# Patient Record
Sex: MALE | Race: WHITE | NOT HISPANIC OR LATINO | Employment: OTHER | URBAN - METROPOLITAN AREA
[De-identification: names, ages, dates, MRNs, and addresses within clinical notes are randomized per-mention and may not be internally consistent; named-entity substitution may affect disease eponyms.]

---

## 2017-04-05 ENCOUNTER — OFFICE VISIT (OUTPATIENT)
Dept: CARDIOLOGY | Facility: MEDICAL CENTER | Age: 82
End: 2017-04-05
Payer: MEDICARE

## 2017-04-05 VITALS
HEIGHT: 70 IN | HEART RATE: 88 BPM | SYSTOLIC BLOOD PRESSURE: 102 MMHG | WEIGHT: 157 LBS | BODY MASS INDEX: 22.48 KG/M2 | OXYGEN SATURATION: 95 % | DIASTOLIC BLOOD PRESSURE: 60 MMHG

## 2017-04-05 DIAGNOSIS — J44.9 CHRONIC OBSTRUCTIVE PULMONARY DISEASE, UNSPECIFIED COPD TYPE (HCC): ICD-10-CM

## 2017-04-05 DIAGNOSIS — E78.00 HYPERCHOLESTEROLEMIA: ICD-10-CM

## 2017-04-05 DIAGNOSIS — I10 ESSENTIAL HYPERTENSION: ICD-10-CM

## 2017-04-05 DIAGNOSIS — I25.10 CORONARY ARTERY DISEASE INVOLVING NATIVE CORONARY ARTERY OF NATIVE HEART WITHOUT ANGINA PECTORIS: ICD-10-CM

## 2017-04-05 LAB — EKG IMPRESSION: NORMAL

## 2017-04-05 PROCEDURE — 1101F PT FALLS ASSESS-DOCD LE1/YR: CPT | Mod: 8P | Performed by: NURSE PRACTITIONER

## 2017-04-05 PROCEDURE — G8420 CALC BMI NORM PARAMETERS: HCPCS | Performed by: NURSE PRACTITIONER

## 2017-04-05 PROCEDURE — G8432 DEP SCR NOT DOC, RNG: HCPCS | Performed by: NURSE PRACTITIONER

## 2017-04-05 PROCEDURE — 1036F TOBACCO NON-USER: CPT | Performed by: NURSE PRACTITIONER

## 2017-04-05 PROCEDURE — G8598 ASA/ANTIPLAT THER USED: HCPCS | Performed by: NURSE PRACTITIONER

## 2017-04-05 PROCEDURE — 93000 ELECTROCARDIOGRAM COMPLETE: CPT | Performed by: NURSE PRACTITIONER

## 2017-04-05 PROCEDURE — 99214 OFFICE O/P EST MOD 30 MIN: CPT | Performed by: NURSE PRACTITIONER

## 2017-04-05 PROCEDURE — 4040F PNEUMOC VAC/ADMIN/RCVD: CPT | Mod: 8P | Performed by: NURSE PRACTITIONER

## 2017-04-05 RX ORDER — METHOCARBAMOL 750 MG/1
750 TABLET, FILM COATED ORAL 4 TIMES DAILY
COMMUNITY
End: 2018-11-19

## 2017-04-05 ASSESSMENT — ENCOUNTER SYMPTOMS
HEADACHES: 0
LOSS OF CONSCIOUSNESS: 0
SHORTNESS OF BREATH: 0
PALPITATIONS: 0
PND: 0
FEVER: 0
BRUISES/BLEEDS EASILY: 0
ORTHOPNEA: 0
MYALGIAS: 0
ABDOMINAL PAIN: 0
DIZZINESS: 0
CHILLS: 0

## 2017-04-05 NOTE — MR AVS SNAPSHOT
"iVpiniliarocio Donato   2017 1:40 PM   Office Visit   MRN: [id number]    Department:  Heart [facility]   Dept Phone:  [phone]    Description:  Male : 1932   Provider:  MICHAELA Orellana           Reason for Visit     Follow-Up           Allergies as of 2017     No Known Allergies      You were diagnosed with     Coronary artery disease involving native coronary artery of native heart without angina pectoris   [8956856]       Chronic obstructive pulmonary disease, unspecified COPD type (CMS-HCC)   [7978526]       Essential hypertension   [8716378]       Hypercholesterolemia   [022343]         Vital Signs     Blood Pressure Pulse Height Weight Body Mass Index Oxygen Saturation    102/60 mmHg 88 1.778 m (5' 10\") 71.215 kg (157 lb) 22.53 kg/m2 95%    Smoking Status                   Former Smoker           Basic Information     Date Of Birth Sex Race Ethnicity Preferred Language    1932 Male White Non- English      Your appointments     Apr 10, 2017  3:00 PM   NM CARDIAC STRESS TEST (30) with [facility] FORTE 1   [facility] ([facility])    [address] [place] [id number]   [phone]           NPO for 4 hours prior to scan.  No caffeine for 24 hours prior to test (decaf, coffee, cola, tea, chocolate, Excedrin, and Anacin).  No Viagra or other ED meds for 48 hours.  Warn patient of length of test and to bring list of meds.              Problem List              ICD-10-CM Priority Class Noted - Resolved    Right shoulder pain M25.511 Medium  2015 - Present    SVT (supraventricular tachycardia) (CMS-HCC) I47.1 High  2015 - Present    Coronary artery disease involving native coronary artery I25.10 High  2015 - Present    Hypercholesterolemia E78.00 High  2015 - Present    GERD (gastroesophageal reflux disease) K21.9 Medium  2015 - Present    Essential hypertension I10 High  2016 - Present    COPD (chronic " obstructive pulmonary disease) (CMS-HCC) J44.9 Medium  4/5/2017 - Present      Health Maintenance        Date Due Completion Dates    IMM DTaP/Tdap/Td Vaccine (1 - Tdap) 11/6/1951 ---    COLONOSCOPY 11/6/1982 ---    IMM ZOSTER VACCINE 11/6/1992 ---    IMM PNEUMOCOCCAL 65+ (ADULT) LOW/MEDIUM RISK SERIES (1 of 2 - PCV13) 11/6/1997 ---            Results       Current Immunizations     No immunizations on file.      Below and/or attached are the medications your provider expects you to take. Review all of your home medications and newly ordered medications with your provider and/or pharmacist. Follow medication instructions as directed by your provider and/or pharmacist. Please keep your medication list with you and share with your provider. Update the information when medications are discontinued, doses are changed, or new medications (including over-the-counter products) are added; and carry medication information at all times in the event of emergency situations     Allergies:  No Known Allergies          Medications  Valid as of: April 05, 2017 -  2:25 PM    Generic Name Brand Name Tablet Size Instructions for use    Aspirin (Tab) aspirin 81 MG Take 81 mg by mouth every day.        Cyanocobalamin (Tab) vitamin B-12 1000 MCG Take 1,000 mcg by mouth every day.        Methocarbamol (Tab) ROBAXIN 750 MG Take 750 mg by mouth 4 times a day.        Nitroglycerin (SL Tab) NITROSTAT 0.4 MG Place 0.4 mg under tongue every 5 minutes as needed for Chest Pain.        Omeprazole (CAPSULE DELAYED RELEASE) PRILOSEC 20 MG Take 20 mg by mouth 2 times a day.        Pravastatin Sodium (Tab) PRAVACHOL 40 MG Take 40 mg by mouth every evening.        Spironolactone (Tab) ALDACTONE 25 MG Take 25 mg by mouth every day.        TraMADol HCl (Tab) ULTRAM 50 MG Take  mg by mouth every four hours as needed.        Verapamil HCl (Tab CR) CALAN- MG Take 1 Tab by mouth every day.        .                 Medicines prescribed today were  sent to:     [facility] [address] [place] [address], [place] - [address] [facility]    [address] [facility] [address]    Phone: [phone] Fax: [phone]    Open 24 Hours?: No      Medication refill instructions:       If your prescription bottle indicates you have medication refills left, it is not necessary to call your provider’s office. Please contact your pharmacy and they will refill your medication.    If your prescription bottle indicates you do not have any refills left, you may request refills at any time through one of the following ways: The online [facility] system (except Urgent Care), by calling your provider’s office, or by asking your pharmacy to contact your provider’s office with a refill request. Medication refills are processed only during regular business hours and may not be available until the next business day. Your provider may request additional information or to have a follow-up visit with you prior to refilling your medication.   *Please Note: Medication refills are assigned a new Rx number when refilled electronically. Your pharmacy may indicate that no refills were authorized even though a new prescription for the same medication is available at the pharmacy. Please request the medicine by name with the pharmacy before contacting your provider for a refill.        Your To Do List     Future Labs/Procedures Complete By Expires    Echocardiogram Comp w/o Cont  As directed 4/5/2018    NM-CARDIAC STRESS TEST  As directed 10/5/2017         [facility] Access Code: Activation code not generated  Current [facility] Status: Active

## 2017-04-05 NOTE — Clinical Note
[facility]   [address]   [address]  [place] [address]  Phone: [phone]  Fax: [phone]              Kyler Donato  11/6/1932    Encounter Date: 4/5/2017    MICHAELA Orellana          PROGRESS NOTE:  Subjective:   Kyler Donato is a 84 y.o. male who presents today for cardiac pre-operative clearance for left shoulder surgery.    Kyler is an 84 year old male with history of CAD, status post remote PCI/stent to the LAD in 1995, hypertension, hyperlipidemia, PSVT and COPD, previously followed by Dr. Castillo, and more recently, Dr. Blake.    He is hoping to have left shoulder surgery, given torn rotator cuff, but needs cardiac clearance. Generally, he has been doing well. At the last visit, he was told to stop his Aldactone, but he developed some chest discomfort after stopping it, which was relieved with NTG. So, he restarted it, and symptoms are improved. No palpitations; some shortness of breath related to COPD; no orthopnea or PND; no dizziness or syncope; no edema. He does have chronic pain in his left shoulder.    Past Medical History   Diagnosis Date   • Acute anterolateral myocardial infarction (CMS-[facility]) 1995   • CAD (coronary artery disease) 1995     PCI/stent to the LAD   • Hypertension    • Hyperlipidemia    • COPD (chronic obstructive pulmonary disease) (CMS-HCC)    • Shoulder pain    • PSVT (paroxysmal supraventricular tachycardia) (CMS-HCC)      Past Surgical History   Procedure Laterality Date   • Shoulder orif     • Hand surgery     • Elbowplasty       ulnar nerve transposition     Family History   Problem Relation Age of Onset   • Heart Disease Father      History   Smoking status   • Former Smoker   • Quit date: 09/24/1995   Smokeless tobacco   • Never Used     No Known Allergies  Outpatient Encounter Prescriptions as of 4/5/2017   Medication Sig Dispense Refill   • methocarbamol (ROBAXIN) 750 MG Tab Take 750  "mg by mouth 4 times a day.     • Cyanocobalamin (VITAMIN B-12) 1000 MCG Tab Take 1,000 mcg by mouth every day.     • verapamil ER (CALAN-SR) 240 MG Tab CR Take 1 Tab by mouth every day. (Patient taking differently: Take 120 mg by mouth every day.) 30 Tab 11   • omeprazole (PRILOSEC) 20 MG delayed-release capsule Take 20 mg by mouth 2 times a day.     • spironolactone (ALDACTONE) 25 MG Tab Take 25 mg by mouth every day.     • tramadol (ULTRAM) 50 MG Tab Take  mg by mouth every four hours as needed.     • aspirin 81 MG tablet Take 81 mg by mouth every day.     • pravastatin (PRAVACHOL) 40 MG tablet Take 40 mg by mouth every evening.     • nitroglycerin (NITROSTAT) 0.4 MG SL Tab Place 0.4 mg under tongue every 5 minutes as needed for Chest Pain.       No facility-administered encounter medications on file as of 4/5/2017.     Review of Systems   Constitutional: Negative for fever and chills.   Respiratory: Negative for shortness of breath.    Cardiovascular: Negative for chest pain, palpitations, orthopnea, leg swelling and PND.   Gastrointestinal: Negative for abdominal pain.   Musculoskeletal: Positive for joint pain. Negative for myalgias.        Left shoulder   Neurological: Negative for dizziness, loss of consciousness and headaches.   Endo/Heme/Allergies: Does not bruise/bleed easily.        Objective:   /60 mmHg  Pulse 88  Ht 1.778 m (5' 10\")  Wt 71.215 kg (157 lb)  BMI 22.53 kg/m2  SpO2 95%    Physical Exam   Constitutional: He is oriented to person, place, and time. He appears well-developed and well-nourished.   HENT:   Head: Normocephalic and atraumatic.   Eyes: Conjunctivae and EOM are normal.   Neck: No JVD present. No thyroid mass present.   Cardiovascular: Normal rate, regular rhythm, S1 normal, S2 normal, intact distal pulses and normal pulses.   No extrasystoles are present. PMI is not displaced.  Exam reveals no gallop.    No murmur heard.  Pulses:       Carotid pulses are 2+ on the " right side, and 2+ on the left side.       Radial pulses are 2+ on the right side, and 2+ on the left side.        Femoral pulses are 2+ on the left side.       Dorsalis pedis pulses are 2+ on the right side, and 2+ on the left side.        Posterior tibial pulses are 2+ on the right side, and 2+ on the left side.   No significant murmurs.   Pulmonary/Chest: Effort normal and breath sounds normal.   Abdominal: Soft. Normal appearance. He exhibits no abdominal bruit. There is no hepatosplenomegaly.   Musculoskeletal: Normal range of motion. He exhibits no edema.        Thoracic back: He exhibits no tenderness and no spasm.   Neurological: He is alert and oriented to person, place, and time.   Skin: No cyanosis. Nails show no clubbing.   Psychiatric: He has a normal mood and affect.     EKG today reveals: sinus rhythm at 74 bpm, with anterior infarct (unchange from previous).    Assessment:     1. Coronary artery disease involving native coronary artery of native heart without angina pectoris  [facility] (Clinic Performed)    Echocardiogram Comp w/o Cont    NM-CARDIAC STRESS TEST   2. Chronic obstructive pulmonary disease, unspecified COPD type (CMS-HCC)     3. Essential hypertension     4. Hypercholesterolemia         Medical Decision Making:  Today's Assessment / Status / Plan:     1. CAD, status post remote PCI/stent to the LAD in 1995.  Last MPI and echo were in 2015. To repeat these, given he has had some symptoms of chest pain, relieved with NTG. He is agreeable.    2. COPD, related to smoking history, stable.    3. Hypertension, treated and stable.    4. Hyperlipidemia, treated with Pravachol.    Plan as above: proceed with echo and MPI. We will determine clearance based on these results. Same medications for now.    Collaborating MD: Hernan      No Recipients

## 2017-04-05 NOTE — PROGRESS NOTES
Subjective:   Kyler Donato is a 84 y.o. male who presents today for cardiac pre-operative clearance for left shoulder surgery.    Kyler is an 84 year old male with history of CAD, status post remote PCI/stent to the LAD in 1995, hypertension, hyperlipidemia, PSVT and COPD, previously followed by Dr. Castillo, and more recently, Dr. Blake.    He is hoping to have left shoulder surgery, given torn rotator cuff, but needs cardiac clearance. Generally, he has been doing well. At the last visit, he was told to stop his Aldactone, but he developed some chest discomfort after stopping it, which was relieved with NTG. So, he restarted it, and symptoms are improved. No palpitations; some shortness of breath related to COPD; no orthopnea or PND; no dizziness or syncope; no edema. He does have chronic pain in his left shoulder.    Past Medical History   Diagnosis Date   • Acute anterolateral myocardial infarction (CMS-HCC) 1995   • CAD (coronary artery disease) 1995     PCI/stent to the LAD   • Hypertension    • Hyperlipidemia    • COPD (chronic obstructive pulmonary disease) (CMS-HCC)    • Shoulder pain    • PSVT (paroxysmal supraventricular tachycardia) (CMS-HCC)      Past Surgical History   Procedure Laterality Date   • Shoulder orif     • Hand surgery     • Elbowplasty       ulnar nerve transposition     Family History   Problem Relation Age of Onset   • Heart Disease Father      History   Smoking status   • Former Smoker   • Quit date: 09/24/1995   Smokeless tobacco   • Never Used     No Known Allergies  Outpatient Encounter Prescriptions as of 4/5/2017   Medication Sig Dispense Refill   • methocarbamol (ROBAXIN) 750 MG Tab Take 750 mg by mouth 4 times a day.     • Cyanocobalamin (VITAMIN B-12) 1000 MCG Tab Take 1,000 mcg by mouth every day.     • verapamil ER (CALAN-SR) 240 MG Tab CR Take 1 Tab by mouth every day. (Patient taking differently: Take 120 mg by mouth every day.) 30 Tab 11   • omeprazole (PRILOSEC) 20  "MG delayed-release capsule Take 20 mg by mouth 2 times a day.     • spironolactone (ALDACTONE) 25 MG Tab Take 25 mg by mouth every day.     • tramadol (ULTRAM) 50 MG Tab Take  mg by mouth every four hours as needed.     • aspirin 81 MG tablet Take 81 mg by mouth every day.     • pravastatin (PRAVACHOL) 40 MG tablet Take 40 mg by mouth every evening.     • nitroglycerin (NITROSTAT) 0.4 MG SL Tab Place 0.4 mg under tongue every 5 minutes as needed for Chest Pain.       No facility-administered encounter medications on file as of 4/5/2017.     Review of Systems   Constitutional: Negative for fever and chills.   Respiratory: Negative for shortness of breath.    Cardiovascular: Negative for chest pain, palpitations, orthopnea, leg swelling and PND.   Gastrointestinal: Negative for abdominal pain.   Musculoskeletal: Positive for joint pain. Negative for myalgias.        Left shoulder   Neurological: Negative for dizziness, loss of consciousness and headaches.   Endo/Heme/Allergies: Does not bruise/bleed easily.        Objective:   /60 mmHg  Pulse 88  Ht 1.778 m (5' 10\")  Wt 71.215 kg (157 lb)  BMI 22.53 kg/m2  SpO2 95%    Physical Exam   Constitutional: He is oriented to person, place, and time. He appears well-developed and well-nourished.   HENT:   Head: Normocephalic and atraumatic.   Eyes: Conjunctivae and EOM are normal.   Neck: No JVD present. No thyroid mass present.   Cardiovascular: Normal rate, regular rhythm, S1 normal, S2 normal, intact distal pulses and normal pulses.   No extrasystoles are present. PMI is not displaced.  Exam reveals no gallop.    No murmur heard.  Pulses:       Carotid pulses are 2+ on the right side, and 2+ on the left side.       Radial pulses are 2+ on the right side, and 2+ on the left side.        Femoral pulses are 2+ on the left side.       Dorsalis pedis pulses are 2+ on the right side, and 2+ on the left side.        Posterior tibial pulses are 2+ on the right " side, and 2+ on the left side.   No significant murmurs.   Pulmonary/Chest: Effort normal and breath sounds normal.   Abdominal: Soft. Normal appearance. He exhibits no abdominal bruit. There is no hepatosplenomegaly.   Musculoskeletal: Normal range of motion. He exhibits no edema.        Thoracic back: He exhibits no tenderness and no spasm.   Neurological: He is alert and oriented to person, place, and time.   Skin: No cyanosis. Nails show no clubbing.   Psychiatric: He has a normal mood and affect.     EKG today reveals: sinus rhythm at 74 bpm, with anterior infarct (unchange from previous).    Assessment:     1. Coronary artery disease involving native coronary artery of native heart without angina pectoris  [facility] EKG (Clinic Performed)    Echocardiogram Comp w/o Cont    NM-CARDIAC STRESS TEST   2. Chronic obstructive pulmonary disease, unspecified COPD type (CMS-HCC)     3. Essential hypertension     4. Hypercholesterolemia         Medical Decision Making:  Today's Assessment / Status / Plan:     1. CAD, status post remote PCI/stent to the LAD in 1995.  Last MPI and echo were in 2015. To repeat these, given he has had some symptoms of chest pain, relieved with NTG. He is agreeable.    2. COPD, related to smoking history, stable.    3. Hypertension, treated and stable.    4. Hyperlipidemia, treated with Pravachol.    Plan as above: proceed with echo and MPI. We will determine clearance based on these results. Same medications for now.    Collaborating MD: Hernan

## 2017-04-07 ENCOUNTER — HOSPITAL ENCOUNTER (OUTPATIENT)
Dept: CARDIOLOGY | Facility: MEDICAL CENTER | Age: 82
End: 2017-04-07
Attending: NURSE PRACTITIONER
Payer: MEDICARE

## 2017-04-07 DIAGNOSIS — I25.10 CORONARY ARTERY DISEASE INVOLVING NATIVE CORONARY ARTERY OF NATIVE HEART WITHOUT ANGINA PECTORIS: ICD-10-CM

## 2017-04-07 LAB
LV EJECT FRACT  99904: 45
LV EJECT FRACT MOD 2C 99903: 40
LV EJECT FRACT MOD 4C 99902: 41.5
LV EJECT FRACT MOD BP 99901: 52.69

## 2017-04-07 PROCEDURE — 93306 TTE W/DOPPLER COMPLETE: CPT | Mod: 26 | Performed by: INTERNAL MEDICINE

## 2017-04-07 PROCEDURE — 93306 TTE W/DOPPLER COMPLETE: CPT

## 2017-04-10 ENCOUNTER — HOSPITAL ENCOUNTER (OUTPATIENT)
Dept: RADIOLOGY | Facility: MEDICAL CENTER | Age: 82
End: 2017-04-10
Attending: NURSE PRACTITIONER
Payer: MEDICARE

## 2017-04-10 DIAGNOSIS — I25.10 CORONARY ARTERY DISEASE INVOLVING NATIVE CORONARY ARTERY OF NATIVE HEART WITHOUT ANGINA PECTORIS: ICD-10-CM

## 2017-04-10 PROCEDURE — 700111 HCHG RX REV CODE 636 W/ 250 OVERRIDE (IP)

## 2017-04-10 PROCEDURE — A9502 TC99M TETROFOSMIN: HCPCS

## 2017-04-10 RX ORDER — REGADENOSON 0.08 MG/ML
INJECTION, SOLUTION INTRAVENOUS
Status: COMPLETED
Start: 2017-04-10 | End: 2017-04-10

## 2017-04-10 RX ADMIN — REGADENOSON 0.4 MG: 0.08 INJECTION, SOLUTION INTRAVENOUS at 16:09

## 2017-05-05 ENCOUNTER — APPOINTMENT (OUTPATIENT)
Dept: RADIOLOGY | Facility: MEDICAL CENTER | Age: 82
End: 2017-05-05
Attending: EMERGENCY MEDICINE
Payer: MEDICARE

## 2017-05-05 ENCOUNTER — HOSPITAL ENCOUNTER (EMERGENCY)
Facility: MEDICAL CENTER | Age: 82
End: 2017-05-05
Attending: EMERGENCY MEDICINE
Payer: MEDICARE

## 2017-05-05 VITALS
SYSTOLIC BLOOD PRESSURE: 132 MMHG | OXYGEN SATURATION: 97 % | HEART RATE: 89 BPM | HEIGHT: 71 IN | TEMPERATURE: 97.5 F | DIASTOLIC BLOOD PRESSURE: 78 MMHG | RESPIRATION RATE: 14 BRPM

## 2017-05-05 DIAGNOSIS — M25.531 ACUTE WRIST PAIN, RIGHT: ICD-10-CM

## 2017-05-05 DIAGNOSIS — M25.331 SCAPHOLUNATE DISSOCIATION, RIGHT: ICD-10-CM

## 2017-05-05 DIAGNOSIS — S69.91XA WRIST INJURY, RIGHT, INITIAL ENCOUNTER: ICD-10-CM

## 2017-05-05 PROCEDURE — 302874 HCHG BANDAGE ACE 2 OR 3""

## 2017-05-05 PROCEDURE — A9270 NON-COVERED ITEM OR SERVICE: HCPCS | Performed by: EMERGENCY MEDICINE

## 2017-05-05 PROCEDURE — 73110 X-RAY EXAM OF WRIST: CPT | Mod: RT

## 2017-05-05 PROCEDURE — 99284 EMERGENCY DEPT VISIT MOD MDM: CPT

## 2017-05-05 PROCEDURE — 29125 APPL SHORT ARM SPLINT STATIC: CPT

## 2017-05-05 PROCEDURE — 700111 HCHG RX REV CODE 636 W/ 250 OVERRIDE (IP): Performed by: EMERGENCY MEDICINE

## 2017-05-05 PROCEDURE — 700102 HCHG RX REV CODE 250 W/ 637 OVERRIDE(OP): Performed by: EMERGENCY MEDICINE

## 2017-05-05 RX ORDER — ONDANSETRON 4 MG/1
4 TABLET, ORALLY DISINTEGRATING ORAL ONCE
Status: COMPLETED | OUTPATIENT
Start: 2017-05-05 | End: 2017-05-05

## 2017-05-05 RX ORDER — OXYCODONE HYDROCHLORIDE AND ACETAMINOPHEN 5; 325 MG/1; MG/1
1 TABLET ORAL ONCE
Status: COMPLETED | OUTPATIENT
Start: 2017-05-05 | End: 2017-05-05

## 2017-05-05 RX ORDER — OXYCODONE HYDROCHLORIDE AND ACETAMINOPHEN 5; 325 MG/1; MG/1
1-2 TABLET ORAL EVERY 4 HOURS PRN
Qty: 20 TAB | Refills: 0 | Status: SHIPPED | OUTPATIENT
Start: 2017-05-05 | End: 2018-11-19

## 2017-05-05 RX ADMIN — OXYCODONE HYDROCHLORIDE AND ACETAMINOPHEN 1 TABLET: 5; 325 TABLET ORAL at 10:02

## 2017-05-05 RX ADMIN — OXYCODONE HYDROCHLORIDE AND ACETAMINOPHEN 1 TABLET: 5; 325 TABLET ORAL at 09:08

## 2017-05-05 RX ADMIN — ONDANSETRON 4 MG: 4 TABLET, ORALLY DISINTEGRATING ORAL at 09:08

## 2017-05-05 NOTE — ED NOTES
"Pt ambulatory to triage, pt c/o rt wrist pain s/p fall a week ago, everything else has \" healed\" , but he is worried about his wrist   "

## 2017-05-05 NOTE — ED AVS SNAPSHOT
Home Care Instructions                                                                                                                Kyler Donato   MRN: [id number]    Department:  [facility], Emergency Dept   Date of Visit:  5/5/2017            [facility], Emergency Dept    [address]    [place] [address]    Phone:  [phone]      You were seen by     Hill Wick M.D.      Your Diagnosis Was     Wrist injury, right, initial encounter     S69.91XA       These are the medications you received during your hospitalization from 05/05/2017 0828 to 05/05/2017 0958     Date/Time Order Dose Route Action    05/05/2017 0908 oxycodone-acetaminophen (PERCOCET) 5-325 MG per tablet 1 Tab 1 Tab Oral Given    05/05/2017 0908 ondansetron (ZOFRAN ODT) dispertab 4 mg 4 mg Oral Given      Medication Information     Review all of your home medications and newly ordered medications with your primary doctor and/or pharmacist as soon as possible. Follow medication instructions as directed by your doctor and/or pharmacist.     Please keep your complete medication list with you and share with your physician. Update the information when medications are discontinued, doses are changed, or new medications (including over-the-counter products) are added; and carry medication information at all times in the event of emergency situations.               Medication List      START taking these medications        Instructions    Morning Afternoon Evening Bedtime    oxycodone-acetaminophen 5-325 MG Tabs   Last time this was given:  1 Tab on 5/5/2017  9:08 AM   Commonly known as:  PERCOCET        Take 1-2 Tabs by mouth every four hours as needed.   Dose:  1-2 Tab                          ASK your doctor about these medications        Instructions    Morning Afternoon Evening Bedtime    aspirin 81 MG tablet        Take 81 mg by mouth every day.   Dose:  81 mg                        methocarbamol  750 MG Tabs   Commonly known as:  ROBAXIN        Take 750 mg by mouth 4 times a day.   Dose:  750 mg                        nitroglycerin 0.4 MG Subl   Commonly known as:  NITROSTAT        Place 0.4 mg under tongue every 5 minutes as needed for Chest Pain.   Dose:  0.4 mg                        omeprazole 20 MG delayed-release capsule   Commonly known as:  PRILOSEC        Take 20 mg by mouth 2 times a day.   Dose:  20 mg                        PRAVACHOL 40 MG tablet   Generic drug:  pravastatin        Take 40 mg by mouth every evening.   Dose:  40 mg                        spironolactone 25 MG Tabs   Commonly known as:  ALDACTONE        Take 25 mg by mouth every day.   Dose:  25 mg                        tramadol 50 MG Tabs   Commonly known as:  ULTRAM        Take  mg by mouth every four hours as needed.   Dose:   mg                        verapamil  MG Tbcr   Commonly known as:  CALAN-SR        Doctor's comments:  RX was printed by mistake, sending electronically..   Take 1 Tab by mouth every day.   Dose:  240 mg                        vitamin B-12 1000 MCG Tabs        Take 1,000 mcg by mouth every day.   Dose:  1000 mcg                             Where to Get Your Medications      You can get these medications from any pharmacy     Bring a paper prescription for each of these medications    - oxycodone-acetaminophen 5-325 MG Tabs            Procedures and tests performed during your visit     DX-WRIST-COMPLETE 3+ RIGHT        Discharge Instructions       Wrist Pain  Wrist injuries are frequent in adults and children. A sprain is an injury to the ligaments that hold your bones together. A strain is an injury to muscle or muscle cord-like structures (tendons) from stretching or pulling. Generally, when wrists are moderately tender to touch following a fall or injury, a break in the bone (fracture) may be present. Most wrist sprains or strains are better in 3 to 5 days, but complete healing may  take several weeks.  HOME CARE INSTRUCTIONS   · Put ice on the injured area.  ¨ Put ice in a plastic bag.  ¨ Place a towel between your skin and the bag.  ¨ Leave the ice on for 15-20 minutes, 3-4 times a day, for the first 2 days, or as directed by your health care provider.  · Keep your arm raised above the level of your heart whenever possible to reduce swelling and pain.  · Rest the injured area for at least 48 hours or as directed by your health care provider.  · If a splint or elastic bandage has been applied, use it for as long as directed by your health care provider or until seen by a health care provider for a follow-up exam.  · Only take over-the-counter or prescription medicines for pain, discomfort, or fever as directed by your health care provider.  · Keep all follow-up appointments. You may need to follow up with a specialist or have follow-up X-rays. Improvement in pain level is not a guarantee that you did not fracture a bone in your wrist. The only way to determine whether or not you have a broken bone is by X-ray.  SEEK IMMEDIATE MEDICAL CARE IF:   · Your fingers are swollen, very red, white, or cold and blue.  · Your fingers are numb or tingling.  · You have increasing pain.  · You have difficulty moving your fingers.  MAKE SURE YOU:   · Understand these instructions.  · Will watch your condition.  · Will get help right away if you are not doing well or get worse.     This information is not intended to replace advice given to you by your health care provider. Make sure you discuss any questions you have with your health care provider.     Document Released: 09/27/2006 Document Revised: 12/23/2014 Document Reviewed: 02/08/2012  Elsevier Interactive Patient Education ©2016 [employer]            Patient Information     Patient Information    Following emergency treatment: all patient requiring follow-up care must return either to a private physician or a clinic if your condition worsens before  you are able to obtain further medical attention, please return to the emergency room.     Billing Information    At [facility], we work to make the billing process streamlined for our patients.  Our Representatives are here to answer any questions you may have regarding your hospital bill.  If you have insurance coverage and have supplied your insurance information to us, we will submit a claim to your insurer on your behalf.  Should you have any questions regarding your bill, we can be reached online or by phone as follows:  Online: You are able pay your bills online or live chat with our representatives about any billing questions you may have. We are here to help Monday - Friday from 8:00am to 7:30pm and 9:00am - 12:00pm on Saturdays.  Please visit [email or web address]  for more information.   Phone:  [phone] or [phone]    Please note that your emergency physician, surgeon, pathologist, radiologist, anesthesiologist, and other specialists are not employed by [facility] and will therefore bill separately for their services.  Please contact them directly for any questions concerning their bills at the numbers below:     Emergency Physician Services:  [phone]  [facility]:  [phone]  [facility]:  [phone]  [facility]:  [phone]    1. Your final bill may vary from the amount quoted upon discharge if all procedures are not complete at that time, or if your doctor has additional procedures of which we are not aware. You will receive an additional bill if you return to the Emergency Department at [facility] for suture removal regardless of the facility of which the sutures were placed.     2. Please arrange for settlement of this account at the emergency registration.    3. All self-pay accounts are due in full at the time of treatment.  If you are unable to meet this obligation then payment is expected  within 4-5 days.     4. If you have had radiology studies (CT, X-ray, Ultrasound, MRI), you have received a preliminary result during your emergency department visit. Please contact the radiology department [phone] to receive a copy of your final result. Please discuss the Final result with your primary physician or with the follow up physician provided.     Crisis Hotline:  [place] Crisis Hotline:  4-486-CESZIIX or 1-621.977.7659  [place] Crisis Hotline:    1-472.223.3710 or 406-640-6117         ED Discharge Follow Up Questions    1. In order to provide you with very good care, we would like to follow up with a phone call in the next few days.  May we have your permission to contact you?     YES /  NO    2. What is the best phone number to call you? (       )_____-__________    3. What is the best time to call you?      Morning  /  Afternoon  /  Evening                   Patient Signature:  ____________________________________________________________    Date:  ____________________________________________________________

## 2017-05-05 NOTE — ED AVS SNAPSHOT
Cerac Access Code: Activation code not generated  Current Cerac Status: Active    [email or web address]  A secure, online tool to manage your health information     [employer] Cerac® is a secure, online tool that connects you to your personalized health information from the privacy of your home -- day or night - making it very easy for you to manage your healthcare. Once the activation process is completed, you can even access your medical information using the Cerac dana, which is available for free in the Apple Dana store or Google Play store.     Cerac provides the following levels of access (as shown below):   My Chart Features   [facility] Primary Care Doctor [facility]  Specialists [facility]  Urgent  Care Non-[facility]  Primary Care  Doctor   Email your healthcare team securely and privately 24/7 X X X X   Manage appointments: schedule your next appointment; view details of past/upcoming appointments X      Request prescription refills. X      View recent personal medical records, including lab and immunizations X X X X   View health record, including health history, allergies, medications X X X X   Read reports about your outpatient visits, procedures, consult and ER notes X X X X   See your discharge summary, which is a recap of your hospital and/or ER visit that includes your diagnosis, lab results, and care plan. X X       How to register for Cerac:  1. Go to  [email or web address].  2. Click on the Sign Up Now box, which takes you to the New Member Sign Up page. You will need to provide the following information:  a. Enter your Cerac Access Code exactly as it appears at the top of this page. (You will not need to use this code after you’ve completed the sign-up process. If you do not sign up before the expiration date, you must request a new code.)   b. Enter your date of birth.   c. Enter your home email address.   d. Click Submit, and follow the next screen’s instructions.  3. Create a Cerac ID. This will  be your [employer] login ID and cannot be changed, so think of one that is secure and easy to remember.  4. Create a [employer] password. You can change your password at any time.  5. Enter your Password Reset Question and Answer. This can be used at a later time if you forget your password.   6. Enter your e-mail address. This allows you to receive e-mail notifications when new information is available in [employer].  7. Click Sign Up. You can now view your health information.    For assistance activating your [employer] account, call [phone]

## 2017-05-05 NOTE — ED PROVIDER NOTES
ED Provider Note    Scribed for Hill Wick M.D. by Lucina Villatoro. 5/5/2017  8:59 AM    Primary care provider: Hernan Trimble Jr., M.D.  Means of arrival: Walk-in   History obtained from: Patient   History limited by: None     CHIEF COMPLAINT  Chief Complaint   Patient presents with   • T-5000 Extremity Pain       HPI  Kyler Donato is a 84 y.o. male who presents to the Emergency Department due to worsening pain to the right wrist. Patient fell 2 weeks ago and was seen at [facility]. He was discharged one week ago. Patient states his other injuries have healed but his wrist pain remains severe. He rates his pain as a 15/10 in severity. His pain radiates up the right arm. He denies further musculoskeletal pain    REVIEW OF SYSTEMS  Pertinent positives include right wrist pain.   Pertinent negatives include no further musculoskeletal pain.    All other systems reviewed and negative.  C     PAST MEDICAL HISTORY   has a past medical history of Acute anterolateral myocardial infarction (CMS-[facility]) (1995); CAD (coronary artery disease) (1995); Hypertension; Hyperlipidemia; COPD (chronic obstructive pulmonary disease) (CMS-HCC); Shoulder pain; and PSVT (paroxysmal supraventricular tachycardia) (CMS-HCC).    SURGICAL HISTORY   has past surgical history that includes shoulder orif; hand surgery; and elbowplasty.    SOCIAL HISTORY  Social History   Substance Use Topics   • Smoking status: Former Smoker     Quit date: 09/24/1995   • Smokeless tobacco: Never Used   • Alcohol Use: Yes      History   Drug Use No       FAMILY HISTORY  Family History   Problem Relation Age of Onset   • Heart Disease Father        CURRENT MEDICATIONS  No current facility-administered medications on file prior to encounter.     Current Outpatient Prescriptions on File Prior to Encounter   Medication Sig Dispense Refill   • methocarbamol (ROBAXIN) 750 MG Tab Take 750 mg by mouth 4 times a day.     • Cyanocobalamin (VITAMIN B-12) 1000 MCG Tab Take  "1,000 mcg by mouth every day.     • verapamil ER (CALAN-SR) 240 MG Tab CR Take 1 Tab by mouth every day. (Patient taking differently: Take 120 mg by mouth every day.) 30 Tab 11   • omeprazole (PRILOSEC) 20 MG delayed-release capsule Take 20 mg by mouth 2 times a day.     • spironolactone (ALDACTONE) 25 MG Tab Take 25 mg by mouth every day.     • pravastatin (PRAVACHOL) 40 MG tablet Take 40 mg by mouth every evening.     • tramadol (ULTRAM) 50 MG Tab Take  mg by mouth every four hours as needed.     • nitroglycerin (NITROSTAT) 0.4 MG SL Tab Place 0.4 mg under tongue every 5 minutes as needed for Chest Pain.     • aspirin 81 MG tablet Take 81 mg by mouth every day.         ALLERGIES  No Known Allergies    PHYSICAL EXAM  VITAL SIGNS: /78 mmHg  Pulse 89  Temp(Src) 36.4 °C (97.5 °F) (Temporal)  Resp 14  Ht 1.803 m (5' 11\")  SpO2 97%    Nursing note and vitals reviewed.  Constitutional: Well-developed and well-nourished. No distress.   HENT: Head is normocephalic and atraumatic. Oropharynx is clear and moist without exudate or erythema. Healing bilateral periorbital ecchymosis.   Eyes: Pupils are equal, round, and reactive to light. Conjunctiva are normal.   Cardiovascular: Normal rate and regular rhythm. No murmur heard. Normal radial pulses.  Pulmonary/Chest: Breath sounds normal. No wheezes or rales.   Abdominal: Soft and non-tender. No distention    Musculoskeletal: Extremities exhibit normal range of motion. Tenderness with bony deformity of the distal right forearm.   Neurological: Awake, alert and oriented to person, place, and time. No focal deficits noted.  Skin: Skin is warm and dry. No rash.   Psychiatric: Normal mood and affect. Appropriate for clinical situation    RADIOLOGY  DX-WRIST-COMPLETE 3+ RIGHT   Final Result      1.  No acute fractures identified.      2.  Widening of the scapholunate interval is consistent with ligamentous injury, age indeterminate.      3.  Severe arthropathy of " the lunate capitate, lunotriquetral, and first carpometacarpal joints.      4.  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      The radiologist's interpretation of all radiological studies have been reviewed by me.    COURSE & MEDICAL DECISION MAKING  Nursing notes, VS, PMSFHx reviewed in chart.     8:59 AM - Patient seen and examined at bedside. Patient will be treated with Percocet 325 mg oral and Zofran ODT 4 mg oral. Ordered right wrist x-ray to evaluate his symptoms. Will evaluate for wrist fracture.     9:43 AM Reviewed radiology results which indicate ligamentous injury without fracture, as indicated above.     9:53 AM Recheck: patient is resting in bed. He reports persistent pain. I will give Percocet 325 mg oral. I informed him of radiology results indicating ligamentous injury. He will be placed in a splint and discharged home with prescriptions for Percocet. Patient should follow up with orthopedics as soon as possible. Patient understands and agrees.     The patient was discharged home with an information sheet on wrist injury and told to return immediately for any signs or symptoms listed.  The patient agreed to the discharge precautions and follow-up plan which is documented in EPIC.       I reviewed prescription monitoring program for patient's narcotic use before prescribing a scheduled drug.The patient will not drink alcohol nor drive with prescribed medications. The patient will return for new or worsening symptoms and is stable at the time of discharge.    The patient is referred to a primary physician for blood pressure management, diabetic screening, and for all other preventative health concerns.    DISPOSITION:  Patient will be discharged home in stable condition.    FOLLOW UP:  No follow-up provider specified.    OUTPATIENT MEDICATIONS:  New Prescriptions    OXYCODONE-ACETAMINOPHEN  (PERCOCET) 5-325 MG TAB    Take 1-2 Tabs by mouth every four hours as needed.           FINAL IMPRESSION  1. Wrist injury, right, initial encounter    2. Acute wrist pain, right    3. Scapholunate dissociation, right          Lucina MOLINA (Scribsonia), am scribing for, and in the presence of, Hill Wick M.D..    Electronically signed by: Lucina Villatoro (Scribe), 5/5/2017    IHill M.D. personally performed the services described in this documentation, as scribed by Lucina Villatoro in my presence, and it is both accurate and complete.    The note accurately reflects work and decisions made by me.  Hill Wick  5/5/2017  11:40 AM

## 2017-05-05 NOTE — ED AVS SNAPSHOT
5/5/2017    Kyler Donato  [address] [address]    Dear Kyler:    [facility] wants to ensure your discharge home is safe and you or your loved ones have had all of your questions answered regarding your care after you leave the hospital.    Below is a list of resources and contact information should you have any questions regarding your hospital stay, follow-up instructions, or active medical symptoms.    Questions or Concerns Regarding… Contact   Medical Questions Related to Your Discharge  (7 days a week, 8am-5pm) Contact a Nurse Care Coordinator   [phone]   Medical Questions Not Related to Your Discharge  (24 hours a day / 7 days a week)  Contact the Nurse Health Line   [phone]    Medications or Discharge Instructions Refer to your discharge packet   or contact your [facility] Primary Care Provider   [phone]   Follow-up Appointment(s) Schedule your appointment via [employer]   or contact Scheduling [phone]   Billing Review your statement via [employer]  or contact Billing [phone]   Medical Records Review your records via [employer]   or contact Medical Records [phone]     You may receive a telephone call within two days of discharge. This call is to make certain you understand your discharge instructions and have the opportunity to have any questions answered. You can also easily access your medical information, test results and upcoming appointments via the [employer] free online health management tool. You can learn more and sign up at [employer]. For assistance setting up your [employer] account, please call [phone].    Once again, we want to ensure your discharge home is safe and that you have a clear understanding of any next steps in your care. If you have any questions or concerns, please do not hesitate to contact us, we are here for you. Thank you for choosing [facility] for your healthcare needs.    Sincerely,    Your [facility] Healthcare Team

## 2017-07-18 ENCOUNTER — OFFICE VISIT (OUTPATIENT)
Dept: CARDIOLOGY | Facility: MEDICAL CENTER | Age: 82
End: 2017-07-18
Payer: MEDICARE

## 2017-07-18 VITALS
HEIGHT: 66 IN | OXYGEN SATURATION: 97 % | HEART RATE: 86 BPM | SYSTOLIC BLOOD PRESSURE: 94 MMHG | WEIGHT: 146 LBS | DIASTOLIC BLOOD PRESSURE: 52 MMHG | BODY MASS INDEX: 23.46 KG/M2

## 2017-07-18 DIAGNOSIS — I10 ESSENTIAL HYPERTENSION: ICD-10-CM

## 2017-07-18 DIAGNOSIS — I25.10 CORONARY ARTERY DISEASE INVOLVING NATIVE CORONARY ARTERY OF NATIVE HEART WITHOUT ANGINA PECTORIS: ICD-10-CM

## 2017-07-18 DIAGNOSIS — R07.89 OTHER CHEST PAIN: ICD-10-CM

## 2017-07-18 DIAGNOSIS — E78.00 HYPERCHOLESTEROLEMIA: ICD-10-CM

## 2017-07-18 DIAGNOSIS — J44.9 CHRONIC OBSTRUCTIVE PULMONARY DISEASE, UNSPECIFIED COPD TYPE (HCC): ICD-10-CM

## 2017-07-18 PROBLEM — R07.9 CHEST PAIN: Status: ACTIVE | Noted: 2017-07-18

## 2017-07-18 PROCEDURE — 93000 ELECTROCARDIOGRAM COMPLETE: CPT | Performed by: NURSE PRACTITIONER

## 2017-07-18 PROCEDURE — 99214 OFFICE O/P EST MOD 30 MIN: CPT | Performed by: NURSE PRACTITIONER

## 2017-07-18 RX ORDER — NITROGLYCERIN 0.4 MG/1
0.4 TABLET SUBLINGUAL PRN
Qty: 25 TAB | Refills: 3 | Status: SHIPPED | OUTPATIENT
Start: 2017-07-18 | End: 2018-11-19

## 2017-07-18 ASSESSMENT — ENCOUNTER SYMPTOMS
LOSS OF CONSCIOUSNESS: 0
ORTHOPNEA: 0
HEADACHES: 0
PALPITATIONS: 0
FEVER: 0
ABDOMINAL PAIN: 0
CHILLS: 0
BRUISES/BLEEDS EASILY: 0
PND: 0
SHORTNESS OF BREATH: 0
DIZZINESS: 0
MYALGIAS: 0

## 2017-07-18 NOTE — MR AVS SNAPSHOT
"        Kyler Ryenolds   2017 2:00 PM   Office Visit   MRN: [id number]    Department:  [facility] BETTYE Mark   Dept Phone:  [phone]    Description:  Male : 1932   Provider:  MICHAELA Orellana           Allergies as of 2017     No Known Allergies      You were diagnosed with     Other chest pain   [786.59.ICD-9-CM]       Coronary artery disease involving native coronary artery of native heart without angina pectoris   [0986332]         Vital Signs     Blood Pressure Pulse Height Weight Body Mass Index Oxygen Saturation    94/52 mmHg 86 1.676 m (5' 5.98\") 66.225 kg (146 lb) 23.58 kg/m2 97%    Smoking Status                   Former Smoker           Basic Information     Date Of Birth Sex Race Ethnicity Preferred Language    1932 Male White Non- English      Problem List              ICD-10-CM Priority Class Noted - Resolved    Right shoulder pain M25.511 Medium  2015 - Present    SVT (supraventricular tachycardia) (CMS-HCC) I47.1 High  2015 - Present    Coronary artery disease involving native coronary artery I25.10 High  2015 - Present    Hypercholesterolemia E78.00 High  2015 - Present    GERD (gastroesophageal reflux disease) K21.9 Medium  2015 - Present    Essential hypertension I10 High  2016 - Present    COPD (chronic obstructive pulmonary disease) (CMS-HCC) J44.9 Medium  2017 - Present    Chest pain R07.9 Medium  2017 - Present      Health Maintenance        Date Due Completion Dates    IMM DTaP/Tdap/Td Vaccine (1 - Tdap) 1951 ---    COLONOSCOPY 1982 ---    IMM ZOSTER VACCINE 1992 ---    IMM PNEUMOCOCCAL 65+ (ADULT) LOW/MEDIUM RISK SERIES (1 of 2 - PCV13) 1997 ---    IMM INFLUENZA (1) 2017 ---            Results       Current Immunizations     No immunizations on file.      Below and/or attached are the medications your provider expects you to take. Review all of your home medications and newly ordered medications " with your provider and/or pharmacist. Follow medication instructions as directed by your provider and/or pharmacist. Please keep your medication list with you and share with your provider. Update the information when medications are discontinued, doses are changed, or new medications (including over-the-counter products) are added; and carry medication information at all times in the event of emergency situations     Allergies:  No Known Allergies          Medications  Valid as of: July 18, 2017 -  2:31 PM    Generic Name Brand Name Tablet Size Instructions for use    Aspirin (Tab) aspirin 81 MG Take 81 mg by mouth every day.        Cholecalciferol (Cap) Vitamin D3 2000 UNIT Take  by mouth.        Cyanocobalamin (Tab) vitamin B-12 1000 MCG Take 1,000 mcg by mouth every day.        Methocarbamol (Tab) ROBAXIN 750 MG Take 750 mg by mouth 4 times a day.        Nitroglycerin (SL Tab) NITROSTAT 0.4 MG Place 1 Tab under tongue as needed for Chest Pain.        Omeprazole (CAPSULE DELAYED RELEASE) PRILOSEC 20 MG Take 20 mg by mouth 2 times a day.        Oxycodone-Acetaminophen (Tab) PERCOCET 5-325 MG Take 1-2 Tabs by mouth every four hours as needed.        Pravastatin Sodium (Tab) PRAVACHOL 40 MG Take 40 mg by mouth every evening.        Spironolactone (Tab) ALDACTONE 25 MG Take 25 mg by mouth every day.        TraMADol HCl (Tab) ULTRAM 50 MG Take  mg by mouth every four hours as needed.        Verapamil HCl (Tab CR) CALAN- MG Take 1 Tab by mouth every day.        .                 Medicines prescribed today were sent to:     [facility] [address] [place], [place] [address] [facility] [address]    Phone: [phone] Fax: [phone]    Open 24 Hours?: No      Medication refill instructions:       If your prescription bottle indicates you have medication refills left, it is not necessary to call your provider’s office. Please contact your pharmacy and they will refill your  medication.    If your prescription bottle indicates you do not have any refills left, you may request refills at any time through one of the following ways: The online [employer] system (except Urgent Care), by calling your provider’s office, or by asking your pharmacy to contact your provider’s office with a refill request. Medication refills are processed only during regular business hours and may not be available until the next business day. Your provider may request additional information or to have a follow-up visit with you prior to refilling your medication.   *Please Note: Medication refills are assigned a new Rx number when refilled electronically. Your pharmacy may indicate that no refills were authorized even though a new prescription for the same medication is available at the pharmacy. Please request the medicine by name with the pharmacy before contacting your provider for a refill.           [employer] Access Code: Activation code not generated  Current [employer] Status: Active

## 2017-07-18 NOTE — PROGRESS NOTES
Subjective:   Kyler Donato is a 84 y.o. male who presents today for follow-up of ongoing chest pain and shoulder pain.    Kyler is an 84 year old male with history of CAD, status post remote PCI/stent to the LAD in 1995, hypertension, hyperlipidemia, PSVT and COPD, normally followed by Dr. Blake. At last FU in April 2017, he was having left shoulder surgery, and was considering surgery to repair a torn rotator cuff. Echocardiogram and MPI were done.    Since then, he has been having some chest discomfort, in addition to ongoing shoulder pain in both the right and left shoulders. He has taken NTG with relief, and this concerns him. No palpitations; some shortness of breath related to COPD; no orthopnea or PND; no dizziness or syncope; no edema. He has put shoulder surgery on hold for now.    Past Medical History   Diagnosis Date   • Acute anterolateral myocardial infarction (CMS-HCC) 1995   • CAD (coronary artery disease) 1995     PCI/stent to the LAD. April 2017: MPI with scar in anterior/inferior wall, no ischemia, LVEF 30%. Echocardiogram with fixed defects in anterior wall and septal inferior wall, no ischemia, LVEF 40%.   • Hypertension    • Hyperlipidemia    • COPD (chronic obstructive pulmonary disease) (CMS-HCC)    • Shoulder pain    • PSVT (paroxysmal supraventricular tachycardia) (CMS-HCC)      Past Surgical History   Procedure Laterality Date   • Shoulder orif     • Hand surgery     • Elbowplasty       ulnar nerve transposition     Family History   Problem Relation Age of Onset   • Heart Disease Father      History   Smoking status   • Former Smoker   • Quit date: 09/24/1995   Smokeless tobacco   • Never Used     No Known Allergies  Outpatient Encounter Prescriptions as of 7/18/2017   Medication Sig Dispense Refill   • Cholecalciferol (VITAMIN D3) 2000 UNIT Cap Take  by mouth.     • nitroglycerin (NITROSTAT) 0.4 MG SL Tab Place 1 Tab under tongue as needed for Chest Pain. 25 Tab 3   •  "methocarbamol (ROBAXIN) 750 MG Tab Take 750 mg by mouth 4 times a day.     • Cyanocobalamin (VITAMIN B-12) 1000 MCG Tab Take 1,000 mcg by mouth every day.     • verapamil ER (CALAN-SR) 240 MG Tab CR Take 1 Tab by mouth every day. (Patient taking differently: Take 120 mg by mouth every day.) 30 Tab 11   • omeprazole (PRILOSEC) 20 MG delayed-release capsule Take 20 mg by mouth 2 times a day.     • spironolactone (ALDACTONE) 25 MG Tab Take 25 mg by mouth every day.     • pravastatin (PRAVACHOL) 40 MG tablet Take 40 mg by mouth every evening.     • tramadol (ULTRAM) 50 MG Tab Take  mg by mouth every four hours as needed.     • aspirin 81 MG tablet Take 81 mg by mouth every day.     • oxycodone-acetaminophen (PERCOCET) 5-325 MG Tab Take 1-2 Tabs by mouth every four hours as needed. 20 Tab 0   • [DISCONTINUED] nitroglycerin (NITROSTAT) 0.4 MG SL Tab Place 0.4 mg under tongue every 5 minutes as needed for Chest Pain.       No facility-administered encounter medications on file as of 7/18/2017.     Review of Systems   Constitutional: Positive for malaise/fatigue. Negative for fever and chills.   Respiratory: Negative for shortness of breath.    Cardiovascular: Positive for chest pain. Negative for palpitations, orthopnea, leg swelling and PND.   Gastrointestinal: Negative for abdominal pain.   Musculoskeletal: Positive for joint pain. Negative for myalgias.        Left shoulder   Neurological: Negative for dizziness, loss of consciousness and headaches.   Endo/Heme/Allergies: Does not bruise/bleed easily.        Objective:   BP 94/52 mmHg  Pulse 86  Ht 1.676 m (5' 5.98\")  Wt 66.225 kg (146 lb)  BMI 23.58 kg/m2  SpO2 97%    Physical Exam   Constitutional: He is oriented to person, place, and time. He appears well-developed and well-nourished.   HENT:   Head: Normocephalic and atraumatic.   Eyes: Conjunctivae and EOM are normal.   Neck: No JVD present. No thyroid mass present.   Cardiovascular: Normal rate, regular " rhythm, S1 normal, S2 normal, intact distal pulses and normal pulses.   No extrasystoles are present. PMI is not displaced.  Exam reveals no gallop.    No murmur heard.  Pulses:       Carotid pulses are 2+ on the right side, and 2+ on the left side.       Radial pulses are 2+ on the right side, and 2+ on the left side.        Femoral pulses are 2+ on the left side.       Dorsalis pedis pulses are 2+ on the right side, and 2+ on the left side.        Posterior tibial pulses are 2+ on the right side, and 2+ on the left side.   No significant murmurs.   Pulmonary/Chest: Effort normal and breath sounds normal.   Abdominal: Soft. Normal appearance. He exhibits no abdominal bruit. There is no hepatosplenomegaly.   Musculoskeletal: Normal range of motion. He exhibits no edema.        Thoracic back: He exhibits no tenderness and no spasm.   Neurological: He is alert and oriented to person, place, and time.   Skin: No cyanosis. Nails show no clubbing.   Psychiatric: He has a normal mood and affect.     EKG today reveals sinus rhythm with first degree AV block.     NUCLEAR IMAGING INTERPRETATION OF 4/10/2017 - REVIEWED WITH PATIENT:   Large sized, nonreversible, decreased uptake of severe severity in the apex    (LAD), anterior (LAD), apical septal (LAD), apical inferior and mid    anteroseptal (LAD) segments during post stress images suggestive of scar.  No      evidence of ischemia.   Enlarged ventricle with global hypokinesis and likely akinesis of the    anteroseptal and anterior walls (difficult to determine with severely    decrease radiotracer uptake). Measured ejection fraction is 30%.   ECG INTERPRETATION   Negative stress ECG for ischemia.    CONCLUSIONS OF ECHOCARDIOGRAM OF 4/7/2017 - REVIEWED WITH PATIENT:  No prior study is available for comparison.   Left ventricular ejection fraction is visually estimated to be 45%.  Akinetic apical segment.  Mid to distal anterior wall is akinetic.  Mild mitral  regurgitation.  Aortic sclerosis without stenosis.  Unable to estimate pulmonary artery pressure due to an inadequate   tricuspid regurgitant jet.  Normal aortic root for body surface area.    Assessment:     1. Other chest pain  [facility] EKG (Clinic Performed)    nitroglycerin (NITROSTAT) 0.4 MG SL Tab   2. Coronary artery disease involving native coronary artery of native heart without angina pectoris  [facility] EKG (Clinic Performed)    nitroglycerin (NITROSTAT) 0.4 MG SL Tab   3. Essential hypertension     4. Hypercholesterolemia     5. Chronic obstructive pulmonary disease, unspecified COPD type (CMS-HCC)         Medical Decision Making:  Today's Assessment / Status / Plan:     1. Chest pain, with history of CAD with remote stent to the LAD. Given results of MPI and more recent symptoms, to proceed with angiogram, and he is agreeable. He is given new Rx for NTG to use as needed. Reviewed EMS too.    2. Hypertension, treated and stable. BP is on the lower side today.    3. Hyperlipidemia, treated with Pravachol.    4. COPD, stable.    As above, to proceed with angiogram. Use NTG as needed. Same medications for now.    Collaborating MD: Hernan

## 2017-07-19 ENCOUNTER — TELEPHONE (OUTPATIENT)
Dept: CARDIOLOGY | Facility: MEDICAL CENTER | Age: 82
End: 2017-07-19

## 2017-07-19 NOTE — TELEPHONE ENCOUNTER
----- Message from MICHAELA Orellana sent at 7/18/2017  2:53 PM PDT -----  Regarding: Angiogram please  Ubaldo,    Can you please schedule an angiogram for this patient?    DX: Chest pain, abnormal MPI, history of CAD (with remote stent of LAD in 1995)    I will get orders faxed to you, but probably tomorrow, as I'll need to get an MD's signature.    Thanks!

## 2017-07-19 NOTE — TELEPHONE ENCOUNTER
Pt is scheduled on 7-21-17 for a [facility] w/poss with  at [facility]. No meds to stop. Patient was told to check in at 6:00 am for a 7:30 procedure.

## 2017-07-21 ENCOUNTER — HOSPITAL ENCOUNTER (OUTPATIENT)
Facility: MEDICAL CENTER | Age: 82
End: 2017-07-21
Attending: INTERNAL MEDICINE | Admitting: INTERNAL MEDICINE
Payer: MEDICARE

## 2017-07-21 VITALS
HEIGHT: 71 IN | SYSTOLIC BLOOD PRESSURE: 111 MMHG | HEART RATE: 72 BPM | OXYGEN SATURATION: 96 % | WEIGHT: 135.8 LBS | TEMPERATURE: 97.6 F | BODY MASS INDEX: 19.01 KG/M2 | DIASTOLIC BLOOD PRESSURE: 68 MMHG | RESPIRATION RATE: 16 BRPM

## 2017-07-21 DIAGNOSIS — I47.10 SVT (SUPRAVENTRICULAR TACHYCARDIA): ICD-10-CM

## 2017-07-21 LAB
ANION GAP SERPL CALC-SCNC: 6 MMOL/L (ref 0–11.9)
BUN SERPL-MCNC: 23 MG/DL (ref 8–22)
CALCIUM SERPL-MCNC: 10.1 MG/DL (ref 8.5–10.5)
CHLORIDE SERPL-SCNC: 105 MMOL/L (ref 96–112)
CO2 SERPL-SCNC: 26 MMOL/L (ref 20–33)
CREAT SERPL-MCNC: 1.38 MG/DL (ref 0.5–1.4)
ERYTHROCYTE [DISTWIDTH] IN BLOOD BY AUTOMATED COUNT: 47.8 FL (ref 35.9–50)
GFR SERPL CREATININE-BSD FRML MDRD: 49 ML/MIN/1.73 M 2
GLUCOSE SERPL-MCNC: 98 MG/DL (ref 65–99)
HCT VFR BLD AUTO: 35.8 % (ref 42–52)
HGB BLD-MCNC: 11.7 G/DL (ref 14–18)
INR PPP: 1.13 (ref 0.87–1.13)
MCH RBC QN AUTO: 31 PG (ref 27–33)
MCHC RBC AUTO-ENTMCNC: 32.7 G/DL (ref 33.7–35.3)
MCV RBC AUTO: 95 FL (ref 81.4–97.8)
PLATELET # BLD AUTO: 242 K/UL (ref 164–446)
PMV BLD AUTO: 9.7 FL (ref 9–12.9)
POTASSIUM SERPL-SCNC: 4.1 MMOL/L (ref 3.6–5.5)
PROTHROMBIN TIME: 14.9 SEC (ref 12–14.6)
RBC # BLD AUTO: 3.77 M/UL (ref 4.7–6.1)
SODIUM SERPL-SCNC: 137 MMOL/L (ref 135–145)
WBC # BLD AUTO: 7 K/UL (ref 4.8–10.8)

## 2017-07-21 PROCEDURE — C1894 INTRO/SHEATH, NON-LASER: HCPCS

## 2017-07-21 PROCEDURE — 93458 L HRT ARTERY/VENTRICLE ANGIO: CPT

## 2017-07-21 PROCEDURE — 700111 HCHG RX REV CODE 636 W/ 250 OVERRIDE (IP)

## 2017-07-21 PROCEDURE — 700101 HCHG RX REV CODE 250

## 2017-07-21 PROCEDURE — 304952 HCHG R 2 PADS

## 2017-07-21 PROCEDURE — A9270 NON-COVERED ITEM OR SERVICE: HCPCS | Performed by: INTERNAL MEDICINE

## 2017-07-21 PROCEDURE — 85027 COMPLETE CBC AUTOMATED: CPT

## 2017-07-21 PROCEDURE — 307093 HCHG TR BAND RADIAL

## 2017-07-21 PROCEDURE — C1769 GUIDE WIRE: HCPCS

## 2017-07-21 PROCEDURE — 700111 HCHG RX REV CODE 636 W/ 250 OVERRIDE (IP): Performed by: INTERNAL MEDICINE

## 2017-07-21 PROCEDURE — 160002 HCHG RECOVERY MINUTES (STAT)

## 2017-07-21 PROCEDURE — 360979 HCHG DIAGNOSTIC CATH

## 2017-07-21 PROCEDURE — C1887 CATHETER, GUIDING: HCPCS

## 2017-07-21 PROCEDURE — 99153 MOD SED SAME PHYS/QHP EA: CPT

## 2017-07-21 PROCEDURE — 700102 HCHG RX REV CODE 250 W/ 637 OVERRIDE(OP): Performed by: INTERNAL MEDICINE

## 2017-07-21 PROCEDURE — 80048 BASIC METABOLIC PNL TOTAL CA: CPT

## 2017-07-21 PROCEDURE — 85610 PROTHROMBIN TIME: CPT

## 2017-07-21 PROCEDURE — 99152 MOD SED SAME PHYS/QHP 5/>YRS: CPT

## 2017-07-21 RX ORDER — VERAPAMIL HYDROCHLORIDE 2.5 MG/ML
INJECTION, SOLUTION INTRAVENOUS
Status: COMPLETED
Start: 2017-07-21 | End: 2017-07-21

## 2017-07-21 RX ORDER — SODIUM CHLORIDE 9 MG/ML
INJECTION, SOLUTION INTRAVENOUS CONTINUOUS
Status: DISCONTINUED | OUTPATIENT
Start: 2017-07-21 | End: 2017-07-21 | Stop reason: HOSPADM

## 2017-07-21 RX ORDER — LISINOPRIL 10 MG/1
10 TABLET ORAL DAILY
Qty: 30 TAB | Refills: 1 | Status: SHIPPED | OUTPATIENT
Start: 2017-07-21 | End: 2018-11-19

## 2017-07-21 RX ORDER — DIPHENHYDRAMINE HYDROCHLORIDE 50 MG/ML
25 INJECTION INTRAMUSCULAR; INTRAVENOUS EVERY 6 HOURS PRN
Status: DISCONTINUED | OUTPATIENT
Start: 2017-07-21 | End: 2017-07-21 | Stop reason: HOSPADM

## 2017-07-21 RX ORDER — SODIUM CHLORIDE 9 MG/ML
INJECTION, SOLUTION INTRAVENOUS
Status: DISCONTINUED | OUTPATIENT
Start: 2017-07-21 | End: 2017-07-21

## 2017-07-21 RX ORDER — HALOPERIDOL 5 MG/ML
1 INJECTION INTRAMUSCULAR EVERY 6 HOURS PRN
Status: DISCONTINUED | OUTPATIENT
Start: 2017-07-21 | End: 2017-07-21 | Stop reason: HOSPADM

## 2017-07-21 RX ORDER — ONDANSETRON 2 MG/ML
4 INJECTION INTRAMUSCULAR; INTRAVENOUS EVERY 4 HOURS PRN
Status: DISCONTINUED | OUTPATIENT
Start: 2017-07-21 | End: 2017-07-21 | Stop reason: HOSPADM

## 2017-07-21 RX ORDER — MIDAZOLAM HYDROCHLORIDE 1 MG/ML
INJECTION INTRAMUSCULAR; INTRAVENOUS
Status: COMPLETED
Start: 2017-07-21 | End: 2017-07-21

## 2017-07-21 RX ORDER — ACETAMINOPHEN 325 MG/1
325 TABLET ORAL EVERY 4 HOURS PRN
Status: DISCONTINUED | OUTPATIENT
Start: 2017-07-21 | End: 2017-07-21 | Stop reason: HOSPADM

## 2017-07-21 RX ORDER — HEPARIN SODIUM,PORCINE 1000/ML
VIAL (ML) INJECTION
Status: COMPLETED
Start: 2017-07-21 | End: 2017-07-21

## 2017-07-21 RX ORDER — LIDOCAINE HYDROCHLORIDE 20 MG/ML
INJECTION, SOLUTION INFILTRATION; PERINEURAL
Status: COMPLETED
Start: 2017-07-21 | End: 2017-07-21

## 2017-07-21 RX ORDER — DEXAMETHASONE SODIUM PHOSPHATE 4 MG/ML
4 INJECTION, SOLUTION INTRA-ARTICULAR; INTRALESIONAL; INTRAMUSCULAR; INTRAVENOUS; SOFT TISSUE
Status: DISCONTINUED | OUTPATIENT
Start: 2017-07-21 | End: 2017-07-21 | Stop reason: HOSPADM

## 2017-07-21 RX ORDER — SCOLOPAMINE TRANSDERMAL SYSTEM 1 MG/1
1 PATCH, EXTENDED RELEASE TRANSDERMAL
Status: DISCONTINUED | OUTPATIENT
Start: 2017-07-21 | End: 2017-07-21 | Stop reason: HOSPADM

## 2017-07-21 RX ADMIN — HEPARIN SODIUM 2000 UNITS: 200 INJECTION, SOLUTION INTRAVENOUS at 07:36

## 2017-07-21 RX ADMIN — SODIUM CHLORIDE: 9 INJECTION, SOLUTION INTRAVENOUS at 06:35

## 2017-07-21 RX ADMIN — NITROGLYCERIN 10 ML: 20 INJECTION INTRAVENOUS at 07:36

## 2017-07-21 RX ADMIN — MIDAZOLAM 2 MG: 1 INJECTION INTRAMUSCULAR; INTRAVENOUS at 07:52

## 2017-07-21 RX ADMIN — FENTANYL CITRATE 50 MCG: 50 INJECTION, SOLUTION INTRAMUSCULAR; INTRAVENOUS at 07:36

## 2017-07-21 RX ADMIN — LIDOCAINE HYDROCHLORIDE: 20 INJECTION, SOLUTION INFILTRATION; PERINEURAL at 07:36

## 2017-07-21 RX ADMIN — HEPARIN SODIUM: 1000 INJECTION, SOLUTION INTRAVENOUS; SUBCUTANEOUS at 07:36

## 2017-07-21 RX ADMIN — VERAPAMIL HYDROCHLORIDE 2.5 MG: 2.5 INJECTION, SOLUTION INTRAVENOUS at 07:52

## 2017-07-21 ASSESSMENT — PAIN SCALES - GENERAL
PAINLEVEL_OUTOF10: 0

## 2017-07-21 NOTE — CATH LAB
Immediate Post-Operative Note      PreOp Diagnosis: Hx of CAD, back pain LV disfunction    PostOp Diagnosis: same    Procedure(s) :  Coronary Angiography, Left Heart Catheterization, Left Ventriculography. R radial approach    Surgeon(s):  Carlos Mccormack M.D.    Type of Anesthesia: Moderate Sedation    Specimen: None    Estimated Blood Loss: 10 cc's    Contrast Media:  112 cc's    Fluoro Time: 3.4 min    Xray DAP: 7470    Findings: Patent stent in proximal LAD, Diffuse nonobstructive lesions in RCA and 40% mid RCA,49% mid Circ.  EF 35-40%    Complications: none      Carlos Mccormack M.D.  7/21/2017 8:18 AM

## 2017-07-21 NOTE — DISCHARGE INSTRUCTIONS
ACTIVITY: Rest and take it easy for the first 24 hours.  A responsible adult is recommended to remain with you during that time.  It is normal to feel sleepy.  We encourage you to not do anything that requires balance, judgment or coordination.    MILD FLU-LIKE SYMPTOMS ARE NORMAL. YOU MAY EXPERIENCE GENERALIZED MUSCLE ACHES, THROAT IRRITATION, HEADACHE AND/OR SOME NAUSEA.    FOR 24 HOURS DO NOT:  Drive, operate machinery or run household appliances.  Drink beer or alcoholic beverages.   Make important decisions or sign legal documents.    SPECIAL INSTRUCTIONS: Refer to radial cath information.     DIET: To avoid nausea, slowly advance diet as tolerated, avoiding spicy or greasy foods for the first day.  Add more substantial food to your diet according to your physician's instructions.  Babies can be fed formula or breast milk as soon as they are hungry.  INCREASE FLUIDS AND FIBER TO AVOID CONSTIPATION.    SURGICAL DRESSING/BATHING: May remove dressing in 24 hours. May shower in 24 hours. Do not submerge site for 3 days.     FOLLOW-UP APPOINTMENT:  A follow-up appointment should be arranged with your doctor; the office will call to schedule.    You should CALL YOUR PHYSICIAN if you develop:  Fever greater than 101 degrees F.  Pain not relieved by medication, or persistent nausea or vomiting.  Excessive bleeding (blood soaking through dressing) or unexpected drainage from the wound.  Extreme redness or swelling around the incision site, drainage of pus or foul smelling drainage.  Inability to urinate or empty your bladder within 8 hours.  Problems with breathing or chest pain.    You should call 911 if you develop problems with breathing or chest pain.  If you are unable to contact your doctor or surgical center, you should go to the nearest emergency room or urgent care center.  Physician's telephone #: [phone]    If any questions arise, call your doctor.  If your doctor is not available, please feel free to call  the Surgical Center at [phone].  The Center is open Monday through Friday from 7AM to 7PM.  You can also call the HEALTH HOTLINE open 24 hours/day, 7 days/week and speak to a nurse at [phone], or toll free at [phone].    A registered nurse may call you a few days after your surgery to see how you are doing after your procedure.    MEDICATIONS: Resume taking daily medication.  Take prescribed pain medication with food.  If no medication is prescribed, you may take non-aspirin pain medication if needed.  PAIN MEDICATION CAN BE VERY CONSTIPATING.  Take a stool softener or laxative such as senokot, pericolace, or milk of magnesia if needed.    Prescription given for Lisinopril.    If your physician has prescribed pain medication that includes Acetaminophen (Tylenol), do not take additional Acetaminophen (Tylenol) while taking the prescribed medication.    Depression / Suicide Risk    As you are discharged from this [facility] facility, it is important to learn how to keep safe from harming yourself.    Recognize the warning signs:  · Abrupt changes in personality, positive or negative- including increase in energy   · Giving away possessions  · Change in eating patterns- significant weight changes-  positive or negative  · Change in sleeping patterns- unable to sleep or sleeping all the time   · Unwillingness or inability to communicate  · Depression  · Unusual sadness, discouragement and loneliness  · Talk of wanting to die  · Neglect of personal appearance   · Rebelliousness- reckless behavior  · Withdrawal from people/activities they love  · Confusion- inability to concentrate     If you or a loved one observes any of these behaviors or has concerns about self-harm, here's what you can do:  · Talk about it- your feelings and reasons for harming yourself  · Remove any means that you might use to hurt yourself (examples: pills, rope, extension cords, firearm)  · Get professional help from the  community (Mental Health, Substance Abuse, psychological counseling)  · Do not be alone:Call your Safe Contact- someone whom you trust who will be there for you.  · Call your local CRISIS HOTLINE 440-0933 or 786-092-7117  · Call your local Children's Mobile Crisis Response Team [place] (412) 162-2105 or www.[employer]  · Call the toll free National Suicide Prevention Hotlines   · National Suicide Prevention Lifeline 338-144-BVJW (8247)  · [place] [employer] Line Network 800-SUICIDE (963-7252)    Radial Catherization Discharge Instructions      · Do not subject hand/arm to any forceful movements for 24 hours    i.e. supporting weight when rising from the chair or bed.   · Do not drive a car for 24 hours  · You may remove the dressing tomorrow  · You may shower on the day following your procedure.  Do not take a tub bath or submerge the puncture site in water for 3 days following the procedure.  · No Lifting more than 3-5 pounds with affected wrist for 5 days  · Follow up with Dr. Mccormack  2-4 weeks.  · Increase fluids for 2 days post procedure.  · Continue all previous medications unless otherwise instructed.    If bleeding should occur following discharge:  · Sit down and apply firm pressure to site with your fingers for 10 minutes  · If the bleeding stops, continue to sit quietly, keeping your wrist straight for 2 hours.  Notify physician as soon as possible ( [phone])  · If bleeding does not stop after 10 minutes, or if there is a large amount of bleeding or spurting, call 911 immediately.  Do not drive yourself to the hospital.

## 2017-07-21 NOTE — PROGRESS NOTES
0900Air removed from TR band Q15min.   All air out of band. Band removed; area cleansed, and gauze and tegaderm placed. Pt. Right radial site CDI with no s/s of bleeding or hematoma. Pt. Meets discharge criteria. Pt. And responsible adult given discharge instructions along with Rx's. Pt. And responsible adult educated and all questions answered. Signed copy on chart. All lines and drains DC'd. Pt. Belongings with Pt and Pt. Transported via wheel chair to car with escort.

## 2017-07-21 NOTE — IP AVS SNAPSHOT
" Home Care Instructions                                                                                                                Name:Kyler Donato  Medical Record Number:[id number]  CSN: [id number]    YOB: 1932   Age: 84 y.o.  Sex: male  HT:1.803 m (5' 11\") WT: 61.598 kg (135 lb 12.8 oz)          Admit Date: 7/21/2017     Discharge Date:   Today's Date: 7/21/2017  Attending Doctor:  Carlos Mccormack M.D.                  Allergies:  Review of patient's allergies indicates no known allergies.                Discharge Instructions         ACTIVITY: Rest and take it easy for the first 24 hours.  A responsible adult is recommended to remain with you during that time.  It is normal to feel sleepy.  We encourage you to not do anything that requires balance, judgment or coordination.    MILD FLU-LIKE SYMPTOMS ARE NORMAL. YOU MAY EXPERIENCE GENERALIZED MUSCLE ACHES, THROAT IRRITATION, HEADACHE AND/OR SOME NAUSEA.    FOR 24 HOURS DO NOT:  Drive, operate machinery or run household appliances.  Drink beer or alcoholic beverages.   Make important decisions or sign legal documents.    SPECIAL INSTRUCTIONS: Refer to radial cath information.     DIET: To avoid nausea, slowly advance diet as tolerated, avoiding spicy or greasy foods for the first day.  Add more substantial food to your diet according to your physician's instructions.  Babies can be fed formula or breast milk as soon as they are hungry.  INCREASE FLUIDS AND FIBER TO AVOID CONSTIPATION.    SURGICAL DRESSING/BATHING: May remove dressing in 24 hours. May shower in 24 hours. Do not submerge site for 3 days.     FOLLOW-UP APPOINTMENT:  A follow-up appointment should be arranged with your doctor; the office will call to schedule.    You should CALL YOUR PHYSICIAN if you develop:  Fever greater than 101 degrees F.  Pain not relieved by medication, or persistent nausea or vomiting.  Excessive bleeding (blood soaking through dressing) or unexpected drainage " from the wound.  Extreme redness or swelling around the incision site, drainage of pus or foul smelling drainage.  Inability to urinate or empty your bladder within 8 hours.  Problems with breathing or chest pain.    You should call 911 if you develop problems with breathing or chest pain.  If you are unable to contact your doctor or surgical center, you should go to the nearest emergency room or urgent care center.  Physician's telephone #: [phone]    If any questions arise, call your doctor.  If your doctor is not available, please feel free to call the Surgical Center at [phone].  The Center is open Monday through Friday from 7AM to 7PM.  You can also call the HEALTH HOTLINE open 24 hours/day, 7 days/week and speak to a nurse at [phone], or toll free at [phone].    A registered nurse may call you a few days after your surgery to see how you are doing after your procedure.    MEDICATIONS: Resume taking daily medication.  Take prescribed pain medication with food.  If no medication is prescribed, you may take non-aspirin pain medication if needed.  PAIN MEDICATION CAN BE VERY CONSTIPATING.  Take a stool softener or laxative such as senokot, pericolace, or milk of magnesia if needed.    Prescription given for Lisinopril.    If your physician has prescribed pain medication that includes Acetaminophen (Tylenol), do not take additional Acetaminophen (Tylenol) while taking the prescribed medication.    Depression / Suicide Risk    As you are discharged from this [facility] facility, it is important to learn how to keep safe from harming yourself.    Recognize the warning signs:  · Abrupt changes in personality, positive or negative- including increase in energy   · Giving away possessions  · Change in eating patterns- significant weight changes-  positive or negative  · Change in sleeping patterns- unable to sleep or sleeping all the time   · Unwillingness or inability to  communicate  · Depression  · Unusual sadness, discouragement and loneliness  · Talk of wanting to die  · Neglect of personal appearance   · Rebelliousness- reckless behavior  · Withdrawal from people/activities they love  · Confusion- inability to concentrate     If you or a loved one observes any of these behaviors or has concerns about self-harm, here's what you can do:  · Talk about it- your feelings and reasons for harming yourself  · Remove any means that you might use to hurt yourself (examples: pills, rope, extension cords, firearm)  · Get professional help from the community (Mental Health, Substance Abuse, psychological counseling)  · Do not be alone:Call your Safe Contact- someone whom you trust who will be there for you.  · Call your local CRISIS HOTLINE 061-0793 or 199-305-2287  · Call your local Children's Mobile Crisis Response Team [place] (362) 576-6409 or www.Tongda  · Call the toll free National Suicide Prevention Hotlines   · National Suicide Prevention Lifeline 857-999-DOXH (2587)  · [place] Line Network 800-SUICIDE (857-5919)    Radial Catherization Discharge Instructions      · Do not subject hand/arm to any forceful movements for 24 hours    i.e. supporting weight when rising from the chair or bed.   · Do not drive a car for 24 hours  · You may remove the dressing tomorrow  · You may shower on the day following your procedure.  Do not take a tub bath or submerge the puncture site in water for 3 days following the procedure.  · No Lifting more than 3-5 pounds with affected wrist for 5 days  · Follow up with Dr. Mccormack  2-4 weeks.  · Increase fluids for 2 days post procedure.  · Continue all previous medications unless otherwise instructed.    If bleeding should occur following discharge:  · Sit down and apply firm pressure to site with your fingers for 10 minutes  · If the bleeding stops, continue to sit quietly, keeping your wrist straight for 2 hours.  Notify physician as  soon as possible ( [phone])  · If bleeding does not stop after 10 minutes, or if there is a large amount of bleeding or spurting, call 911 immediately.  Do not drive yourself to the hospital.       Medication List      START taking these medications        Instructions    Morning Afternoon Evening Bedtime    lisinopril 10 MG Tabs   Commonly known as:  PRINIVIL        Take 1 Tab by mouth every day.   Dose:  10 mg                          CONTINUE taking these medications        Instructions    Morning Afternoon Evening Bedtime    aspirin 81 MG tablet        Take 81 mg by mouth every day.   Dose:  81 mg                        methocarbamol 750 MG Tabs   Commonly known as:  ROBAXIN        Take 750 mg by mouth 4 times a day.   Dose:  750 mg                        nitroglycerin 0.4 MG Subl   Commonly known as:  NITROSTAT        Place 1 Tab under tongue as needed for Chest Pain.   Dose:  0.4 mg                        omeprazole 20 MG delayed-release capsule   Commonly known as:  PRILOSEC        Take 20 mg by mouth 2 times a day.   Dose:  20 mg                        oxycodone-acetaminophen 5-325 MG Tabs   Commonly known as:  PERCOCET        Take 1-2 Tabs by mouth every four hours as needed.   Dose:  1-2 Tab                        PRAVACHOL 40 MG tablet   Generic drug:  pravastatin        Take 40 mg by mouth every evening.   Dose:  40 mg                        spironolactone 25 MG Tabs   Commonly known as:  ALDACTONE        Take 25 mg by mouth every day.   Dose:  25 mg                        tramadol 50 MG Tabs   Commonly known as:  ULTRAM        Take  mg by mouth every four hours as needed.   Dose:   mg                        vitamin B-12 1000 MCG Tabs        Take 1,000 mcg by mouth every day.   Dose:  1000 mcg                        Vitamin D3 2000 UNIT Caps        Take  by mouth.                          STOP taking these medications     verapamil  MG Tbcr   Commonly known as:  CALAN-SR                     Where to Get Your Medications      These medications were sent to [facility]  [place], [place] - [address]  [address] [facility], [place] [address]     Phone:  [phone]    - lisinopril 10 MG Tabs            Medication Information     Above and/or attached are the medications your physician expects you to take upon discharge. Review all of your home medications and newly ordered medications with your doctor and/or pharmacist. Follow medication instructions as directed by your doctor and/or pharmacist. Please keep your medication list with you and share with your physician. Update the information when medications are discontinued, doses are changed, or new medications (including over-the-counter products) are added; and carry medication information at all times in the event of emergency situations.        Resources     Quit Smoking / Tobacco Use:    I understand the use of any tobacco products increases my chance of suffering from future heart disease or stroke and could cause other illnesses which may shorten my life. Quitting the use of tobacco products is the single most important thing I can do to improve my health. For further information on smoking / tobacco cessation call a Toll Free Quit Line at 1-400.203.9899 (*National Cancer [place]) or 1-825.465.4141 (American Lung Association) or you can access the web based program at www.lungusa.org.    [place] Tobacco Users Help Line:  (732) 993-9657       Toll Free: 1-317.481.9340  Quit Tobacco Program [facility] (573)665-6898    Crisis Hotline:    [place] Crisis Hotline:  6-775-ZPRIQDP or 1-970.217.3106    [place] Crisis Hotline:    1-531.914.7980 or 569-113-5645    Discharge Survey:   Thank you for choosing [facility]. We hope we did everything we could to make your hospital stay a pleasant one. You may be receiving a survey and we would appreciate your time and participation in answering the questions. Your  input is very valuable to us in our efforts to improve our service to our patients and their families.            Signatures     My signature on this form indicates that:    1. I acknowledge receipt and understanding of these Home Care Instruction.  2. My questions regarding this information have been answered to my satisfaction.  3. I have formulated a plan with my discharge nurse to obtain my prescribed medications for home.    __________________________________      __________________________________                   Patient Signature                                 Guardian/Responsible Adult Signature      __________________________________                 __________       ________                       Nurse Signature                                               Date                 Time

## 2017-07-21 NOTE — IP AVS SNAPSHOT
7/21/2017    Klyer Donato  [address] [facility] [address]    Dear Kyler:    [facility] wants to ensure your discharge home is safe and you or your loved ones have had all of your questions answered regarding your care after you leave the hospital.    Below is a list of resources and contact information should you have any questions regarding your hospital stay, follow-up instructions, or active medical symptoms.    Questions or Concerns Regarding… Contact   Medical Questions Related to Your Discharge  (7 days a week, 8am-5pm) Contact a Nurse Care Coordinator   [phone]   Medical Questions Not Related to Your Discharge  (24 hours a day / 7 days a week)  Contact the Nurse Health Line   [phone]    Medications or Discharge Instructions Refer to your discharge packet   or contact your [facility] Primary Care Provider   [phone]   Follow-up Appointment(s) Schedule your appointment via [facility]   or contact Scheduling [phone]   Billing Review your statement via [employer]  or contact Billing [phone]   Medical Records Review your records via [employer]   or contact Medical Records [phone]     You may receive a telephone call within two days of discharge. This call is to make certain you understand your discharge instructions and have the opportunity to have any questions answered. You can also easily access your medical information, test results and upcoming appointments via the [employer] free online health management tool. You can learn more and sign up at [email or web address]. For assistance setting up your [employer] account, please call [phone].    Once again, we want to ensure your discharge home is safe and that you have a clear understanding of any next steps in your care. If you have any questions or concerns, please do not hesitate to contact us, we are here for you. Thank you for choosing [facility] for your healthcare needs.    Sincerely,    Your [facility] Healthcare Team

## 2017-07-21 NOTE — PROGRESS NOTES
Report received and Pt admitted to PPU 3 s/p Left heart cath. Right radial site soft non-tender with TR band in place. Site CDI with no s/s of bleeding or hematoma. Pt attached to all leads and monitors. VSS with no complaints of pain or nausea. Fluids and food offered. Orders reviewed.  Family at bedside.

## 2017-07-21 NOTE — CATH LAB
Cardiac catheterization report.  This report is dictated in the epi-format as the hospital dictation system is currently not operating.    Procedures:  Selective coronary angiography.  Left heart catheterization.  Left ventriculography.    Indications:  84-year-old with history of prior LAD stenting undergoing angiography this time because of chest pain.    Procedure:  The right wrist was sterilely prepped and draped in the usual fashion and the patient was premedicated with 1 mg of Versed and 25 µg of fentanyl for conscious sedation. Received additional Versed and fentanyl during the procedure please refer to the nurse's notes for dosages and timing. The area over the right renal arteries anesthetized with 2% lidocaine. The artery was then easily entered and a 6 Russian sheath was placed. He was given intra-arterial verapamil and nitroglycerin. He was also given 3500 units of intravenous heparin.  A 5 Russian Yoel catheter was placed and advanced into the ostium of the RCA where selective angiography performed. Next the catheter was ventilated into the Left main coronary where selective angiograms were performed. Following this, a pigtail catheter was exchanged and a left heart catheterization was performed followed by left ventriculogram using 30 mL of contrast in the left heart pullback. The catheter was removed and additional nitroglycerin was given to the sheath. The sheath was then removed and hemostasis obtained by direct compression artery.  Complications: None  Estimated blood loss: 15 mL.  Fluoroscopy time 3.4 minutes.  X-ray dose area product 7470.  Total contrast: 112 mL of Omnipaque 350    Hemodynamics:   Sinus rhythm Aortic pressure 104/64. LV pressure 115/17. No gradient in LV pullback.    Angiography:  Left ventricle: Global hypokinesis. EF 36%. Akinesis of the apex. Descending aorta not visualized.  Significant calcification of left and right coronary artery tree noted  Left main: Free of  disease.  LAD: Terminates at LV apex. Patent stent in the proximal LAD with 30% in-stent narrowing.. Small 1st diagonal artery, moderate 2nd diagonal artery, no other significant lesions in LAD.  Circumflex: Moderate size 1st marginal artery, small 2nd marginal artery, 40% lesion in circumflex distal to 2nd marginal. Atrial branch arises distal to this marginal lesion. Large 3rd marginal artery. Circumflex terminates in AV groove.  Right coronary artery: Dominant vessel: Diffuse plaquing in proximal to mid RCA, 40% stenosis at worst point. PDA is normal. Moderate sized PL branch with 20-30% proximal stenosis.    Impression:  Pain stent in proximal LAD without significant in-stent stenosis.  Nonobstructive stenosis mid circumflex.  Diffuse plaquing in proximal to mid RCA with 40% stenosis at worst point.  Global hypokinesis of the left ventricle EF 36%  Significant coronary calcification.                                          Carlos Mccormack M.D.  7/21/2017 3:46 PM

## 2017-07-22 LAB — EKG IMPRESSION: NORMAL

## 2017-07-31 ENCOUNTER — OFFICE VISIT (OUTPATIENT)
Dept: CARDIOLOGY | Facility: MEDICAL CENTER | Age: 82
End: 2017-07-31
Payer: MEDICARE

## 2017-07-31 VITALS
DIASTOLIC BLOOD PRESSURE: 58 MMHG | HEIGHT: 71 IN | HEART RATE: 70 BPM | WEIGHT: 148 LBS | OXYGEN SATURATION: 98 % | BODY MASS INDEX: 20.72 KG/M2 | SYSTOLIC BLOOD PRESSURE: 100 MMHG

## 2017-07-31 DIAGNOSIS — E78.00 HYPERCHOLESTEROLEMIA: ICD-10-CM

## 2017-07-31 DIAGNOSIS — G89.29 CHRONIC RIGHT SHOULDER PAIN: ICD-10-CM

## 2017-07-31 DIAGNOSIS — I25.10 CORONARY ARTERY DISEASE INVOLVING NATIVE CORONARY ARTERY OF NATIVE HEART WITHOUT ANGINA PECTORIS: ICD-10-CM

## 2017-07-31 DIAGNOSIS — I10 ESSENTIAL HYPERTENSION: ICD-10-CM

## 2017-07-31 DIAGNOSIS — M25.511 CHRONIC RIGHT SHOULDER PAIN: ICD-10-CM

## 2017-07-31 DIAGNOSIS — R07.89 OTHER CHEST PAIN: ICD-10-CM

## 2017-07-31 DIAGNOSIS — I25.5 ISCHEMIC CARDIOMYOPATHY: ICD-10-CM

## 2017-07-31 DIAGNOSIS — J44.9 CHRONIC OBSTRUCTIVE PULMONARY DISEASE, UNSPECIFIED COPD TYPE (HCC): ICD-10-CM

## 2017-07-31 PROCEDURE — 99214 OFFICE O/P EST MOD 30 MIN: CPT | Performed by: NURSE PRACTITIONER

## 2017-07-31 RX ORDER — CARVEDILOL 6.25 MG/1
6.25 TABLET ORAL 2 TIMES DAILY WITH MEALS
Qty: 180 TAB | Refills: 1 | Status: SHIPPED | OUTPATIENT
Start: 2017-07-31 | End: 2018-11-19

## 2017-07-31 RX ORDER — VERAPAMIL HYDROCHLORIDE 180 MG/1
180 CAPSULE, EXTENDED RELEASE ORAL DAILY
COMMUNITY
End: 2017-07-31

## 2017-07-31 RX ORDER — CARVEDILOL 6.25 MG/1
6.25 TABLET ORAL 2 TIMES DAILY WITH MEALS
Qty: 180 TAB | Refills: 1 | Status: SHIPPED | OUTPATIENT
Start: 2017-07-31 | End: 2017-07-31 | Stop reason: SDUPTHER

## 2017-07-31 ASSESSMENT — ENCOUNTER SYMPTOMS
ORTHOPNEA: 0
FEVER: 0
DIZZINESS: 0
BRUISES/BLEEDS EASILY: 0
CHILLS: 0
PALPITATIONS: 0
MYALGIAS: 0
ABDOMINAL PAIN: 0
SHORTNESS OF BREATH: 0
LOSS OF CONSCIOUSNESS: 0
HEADACHES: 0
PND: 0

## 2017-07-31 NOTE — MR AVS SNAPSHOT
"Kyler Donato   2017 9:00 AM   Office Visit   MRN: [id number]    Department:  [facility]   Dept Phone:  [phone]    Description:  Male : 1932   Provider:  MICHAELA Orellana           Allergies as of 2017     No Known Allergies      You were diagnosed with     Chronic right shoulder pain   [375070]       Other chest pain   [786.59.ICD-9-CM]       Coronary artery disease involving native coronary artery of native heart without angina pectoris   [4143395]       Ischemic cardiomyopathy   [580645]         Vital Signs     Blood Pressure Pulse Height Weight Body Mass Index Oxygen Saturation    100/58 mmHg 70 1.803 m (5' 10.98\") 67.132 kg (148 lb) 20.65 kg/m2 98%    Smoking Status                   Former Smoker           Basic Information     Date Of Birth Sex Race Ethnicity Preferred Language    1932 Male White Non- English      Your appointments     2017  9:00 AM   FOLLOW UP with MICHAELA Orellana   [facility] (--)    [address]  [address]  [place] [address] [phone]            2017 11:30 AM   FOLLOW UP with Yari Blake M.D.   [facility] (--)    [address]  [address]  [place] [address] [phone]              Problem List              ICD-10-CM Priority Class Noted - Resolved    Right shoulder pain M25.511 Medium  2015 - Present    SVT (supraventricular tachycardia) (CMS-HCC) I47.1 High  2015 - Present    Coronary artery disease involving native coronary artery I25.10 High  2015 - Present    Hypercholesterolemia E78.00 High  2015 - Present    GERD (gastroesophageal reflux disease) K21.9 Medium  2015 - Present    Essential hypertension I10 High  2016 - Present    COPD (chronic obstructive pulmonary disease) (CMS-HCC) J44.9 Medium  2017 - Present    Chest pain R07.9 " Medium  7/18/2017 - Present    Ischemic cardiomyopathy I25.5 High  7/31/2017 - Present      Health Maintenance        Date Due Completion Dates    IMM DTaP/Tdap/Td Vaccine (1 - Tdap) 11/6/1951 ---    COLONOSCOPY 11/6/1982 ---    IMM ZOSTER VACCINE 11/6/1992 ---    IMM PNEUMOCOCCAL 65+ (ADULT) LOW/MEDIUM RISK SERIES (1 of 2 - PCV13) 11/6/1997 ---    IMM INFLUENZA (1) 9/1/2017 ---            Current Immunizations     No immunizations on file.      Below and/or attached are the medications your provider expects you to take. Review all of your home medications and newly ordered medications with your provider and/or pharmacist. Follow medication instructions as directed by your provider and/or pharmacist. Please keep your medication list with you and share with your provider. Update the information when medications are discontinued, doses are changed, or new medications (including over-the-counter products) are added; and carry medication information at all times in the event of emergency situations     Allergies:  No Known Allergies          Medications  Valid as of: July 31, 2017 -  8:58 AM    Generic Name Brand Name Tablet Size Instructions for use    Aspirin (Tab) aspirin 81 MG Take 81 mg by mouth every day.        Carvedilol (Tab) COREG 6.25 MG Take 1 Tab by mouth 2 times a day, with meals.        Cholecalciferol (Cap) Vitamin D3 2000 UNIT Take  by mouth.        Cyanocobalamin (Tab) vitamin B-12 1000 MCG Take 1,000 mcg by mouth every day.        Lactobacillus   Take  by mouth.        Lisinopril (Tab) PRINIVIL 10 MG Take 1 Tab by mouth every day.        Methocarbamol (Tab) ROBAXIN 750 MG Take 750 mg by mouth 4 times a day.        Nitroglycerin (SL Tab) NITROSTAT 0.4 MG Place 1 Tab under tongue as needed for Chest Pain.        Omeprazole (CAPSULE DELAYED RELEASE) PRILOSEC 20 MG Take 20 mg by mouth 2 times a day.        Oxycodone-Acetaminophen (Tab) PERCOCET 5-325 MG Take 1-2 Tabs by mouth every four hours as needed.           Pravastatin Sodium (Tab) PRAVACHOL 40 MG Take 40 mg by mouth every evening.        Spironolactone (Tab) ALDACTONE 25 MG Take 25 mg by mouth every day.        TraMADol HCl (Tab) ULTRAM 50 MG Take  mg by mouth every four hours as needed.        .                 Medicines prescribed today were sent to:     [facility] [address] [place] [address], [place] - [address] [facility]    [address] [facility] [place] [address]    Phone: [phone] Fax: [phone]    Open 24 Hours?: No      Medication refill instructions:       If your prescription bottle indicates you have medication refills left, it is not necessary to call your provider’s office. Please contact your pharmacy and they will refill your medication.    If your prescription bottle indicates you do not have any refills left, you may request refills at any time through one of the following ways: The online [email or web address] system (except Urgent Care), by calling your provider’s office, or by asking your pharmacy to contact your provider’s office with a refill request. Medication refills are processed only during regular business hours and may not be available until the next business day. Your provider may request additional information or to have a follow-up visit with you prior to refilling your medication.   *Please Note: Medication refills are assigned a new Rx number when refilled electronically. Your pharmacy may indicate that no refills were authorized even though a new prescription for the same medication is available at the pharmacy. Please request the medicine by name with the pharmacy before contacting your provider for a refill.           [email or web address] Access Code: Activation code not generated  Current [email or web address] Status: Active

## 2017-07-31 NOTE — Clinical Note
[facility]   [address]   [address]  [place] [address]  Phone: [phone]  Fax: [phone]              Kyler Donato  11/6/1932    Encounter Date: 7/31/2017    MICHAELA Orellana          PROGRESS NOTE:  Subjective:   Kyler Donato is a 84 y.o. male who presents today for hospital follow-up of coronary angiogram.    Kyler is an 84 year old male with history of CAD, status post remote PCI/stent to the LAD in 1995, hypertension, hyperlipidemia, PSVT and COPD, normally followed by Dr. Blake. In April 2017, he was having left shoulder surgery, and was considering surgery to repair a torn rotator cuff. Echocardiogram and MPI were done, which showed LVEF 30-40%.  He started having some back pain and chest pain as well, as we decided to proceed with angiogram, which was done on 7/21/2017.    He is here today for follow-up. He still has some shoulder pain, but no further chest or back pain. He is tired today, as he didn't sleep well.  Otherwise, kristi palpitations; some shortness of breath related to COPD; no orthopnea or PND; no dizziness or syncope; no edema. Shoulder surgery is on hold for now. He does have to have some dental work done this week.    Past Medical History   Diagnosis Date   • Acute anterolateral myocardial infarction (CMS-HCC) 1995   • CAD (coronary artery disease) 1995     PCI/stent to the LAD. April 2017: MPI with scar in anterior/inferior wall, no ischemia, LVEF 30%. Echocardiogram with fixed defects in anterior wall and septal inferior wall, no ischemia, LVEF 40%. July 2017: Coronary angiogram with patent LAD stent, diffuse nonobstructive lesions in RCA, 40% mid RCA, 50% mid circumflex.   • Hypertension    • Hyperlipidemia    • COPD (chronic obstructive pulmonary disease) (CMS-[facility])    • Shoulder pain    • PSVT (paroxysmal supraventricular tachycardia) (CMS-[facility])      Past Surgical History   Procedure Laterality  Date   • Shoulder orif     • Hand surgery     • Elbowplasty       ulnar nerve transposition     Family History   Problem Relation Age of Onset   • Heart Disease Father      History   Smoking status   • Former Smoker   • Quit date: 09/24/1995   Smokeless tobacco   • Never Used     No Known Allergies  Outpatient Encounter Prescriptions as of 7/31/2017   Medication Sig Dispense Refill   • Lactobacillus (ACIDOPHILUS PO) Take  by mouth.     • carvedilol (COREG) 6.25 MG Tab Take 1 Tab by mouth 2 times a day, with meals. 180 Tab 1   • Cholecalciferol (VITAMIN D3) 2000 UNIT Cap Take  by mouth.     • nitroglycerin (NITROSTAT) 0.4 MG SL Tab Place 1 Tab under tongue as needed for Chest Pain. 25 Tab 3   • methocarbamol (ROBAXIN) 750 MG Tab Take 750 mg by mouth 4 times a day.     • Cyanocobalamin (VITAMIN B-12) 1000 MCG Tab Take 1,000 mcg by mouth every day.     • omeprazole (PRILOSEC) 20 MG delayed-release capsule Take 20 mg by mouth 2 times a day.     • spironolactone (ALDACTONE) 25 MG Tab Take 25 mg by mouth every day.     • pravastatin (PRAVACHOL) 40 MG tablet Take 40 mg by mouth every evening.     • tramadol (ULTRAM) 50 MG Tab Take  mg by mouth every four hours as needed.     • [DISCONTINUED] verapamil (VERELAN) 180 MG CR capsule Take 180 mg by mouth every day.     • [DISCONTINUED] carvedilol (COREG) 6.25 MG Tab Take 1 Tab by mouth 2 times a day, with meals. 180 Tab 1   • lisinopril (PRINIVIL) 10 MG Tab Take 1 Tab by mouth every day. 30 Tab 1   • oxycodone-acetaminophen (PERCOCET) 5-325 MG Tab Take 1-2 Tabs by mouth every four hours as needed. 20 Tab 0   • aspirin 81 MG tablet Take 81 mg by mouth every day.       No facility-administered encounter medications on file as of 7/31/2017.     Review of Systems   Constitutional: Positive for malaise/fatigue. Negative for fever and chills.   Respiratory: Negative for shortness of breath.    Cardiovascular: Negative for chest pain, palpitations, orthopnea, leg swelling and  "PND.   Gastrointestinal: Negative for abdominal pain.   Musculoskeletal: Positive for joint pain. Negative for myalgias.        Left shoulder   Neurological: Negative for dizziness, loss of consciousness and headaches.   Endo/Heme/Allergies: Does not bruise/bleed easily.        Objective:   /58 mmHg  Pulse 70  Ht 1.803 m (5' 10.98\")  Wt 67.132 kg (148 lb)  BMI 20.65 kg/m2  SpO2 98%    Physical Exam   Constitutional: He is oriented to person, place, and time. He appears well-developed and well-nourished.   HENT:   Head: Normocephalic and atraumatic.   Eyes: Conjunctivae and EOM are normal.   Neck: No JVD present. No thyroid mass present.   Cardiovascular: Normal rate, regular rhythm, S1 normal, S2 normal, intact distal pulses and normal pulses.   No extrasystoles are present. PMI is not displaced.  Exam reveals no gallop.    No murmur heard.  Pulses:       Carotid pulses are 2+ on the right side, and 2+ on the left side.       Radial pulses are 2+ on the right side, and 2+ on the left side.        Femoral pulses are 2+ on the left side.       Dorsalis pedis pulses are 2+ on the right side, and 2+ on the left side.        Posterior tibial pulses are 2+ on the right side, and 2+ on the left side.   No significant murmurs.   Pulmonary/Chest: Effort normal and breath sounds normal.   Abdominal: Soft. Normal appearance. He exhibits no abdominal bruit. There is no hepatosplenomegaly.   Musculoskeletal: Normal range of motion. He exhibits no edema.        Thoracic back: He exhibits no tenderness and no spasm.   Neurological: He is alert and oriented to person, place, and time.   Skin: No cyanosis. Nails show no clubbing.   Psychiatric: He has a normal mood and affect.     FINDINGS OF CORONARY ANGIOGRAM OF 7/21/2017 - REVIEWED WITH PATIENT:  Patent stent in proximal LAD, Diffuse nonobstructive lesions in RCA and 40% mid RCA,49% mid Circ.  EF 35-40%    Lab Results   Component Value Date/Time    SODIUM 137 " 07/21/2017 06:35 AM    POTASSIUM 4.1 07/21/2017 06:35 AM    CHLORIDE 105 07/21/2017 06:35 AM    CO2 26 07/21/2017 06:35 AM    GLUCOSE 98 07/21/2017 06:35 AM    BUN 23* 07/21/2017 06:35 AM    CREATININE 1.38 07/21/2017 06:35 AM     Lab Results   Component Value Date/Time    WBC 7.0 07/21/2017 06:35 AM    RBC 3.77* 07/21/2017 06:35 AM    HEMOGLOBIN 11.7* 07/21/2017 06:35 AM    HEMATOCRIT 35.8* 07/21/2017 06:35 AM    MCV 95.0 07/21/2017 06:35 AM    MCH 31.0 07/21/2017 06:35 AM    MCHC 32.7* 07/21/2017 06:35 AM    MPV 9.7 07/21/2017 06:35 AM        Assessment:     1. Chronic right shoulder pain     2. Other chest pain     3. Coronary artery disease involving native coronary artery of native heart without angina pectoris  carvedilol (COREG) 6.25 MG Tab    DISCONTINUED: carvedilol (COREG) 6.25 MG Tab   4. Ischemic cardiomyopathy  carvedilol (COREG) 6.25 MG Tab    DISCONTINUED: carvedilol (COREG) 6.25 MG Tab   5. Essential hypertension     6. Hypercholesterolemia     7. Chronic obstructive pulmonary disease, unspecified COPD type (CMS-HCC)         Medical Decision Making:  Today's Assessment / Status / Plan:     1. Shoulder pain, ongoing, but unrelated to cardiac symptoms. Should surgery has been mentioned, but for now, he declines/has deferred.    2. History of CAD/ischemic cardiomyopathy, status post remote PCI/stent to the LAD in 1995, patent on recent coronary angiogram, with diffuse nonobstructive lesions, LVEF 35-40%. To stop Verapamil and start Coreg 6.25mg BID; may need to titrate. Consider repeating echocardiogram in 3-6 months.    3. Hypertension, treated. As above, to stop Verapamil and start Coreg. Continue Lisinopril.    4. Hyperlipidemia, treated with Pravachol.    5. COPD, mild symptoms.    Plan as above. FU with Dr. Blake in 3 months. Repeat echocardiogram in 3-6 months. FU sooner if clinical condition changes.    Collaborating MD: Shellie White

## 2017-07-31 NOTE — PROGRESS NOTES
Subjective:   Kyler Donato is a 84 y.o. male who presents today for hospital follow-up of coronary angiogram.    Kyler is an 84 year old male with history of CAD, status post remote PCI/stent to the LAD in 1995, hypertension, hyperlipidemia, PSVT and COPD, normally followed by Dr. Blake. In April 2017, he was having left shoulder surgery, and was considering surgery to repair a torn rotator cuff. Echocardiogram and MPI were done, which showed LVEF 30-40%.  He started having some back pain and chest pain as well, as we decided to proceed with angiogram, which was done on 7/21/2017.    He is here today for follow-up. He still has some shoulder pain, but no further chest or back pain. He is tired today, as he didn't sleep well.  Otherwise, kristi palpitations; some shortness of breath related to COPD; no orthopnea or PND; no dizziness or syncope; no edema. Shoulder surgery is on hold for now. He does have to have some dental work done this week.    Past Medical History   Diagnosis Date   • Acute anterolateral myocardial infarction (CMS-HCC) 1995   • CAD (coronary artery disease) 1995     PCI/stent to the LAD. April 2017: MPI with scar in anterior/inferior wall, no ischemia, LVEF 30%. Echocardiogram with fixed defects in anterior wall and septal inferior wall, no ischemia, LVEF 40%. July 2017: Coronary angiogram with patent LAD stent, diffuse nonobstructive lesions in RCA, 40% mid RCA, 50% mid circumflex.   • Hypertension    • Hyperlipidemia    • COPD (chronic obstructive pulmonary disease) (CMS-HCC)    • Shoulder pain    • PSVT (paroxysmal supraventricular tachycardia) (CMS-HCC)      Past Surgical History   Procedure Laterality Date   • Shoulder orif     • Hand surgery     • Elbowplasty       ulnar nerve transposition     Family History   Problem Relation Age of Onset   • Heart Disease Father      History   Smoking status   • Former Smoker   • Quit date: 09/24/1995   Smokeless tobacco   • Never Used     No Known  Allergies  Outpatient Encounter Prescriptions as of 7/31/2017   Medication Sig Dispense Refill   • Lactobacillus (ACIDOPHILUS PO) Take  by mouth.     • carvedilol (COREG) 6.25 MG Tab Take 1 Tab by mouth 2 times a day, with meals. 180 Tab 1   • Cholecalciferol (VITAMIN D3) 2000 UNIT Cap Take  by mouth.     • nitroglycerin (NITROSTAT) 0.4 MG SL Tab Place 1 Tab under tongue as needed for Chest Pain. 25 Tab 3   • methocarbamol (ROBAXIN) 750 MG Tab Take 750 mg by mouth 4 times a day.     • Cyanocobalamin (VITAMIN B-12) 1000 MCG Tab Take 1,000 mcg by mouth every day.     • omeprazole (PRILOSEC) 20 MG delayed-release capsule Take 20 mg by mouth 2 times a day.     • spironolactone (ALDACTONE) 25 MG Tab Take 25 mg by mouth every day.     • pravastatin (PRAVACHOL) 40 MG tablet Take 40 mg by mouth every evening.     • tramadol (ULTRAM) 50 MG Tab Take  mg by mouth every four hours as needed.     • [DISCONTINUED] verapamil (VERELAN) 180 MG CR capsule Take 180 mg by mouth every day.     • [DISCONTINUED] carvedilol (COREG) 6.25 MG Tab Take 1 Tab by mouth 2 times a day, with meals. 180 Tab 1   • lisinopril (PRINIVIL) 10 MG Tab Take 1 Tab by mouth every day. 30 Tab 1   • oxycodone-acetaminophen (PERCOCET) 5-325 MG Tab Take 1-2 Tabs by mouth every four hours as needed. 20 Tab 0   • aspirin 81 MG tablet Take 81 mg by mouth every day.       No facility-administered encounter medications on file as of 7/31/2017.     Review of Systems   Constitutional: Positive for malaise/fatigue. Negative for fever and chills.   Respiratory: Negative for shortness of breath.    Cardiovascular: Negative for chest pain, palpitations, orthopnea, leg swelling and PND.   Gastrointestinal: Negative for abdominal pain.   Musculoskeletal: Positive for joint pain. Negative for myalgias.        Left shoulder   Neurological: Negative for dizziness, loss of consciousness and headaches.   Endo/Heme/Allergies: Does not bruise/bleed easily.        Objective:  "  /58 mmHg  Pulse 70  Ht 1.803 m (5' 10.98\")  Wt 67.132 kg (148 lb)  BMI 20.65 kg/m2  SpO2 98%    Physical Exam   Constitutional: He is oriented to person, place, and time. He appears well-developed and well-nourished.   HENT:   Head: Normocephalic and atraumatic.   Eyes: Conjunctivae and EOM are normal.   Neck: No JVD present. No thyroid mass present.   Cardiovascular: Normal rate, regular rhythm, S1 normal, S2 normal, intact distal pulses and normal pulses.   No extrasystoles are present. PMI is not displaced.  Exam reveals no gallop.    No murmur heard.  Pulses:       Carotid pulses are 2+ on the right side, and 2+ on the left side.       Radial pulses are 2+ on the right side, and 2+ on the left side.        Femoral pulses are 2+ on the left side.       Dorsalis pedis pulses are 2+ on the right side, and 2+ on the left side.        Posterior tibial pulses are 2+ on the right side, and 2+ on the left side.   No significant murmurs.   Pulmonary/Chest: Effort normal and breath sounds normal.   Abdominal: Soft. Normal appearance. He exhibits no abdominal bruit. There is no hepatosplenomegaly.   Musculoskeletal: Normal range of motion. He exhibits no edema.        Thoracic back: He exhibits no tenderness and no spasm.   Neurological: He is alert and oriented to person, place, and time.   Skin: No cyanosis. Nails show no clubbing.   Psychiatric: He has a normal mood and affect.     FINDINGS OF CORONARY ANGIOGRAM OF 7/21/2017 - REVIEWED WITH PATIENT:  Patent stent in proximal LAD, Diffuse nonobstructive lesions in RCA and 40% mid RCA,49% mid Circ.  EF 35-40%    Lab Results   Component Value Date/Time    SODIUM 137 07/21/2017 06:35 AM    POTASSIUM 4.1 07/21/2017 06:35 AM    CHLORIDE 105 07/21/2017 06:35 AM    CO2 26 07/21/2017 06:35 AM    GLUCOSE 98 07/21/2017 06:35 AM    BUN 23* 07/21/2017 06:35 AM    CREATININE 1.38 07/21/2017 06:35 AM     Lab Results   Component Value Date/Time    WBC 7.0 07/21/2017 06:35 " AM    RBC 3.77* 07/21/2017 06:35 AM    HEMOGLOBIN 11.7* 07/21/2017 06:35 AM    HEMATOCRIT 35.8* 07/21/2017 06:35 AM    MCV 95.0 07/21/2017 06:35 AM    MCH 31.0 07/21/2017 06:35 AM    MCHC 32.7* 07/21/2017 06:35 AM    MPV 9.7 07/21/2017 06:35 AM        Assessment:     1. Chronic right shoulder pain     2. Other chest pain     3. Coronary artery disease involving native coronary artery of native heart without angina pectoris  carvedilol (COREG) 6.25 MG Tab    DISCONTINUED: carvedilol (COREG) 6.25 MG Tab   4. Ischemic cardiomyopathy  carvedilol (COREG) 6.25 MG Tab    DISCONTINUED: carvedilol (COREG) 6.25 MG Tab   5. Essential hypertension     6. Hypercholesterolemia     7. Chronic obstructive pulmonary disease, unspecified COPD type (CMS-HCC)         Medical Decision Making:  Today's Assessment / Status / Plan:     1. Shoulder pain, ongoing, but unrelated to cardiac symptoms. Should surgery has been mentioned, but for now, he declines/has deferred.    2. History of CAD/ischemic cardiomyopathy, status post remote PCI/stent to the LAD in 1995, patent on recent coronary angiogram, with diffuse nonobstructive lesions, LVEF 35-40%. To stop Verapamil and start Coreg 6.25mg BID; may need to titrate. Consider repeating echocardiogram in 3-6 months.    3. Hypertension, treated. As above, to stop Verapamil and start Coreg. Continue Lisinopril.    4. Hyperlipidemia, treated with Pravachol.    5. COPD, mild symptoms.    Plan as above. FU with Dr. Blake in 3 months. Repeat echocardiogram in 3-6 months. FU sooner if clinical condition changes.    Collaborating MD: Shellie

## 2017-10-31 ENCOUNTER — TELEPHONE (OUTPATIENT)
Dept: CARDIOLOGY | Facility: MEDICAL CENTER | Age: 82
End: 2017-10-31

## 2017-11-01 ENCOUNTER — APPOINTMENT (OUTPATIENT)
Dept: LAB | Facility: MEDICAL CENTER | Age: 82
End: 2017-11-01
Attending: INTERNAL MEDICINE
Payer: MEDICARE

## 2017-11-03 ENCOUNTER — OFFICE VISIT (OUTPATIENT)
Dept: CARDIOLOGY | Facility: MEDICAL CENTER | Age: 82
End: 2017-11-03
Payer: MEDICARE

## 2017-11-03 VITALS
DIASTOLIC BLOOD PRESSURE: 62 MMHG | SYSTOLIC BLOOD PRESSURE: 112 MMHG | HEART RATE: 72 BPM | WEIGHT: 150 LBS | HEIGHT: 70 IN | BODY MASS INDEX: 21.47 KG/M2 | OXYGEN SATURATION: 96 %

## 2017-11-03 DIAGNOSIS — E78.00 HYPERCHOLESTEROLEMIA: ICD-10-CM

## 2017-11-03 DIAGNOSIS — I25.10 CORONARY ARTERY DISEASE INVOLVING NATIVE CORONARY ARTERY OF NATIVE HEART WITHOUT ANGINA PECTORIS: ICD-10-CM

## 2017-11-03 DIAGNOSIS — I10 ESSENTIAL HYPERTENSION: ICD-10-CM

## 2017-11-03 DIAGNOSIS — I25.5 ISCHEMIC CARDIOMYOPATHY: ICD-10-CM

## 2017-11-03 PROCEDURE — 99214 OFFICE O/P EST MOD 30 MIN: CPT | Performed by: INTERNAL MEDICINE

## 2017-11-03 ASSESSMENT — ENCOUNTER SYMPTOMS
ORTHOPNEA: 0
LOSS OF CONSCIOUSNESS: 0
MYALGIAS: 0
CHILLS: 0
DEPRESSION: 0
SPEECH CHANGE: 0
NERVOUS/ANXIOUS: 0
BRUISES/BLEEDS EASILY: 0
FEVER: 0
CLAUDICATION: 0
WHEEZING: 0
PALPITATIONS: 0
SHORTNESS OF BREATH: 0
ABDOMINAL PAIN: 0
PND: 0

## 2017-11-03 NOTE — PROGRESS NOTES
Subjective:   Kyler Donato is a 84 y.o. male with runs of   1. SVT/atrial tachycardia  2. CAD remote MI and stent to LAD in 1995 at [place].  Angiogram from 2017 showed patent stent in proximal LAD.  Nonobstructive stenosis mid LCx.  Diffuse plaque proximal to mid RCA 40% stenosis.   3. Dyslipidemia  4. HTN  5. Ischemic cardiomyopathy LVEF of 45%  Presenting today for follow-up evaluation of CAD, hypertension, dyslipidemia and ischemic cardiomyopathy.    Patient's wife recently underwent back surgery he is under tremendous stress. Denied any further episodes of chest pain since he has been taking half a tablet of verapamil 180 mg. Denied any complaints of palpitations orthopnea or PND. Intermittent episodes of dizziness when he gets up from sitting to standing position but no loss of consciousness. No complaints of lower extremity edema or claudication. Denied any complaints of myalgias while on statins. Patient currently in NYHA class II.    Past Medical History:   Diagnosis Date   • Acute anterolateral myocardial infarction (CMS-HCC) 1995   • CAD (coronary artery disease) 1995    PCI/stent to the LAD. April 2017: MPI with scar in anterior/inferior wall, no ischemia, LVEF 30%. Echocardiogram with fixed defects in anterior wall and septal inferior wall, no ischemia, LVEF 40%. July 2017: Coronary angiogram with patent LAD stent, diffuse nonobstructive lesions in RCA, 40% mid RCA, 50% mid circumflex.   • COPD (chronic obstructive pulmonary disease) (CMS-HCC)    • Hyperlipidemia    • Hypertension    • PSVT (paroxysmal supraventricular tachycardia) (CMS-HCC)    • Shoulder pain      Past Surgical History:   Procedure Laterality Date   • ELBOWPLASTY      ulnar nerve transposition   • HAND SURGERY     • SHOULDER ORIF       Family History   Problem Relation Age of Onset   • Heart Disease Father      History   Smoking Status   • Former Smoker   • Quit date: 9/24/1995   Smokeless Tobacco   • Never Used     No Known  Allergies  Outpatient Encounter Prescriptions as of 11/3/2017   Medication Sig Dispense Refill   • verapamil ER (CALAN-SR) 180 MG Tab CR Take 180 mg by mouth every day. PT TAKES HALF TAB DAILY     • carvedilol (COREG) 6.25 MG Tab Take 1 Tab by mouth 2 times a day, with meals. (Patient taking differently: Take 6.25 mg by mouth 2 times a day, with meals. PT TAKES HALF TAB TWICE A DAY) 180 Tab 1   • lisinopril (PRINIVIL) 10 MG Tab Take 1 Tab by mouth every day. 30 Tab 1   • Cholecalciferol (VITAMIN D3) 2000 UNIT Cap Take  by mouth.     • nitroglycerin (NITROSTAT) 0.4 MG SL Tab Place 1 Tab under tongue as needed for Chest Pain. 25 Tab 3   • methocarbamol (ROBAXIN) 750 MG Tab Take 750 mg by mouth 4 times a day.     • Cyanocobalamin (VITAMIN B-12) 1000 MCG Tab Take 1,000 mcg by mouth every day.     • omeprazole (PRILOSEC) 20 MG delayed-release capsule Take 20 mg by mouth 2 times a day.     • spironolactone (ALDACTONE) 25 MG Tab Take 25 mg by mouth every day.     • pravastatin (PRAVACHOL) 40 MG tablet Take 40 mg by mouth every evening.     • tramadol (ULTRAM) 50 MG Tab Take  mg by mouth every four hours as needed.     • Lactobacillus (ACIDOPHILUS PO) Take  by mouth.     • oxycodone-acetaminophen (PERCOCET) 5-325 MG Tab Take 1-2 Tabs by mouth every four hours as needed. 20 Tab 0   • aspirin 81 MG tablet Take 81 mg by mouth every day.       No facility-administered encounter medications on file as of 11/3/2017.      Review of Systems   Constitutional: Negative for chills and fever.   HENT: Positive for hearing loss. Negative for congestion.    Respiratory: Negative for shortness of breath (intermittent episodes of shortness of breath relieved with rest ) and wheezing.    Cardiovascular: Negative for chest pain (intermittent episodes of back pain interscapular relieved with sublingual nitroglycerin, hasn't taken nitroglycerin since last visit), palpitations, orthopnea, claudication, leg swelling and PND.  "  Gastrointestinal: Negative for abdominal pain.   Musculoskeletal: Positive for joint pain (bilateral knee pain). Negative for myalgias.   Neurological: Negative for speech change and loss of consciousness.   Endo/Heme/Allergies: Does not bruise/bleed easily.   Psychiatric/Behavioral: Negative for depression. The patient is not nervous/anxious.    All other systems reviewed and are negative.       Objective:   /62   Pulse 72   Ht 1.778 m (5' 10\")   Wt 68 kg (150 lb)   SpO2 96%   BMI 21.52 kg/m²    Weight down another pound today.    Physical Exam   Constitutional: He is oriented to person, place, and time. He appears well-developed and well-nourished.   Healthy appearance but weight is low.   HENT:   Head: Normocephalic and atraumatic.   Eyes: Conjunctivae and EOM are normal. Right eye exhibits no discharge. Left eye exhibits no discharge. No scleral icterus.   Neck: No JVD present. No tracheal deviation present. No thyroid mass present.   Cardiovascular: Normal rate, regular rhythm, S1 normal, S2 normal, intact distal pulses and normal pulses.   No extrasystoles are present. PMI is not displaced.  Exam reveals no gallop.    No murmur heard.  Pulses:       Carotid pulses are 2+ on the right side, and 2+ on the left side.       Radial pulses are 2+ on the right side, and 2+ on the left side.        Femoral pulses are 2+ on the left side.       Dorsalis pedis pulses are 2+ on the right side, and 2+ on the left side.        Posterior tibial pulses are 2+ on the right side, and 2+ on the left side.   No significant murmurs.   Pulmonary/Chest: Effort normal and breath sounds normal.   Abdominal: Soft. Normal appearance. He exhibits no abdominal bruit. There is no hepatosplenomegaly. There is no tenderness.   Musculoskeletal: Normal range of motion. He exhibits no edema.        Thoracic back: He exhibits no tenderness and no spasm.   Neurological: He is alert and oriented to person, place, and time.   Skin: No " rash noted. No cyanosis. Nails show no clubbing.   Psychiatric: He has a normal mood and affect.     Coronary angiogram: 7/21/17  Pain stent in proximal LAD without significant in-stent stenosis.  Nonobstructive stenosis mid circumflex.  Diffuse plaquing in proximal to mid RCA with 40% stenosis at worst point.  Global hypokinesis of the left ventricle EF 36%  Significant coronary calcification    TTE: 4/7/17  No prior study is available for comparison.   Left ventricular ejection fraction is visually estimated to be 45%.  Akinetic apical segment.  Mid to distal anterior wall is akinetic.  Mild mitral regurgitation.  Aortic sclerosis without stenosis.  Unable to estimate pulmonary artery pressure due to an inadequate   tricuspid regurgitant jet.  Normal aortic root for body surface area    Labs: 8/30/16  Sodium 146, potassium 5.2, chloride 111, bicarbonate 24, glucose 97, BUN 29, creatinine 1.6  Alkaline phosphatase 59, AST 13, ALT 10  W BC 7.4, H&H 12.1/35.5, platelet count 237  TSH 2.16  Total cholesterol 122, triglycerides 50, extension 57, LDL 57    Nuclear stress test: 12/4/15  No evidence of reversible ischemia.  Fixed defect at anterior wall and septal inferior wall.  LVEF calculated 40%.  Hypokinesis of anterior wall and septum.    Transthoracic echo: 9/17/15  Left ventricle normal in size, normal wall thickness, septal hypokinesis, LVEF 60-65%.  Left Atrium mildly dilated.  Mild to moderate mitral regurgitation.  Aortic sclerosis no stenosis.  No tricuspid regurgitation.  Aortic root is normal.     Labs: 11/27/15  Glucose 99, BUN 27, creatinine 1.50, sodium 137, potassium 4.2, chloride 104, bicarbonate 27  Total cholesterol 138, triglycerides 108, HDL 57, LDL 63    Holter monitor: 10/19/15  Underlying rhythm sinus with a minimum heart rate of 49 bpm, average heart rate of 66 bpm, maximum heart rate of 99 BPM.  Total ventricular ectopic beats 0.1%  Total to prevent ectopic beat 7.2%.  Multiple short runs of  SVT or atrial tachycardia longest run was 22 beat.    EK/22/15  EKG personally reviewed by me  Sinus rhythm PACs normal axis lateral Q waves consistent with lateral infarct  Assessment:     1. CAD s/p PCI to LAD in  stress test from 2015 showed fixed defect in anterior and septal inferior wall.  2. SVT  3. Dyslipidemia  4. Hypertension  5. Ischemic cardiomyopathy   Medical Decision Making:  Today's Assessment / Status / Plan:     1.Recent angiogram showed patent stent in LAD with 40% stenosis proximal to mid RCA.  Continue aspirin 81 mg daily.  2. Continue Coreg 3.125 mg BID.  Continue half a tablet of verapamil 180 mg daily.  No further episodes of palpitation on current regimen.  3. Patient is scheduled to get lab work done in the hospital will review the results.  Continue pravastatin 40 mg qhs.  4. Blood pressure well controlled on current antihypertensive agents.  5. Patient appears euvolemic.  LVEF is more than 40. Not a candidate for Entresto.  Continue lisinopril and Coreg.    Follow-up in 6 months.  [facility] is [facility] will review those results when available.    Thank you for allowing me to participate in taking care of patient.    Yari Kim MD.

## 2017-11-03 NOTE — LETTER
[facility]   [address]   [address]  [place] [address]  Phone: [phone]  Fax: [phone]              Kyler Donato  11/6/1932    Encounter Date: 11/3/2017    Yari Blake M.D.          PROGRESS NOTE:  Subjective:   Kyler Donato is a 84 y.o. male with runs of   1. SVT/atrial tachycardia  2. CAD remote MI and stent to LAD in 1995 at [place].  Angiogram from 2017 showed patent stent in proximal LAD.  Nonobstructive stenosis mid LCx.  Diffuse plaque proximal to mid RCA 40% stenosis.   3. Dyslipidemia  4. HTN  Presenting today for follow-up evaluation of CAD, hypertension, dyslipidemia.    Patient's wife recently underwent back surgery he is under tremendous stress. Denied any further episodes of chest pain since he has been taking half a tablet of verapamil 180 mg. Denied any complaints of palpitations orthopnea or PND. Intermittent episodes of dizziness when he gets up from sitting to standing position but no loss of consciousness. No complaints of lower extremity edema or claudication. Denied any complaints of myalgias while on statins.    Past Medical History:   Diagnosis Date   • Acute anterolateral myocardial infarction (CMS-HCC) 1995   • CAD (coronary artery disease) 1995    PCI/stent to the LAD. April 2017: MPI with scar in anterior/inferior wall, no ischemia, LVEF 30%. Echocardiogram with fixed defects in anterior wall and septal inferior wall, no ischemia, LVEF 40%. July 2017: Coronary angiogram with patent LAD stent, diffuse nonobstructive lesions in RCA, 40% mid RCA, 50% mid circumflex.   • COPD (chronic obstructive pulmonary disease) (CMS-HCC)    • Hyperlipidemia    • Hypertension    • PSVT (paroxysmal supraventricular tachycardia) (CMS-[facility])    • Shoulder pain      Past Surgical History:   Procedure Laterality Date   • ELBOWPLASTY      ulnar nerve transposition   • HAND SURGERY     • SHOULDER ORIF       Family  History   Problem Relation Age of Onset   • Heart Disease Father      History   Smoking Status   • Former Smoker   • Quit date: 9/24/1995   Smokeless Tobacco   • Never Used     No Known Allergies  Outpatient Encounter Prescriptions as of 11/3/2017   Medication Sig Dispense Refill   • verapamil ER (CALAN-SR) 180 MG Tab CR Take 180 mg by mouth every day. PT TAKES HALF TAB DAILY     • carvedilol (COREG) 6.25 MG Tab Take 1 Tab by mouth 2 times a day, with meals. (Patient taking differently: Take 6.25 mg by mouth 2 times a day, with meals. PT TAKES HALF TAB TWICE A DAY) 180 Tab 1   • lisinopril (PRINIVIL) 10 MG Tab Take 1 Tab by mouth every day. 30 Tab 1   • Cholecalciferol (VITAMIN D3) 2000 UNIT Cap Take  by mouth.     • nitroglycerin (NITROSTAT) 0.4 MG SL Tab Place 1 Tab under tongue as needed for Chest Pain. 25 Tab 3   • methocarbamol (ROBAXIN) 750 MG Tab Take 750 mg by mouth 4 times a day.     • Cyanocobalamin (VITAMIN B-12) 1000 MCG Tab Take 1,000 mcg by mouth every day.     • omeprazole (PRILOSEC) 20 MG delayed-release capsule Take 20 mg by mouth 2 times a day.     • spironolactone (ALDACTONE) 25 MG Tab Take 25 mg by mouth every day.     • pravastatin (PRAVACHOL) 40 MG tablet Take 40 mg by mouth every evening.     • tramadol (ULTRAM) 50 MG Tab Take  mg by mouth every four hours as needed.     • Lactobacillus (ACIDOPHILUS PO) Take  by mouth.     • oxycodone-acetaminophen (PERCOCET) 5-325 MG Tab Take 1-2 Tabs by mouth every four hours as needed. 20 Tab 0   • aspirin 81 MG tablet Take 81 mg by mouth every day.       No facility-administered encounter medications on file as of 11/3/2017.      Review of Systems   Constitutional: Negative for chills and fever.   HENT: Positive for hearing loss. Negative for congestion.    Respiratory: Negative for shortness of breath (intermittent episodes of shortness of breath relieved with rest ) and wheezing.    Cardiovascular: Negative for chest pain (intermittent episodes of  "back pain interscapular relieved with sublingual nitroglycerin, hasn't taken nitroglycerin since last visit), palpitations, orthopnea, claudication, leg swelling and PND.   Gastrointestinal: Negative for abdominal pain.   Musculoskeletal: Positive for joint pain (bilateral knee pain). Negative for myalgias.   Neurological: Negative for speech change and loss of consciousness.   Endo/Heme/Allergies: Does not bruise/bleed easily.   Psychiatric/Behavioral: Negative for depression. The patient is not nervous/anxious.    All other systems reviewed and are negative.       Objective:   /62   Pulse 72   Ht 1.778 m (5' 10\")   Wt 68 kg (150 lb)   SpO2 96%   BMI 21.52 kg/m²     Weight down another pound today.    Physical Exam   Constitutional: He is oriented to person, place, and time. He appears well-developed and well-nourished.   Healthy appearance but weight is low.   HENT:   Head: Normocephalic and atraumatic.   Eyes: Conjunctivae and EOM are normal. Right eye exhibits no discharge. Left eye exhibits no discharge. No scleral icterus.   Neck: No JVD present. No tracheal deviation present. No thyroid mass present.   Cardiovascular: Normal rate, regular rhythm, S1 normal, S2 normal, intact distal pulses and normal pulses.   No extrasystoles are present. PMI is not displaced.  Exam reveals no gallop.    No murmur heard.  Pulses:       Carotid pulses are 2+ on the right side, and 2+ on the left side.       Radial pulses are 2+ on the right side, and 2+ on the left side.        Femoral pulses are 2+ on the left side.       Dorsalis pedis pulses are 2+ on the right side, and 2+ on the left side.        Posterior tibial pulses are 2+ on the right side, and 2+ on the left side.   No significant murmurs.   Pulmonary/Chest: Effort normal and breath sounds normal.   Abdominal: Soft. Normal appearance. He exhibits no abdominal bruit. There is no hepatosplenomegaly. There is no tenderness.   Musculoskeletal: Normal range of " motion. He exhibits no edema.        Thoracic back: He exhibits no tenderness and no spasm.   Neurological: He is alert and oriented to person, place, and time.   Skin: No rash noted. No cyanosis. Nails show no clubbing.   Psychiatric: He has a normal mood and affect.     Coronary angiogram: 7/21/17  Pain stent in proximal LAD without significant in-stent stenosis.  Nonobstructive stenosis mid circumflex.  Diffuse plaquing in proximal to mid RCA with 40% stenosis at worst point.  Global hypokinesis of the left ventricle EF 36%  Significant coronary calcification    TTE: 4/7/17  No prior study is available for comparison.   Left ventricular ejection fraction is visually estimated to be 45%.  Akinetic apical segment.  Mid to distal anterior wall is akinetic.  Mild mitral regurgitation.  Aortic sclerosis without stenosis.  Unable to estimate pulmonary artery pressure due to an inadequate   tricuspid regurgitant jet.  Normal aortic root for body surface area    Labs: 8/30/16  Sodium 146, potassium 5.2, chloride 111, bicarbonate 24, glucose 97, BUN 29, creatinine 1.6  Alkaline phosphatase 59, AST 13, ALT 10  W BC 7.4, H&H 12.1/35.5, platelet count 237  TSH 2.16  Total cholesterol 122, triglycerides 50, extension 57, LDL 57    Nuclear stress test: 12/4/15  No evidence of reversible ischemia.  Fixed defect at anterior wall and septal inferior wall.  LVEF calculated 40%.  Hypokinesis of anterior wall and septum.    Transthoracic echo: 9/17/15  Left ventricle normal in size, normal wall thickness, septal hypokinesis, LVEF 60-65%.  Left Atrium mildly dilated.  Mild to moderate mitral regurgitation.  Aortic sclerosis no stenosis.  No tricuspid regurgitation.  Aortic root is normal.     Labs: 11/27/15  Glucose 99, BUN 27, creatinine 1.50, sodium 137, potassium 4.2, chloride 104, bicarbonate 27  Total cholesterol 138, triglycerides 108, HDL 57, LDL 63    Holter monitor: 10/19/15  Underlying rhythm sinus with a minimum heart rate  of 49 bpm, average heart rate of 66 bpm, maximum heart rate of 99 BPM.  Total ventricular ectopic beats 0.1%  Total to prevent ectopic beat 7.2%.  Multiple short runs of SVT or atrial tachycardia longest run was 22 beat.    EK/22/15  EKG personally reviewed by me  Sinus rhythm PACs normal axis lateral Q waves consistent with lateral infarct  Assessment:     1. CAD s/p PCI to LAD in  stress test from 2015 showed fixed defect in anterior and septal inferior wall.  2. SVT  3. Dyslipidemia  4. Hypertension  5. Ischemic cardiomyopathy   Medical Decision Making:  Today's Assessment / Status / Plan:     1.Recent angiogram showed patent stent in LAD with 40% stenosis proximal to mid RCA.  Continue aspirin 81 mg daily.  2. Continue Coreg 3.125 mg BID.  Continue half a tablet of verapamil 180 mg daily.  No further episodes of palpitation on current regimen.  3. Patient is scheduled to get lab work done in the hospital will review the results.  Continue pravastatin 40 mg qhs.  4. Blood pressure well controlled on current antihypertensive agents.  5. Patient appears euvolemic.  LVEF is more than 40. Not a candidate for Entresto.  Continue lisinopril and Coreg.    Follow-up in one year.  [facility] is [facility] will review those results when available.    Thank you for allowing me to participate in taking care of patient.    Yari Kim MD.      Hernan Trimble Jr., M.D.  [address] [facility] [place] [address]  VIA Facsimile: [phone]

## 2017-12-08 ENCOUNTER — TELEPHONE (OUTPATIENT)
Dept: CARDIOLOGY | Facility: MEDICAL CENTER | Age: 82
End: 2017-12-08

## 2017-12-08 NOTE — PROGRESS NOTES
Labs: 11/14/17  Sodium 141, potassium 3.9, chloride 109, bicarbonate 27, BUN 26, creatinine 1.4  Alkaline phosphatase 69, AST 14, AST 15  Total cholesterol 124, triglycerides 72, images 53, LDL 50

## 2017-12-09 NOTE — TELEPHONE ENCOUNTER
----- Message from Yari Blake M.D. sent at 12/8/2017  3:27 PM PST -----  Just reviewed patient's labs portion is known to have CKD, creatinine is abnormal but stable rest of the labs are pretty good

## 2018-06-05 ENCOUNTER — HOSPITAL ENCOUNTER (OUTPATIENT)
Dept: LAB | Facility: MEDICAL CENTER | Age: 83
End: 2018-06-05
Attending: UROLOGY
Payer: MEDICARE

## 2018-06-05 LAB
ANION GAP SERPL CALC-SCNC: 9 MMOL/L (ref 0–11.9)
BUN SERPL-MCNC: 20 MG/DL (ref 8–22)
CALCIUM SERPL-MCNC: 9.6 MG/DL (ref 8.5–10.5)
CHLORIDE SERPL-SCNC: 106 MMOL/L (ref 96–112)
CO2 SERPL-SCNC: 27 MMOL/L (ref 20–33)
CREAT SERPL-MCNC: 1.38 MG/DL (ref 0.5–1.4)
GLUCOSE SERPL-MCNC: 92 MG/DL (ref 65–99)
POTASSIUM SERPL-SCNC: 4.2 MMOL/L (ref 3.6–5.5)
SODIUM SERPL-SCNC: 142 MMOL/L (ref 135–145)

## 2018-06-05 PROCEDURE — 36415 COLL VENOUS BLD VENIPUNCTURE: CPT

## 2018-06-05 PROCEDURE — 80048 BASIC METABOLIC PNL TOTAL CA: CPT

## 2018-11-17 ENCOUNTER — HOSPITAL ENCOUNTER (OUTPATIENT)
Dept: RADIOLOGY | Facility: MEDICAL CENTER | Age: 83
DRG: 293 | End: 2018-11-17
Attending: NURSE PRACTITIONER
Payer: MEDICARE

## 2018-11-17 ENCOUNTER — OFFICE VISIT (OUTPATIENT)
Dept: URGENT CARE | Facility: PHYSICIAN GROUP | Age: 83
End: 2018-11-17
Payer: MEDICARE

## 2018-11-17 VITALS
RESPIRATION RATE: 14 BRPM | HEART RATE: 81 BPM | SYSTOLIC BLOOD PRESSURE: 116 MMHG | TEMPERATURE: 98 F | WEIGHT: 150 LBS | HEIGHT: 70 IN | OXYGEN SATURATION: 99 % | BODY MASS INDEX: 21.47 KG/M2 | DIASTOLIC BLOOD PRESSURE: 68 MMHG

## 2018-11-17 DIAGNOSIS — Z86.79 H/O CARDIOMYOPATHY: ICD-10-CM

## 2018-11-17 DIAGNOSIS — Z86.79 H/O: HTN (HYPERTENSION): ICD-10-CM

## 2018-11-17 DIAGNOSIS — R06.02 SOB (SHORTNESS OF BREATH): ICD-10-CM

## 2018-11-17 DIAGNOSIS — J18.9 PNEUMONIA OF LOWER LOBE DUE TO INFECTIOUS ORGANISM, UNSPECIFIED LATERALITY: ICD-10-CM

## 2018-11-17 PROCEDURE — 94640 AIRWAY INHALATION TREATMENT: CPT | Performed by: NURSE PRACTITIONER

## 2018-11-17 PROCEDURE — 71046 X-RAY EXAM CHEST 2 VIEWS: CPT

## 2018-11-17 PROCEDURE — 99204 OFFICE O/P NEW MOD 45 MIN: CPT | Mod: 25 | Performed by: NURSE PRACTITIONER

## 2018-11-17 RX ORDER — ALBUTEROL SULFATE 2.5 MG/3ML
2.5 SOLUTION RESPIRATORY (INHALATION) ONCE
Status: COMPLETED | OUTPATIENT
Start: 2018-11-17 | End: 2018-11-17

## 2018-11-17 RX ORDER — ALBUTEROL SULFATE 90 UG/1
2 AEROSOL, METERED RESPIRATORY (INHALATION) EVERY 6 HOURS PRN
Qty: 8.5 G | Refills: 0 | Status: SHIPPED
Start: 2018-11-17 | End: 2020-03-22

## 2018-11-17 RX ORDER — DOXYCYCLINE HYCLATE 100 MG
100 TABLET ORAL 2 TIMES DAILY
Qty: 20 TAB | Refills: 0 | Status: ON HOLD | OUTPATIENT
Start: 2018-11-17 | End: 2018-11-20

## 2018-11-17 RX ORDER — AZITHROMYCIN 250 MG/1
TABLET, FILM COATED ORAL
Qty: 6 TAB | Refills: 0 | Status: ON HOLD | OUTPATIENT
Start: 2018-11-17 | End: 2018-11-20

## 2018-11-17 RX ADMIN — ALBUTEROL SULFATE 2.5 MG: 2.5 SOLUTION RESPIRATORY (INHALATION) at 16:36

## 2018-11-18 NOTE — PROGRESS NOTES
"Subjective:      Kyler Donato is a 86 y.o. male who presents with Shortness of Breath (sob,gasping, x 3 weeks)             HPI  C/o SOB, mild chest tightness x 3 weeks. States feel like \"something is in my chest and I can't get it out\". Denies COPD, wheeze or h/o asthma. Has cardiologist, last seen 11/2017, did not make his f/u over the summer as his cardiologist had left the practice. No changes in meds or missed doses of prescribed meds. Denies recent fevers or illness. Denies cough. Denies CP/pressure at this time but states had this earlier this morning and took 2 nitro and felt better. Wife present. Denies dizziness, numbness/tingling, weakness, abdominal or new back pains.    PMH:  has a past medical history of Acute anterolateral myocardial infarction (HCC) (1995); CAD (coronary artery disease) (1995); COPD (chronic obstructive pulmonary disease) (Formerly [facility] [place]); Hyperlipidemia; Hypertension; PSVT (paroxysmal supraventricular tachycardia) (Formerly [facility] [place]); and Shoulder pain.  MEDS:   Current Outpatient Prescriptions:   •  verapamil ER (CALAN-SR) 180 MG Tab CR, Take 180 mg by mouth every day. PT TAKES HALF TAB DAILY , Disp: , Rfl:   •  carvedilol (COREG) 6.25 MG Tab, Take 1 Tab by mouth 2 times a day, with meals. (Patient taking differently: Take 3,125 mg by mouth 2 times a day, with meals. PT TAKES HALF TAB TWICE A DAY), Disp: 180 Tab, Rfl: 1  •  lisinopril (PRINIVIL) 10 MG Tab, Take 1 Tab by mouth every day., Disp: 30 Tab, Rfl: 1  •  Cholecalciferol (VITAMIN D3) 2000 UNIT Cap, Take  by mouth., Disp: , Rfl:   •  methocarbamol (ROBAXIN) 750 MG Tab, Take 750 mg by mouth 4 times a day., Disp: , Rfl:   •  Cyanocobalamin (VITAMIN B-12) 1000 MCG Tab, Take 1,000 mcg by mouth every day., Disp: , Rfl:   •  omeprazole (PRILOSEC) 20 MG delayed-release capsule, Take 20 mg by mouth 2 times a day., Disp: , Rfl:   •  spironolactone (ALDACTONE) 25 MG Tab, Take 25 mg by mouth every day., Disp: , Rfl:   •  pravastatin (PRAVACHOL) 40 MG tablet, " "Take 40 mg by mouth every evening., Disp: , Rfl:   •  Lactobacillus (ACIDOPHILUS PO), Take  by mouth., Disp: , Rfl:   •  nitroglycerin (NITROSTAT) 0.4 MG SL Tab, Place 1 Tab under tongue as needed for Chest Pain., Disp: 25 Tab, Rfl: 3  •  oxycodone-acetaminophen (PERCOCET) 5-325 MG Tab, Take 1-2 Tabs by mouth every four hours as needed. (Patient not taking: Reported on 11/17/2018), Disp: 20 Tab, Rfl: 0  •  tramadol (ULTRAM) 50 MG Tab, Take  mg by mouth every four hours as needed., Disp: , Rfl:   •  aspirin 81 MG tablet, Take 81 mg by mouth every day., Disp: , Rfl:   ALLERGIES: No Known Allergies  SURGHX:   Past Surgical History:   Procedure Laterality Date   • ELBOWPLASTY      ulnar nerve transposition   • HAND SURGERY     • SHOULDER ORIF       SOCHX:  reports that he quit smoking about 23 years ago. He has never used smokeless tobacco. He reports that he drinks alcohol. He reports that he does not use drugs.  FH: Family history was reviewed, no pertinent findings to report    Review of Systems   Constitutional: Negative for chills, fever and malaise/fatigue.   HENT: Negative for congestion, ear pain, sinus pain and sore throat.    Respiratory: Positive for shortness of breath. Negative for cough, sputum production and wheezing.    Cardiovascular: Negative for chest pain, palpitations, orthopnea and leg swelling.   Gastrointestinal: Negative for abdominal pain, nausea and vomiting.   Musculoskeletal: Negative for back pain and myalgias.   Skin: Negative for itching and rash.   Neurological: Negative for dizziness, tingling, sensory change, focal weakness, weakness and headaches.   Endo/Heme/Allergies: Negative for environmental allergies.   All other systems reviewed and are negative.         Objective:     /68 (BP Location: Left arm, Patient Position: Sitting, BP Cuff Size: Adult)   Pulse 81   Temp 36.7 °C (98 °F)   Resp 14   Ht 1.778 m (5' 10\")   Wt 68 kg (150 lb)   SpO2 99%   BMI 21.52 kg/m²  "     Physical Exam   Constitutional: He is oriented to person, place, and time. Vital signs are normal. He appears well-developed and well-nourished. He is active and cooperative.  Non-toxic appearance. He does not have a sickly appearance. He does not appear ill. No distress.   Patient has no respiratory distress of active CP/pressure. Speaking full sentences without difficulty. Laughing and joking through exam and with wife.    HENT:   Head: Normocephalic.   Right Ear: Tympanic membrane, external ear and ear canal normal.   Left Ear: Tympanic membrane, external ear and ear canal normal.   Nose: Mucosal edema and rhinorrhea present.   Mouth/Throat: Mucous membranes are normal. Posterior oropharyngeal erythema present. No posterior oropharyngeal edema.   Eyes: Pupils are equal, round, and reactive to light. Conjunctivae and EOM are normal. Right eye exhibits no discharge. Left eye exhibits no discharge.   Neck: Normal range of motion.   Cardiovascular: Normal rate, regular rhythm, normal heart sounds and normal pulses.  Exam reveals no gallop and no friction rub.    No murmur heard.  Pulses:       Radial pulses are 2+ on the right side, and 2+ on the left side.   Pulmonary/Chest: Effort normal. No accessory muscle usage. No respiratory distress. He has decreased breath sounds in the right lower field and the left lower field. He has no wheezes. He has no rhonchi. He has no rales.   Abdominal: Soft. Bowel sounds are normal. He exhibits no distension and no ascites. There is no tenderness. There is no rigidity, no rebound, no guarding and no CVA tenderness.   Musculoskeletal: Normal range of motion.   Lymphadenopathy:     He has no cervical adenopathy.   Neurological: He is alert and oriented to person, place, and time. He has normal strength. No cranial nerve deficit or sensory deficit. Coordination and gait normal. GCS eye subscore is 4. GCS verbal subscore is 5. GCS motor subscore is 6.   Skin: Skin is warm and dry.  He is not diaphoretic.   Psychiatric: He has a normal mood and affect. His speech is normal and behavior is normal. Judgment and thought content normal. He is not actively hallucinating. Cognition and memory are normal. He is attentive.   Vitals reviewed.        Albuterol breathing treatment: Patient has chest tightness without CP. No cough induced especially with deep breathing. Air movement throughout. Post tx 100%    CXR FINDINGS:  Cardiomediastinal contours are are notable for moderate enlargement of the cardiac silhouette and aortic arch.  There is costophrenic sulcus blunting on the right greater than left with basilar mild opacification  No pneumothorax is evident.  Hyperinflation  Apparent developmental fusion of approximately T3/4 vertebral bodies     Assessment/Plan:     1. SOB (shortness of breath)  - DX-CHEST-2 VIEWS; Future  - albuterol (PROVENTIL) 2.5mg/3ml nebulizer solution 2.5 mg; 3 mL by Nebulization route Once.  - albuterol 108 (90 Base) MCG/ACT Aero Soln inhalation aerosol; Inhale 2 Puffs by mouth every 6 hours as needed for Shortness of Breath.  Dispense: 8.5 g; Refill: 0  - REFERRAL TO CARDIOLOGY    2. Pneumonia of lower lobe due to infectious organism, unspecified laterality (HCC)    - doxycycline (VIBRAMYCIN) 100 MG Tab; Take 1 Tab by mouth 2 times a day for 10 days.  Dispense: 20 Tab; Refill: 0  - azithromycin (ZITHROMAX) 250 MG Tab; Take 2 tabs by mouth on day 1, then take 1 tab on days 2-5  Dispense: 6 Tab; Refill: 0    3. H/O cardiomyopathy    - REFERRAL TO CARDIOLOGY    4. H/O: HTN (hypertension)    - REFERRAL TO CARDIOLOGY    Maintain water intake  Get rest  Use inhaler prn for SOB   Monitor for fevers, cough, increased SOB, CP, chest tightness - need re-evaluation immediately, must go to ER if these symptoms continue, patient and wife understand this  Re-establish with cardiologist for missed f/u

## 2018-11-19 ENCOUNTER — HOSPITAL ENCOUNTER (INPATIENT)
Facility: MEDICAL CENTER | Age: 83
LOS: 1 days | DRG: 293 | End: 2018-11-20
Attending: EMERGENCY MEDICINE | Admitting: INTERNAL MEDICINE
Payer: MEDICARE

## 2018-11-19 ENCOUNTER — APPOINTMENT (OUTPATIENT)
Dept: RADIOLOGY | Facility: MEDICAL CENTER | Age: 83
DRG: 293 | End: 2018-11-19
Attending: EMERGENCY MEDICINE
Payer: MEDICARE

## 2018-11-19 DIAGNOSIS — R79.89 ELEVATED TROPONIN: ICD-10-CM

## 2018-11-19 DIAGNOSIS — I25.5 ISCHEMIC CARDIOMYOPATHY: ICD-10-CM

## 2018-11-19 DIAGNOSIS — I50.23 ACUTE ON CHRONIC SYSTOLIC (CONGESTIVE) HEART FAILURE (HCC): ICD-10-CM

## 2018-11-19 DIAGNOSIS — R07.9 CHEST PAIN WITH MODERATE RISK FOR CARDIAC ETIOLOGY: ICD-10-CM

## 2018-11-19 DIAGNOSIS — R06.09 DOE (DYSPNEA ON EXERTION): ICD-10-CM

## 2018-11-19 DIAGNOSIS — R06.01 ORTHOPNEA: ICD-10-CM

## 2018-11-19 LAB
ALBUMIN SERPL BCP-MCNC: 4.5 G/DL (ref 3.2–4.9)
ALBUMIN/GLOB SERPL: 1.6 G/DL
ALP SERPL-CCNC: 61 U/L (ref 30–99)
ALT SERPL-CCNC: 18 U/L (ref 2–50)
ANION GAP SERPL CALC-SCNC: 9 MMOL/L (ref 0–11.9)
APTT PPP: 26.8 SEC (ref 24.7–36)
AST SERPL-CCNC: 17 U/L (ref 12–45)
BASOPHILS # BLD AUTO: 1 % (ref 0–1.8)
BASOPHILS # BLD: 0.06 K/UL (ref 0–0.12)
BILIRUB SERPL-MCNC: 0.7 MG/DL (ref 0.1–1.5)
BNP SERPL-MCNC: 612 PG/ML (ref 0–100)
BUN SERPL-MCNC: 22 MG/DL (ref 8–22)
CALCIUM SERPL-MCNC: 9.9 MG/DL (ref 8.5–10.5)
CHLORIDE SERPL-SCNC: 109 MMOL/L (ref 96–112)
CO2 SERPL-SCNC: 24 MMOL/L (ref 20–33)
CREAT SERPL-MCNC: 1.35 MG/DL (ref 0.5–1.4)
EKG IMPRESSION: NORMAL
EOSINOPHIL # BLD AUTO: 0.07 K/UL (ref 0–0.51)
EOSINOPHIL NFR BLD: 1.2 % (ref 0–6.9)
ERYTHROCYTE [DISTWIDTH] IN BLOOD BY AUTOMATED COUNT: 51.3 FL (ref 35.9–50)
GLOBULIN SER CALC-MCNC: 2.8 G/DL (ref 1.9–3.5)
GLUCOSE SERPL-MCNC: 111 MG/DL (ref 65–99)
HCT VFR BLD AUTO: 34.5 % (ref 42–52)
HGB BLD-MCNC: 11.1 G/DL (ref 14–18)
IMM GRANULOCYTES # BLD AUTO: 0.02 K/UL (ref 0–0.11)
IMM GRANULOCYTES NFR BLD AUTO: 0.3 % (ref 0–0.9)
INR PPP: 1.33 (ref 0.87–1.13)
LIPASE SERPL-CCNC: 12 U/L (ref 11–82)
LYMPHOCYTES # BLD AUTO: 1.01 K/UL (ref 1–4.8)
LYMPHOCYTES NFR BLD: 16.9 % (ref 22–41)
MCH RBC QN AUTO: 32 PG (ref 27–33)
MCHC RBC AUTO-ENTMCNC: 32.2 G/DL (ref 33.7–35.3)
MCV RBC AUTO: 99.4 FL (ref 81.4–97.8)
MONOCYTES # BLD AUTO: 0.57 K/UL (ref 0–0.85)
MONOCYTES NFR BLD AUTO: 9.6 % (ref 0–13.4)
NEUTROPHILS # BLD AUTO: 4.23 K/UL (ref 1.82–7.42)
NEUTROPHILS NFR BLD: 71 % (ref 44–72)
NRBC # BLD AUTO: 0 K/UL
NRBC BLD-RTO: 0 /100 WBC
PLATELET # BLD AUTO: 195 K/UL (ref 164–446)
PMV BLD AUTO: 10.5 FL (ref 9–12.9)
POTASSIUM SERPL-SCNC: 3.9 MMOL/L (ref 3.6–5.5)
PROT SERPL-MCNC: 7.3 G/DL (ref 6–8.2)
PROTHROMBIN TIME: 16.6 SEC (ref 12–14.6)
RBC # BLD AUTO: 3.47 M/UL (ref 4.7–6.1)
SODIUM SERPL-SCNC: 142 MMOL/L (ref 135–145)
TROPONIN I SERPL-MCNC: 0.11 NG/ML (ref 0–0.04)
WBC # BLD AUTO: 6 K/UL (ref 4.8–10.8)

## 2018-11-19 PROCEDURE — A9270 NON-COVERED ITEM OR SERVICE: HCPCS | Performed by: INTERNAL MEDICINE

## 2018-11-19 PROCEDURE — 80053 COMPREHEN METABOLIC PANEL: CPT

## 2018-11-19 PROCEDURE — 93005 ELECTROCARDIOGRAM TRACING: CPT | Performed by: EMERGENCY MEDICINE

## 2018-11-19 PROCEDURE — 36415 COLL VENOUS BLD VENIPUNCTURE: CPT

## 2018-11-19 PROCEDURE — 770020 HCHG ROOM/CARE - TELE (206)

## 2018-11-19 PROCEDURE — 99223 1ST HOSP IP/OBS HIGH 75: CPT | Performed by: INTERNAL MEDICINE

## 2018-11-19 PROCEDURE — 96372 THER/PROPH/DIAG INJ SC/IM: CPT

## 2018-11-19 PROCEDURE — 83880 ASSAY OF NATRIURETIC PEPTIDE: CPT

## 2018-11-19 PROCEDURE — 85610 PROTHROMBIN TIME: CPT

## 2018-11-19 PROCEDURE — 93005 ELECTROCARDIOGRAM TRACING: CPT

## 2018-11-19 PROCEDURE — 700111 HCHG RX REV CODE 636 W/ 250 OVERRIDE (IP): Performed by: INTERNAL MEDICINE

## 2018-11-19 PROCEDURE — 71045 X-RAY EXAM CHEST 1 VIEW: CPT

## 2018-11-19 PROCEDURE — 96374 THER/PROPH/DIAG INJ IV PUSH: CPT

## 2018-11-19 PROCEDURE — 99285 EMERGENCY DEPT VISIT HI MDM: CPT

## 2018-11-19 PROCEDURE — 84484 ASSAY OF TROPONIN QUANT: CPT

## 2018-11-19 PROCEDURE — 700102 HCHG RX REV CODE 250 W/ 637 OVERRIDE(OP): Performed by: INTERNAL MEDICINE

## 2018-11-19 PROCEDURE — 83690 ASSAY OF LIPASE: CPT

## 2018-11-19 PROCEDURE — 85025 COMPLETE CBC W/AUTO DIFF WBC: CPT

## 2018-11-19 PROCEDURE — 85730 THROMBOPLASTIN TIME PARTIAL: CPT

## 2018-11-19 RX ORDER — BISACODYL 10 MG
10 SUPPOSITORY, RECTAL RECTAL
Status: DISCONTINUED | OUTPATIENT
Start: 2018-11-19 | End: 2018-11-20 | Stop reason: HOSPADM

## 2018-11-19 RX ORDER — NITROGLYCERIN 0.4 MG/1
0.4 TABLET SUBLINGUAL PRN
Status: DISCONTINUED | OUTPATIENT
Start: 2018-11-19 | End: 2018-11-20 | Stop reason: HOSPADM

## 2018-11-19 RX ORDER — LISINOPRIL 5 MG/1
10 TABLET ORAL DAILY
Status: DISCONTINUED | OUTPATIENT
Start: 2018-11-19 | End: 2018-11-20

## 2018-11-19 RX ORDER — PRAVASTATIN SODIUM 80 MG/1
80 TABLET ORAL
Status: ON HOLD | COMMUNITY
End: 2020-03-27

## 2018-11-19 RX ORDER — HEPARIN SODIUM 5000 [USP'U]/ML
5000 INJECTION, SOLUTION INTRAVENOUS; SUBCUTANEOUS EVERY 8 HOURS
Status: DISCONTINUED | OUTPATIENT
Start: 2018-11-19 | End: 2018-11-20 | Stop reason: HOSPADM

## 2018-11-19 RX ORDER — PRAVASTATIN SODIUM 20 MG
40 TABLET ORAL NIGHTLY
Status: DISCONTINUED | OUTPATIENT
Start: 2018-11-19 | End: 2018-11-20 | Stop reason: HOSPADM

## 2018-11-19 RX ORDER — POTASSIUM CHLORIDE 20 MEQ/1
40 TABLET, EXTENDED RELEASE ORAL DAILY
Status: DISCONTINUED | OUTPATIENT
Start: 2018-11-19 | End: 2018-11-20 | Stop reason: HOSPADM

## 2018-11-19 RX ORDER — ACETAMINOPHEN 325 MG/1
650 TABLET ORAL EVERY 6 HOURS PRN
Status: DISCONTINUED | OUTPATIENT
Start: 2018-11-19 | End: 2018-11-20 | Stop reason: HOSPADM

## 2018-11-19 RX ORDER — METHOCARBAMOL 750 MG/1
750 TABLET, FILM COATED ORAL 4 TIMES DAILY
Status: DISCONTINUED | OUTPATIENT
Start: 2018-11-19 | End: 2018-11-20 | Stop reason: HOSPADM

## 2018-11-19 RX ORDER — CARVEDILOL 3.12 MG/1
3.12 TABLET ORAL 2 TIMES DAILY WITH MEALS
Status: ON HOLD | COMMUNITY
End: 2018-11-20

## 2018-11-19 RX ORDER — ASPIRIN 81 MG/1
81 TABLET, CHEWABLE ORAL DAILY
Status: DISCONTINUED | OUTPATIENT
Start: 2018-11-19 | End: 2018-11-20 | Stop reason: HOSPADM

## 2018-11-19 RX ORDER — POLYETHYLENE GLYCOL 3350 17 G/17G
1 POWDER, FOR SOLUTION ORAL
Status: DISCONTINUED | OUTPATIENT
Start: 2018-11-19 | End: 2018-11-20 | Stop reason: HOSPADM

## 2018-11-19 RX ORDER — METHOCARBAMOL 500 MG/1
500 TABLET, FILM COATED ORAL
Status: ON HOLD | COMMUNITY
End: 2020-03-27

## 2018-11-19 RX ORDER — OMEPRAZOLE 20 MG/1
20 CAPSULE, DELAYED RELEASE ORAL 2 TIMES DAILY
Status: DISCONTINUED | OUTPATIENT
Start: 2018-11-19 | End: 2018-11-20 | Stop reason: HOSPADM

## 2018-11-19 RX ORDER — CARVEDILOL 6.25 MG/1
6.25 TABLET ORAL 2 TIMES DAILY WITH MEALS
Status: DISCONTINUED | OUTPATIENT
Start: 2018-11-19 | End: 2018-11-20 | Stop reason: HOSPADM

## 2018-11-19 RX ORDER — OXYCODONE HYDROCHLORIDE AND ACETAMINOPHEN 5; 325 MG/1; MG/1
1-2 TABLET ORAL EVERY 4 HOURS PRN
Status: DISCONTINUED | OUTPATIENT
Start: 2018-11-19 | End: 2018-11-20 | Stop reason: HOSPADM

## 2018-11-19 RX ORDER — DIPHENHYDRAMINE HCL 25 MG
25 TABLET ORAL EVERY EVENING
COMMUNITY
End: 2018-12-28

## 2018-11-19 RX ORDER — AMOXICILLIN 250 MG
2 CAPSULE ORAL 2 TIMES DAILY
Status: DISCONTINUED | OUTPATIENT
Start: 2018-11-19 | End: 2018-11-20 | Stop reason: HOSPADM

## 2018-11-19 RX ORDER — SPIRONOLACTONE 25 MG/1
25 TABLET ORAL DAILY
Status: DISCONTINUED | OUTPATIENT
Start: 2018-11-20 | End: 2018-11-20 | Stop reason: HOSPADM

## 2018-11-19 RX ORDER — FUROSEMIDE 10 MG/ML
40 INJECTION INTRAMUSCULAR; INTRAVENOUS
Status: DISCONTINUED | OUTPATIENT
Start: 2018-11-19 | End: 2018-11-20

## 2018-11-19 RX ORDER — CYCLOBENZAPRINE HCL 10 MG
5 TABLET ORAL ONCE
Status: COMPLETED | OUTPATIENT
Start: 2018-11-19 | End: 2018-11-20

## 2018-11-19 RX ADMIN — FUROSEMIDE 40 MG: 10 INJECTION, SOLUTION INTRAMUSCULAR; INTRAVENOUS at 18:19

## 2018-11-19 RX ADMIN — ASPIRIN 81 MG: 81 TABLET, CHEWABLE ORAL at 18:18

## 2018-11-19 RX ADMIN — OMEPRAZOLE 20 MG: 20 CAPSULE, DELAYED RELEASE ORAL at 18:18

## 2018-11-19 RX ADMIN — PRAVASTATIN SODIUM 40 MG: 20 TABLET ORAL at 23:03

## 2018-11-19 RX ADMIN — OXYCODONE HYDROCHLORIDE AND ACETAMINOPHEN 1 TABLET: 5; 325 TABLET ORAL at 23:07

## 2018-11-19 RX ADMIN — LISINOPRIL 10 MG: 5 TABLET ORAL at 18:18

## 2018-11-19 RX ADMIN — POTASSIUM CHLORIDE 40 MEQ: 1500 TABLET, EXTENDED RELEASE ORAL at 18:18

## 2018-11-19 RX ADMIN — METHOCARBAMOL 750 MG: 750 TABLET ORAL at 23:02

## 2018-11-19 RX ADMIN — HEPARIN SODIUM 5000 UNITS: 5000 INJECTION, SOLUTION INTRAVENOUS; SUBCUTANEOUS at 18:19

## 2018-11-19 ASSESSMENT — ENCOUNTER SYMPTOMS
WHEEZING: 0
PALPITATIONS: 0
SHORTNESS OF BREATH: 1
SINUS PAIN: 0
BLURRED VISION: 0
DEPRESSION: 0
SEIZURES: 0
HEADACHES: 0
ABDOMINAL PAIN: 0
FEVER: 0
COUGH: 0
ORTHOPNEA: 1
MYALGIAS: 0
SHORTNESS OF BREATH: 1
FEVER: 0
HEARTBURN: 0
FOCAL WEAKNESS: 0
ORTHOPNEA: 0
MYALGIAS: 0
ABDOMINAL PAIN: 0
WEAKNESS: 0
DIZZINESS: 0
DIARRHEA: 0
HEADACHES: 0
INSOMNIA: 0
VOMITING: 0
NAUSEA: 0
DIZZINESS: 0
SPUTUM PRODUCTION: 0
SPUTUM PRODUCTION: 0
NERVOUS/ANXIOUS: 0
EYE REDNESS: 0
FOCAL WEAKNESS: 0
TINGLING: 0
STRIDOR: 0
BACK PAIN: 0
NAUSEA: 0
VOMITING: 0
SORE THROAT: 0
CHILLS: 0
WEIGHT LOSS: 0
COUGH: 0
NECK PAIN: 0
PALPITATIONS: 0
EYE PAIN: 0
BACK PAIN: 0
SENSORY CHANGE: 0
EYE DISCHARGE: 0
CHILLS: 0

## 2018-11-19 ASSESSMENT — LIFESTYLE VARIABLES
ALCOHOL_USE: YES
HAVE YOU EVER FELT YOU SHOULD CUT DOWN ON YOUR DRINKING: NO
EVER HAD A DRINK FIRST THING IN THE MORNING TO STEADY YOUR NERVES TO GET RID OF A HANGOVER: NO
TOTAL SCORE: 0
HAVE PEOPLE ANNOYED YOU BY CRITICIZING YOUR DRINKING: NO
EVER FELT BAD OR GUILTY ABOUT YOUR DRINKING: NO
TOTAL SCORE: 0
CONSUMPTION TOTAL: INCOMPLETE
TOTAL SCORE: 0

## 2018-11-19 ASSESSMENT — PAIN SCALES - GENERAL
PAINLEVEL_OUTOF10: 3
PAINLEVEL_OUTOF10: 3

## 2018-11-19 NOTE — ED NOTES
Vital signs rechecked.  Pt given warm blanket.  Troponin noted to be elevated- charge RN aware.  Pt to go to red 2.

## 2018-11-19 NOTE — ED TRIAGE NOTES
"PT to triage c/o SOB, per report pt was dx with pneumonia a couple days ago and told to come to the ER following an EKG due to \"skipping beats\".  PT reports this AM while laying on the couch he experienced \"a couple heart jolts\"  PT reports he took 2 nitros, and had relief.  PT denies CP at this time, EKG completed in triage  Chief Complaint   Patient presents with   • Shortness of Breath   • Chest Pain     took 2 nitros today     Blood pressure 131/75, pulse 93, temperature 36.6 °C (97.9 °F), temperature source Temporal, resp. rate 18, height 1.803 m (5' 11\"), weight 69.4 kg (153 lb), SpO2 94 %.      "

## 2018-11-20 ENCOUNTER — PATIENT OUTREACH (OUTPATIENT)
Dept: HEALTH INFORMATION MANAGEMENT | Facility: OTHER | Age: 83
End: 2018-11-20

## 2018-11-20 ENCOUNTER — APPOINTMENT (OUTPATIENT)
Dept: CARDIOLOGY | Facility: MEDICAL CENTER | Age: 83
DRG: 293 | End: 2018-11-20
Attending: INTERNAL MEDICINE
Payer: MEDICARE

## 2018-11-20 VITALS
TEMPERATURE: 98.3 F | BODY MASS INDEX: 20.96 KG/M2 | HEART RATE: 69 BPM | HEIGHT: 71 IN | SYSTOLIC BLOOD PRESSURE: 92 MMHG | RESPIRATION RATE: 17 BRPM | WEIGHT: 149.69 LBS | DIASTOLIC BLOOD PRESSURE: 54 MMHG | OXYGEN SATURATION: 95 %

## 2018-11-20 PROBLEM — I50.23 ACUTE ON CHRONIC SYSTOLIC (CONGESTIVE) HEART FAILURE (HCC): Status: RESOLVED | Noted: 2018-11-19 | Resolved: 2018-11-20

## 2018-11-20 PROBLEM — R07.9 CHEST PAIN: Status: RESOLVED | Noted: 2017-07-18 | Resolved: 2018-11-20

## 2018-11-20 LAB
ALBUMIN SERPL BCP-MCNC: 4.1 G/DL (ref 3.2–4.9)
ALBUMIN/GLOB SERPL: 1.6 G/DL
ALP SERPL-CCNC: 54 U/L (ref 30–99)
ALT SERPL-CCNC: 16 U/L (ref 2–50)
ANION GAP SERPL CALC-SCNC: 12 MMOL/L (ref 0–11.9)
AST SERPL-CCNC: 16 U/L (ref 12–45)
BILIRUB SERPL-MCNC: 0.8 MG/DL (ref 0.1–1.5)
BUN SERPL-MCNC: 24 MG/DL (ref 8–22)
CALCIUM SERPL-MCNC: 9.9 MG/DL (ref 8.5–10.5)
CHLORIDE SERPL-SCNC: 105 MMOL/L (ref 96–112)
CO2 SERPL-SCNC: 25 MMOL/L (ref 20–33)
CREAT SERPL-MCNC: 1.38 MG/DL (ref 0.5–1.4)
ERYTHROCYTE [DISTWIDTH] IN BLOOD BY AUTOMATED COUNT: 50.4 FL (ref 35.9–50)
FERRITIN SERPL-MCNC: 191.7 NG/ML (ref 22–322)
GLOBULIN SER CALC-MCNC: 2.6 G/DL (ref 1.9–3.5)
GLUCOSE SERPL-MCNC: 93 MG/DL (ref 65–99)
HCT VFR BLD AUTO: 33.1 % (ref 42–52)
HGB BLD-MCNC: 10.5 G/DL (ref 14–18)
IRON SATN MFR SERPL: 17 % (ref 15–55)
IRON SATN MFR SERPL: 18 % (ref 15–55)
IRON SERPL-MCNC: 64 UG/DL (ref 50–180)
IRON SERPL-MCNC: 65 UG/DL (ref 50–180)
LV EJECT FRACT  99904: 28
LV EJECT FRACT MOD 2C 99903: 28.77
LV EJECT FRACT MOD 4C 99902: 28.33
LV EJECT FRACT MOD BP 99901: 30.04
MAGNESIUM SERPL-MCNC: 1.6 MG/DL (ref 1.5–2.5)
MCH RBC QN AUTO: 31.3 PG (ref 27–33)
MCHC RBC AUTO-ENTMCNC: 31.7 G/DL (ref 33.7–35.3)
MCV RBC AUTO: 98.5 FL (ref 81.4–97.8)
PLATELET # BLD AUTO: 205 K/UL (ref 164–446)
PMV BLD AUTO: 10.5 FL (ref 9–12.9)
POTASSIUM SERPL-SCNC: 3.8 MMOL/L (ref 3.6–5.5)
PROT SERPL-MCNC: 6.7 G/DL (ref 6–8.2)
RBC # BLD AUTO: 3.36 M/UL (ref 4.7–6.1)
SODIUM SERPL-SCNC: 142 MMOL/L (ref 135–145)
TIBC SERPL-MCNC: 370 UG/DL (ref 250–450)
TIBC SERPL-MCNC: 375 UG/DL (ref 250–450)
TROPONIN I SERPL-MCNC: 0.13 NG/ML (ref 0–0.04)
WBC # BLD AUTO: 9.5 K/UL (ref 4.8–10.8)

## 2018-11-20 PROCEDURE — 700111 HCHG RX REV CODE 636 W/ 250 OVERRIDE (IP): Performed by: INTERNAL MEDICINE

## 2018-11-20 PROCEDURE — 80053 COMPREHEN METABOLIC PANEL: CPT

## 2018-11-20 PROCEDURE — 700102 HCHG RX REV CODE 250 W/ 637 OVERRIDE(OP): Performed by: INTERNAL MEDICINE

## 2018-11-20 PROCEDURE — 93306 TTE W/DOPPLER COMPLETE: CPT

## 2018-11-20 PROCEDURE — 82728 ASSAY OF FERRITIN: CPT

## 2018-11-20 PROCEDURE — 83540 ASSAY OF IRON: CPT

## 2018-11-20 PROCEDURE — 83735 ASSAY OF MAGNESIUM: CPT

## 2018-11-20 PROCEDURE — A9270 NON-COVERED ITEM OR SERVICE: HCPCS | Performed by: HOSPITALIST

## 2018-11-20 PROCEDURE — 85027 COMPLETE CBC AUTOMATED: CPT

## 2018-11-20 PROCEDURE — 700102 HCHG RX REV CODE 250 W/ 637 OVERRIDE(OP): Performed by: HOSPITALIST

## 2018-11-20 PROCEDURE — 36415 COLL VENOUS BLD VENIPUNCTURE: CPT

## 2018-11-20 PROCEDURE — A9270 NON-COVERED ITEM OR SERVICE: HCPCS | Performed by: INTERNAL MEDICINE

## 2018-11-20 PROCEDURE — 93306 TTE W/DOPPLER COMPLETE: CPT | Mod: 26 | Performed by: INTERNAL MEDICINE

## 2018-11-20 PROCEDURE — 99223 1ST HOSP IP/OBS HIGH 75: CPT | Performed by: INTERNAL MEDICINE

## 2018-11-20 PROCEDURE — 99239 HOSP IP/OBS DSCHRG MGMT >30: CPT | Performed by: INTERNAL MEDICINE

## 2018-11-20 PROCEDURE — 83550 IRON BINDING TEST: CPT | Mod: 91

## 2018-11-20 PROCEDURE — 84484 ASSAY OF TROPONIN QUANT: CPT

## 2018-11-20 RX ORDER — LOSARTAN POTASSIUM 25 MG/1
25 TABLET ORAL DAILY
Qty: 30 TAB | Refills: 1 | Status: SHIPPED | OUTPATIENT
Start: 2018-11-20 | End: 2019-03-05 | Stop reason: SDUPTHER

## 2018-11-20 RX ORDER — LOSARTAN POTASSIUM 25 MG/1
25 TABLET ORAL DAILY
Qty: 30 TAB | Refills: 1 | Status: SHIPPED | OUTPATIENT
Start: 2018-11-20 | End: 2018-11-20

## 2018-11-20 RX ORDER — TORSEMIDE 100 MG/1
100 TABLET ORAL
Status: DISCONTINUED | OUTPATIENT
Start: 2018-11-20 | End: 2018-11-20 | Stop reason: HOSPADM

## 2018-11-20 RX ORDER — CARVEDILOL 6.25 MG/1
6.25 TABLET ORAL 2 TIMES DAILY WITH MEALS
Qty: 60 TAB | Refills: 1 | Status: SHIPPED | OUTPATIENT
Start: 2018-11-20 | End: 2018-12-28

## 2018-11-20 RX ORDER — ASPIRIN 81 MG/1
81 TABLET, CHEWABLE ORAL DAILY
Qty: 100 TAB | Refills: 1
Start: 2018-11-21 | End: 2018-12-28

## 2018-11-20 RX ORDER — LOSARTAN POTASSIUM 25 MG/1
25 TABLET ORAL
Status: DISCONTINUED | OUTPATIENT
Start: 2018-11-20 | End: 2018-11-20 | Stop reason: HOSPADM

## 2018-11-20 RX ORDER — TORSEMIDE 100 MG/1
100 TABLET ORAL DAILY
Qty: 30 TAB | Refills: 3 | Status: SHIPPED | OUTPATIENT
Start: 2018-11-20 | End: 2018-11-20

## 2018-11-20 RX ORDER — TORSEMIDE 100 MG/1
100 TABLET ORAL DAILY
Qty: 30 TAB | Refills: 3 | Status: SHIPPED | OUTPATIENT
Start: 2018-11-20 | End: 2018-12-28

## 2018-11-20 RX ADMIN — METHOCARBAMOL 750 MG: 750 TABLET ORAL at 12:09

## 2018-11-20 RX ADMIN — LISINOPRIL 10 MG: 5 TABLET ORAL at 05:28

## 2018-11-20 RX ADMIN — HEPARIN SODIUM 5000 UNITS: 5000 INJECTION, SOLUTION INTRAVENOUS; SUBCUTANEOUS at 05:36

## 2018-11-20 RX ADMIN — FUROSEMIDE 40 MG: 10 INJECTION, SOLUTION INTRAMUSCULAR; INTRAVENOUS at 05:33

## 2018-11-20 RX ADMIN — METHOCARBAMOL 750 MG: 750 TABLET ORAL at 08:22

## 2018-11-20 RX ADMIN — POTASSIUM CHLORIDE 40 MEQ: 1500 TABLET, EXTENDED RELEASE ORAL at 05:29

## 2018-11-20 RX ADMIN — ASPIRIN 81 MG: 81 TABLET, CHEWABLE ORAL at 05:27

## 2018-11-20 RX ADMIN — MAGNESIUM 64 MG (MAGNESIUM CHLORIDE) TABLET,DELAYED RELEASE 64 MG: at 12:09

## 2018-11-20 RX ADMIN — OMEPRAZOLE 20 MG: 20 CAPSULE, DELAYED RELEASE ORAL at 05:29

## 2018-11-20 RX ADMIN — SPIRONOLACTONE 25 MG: 25 TABLET ORAL at 05:28

## 2018-11-20 RX ADMIN — CARVEDILOL 6.25 MG: 6.25 TABLET, FILM COATED ORAL at 08:22

## 2018-11-20 RX ADMIN — CYCLOBENZAPRINE 5 MG: 10 TABLET, FILM COATED ORAL at 00:11

## 2018-11-20 RX ADMIN — CARVEDILOL 6.25 MG: 6.25 TABLET, FILM COATED ORAL at 00:10

## 2018-11-20 ASSESSMENT — LIFESTYLE VARIABLES
TOTAL SCORE: 0
EVER FELT BAD OR GUILTY ABOUT YOUR DRINKING: NO
ALCOHOL_USE: YES
TOTAL SCORE: 0
HAVE YOU EVER FELT YOU SHOULD CUT DOWN ON YOUR DRINKING: NO
CONSUMPTION TOTAL: POSITIVE
EVER HAD A DRINK FIRST THING IN THE MORNING TO STEADY YOUR NERVES TO GET RID OF A HANGOVER: NO
AVERAGE NUMBER OF DAYS PER WEEK YOU HAVE A DRINK CONTAINING ALCOHOL: 7
ON A TYPICAL DAY WHEN YOU DRINK ALCOHOL HOW MANY DRINKS DO YOU HAVE: 2
TOTAL SCORE: 0
HOW MANY TIMES IN THE PAST YEAR HAVE YOU HAD 5 OR MORE DRINKS IN A DAY: 10
EVER_SMOKED: YES
HAVE PEOPLE ANNOYED YOU BY CRITICIZING YOUR DRINKING: NO

## 2018-11-20 ASSESSMENT — PAIN SCALES - GENERAL
PAINLEVEL_OUTOF10: 6
PAINLEVEL_OUTOF10: 3
PAINLEVEL_OUTOF10: 10
PAINLEVEL_OUTOF10: 3
PAINLEVEL_OUTOF10: 7

## 2018-11-20 ASSESSMENT — COGNITIVE AND FUNCTIONAL STATUS - GENERAL
SUGGESTED CMS G CODE MODIFIER DAILY ACTIVITY: CH
DAILY ACTIVITIY SCORE: 24
MOBILITY SCORE: 24
SUGGESTED CMS G CODE MODIFIER MOBILITY: CH

## 2018-11-20 ASSESSMENT — PATIENT HEALTH QUESTIONNAIRE - PHQ9
SUM OF ALL RESPONSES TO PHQ9 QUESTIONS 1 AND 2: 0
1. LITTLE INTEREST OR PLEASURE IN DOING THINGS: NOT AT ALL
2. FEELING DOWN, DEPRESSED, IRRITABLE, OR HOPELESS: NOT AT ALL

## 2018-11-20 NOTE — DISCHARGE INSTRUCTIONS
Discharge Instructions    Discharged to home by car with relative. Discharged via wheelchair, hospital escort: Yes.  Special equipment needed: Not Applicable    Be sure to schedule a follow-up appointment with your primary care doctor or any specialists as instructed.     Discharge Plan:   Diet Plan: Discussed  Activity Level: Discussed  Confirmed Follow up Appointment: Appointment Scheduled  Confirmed Symptoms Management: Discussed  Medication Reconciliation Updated: Yes  Pneumococcal Vaccine Administered/Refused: Not given - Patient refused pneumococcal vaccine  Influenza Vaccine Indication: Patient Refuses    I understand that a diet low in cholesterol, fat, and sodium is recommended for good health. Unless I have been given specific instructions below for another diet, I accept this instruction as my diet prescription.   Other diet:     Diet low fat, low salt, heart healthy with 9-13 servings of fruits and vegetables   Activities as tolerated   Follow ups with PCP in 7-10 days, call for appointment   Meds per med rec sheet   No smoking, no alcohol, no caffeine   Wear seat belt in motorized vehicle   Take medications as perscribed   Keep appointments   If symptoms worsen call PCP, 911 or urgent care.    Special Instructions:   HF Patient Discharge Instructions  · Monitor your weight daily, and maintain a weight chart, to track your weight changes.   · Activity as tolerated, unless your Doctor has ordered otherwise. Other activity order.  · Follow a low fat, low cholesterol, low salt diet unless instructed otherwise by your Doctor. Read the labels on the back of food products and track your intake of fat, cholesterol and salt.   · Fluid Restriction No. If a Fluid Restriction has been ordered by your Doctor, measure fluids with a measuring cup to ensure that you are not exceeding the restriction.   · No smoking.  · Oxygen No. If your Doctor has ordered that you wear Oxygen at home, it is important to wear it as  ordered.  · Did you receive an explanation from staff on the importance of taking each of your medications and why it is necessary to keep taking them unless your doctor says to stop? Yes  · Were all of your questions answered about how to manage your heart failure and what to do if you have increased signs and symptoms after you go home? Yes  · Do you feel like your heart failure care team involved you in the care treatment plan and allowed you to make decisions regarding your care while in the hospital and addressed any discharge needs you might have? Yes    See the educational handout provided at discharge for more information on monitoring your daily weight, activity and diet. This also explains more about Heart Failure, symptoms of a flare-up and some of the tests that you have undergone.     Warning Signs of a Flare-Up include:  · Swelling in the ankles or lower legs.  · Shortness of breath, while at rest, or while doing normal activities.   · Shortness of breath at night when in bed, or coughing in bed.   · Requiring more pillows to sleep at night, or needing to sit up at night to sleep.  · Feeling weak, dizzy or fatigued.     When to call your Doctor:  · Call [facility] seven days a week from 8:00 a.m. to 8:00 p.m. for medical questions [phone].  · Call your Primary Care Physician or Cardiologist if:   1. You experience any pain radiating to your jaw or neck.  2. You have any difficulty breathing.  3. You experience weight gain of 3 lbs in a day or 5 lbs in a week.   4. You feel any palpitations or irregular heartbeats.  5. You become dizzy or lose consciousness.   If you have had an angiogram or had a pacemaker or AICD placed, and experience:  1. Bleeding, drainage or swelling at the surgical / puncture site.  2. Fever greater than 100.0 F  3. Shock from internal defibrillator.  4. Cool and / or numb extremities.      · Is patient discharged on Warfarin / Coumadin?   No     Depression /  Suicide Risk    As you are discharged from this [facility] facility, it is important to learn how to keep safe from harming yourself.    Recognize the warning signs:  · Abrupt changes in personality, positive or negative- including increase in energy   · Giving away possessions  · Change in eating patterns- significant weight changes-  positive or negative  · Change in sleeping patterns- unable to sleep or sleeping all the time   · Unwillingness or inability to communicate  · Depression  · Unusual sadness, discouragement and loneliness  · Talk of wanting to die  · Neglect of personal appearance   · Rebelliousness- reckless behavior  · Withdrawal from people/activities they love  · Confusion- inability to concentrate     If you or a loved one observes any of these behaviors or has concerns about self-harm, here's what you can do:  · Talk about it- your feelings and reasons for harming yourself  · Remove any means that you might use to hurt yourself (examples: pills, rope, extension cords, firearm)  · Get professional help from the community (Mental Health, Substance Abuse, psychological counseling)  · Do not be alone:Call your Safe Contact- someone whom you trust who will be there for you.  · Call your local CRISIS HOTLINE 743-0735 or 501-834-2818  · Call your local Children's Mobile Crisis Response Team [place] (928) 056-5807 or www.[employer]  · Call the toll free National Suicide Prevention Hotlines   · National Suicide Prevention Lifeline 532-679-KNBU (1397)  · National Hope Line Network 800-SUICIDE (824-6693)      Heart Failure  Heart failure means your heart has trouble pumping blood. This makes it hard for your body to work well. Heart failure is usually a long-term (chronic) condition. You must take good care of yourself and follow your doctor's treatment plan.  HOME CARE  · Take your heart medicine as told by your doctor.  ¨ Do not stop taking medicine unless your doctor tells you to.  ¨ Do  not skip any dose of medicine.  ¨ Refill your medicines before they run out.  ¨ Take other medicines only as told by your doctor or pharmacist.  · Stay active if told by your doctor. The elderly and people with severe heart failure should talk with a doctor about physical activity.  · Eat heart-healthy foods. Choose foods that are without trans fat and are low in saturated fat, cholesterol, and salt (sodium). This includes fresh or frozen fruits and vegetables, fish, lean meats, fat-free or low-fat dairy foods, whole grains, and high-fiber foods. Lentils and dried peas and beans (legumes) are also good choices.  · Limit salt if told by your doctor.  · Cook in a healthy way. Roast, grill, broil, bake, poach, steam, or stir-delgado foods.  · Limit fluids as told by your doctor.  · Weigh yourself every morning. Do this after you pee (urinate) and before you eat breakfast. Write down your weight to give to your doctor.  · Take your blood pressure and write it down if your doctor tells you to.  · Ask your doctor how to check your pulse. Check your pulse as told.  · Lose weight if told by your doctor.  · Stop smoking or chewing tobacco. Do not use gum or patches that help you quit without your doctor's approval.  · Schedule and go to doctor visits as told.  · Nonpregnant women should have no more than 1 drink a day. Men should have no more than 2 drinks a day. Talk to your doctor about drinking alcohol.  · Stop illegal drug use.  · Stay current with shots (immunizations).  · Manage your health conditions as told by your doctor.  · Learn to manage your stress.  · Rest when you are tired.  · If it is really hot outside:  ¨ Avoid intense activities.  ¨ Use air conditioning or fans, or get in a cooler place.  ¨ Avoid caffeine and alcohol.  ¨ Wear loose-fitting, lightweight, and light-colored clothing.  · If it is really cold outside:  ¨ Avoid intense activities.  ¨ Layer your clothing.  ¨ Wear mittens or gloves, a hat, and a  scarf when going outside.  ¨ Avoid alcohol.  · Learn about heart failure and get support as needed.  · Get help to maintain or improve your quality of life and your ability to care for yourself as needed.  GET HELP IF:   · You gain weight quickly.  · You are more short of breath than usual.  · You cannot do your normal activities.  · You tire easily.  · You cough more than normal, especially with activity.  · You have any or more puffiness (swelling) in areas such as your hands, feet, ankles, or belly (abdomen).  · You cannot sleep because it is hard to breathe.  · You feel like your heart is beating fast (palpitations).  · You get dizzy or light-headed when you stand up.  GET HELP RIGHT AWAY IF:   · You have trouble breathing.  · There is a change in mental status, such as becoming less alert or not being able to focus.  · You have chest pain or discomfort.  · You faint.  MAKE SURE YOU:   · Understand these instructions.  · Will watch your condition.  · Will get help right away if you are not doing well or get worse.  This information is not intended to replace advice given to you by your health care provider. Make sure you discuss any questions you have with your health care provider.  Document Released: 09/26/2009 Document Revised: 01/08/2016 Document Reviewed: 02/03/2014  [employer] Interactive Patient Education © 2017 [employer]      Preventing Heart Failure  Heart failure is a condition in which the heart has trouble pumping blood. This may mean that the heart cannot pump enough blood out to the body, or that the heart does not fill up with enough blood. Either of those problems can lead to symptoms such as fatigue, trouble breathing, and swelling throughout the body.  This is a common medical condition that affects not only the heart, but the entire body. Making certain nutrition and lifestyle changes can help you prevent heart failure and avoid serious health problems.  What nutrition changes can be  made?  · If you are overweight or obese, reduce how many calories you eat each day so that you lose weight. Work with your health care provider or a diet and nutrition specialist (dietitian) to determine how many calories you need each day.  · Eat foods that are low in salt (sodium). Avoid adding extra salt to foods.  · Eat a well-balanced diet that includes a lot of:  ¨ Fresh fruits and vegetables.  ¨ Whole grains.  ¨ Lean meats.  ¨ Beans.  ¨ Fat-free or low-fat dairy products.  · Avoid foods that contain a lot of:  ¨ Trans fats.  ¨ Saturated fats.  ¨ Sugar.  ¨ Cholesterol.  What lifestyle changes can be made?  · Do not use any products that contain nicotine or tobacco, such as cigarettes and e-cigarettes. If you need help quitting or reducing how much you smoke, ask your health care provider.  · Stop using alcohol, or limit alcohol intake to no more than 1 drink a day for nonpregnant women and 2 drinks a day for men. One drink equals 12 oz of beer, 5 oz of wine, or 1½ oz of hard liquor.  · Exercise for at least 150 minutes each week, or as much as told by your health care provider.  ¨ Do moderate-intensity exercise, such as brisk walking, bicycling, or water aerobics.  ¨ Ask your health care provider which activities are safe for you.  · See a health care provider regularly for screening and wellness checks. Know your heart health indicators, such as:  ¨ Blood pressure.  ¨ Cholesterol levels.  ¨ Blood sugar (glucose) levels.  ¨ Weight and BMI.  · If you have diabetes, manage your condition and follow your treatment plan as instructed.  · Try to get 7-9 hours of sleep each night. To help with sleep:  ¨ Keep your bedroom cool and dark.  ¨ Do not eat a heavy meal during the hour before you go to bed.  ¨ Do not drink alcohol or caffeinated drinks before bed.  ¨ Avoid screen time before bedtime. This means avoiding television, computers, tablets, and cell phones.  · Find ways to relax and manage stress. These may  include:  ¨ Breathing exercises.  ¨ Meditation.  ¨ Yoga.  ¨ Listening to music.  Why are these changes important?  · A well-balanced diet with the appropriate amount of calories can keep your body weight at a healthy level, which reduces strain on your heart.  · A low-sodium diet can help keep your blood pressure in a normal range and keep your blood vessels working properly.  · Quitting smoking and limiting alcohol intake can reduce harmful effects that these substances have on your heart and blood vessels.  · Regular exercise can keep your heart strong so it can pump blood normally.  · Managing diabetes helps your blood circulate and can help you maintain a healthy weight.  · Managing stress helps to reduce the risk of high blood pressure and heart problems.  What can happen if changes are not made?  Heart failure can cause very serious problems that may get worse over time, such as:  · Extreme fatigue during normal physical activities.  · Shortness of breath or trouble breathing.  · Swelling in your abdomen, legs, ankles, feet, or neck.  · Fluid buildup throughout the body.  · Weight gain.  · Cough.  · Frequent urination.  What can I do to lower my risk?  You may be able to lower your risk of heart failure by:  · Losing weight or keeping your weight under control.  · Working with your health care provider to manage your:  ¨ Cholesterol.  ¨ Blood pressure.  ¨ Diabetes, if this applies.  · Eating a healthy diet.  · Exercising regularly.  · Avoiding unhealthy habits, such as smoking, drinking, or using drugs.  · Getting plenty of sleep.  · Managing your stress.  How is this treated?  Heart failure cannot be cured except by heart transplant, but treatment can help to improve your quality of life. Treatment may include:  · Medicines to help:  ¨ Lower blood pressure.  ¨ Remove excess sodium from your body.  ¨ Relax blood vessels.  ¨ Improve heart function.  ¨ Control other symptoms of heart failure.  · Surgery to open  blocked coronary arteries or repair damaged heart valves.  · Implantation of a biventricular pacemaker to improve heart muscle function (cardiac resynchronization therapy). This device paces both the right ventricle and left ventricle.  · Implantation of a device to treat serious abnormal heart rhythms (implantable cardioverter defibrillator, ICD).  · Implantation of a mechanical heart pump to improve the pumping ability of your heart (left ventricular assist device, LVAD).  · Heart transplant. This treatment is considered for certain people who do not improve with other treatments.  Where to find more information:  · National [facility], [facility] [place]: www.nhlbi.nih.gov/health/health-topics/topics/hf  · Centers for Disease Control and Prevention: www.cdc.gov/dhdsp/data_statistics/fact_sheets/fs_heart_failure.htm  · [facility]: [facility]/heartfailure/heartfailuredefined/01.html  · American Heart Association: www.heart.org/HEARTORG/Conditions/HeartFailure/Heart-Failure_UCM_002019_SubHomePage.jsp  Contact a health care provider if:  · You have rapid weight gain.  · You have increasing shortness of breath that is unusual for you.  · You tire easily, or you are unable to participate in your usual activities.  · You cough more than normal, especially with physical activity.  · You have any swelling or more swelling in areas such as your hands, feet, ankles, or abdomen.  Summary  · Heart failure can be prevented by making changes to your diet and your lifestyle.  · It is important to eat a healthy diet, manage your weight, exercise regularly, manage stress, avoid drugs and alcohol, and keep your cholesterol and blood pressure under control.  · Heart failure can cause very serious problems over time.  This information is not intended to replace advice given to you by your health care provider. Make sure you discuss any questions you have with your health care provider.  Document Released: 08/08/2017  Document Revised: 08/08/2017 Document Reviewed: 08/08/2017  [employer] Interactive Patient Education © 2017 [employer]    Losartan tablets  What is this medicine?  LOSARTAN (sergio REBOLLEDO tan) is used to treat high blood pressure and to reduce the risk of stroke in certain patients. This drug also slows the progression of kidney disease in patients with diabetes.  This medicine may be used for other purposes; ask your health care provider or pharmacist if you have questions.  COMMON BRAND NAME(S): Cozaar  What should I tell my health care provider before I take this medicine?  They need to know if you have any of these conditions:  -heart failure  -kidney or liver disease  -an unusual or allergic reaction to losartan, other medicines, foods, dyes, or preservatives  -pregnant or trying to get pregnant  -breast-feeding  How should I use this medicine?  Take this medicine by mouth with a glass of water. Follow the directions on the prescription label. This medicine can be taken with or without food. Take your doses at regular intervals. Do not take your medicine more often than directed.  Talk to your pediatrician regarding the use of this medicine in children. Special care may be needed.  Overdosage: If you think you have taken too much of this medicine contact a poison control center or emergency room at once.  NOTE: This medicine is only for you. Do not share this medicine with others.  What if I miss a dose?  If you miss a dose, take it as soon as you can. If it is almost time for your next dose, take only that dose. Do not take double or extra doses.  What may interact with this medicine?  -blood pressure medicines  -diuretics, especially triamterene, spironolactone, or amiloride  -fluconazole  -NSAIDs, medicines for pain and inflammation, like ibuprofen or naproxen  -potassium salts or potassium supplements  -rifampin  This list may not describe all possible interactions. Give your health care provider a list of all  the medicines, herbs, non-prescription drugs, or dietary supplements you use. Also tell them if you smoke, drink alcohol, or use illegal drugs. Some items may interact with your medicine.  What should I watch for while using this medicine?  Visit your doctor or health care professional for regular checks on your progress. Check your blood pressure as directed. Ask your doctor or health care professional what your blood pressure should be and when you should contact him or her. Call your doctor or health care professional if you notice an irregular or fast heart beat.  Women should inform their doctor if they wish to become pregnant or think they might be pregnant. There is a potential for serious side effects to an unborn child, particularly in the second or third trimester. Talk to your health care professional or pharmacist for more information.  You may get drowsy or dizzy. Do not drive, use machinery, or do anything that needs mental alertness until you know how this drug affects you. Do not stand or sit up quickly, especially if you are an older patient. This reduces the risk of dizzy or fainting spells. Alcohol can make you more drowsy and dizzy. Avoid alcoholic drinks.  Avoid salt substitutes unless you are told otherwise by your doctor or health care professional.  Do not treat yourself for coughs, colds, or pain while you are taking this medicine without asking your doctor or health care professional for advice. Some ingredients may increase your blood pressure.  What side effects may I notice from receiving this medicine?  Side effects that you should report to your doctor or health care professional as soon as possible:  -confusion, dizziness, light headedness or fainting spells  -decreased amount of urine passed  -difficulty breathing or swallowing, hoarseness, or tightening of the throat  -fast or irregular heart beat, palpitations, or chest pain  -skin rash, itching  -swelling of your face, lips,  tongue, hands, or feet  Side effects that usually do not require medical attention (report to your doctor or health care professional if they continue or are bothersome):  -cough  -decreased sexual function or desire  -headache  -nasal congestion or stuffiness  -nausea or stomach pain  -sore or cramping muscles  This list may not describe all possible side effects. Call your doctor for medical advice about side effects. You may report side effects to FDA at 3-080-KWF-9466.  Where should I keep my medicine?  Keep out of the reach of children.  Store at room temperature between 15 and 30 degrees C (59 and 86 degrees F). Protect from light. Keep container tightly closed. Throw away any unused medicine after the expiration date.  NOTE: This sheet is a summary. It may not cover all possible information. If you have questions about this medicine, talk to your doctor, pharmacist, or health care provider.  © 2018 [employer] (2009-02-27 16:42:18)    Torsemide tablets  What is this medicine?  TORSEMIDE (TORE se mide) is a diuretic. It helps you make more urine and to lose salt and excess water from your body. This medicine is used to treat high blood pressure, and edema or swelling from heart, kidney, or liver disease.  This medicine may be used for other purposes; ask your health care provider or pharmacist if you have questions.  COMMON BRAND NAME(S): Demadex  What should I tell my health care provider before I take this medicine?  They need to know if you have any of these conditions:  -abnormal blood electrolytes  -diabetes  -gout  -heart disease  -kidney disease  -liver disease  -small amounts of urine, or difficulty passing urine  -an unusual or allergic reaction to torsemide, sulfa drugs, other medicines, foods, dyes, or preservatives  -pregnant or trying to get pregnant  -breast-feeding  How should I use this medicine?  Take this medicine by mouth with a glass of water. Follow the directions on the  prescription label. You may take this medicine with or without food. If it upsets your stomach, take it with food or milk. Do not take your medicine more often than directed. Remember that you will need to pass more urine after taking this medicine. Do not take your medicine at a time of day that will cause you problems. Do not take at bedtime.  Talk to your pediatrician regarding the use of this medicine in children. Special care may be needed.  Overdosage: If you think you have taken too much of this medicine contact a poison control center or emergency room at once.  NOTE: This medicine is only for you. Do not share this medicine with others.  What if I miss a dose?  If you miss a dose, take it as soon as you can. If it is almost time for your next dose, take only that dose. Do not take double or extra doses.  What may interact with this medicine?  -alcohol  -certain antibiotics given by injection  certain heart medicines like digoxin  -diuretics  -lithium  -medicines for diabetes  -medicines for blood pressure  -medicines for cholesterol like cholestyramine  -medicines that relax muscles for surgery  -NSAIDs, medicines for pain and inflammation, like ibuprofen or naproxen  -OTC supplements like ginseng and ephedra  -probenecid  -steroid medicines like prednisone or cortisone  This list may not describe all possible interactions. Give your health care provider a list of all the medicines, herbs, non-prescription drugs, or dietary supplements you use. Also tell them if you smoke, drink alcohol, or use illegal drugs. Some items may interact with your medicine.  What should I watch for while using this medicine?  Visit your doctor or health care professional for regular checks on your progress. Check your blood pressure regularly. Ask your doctor or health care professional what your blood pressure should be, and when you should contact him or her. If you are a diabetic, check your blood sugar as directed.  You may  need to be on a special diet while taking this medicine. Check with your doctor. Also, ask how many glasses of fluid you need to drink a day. You must not get dehydrated.  You may get drowsy or dizzy. Do not drive, use machinery, or do anything that needs mental alertness until you know how this drug affects you. Do not stand or sit up quickly, especially if you are an older patient. This reduces the risk of dizzy or fainting spells. Alcohol can make you more drowsy and dizzy. Avoid alcoholic drinks.  What side effects may I notice from receiving this medicine?  Side effects that you should report to your doctor or health care professional as soon as possible:  -allergic reactions such as skin rash or itching, hives, swelling of the lips, mouth, tongue or throat  -blood in urine or stool  -dry mouth  -hearing loss or ringing in the ears  -irregular heartbeat  -muscle pain, weakness or cramps  -pain or difficulty passing urine  -unusually weak or tired  -vomiting or diarrhea  Side effects that usually do not require medical attention (report to your doctor or health care professional if they continue or are bothersome):  -dizzy or lightheaded  -headache  -increased thirst  -passing large amounts of urine  -sexual difficulties  -stomach pain, upset or nausea  This list may not describe all possible side effects. Call your doctor for medical advice about side effects. You may report side effects to FDA at 7-902-FDA-1302.  Where should I keep my medicine?  Keep out of the reach of children.  Store at room temperature between 15 and 30 degrees C (59 and 86 degrees F). Throw away any unused medicine after the expiration date.  NOTE: This sheet is a summary. It may not cover all possible information. If you have questions about this medicine, talk to your doctor, pharmacist, or health care provider.  © 2018 [employer] Standard (2009-09-03 11:35:45)

## 2018-11-20 NOTE — PROGRESS NOTES
Took report from Lucinda CHATTERJEE. Assumed patient care. Assessed patient. Patient A&Ox4. All questions and concerns answered at this time. Bed locked and in lowest position. Call light within reach. Will continue to monitor patient.

## 2018-11-20 NOTE — PROGRESS NOTES
Cardiovascular Nurse Navigator () Advanced Heart Failure Program Inpatient Progress Note:    This patient has been diagnosed with acute on chronic systolic congestive heart failure (150.23) by admitting hospitalist.     Of note, patient has never received a diagnosis of HF from cardiology, he has followed with them for years. Cardiology has only diagnosed ischemic cardiomyopathy in the past.    Patient presented with SOB and CP. Last EF was from echocardiogram on 4/7/17: 45%, so this would be HFpEF, borderline. No repeat ordered.     Pt is already on GDMT for HFrEF: carvedilol, lisinopril, and spironolactone.    Troponin at 1434 yesterday was 0.11.    Patient is the primary caregiver for his disabled wife and there are no family supports. Patient apparently was trying to help his wife out of her wheelchair and in to the restroom in his patient room. Because of this, patient's wife has been socially admitted as well.    Patient is a DNAR/DNI at this time but there is no POLST or AD on file.    Given his status as primary care provider, lack of social support, and new heart failure diagnosis, this is a tenuous social situation with potential for near future complications.    In light of all of this, I would strongly recommend that someone discuss advance care planning with the patient, I don't see that he has had a palliative consult in the past.    The ACC recommends engaging palliative care as part of optimization of HFrEF treatment to solicit goals of care and focus on quality of life throughout the clinical course of HF.    Once all diagnostics are in, please consider an order for palliative to discuss Advanced Care Planning.      Speaking with patients frankly about their end-of-life wishes is one of the most important things a palliative care team can do. This is especially important in the context of heart failure, since it’s such an unpredictable disease.    Finally a more in depth  consult  would be helpful especially for providing information on community resources, and any possible care that could be obtained for the wife to help the patient with his caregiving responsibilities. I have ordered this.    Thank you and please call LAURA Glover with questions M-F  Follow-up With  Details  Why  Contact Info   Hernan Trimble Jr., M.D.  Go on 11/26/2018  Please arrive at 3:00 pm for your appointment with primary care.  [address]  [place] [address]  [phone]     Dec 06, 2018  3:00 PM PST  Heart Failure New Patient with Uzair Vogt M.D.  [facility] (--) [address] , [address]  [place] [address]  [phone]

## 2018-11-20 NOTE — PROGRESS NOTES
2 RN Skin Check:    Right inner ear red  Scar on lower back  Left shin has a few scratches  Heels red and slightly slow to shira. Floated heels with pillow.

## 2018-11-20 NOTE — ED PROVIDER NOTES
ED Provider Note    HPI: Patient is a 86-year-old male who presented to the emergency department November 19, 2018 at 1:55 PM with a chief complaint of dyspnea on exertion chest pressure and orthopnea.    Symptoms began several days ago.  The patient was apparently diagnosed at an urgent care with possible pneumonia a few days ago.  He had an EKG done today which showed skipped beats and was told to come to the emergency department.  The patient this a.m. while lying on his couch experienced a couple of sharp pains in his chest.  He took nitroglycerin and the pain resolved.  He notes severe dyspnea on exertion and orthopnea over the last couple of weeks.  No fever no chills no leg pain no leg swelling no nausea no vomiting no diarrhea.  No other somatic complaint    Review of Systems: Positive for chest pain, dyspnea on exertion and orthopnea negative for fever chills leg pain leg swelling nausea vomiting diarrhea.  Review of systems reviewed with patient, all other systems negative    Past medical/surgical history: Myocardial infarction coronary artery disease ejection fraction approximately 40% hypertension elevated cholesterol COPD PSVT elbow surgery    Medications: Vibramycin albuterol azithromycin verapamil Coreg lisinopril nitroglycerin as needed Robaxin Prilosec Aldactone Pravachol    Allergies: None    Social History: Remote history of smoking social user of alcohol      Physical exam: Constitutional: Well-developed well-nourished male appears younger than stated age  Vital signs: Temperature 97.9 pulse 93 respirations 18 blood pressure 131/75 pulse oximetry 94%  EYES: PERRL, EOMI, Conjunctivae and sclera normal, eyelids normal bilaterally.  Neck: Trachea midline. No cervical masses seen or palpated. Normal range of motion, supple. No meningeal signs elicited.  Cardiac: Regular rate and rhythm. S1-S2 present. No S3 or S4 present. No murmurs, rubs, or gallops heard. No edema or varicosities were seen.    Lungs: Clear to auscultation with good aeration throughout. No wheezes, rales, or rhonchi heard. Patient's chest wall moved symmetrically with each respiratory effort. Patient was not making use of accessory muscles of respiration in breathing.  Abdomen: Soft nontender to palpation. No rebound or guarding elicited. No organomegaly identified. No pulsatile abdominal masses identified.   Musculoskeletal:  no  pain with palpitation or movement of muscle, bone or joint , no obvious musculoskeletal deformities identified.  Neurologic: alert and awake answers questions appropriately. Moves all four extremities independently, no gross focal abnormalities identified. Normal strength and motor.  Skin: no rash or lesion seen, no palpable dermatologic lesions identified.  Psychiatric: not anxious, delusional, or hallucinating.    Medical decision making: EKG obtained on arrival (interpretation) twelve-lead EKG sinus rhythm rate 1.  Morphology of P waves QRS complexes T waves unremarkable.  PVC present.  No evidence of ST elevation or depression.  Interpreted as an abnormal EKG due to the PVC but otherwise unremarkable for acute findings    Laboratory studies obtained (please see lab sheet for all results) significant findings included unremarkable CBC and chemistry panel.  Troponin elevated 0.11 BNP elevated at 612 INR slightly elevated 1.33 of uncertain significance.    Chest x-ray obtained;Prilosec  costophrenic angles are blunted with prominence of the pulmonary vasculature.  This is really unchanged from her previous chest x-ray that I could access.    Hospitalist consulted.  Patient be admitted for further evaluation.  At this time the patient has significant symptoms that would be consistent with cardiac dysfunction.  He does not appear to be having an acute myocardial infarction at this time.  His signs and symptoms would not seem to go along with either pulmonary embolism or aortic disease.  I would characterize his  chest pain is having at least a moderate likelihood of a cardiac etiology chest pain moderate likelihood cardiac etiology      Further care and hospital course per attending physician summary    Impression 1) chest pain with moderate likelihood of cardiac etiology  2) dyspnea on exertion  3) orthopnea  4.)  Elevated troponin

## 2018-11-20 NOTE — DISCHARGE PLANNING
Anticipated Discharge Disposition: Home    Action: LSW was updated that pt is the main caregiver for his wife, MRN: [id number]. Pt's wife is in a w/c and unable to safely take care of herself at home. LSW met with pt and his wife who stated that he would help assist his wife in the middle of the night. LSW stated that this was not safe and this was not allowed while pt is inpatient. LSW asked that they call pt's dtr listed on facehseet. Pt's wife stated that she called her dtr and that she did not want anything to do with them and was not willing to take care of her mother. LSW asked if she could contact pt's dtr and explained the situation. Pt became very upset and stated no that he did not want her contacted.     LSW called on call SS Supervisor Lia and explained situation who stated that it may be best for pt to be a social admit since pt is not expected to be in hospital very long.     LSW met with pt and wife explained that pt will be admitted with them but encouraged them to discuss and make up back up plans in case something like this should happen again. Pt and wife agreed and stated they were just discussing some future options.     Barriers to Discharge: None    Plan: Pt to be admitted and wife will be admitted in same one.

## 2018-11-20 NOTE — CONSULTS
Cardiology Consult Note:    Tawanna To  Date & Time note created:    11/20/2018   1:28 PM     Referring MD:  Dr. Gandara    Patient ID:   Name:             Kyler Donato     YOB: 1932  Age:                 86 y.o.  male   MRN:               [id number]                                                             Chief Complaint / Reason for consult:  Dyspnea    History of Present Illness:    This is an 86 years old man with prior history of ischemic cardiomyopathy with remote myocardial infarction and stent to the LAD in 1995 with subsequent angiogram in 2017 showing patent stent in the proximal LAD, nonobstructive stenosis in the mid circumflex and nonobstructive disease in mid RCA, hypertension, hyperlipidemia, paroxysmal SVT, presented to the hospital because of progressively worsening shortness of breath.    Patient was diuresed.  Repeated transthoracic echocardiogram showed worsening LV function.  His new left ventricular systolic function is now found to be 25%.  No significant valvular disease.    Patient is feeling much better today.  He is able to lie flat.    Negative 2.2 liters over the last 24 hours.  Negative 2.2 liters during hospital stay.      Review of Systems:      Constitutional: Denies fevers, Denies weight changes  Eyes: Denies changes in vision, no eye pain  Ears/Nose/Throat/Mouth: Denies nasal congestion or sore throat   Cardiovascular: no chest pain, no palpitations   Respiratory: + shortness of breath , Denies cough  Gastrointestinal/Hepatic: Denies abdominal pain, nausea, vomiting, diarrhea, constipation or GI bleeding   Genitourinary: Denies dysuria or frequency  Musculoskeletal/Rheum: Denies  joint pain and swelling   Skin: Denies rash  Neurological: Denies headache, confusion, memory loss or focal weakness/parasthesias  Psychiatric: denies mood disorder   Endocrine: Edie thyroid problems  Heme/Oncology/Lymph Nodes: Denies enlarged lymph nodes, denies brusing or  known bleeding disorder  All other systems were reviewed and are negative (AMA/CMS criteria)                Past Medical History:   Past Medical History:   Diagnosis Date   • Acute anterolateral myocardial infarction ([facility]) 1995   • CAD (coronary artery disease) 1995    PCI/stent to the LAD. April 2017: MPI with scar in anterior/inferior wall, no ischemia, LVEF 30%. Echocardiogram with fixed defects in anterior wall and septal inferior wall, no ischemia, LVEF 40%. July 2017: Coronary angiogram with patent LAD stent, diffuse nonobstructive lesions in RCA, 40% mid RCA, 50% mid circumflex.   • COPD (chronic obstructive pulmonary disease) ([facility])    • Hyperlipidemia    • Hypertension    • PSVT (paroxysmal supraventricular tachycardia) ([facility])    • Shoulder pain      Active Hospital Problems    Diagnosis   • Acute on chronic systolic (congestive) heart failure ([facility]) [I50.23]     Priority: High   • Chest pain [R07.9]     Priority: Medium   • Essential hypertension [I10]     Priority: Low       Past Surgical History:  Past Surgical History:   Procedure Laterality Date   • ELBOWPLASTY      ulnar nerve transposition   • HAND SURGERY     • SHOULDER ORIF         Hospital Medications:    Current Facility-Administered Medications:   •  magnesium chloride (MAG-64) delayed-release tablet 64 mg, 64 mg, Oral, BID, Alejandro Thakur M.D., 64 mg at 11/20/18 1209  •  aspirin (ASA) chewable tab 81 mg, 81 mg, Oral, DAILY, Jai Gandara M.D., 81 mg at 11/20/18 0527  •  carvedilol (COREG) tablet 6.25 mg, 6.25 mg, Oral, BID WITH MEALS, Jai Gandara M.D., 6.25 mg at 11/20/18 0822  •  lisinopril (PRINIVIL) 10 MG tablet 10 mg, 10 mg, Oral, DAILY, Jai Gandara M.D., 10 mg at 11/20/18 0528  •  methocarbamol (ROBAXIN) tablet 750 mg, 750 mg, Oral, 4X/DAY, Jai Gandara M.D., 750 mg at 11/20/18 1209  •  nitroglycerin (NITROSTAT) tablet 0.4 mg, 0.4 mg, Sublingual, PRN, Jai Gandara M.D.  •  omeprazole (PRILOSEC) capsule 20 mg, 20 mg, Oral, BID, Jai Gandara,  M.D., 20 mg at 11/20/18 0529  •  oxyCODONE-acetaminophen (PERCOCET) 5-325 MG per tablet 1-2 Tab, 1-2 Tab, Oral, Q4HRS PRN, Jai Gandara M.D., 1 Tab at 11/19/18 2307  •  pravastatin (PRAVACHOL) tablet 40 mg, 40 mg, Oral, Nightly, Jai Gandara M.D., 40 mg at 11/19/18 2303  •  spironolactone (ALDACTONE) tablet 25 mg, 25 mg, Oral, DAILY, Jai Gandara M.D., 25 mg at 11/20/18 0528  •  senna-docusate (PERICOLACE or SENOKOT S) 8.6-50 MG per tablet 2 Tab, 2 Tab, Oral, BID **AND** polyethylene glycol/lytes (MIRALAX) PACKET 1 Packet, 1 Packet, Oral, QDAY PRN **AND** magnesium hydroxide (MILK OF MAGNESIA) suspension 30 mL, 30 mL, Oral, QDAY PRN **AND** bisacodyl (DULCOLAX) suppository 10 mg, 10 mg, Rectal, QDAY PRN, Jai Gandara M.D.  •  heparin injection 5,000 Units, 5,000 Units, Subcutaneous, Q8HRS, Jai Gandara M.D., 5,000 Units at 11/20/18 0536  •  acetaminophen (TYLENOL) tablet 650 mg, 650 mg, Oral, Q6HRS PRN, Jai Gandara M.D.  •  furosemide (LASIX) injection 40 mg, 40 mg, Intravenous, BID DIURETIC, Jai Gandara M.D., 40 mg at 11/20/18 0533  •  potassium chloride SA (Kdur) tablet 40 mEq, 40 mEq, Oral, DAILY, Jai Gandara M.D., 40 mEq at 11/20/18 0529    Current Outpatient Medications:  Prescriptions Prior to Admission   Medication Sig Dispense Refill Last Dose   • carvedilol (COREG) 3.125 MG Tab Take 3.125 mg by mouth 2 times a day, with meals.   11/19/2018 at 0800   • pravastatin (PRAVACHOL) 80 MG tablet Take 40 mg by mouth every bedtime.   11/18/2018 at Unknown time   • Cyanocobalamin (VITAMIN B-12) 5000 MCG SL Tab Place 5,000 mcg under tongue every evening.   11/18/2018 at Unknown time   • diphenhydrAMINE (ALLERGY RELIEF) 25 MG Tab Take 25 mg by mouth every evening.   11/18/2018 at Unknown time   • methocarbamol (ROBAXIN) 500 MG Tab Take 500 mg by mouth every bedtime.   11/18/2018 at Unknown time   • doxycycline (VIBRAMYCIN) 100 MG Tab Take 1 Tab by mouth 2 times a day for 10 days. 20 Tab 0 11/19/2018 at am   • albuterol 108  "(90 Base) MCG/ACT Aero Soln inhalation aerosol Inhale 2 Puffs by mouth every 6 hours as needed for Shortness of Breath. 8.5 g 0 unknown at Unknown time   • azithromycin (ZITHROMAX) 250 MG Tab Take 2 tabs by mouth on day 1, then take 1 tab on days 2-5 6 Tab 0 11/19/2018 at Unknown time   • verapamil ER (CALAN-SR) 180 MG Tab CR Take 90 mg by mouth every morning.   11/19/2018 at 0800   • Cholecalciferol (VITAMIN D3) 2000 UNIT Cap Take 2,000 Units by mouth every evening.   11/18/2018 at Unknown time   • omeprazole (PRILOSEC) 20 MG delayed-release capsule Take 20 mg by mouth 2 times a day.   11/19/2018 at 0800   • spironolactone (ALDACTONE) 25 MG Tab Take 25 mg by mouth every morning.   11/19/2018 at 0800   • tramadol (ULTRAM) 50 MG Tab Take 50 mg by mouth every 8 hours as needed (for pain).   unknown at Unknown time       Medication Allergy:  No Known Allergies    Family History:  Family History   Problem Relation Age of Onset   • Heart Disease Father        Social History:  Social History     Social History   • Marital status:      Spouse name: N/A   • Number of children: N/A   • Years of education: N/A     Occupational History   • Not on file.     Social History Main Topics   • Smoking status: Former Smoker     Quit date: 9/24/1995   • Smokeless tobacco: Never Used   • Alcohol use Yes   • Drug use: No   • Sexual activity: Not on file     Other Topics Concern   • Not on file     Social History Narrative   • No narrative on file         Physical Exam:  Vitals/ General Appearance:   Weight/BMI: Body mass index is 20.88 kg/m².  Blood pressure 109/60, pulse 75, temperature 36.7 °C (98 °F), temperature source Temporal, resp. rate 16, height 1.803 m (5' 11\"), weight 67.9 kg (149 lb 11.1 oz), SpO2 94 %.  Vitals:    11/19/18 2210 11/20/18 0010 11/20/18 0405 11/20/18 0819   BP: 136/74 129/72 109/64 109/60   Pulse: 87 93 68 75   Resp: 18 17 17 16   Temp: 36.9 °C (98.5 °F) 37.3 °C (99.1 °F) 36.2 °C (97.1 °F) 36.7 °C (98 " °F)   TempSrc: Temporal Temporal Temporal Temporal   SpO2: 98% 92% 94% 94%   Weight: 67.9 kg (149 lb 11.1 oz)      Height:         Oxygen Therapy:  Pulse Oximetry: 94 %, O2 (LPM): 0, O2 Delivery: None (Room Air)    Constitutional:   Well developed, Well nourished, No acute distress  HENMT:  Normocephalic, Atraumatic, Oropharynx moist mucous membranes, No oral exudates, Nose normal.  No thyromegaly.  Eyes:  EOMI, Conjunctiva normal, No discharge.  Neck:  Normal range of motion, No cervical tenderness,  no JVD.  Cardiovascular:  Normal heart rate, Normal rhythm, No murmurs, No rubs, No gallops.   Extremitites with intact distal pulses, no cyanosis, or edema.  Lungs:  Normal breath sounds, breath sounds clear to auscultation bilaterally,  no rales, no rhonchi, no wheezing.   Abdomen: Bowel sounds normal, Soft, No tenderness, No guarding, No rebound, No masses, No hepatosplenomegaly.  Skin: Warm, Dry, No erythema, No rash, no induration.  Neurologic: Alert & oriented x 3, No focal deficits noted, cranial nerves II through X are intact.  Psychiatric: Affect normal, Judgment normal, Mood normal.      MDM (Data Review):     Records reviewed and summarized in current documentation    Lab Data Review:  Recent Results (from the past 24 hour(s))   EKG (NOW)    Collection Time: 18  2:05 PM   Result Value Ref Range    Report       [facility]    Test Date:  2018  Pt Name:    ELIZABETH SOMMER             Department: ER  MRN:        [id number]                      Room:  Gender:     Male                         Technician: [id number]  :        [id number]                   Requested By:ER TRIAGE PROTOCOL  Order #:    [id number]                    Reading MD: CONSTANCE COMER MD    Measurements  Intervals                                Axis  Rate:       91                           P:          64  VA:         212                          QRS:        -39  QRSD:       106                           T:          97  QT:         396  QTc:        488    Interpretive Statements  SINUS RHYTHM  VENTRICULAR PREMATURE COMPLEX  BORDERLINE AV CONDUCTION DELAY  BORDERLINE IVCD WITH LAD  LATERAL INFARCT, AGE INDETERMINATE  CONSIDER ANTEROSEPTAL INFARCT  BORDERLINE PROLONGED QT INTERVAL  Compared to ECG 07/18/2017 14:13:24  First degree AV block no longer present  Myocardial infarct finding still p resent    Electronically Signed On 11- 16:39:30 PST by CONSTANCE COMER MD     Troponin    Collection Time: 11/19/18  2:34 PM   Result Value Ref Range    Troponin I 0.11 (H) 0.00 - 0.04 ng/mL   Btype Natriuretic Peptide    Collection Time: 11/19/18  2:34 PM   Result Value Ref Range    B Natriuretic Peptide 612 (H) 0 - 100 pg/mL   CBC with Differential    Collection Time: 11/19/18  2:34 PM   Result Value Ref Range    WBC 6.0 4.8 - 10.8 K/uL    RBC 3.47 (L) 4.70 - 6.10 M/uL    Hemoglobin 11.1 (L) 14.0 - 18.0 g/dL    Hematocrit 34.5 (L) 42.0 - 52.0 %    MCV 99.4 (H) 81.4 - 97.8 fL    MCH 32.0 27.0 - 33.0 pg    MCHC 32.2 (L) 33.7 - 35.3 g/dL    RDW 51.3 (H) 35.9 - 50.0 fL    Platelet Count 195 164 - 446 K/uL    MPV 10.5 9.0 - 12.9 fL    Neutrophils-Polys 71.00 44.00 - 72.00 %    Lymphocytes 16.90 (L) 22.00 - 41.00 %    Monocytes 9.60 0.00 - 13.40 %    Eosinophils 1.20 0.00 - 6.90 %    Basophils 1.00 0.00 - 1.80 %    Immature Granulocytes 0.30 0.00 - 0.90 %    Nucleated RBC 0.00 /100 WBC    Neutrophils (Absolute) 4.23 1.82 - 7.42 K/uL    Lymphs (Absolute) 1.01 1.00 - 4.80 K/uL    Monos (Absolute) 0.57 0.00 - 0.85 K/uL    Eos (Absolute) 0.07 0.00 - 0.51 K/uL    Baso (Absolute) 0.06 0.00 - 0.12 K/uL    Immature Granulocytes (abs) 0.02 0.00 - 0.11 K/uL    NRBC (Absolute) 0.00 K/uL   Complete Metabolic Panel (CMP)    Collection Time: 11/19/18  2:34 PM   Result Value Ref Range    Sodium 142 135 - 145 mmol/L    Potassium 3.9 3.6 - 5.5 mmol/L    Chloride 109 96 - 112 mmol/L    Co2 24 20 - 33 mmol/L    Anion Gap 9.0 0.0 - 11.9     Glucose 111 (H) 65 - 99 mg/dL    Bun 22 8 - 22 mg/dL    Creatinine 1.35 0.50 - 1.40 mg/dL    Calcium 9.9 8.5 - 10.5 mg/dL    AST(SGOT) 17 12 - 45 U/L    ALT(SGPT) 18 2 - 50 U/L    Alkaline Phosphatase 61 30 - 99 U/L    Total Bilirubin 0.7 0.1 - 1.5 mg/dL    Albumin 4.5 3.2 - 4.9 g/dL    Total Protein 7.3 6.0 - 8.2 g/dL    Globulin 2.8 1.9 - 3.5 g/dL    A-G Ratio 1.6 g/dL   Prothrombin Time    Collection Time: 11/19/18  2:34 PM   Result Value Ref Range    PT 16.6 (H) 12.0 - 14.6 sec    INR 1.33 (H) 0.87 - 1.13   APTT    Collection Time: 11/19/18  2:34 PM   Result Value Ref Range    APTT 26.8 24.7 - 36.0 sec   Lipase    Collection Time: 11/19/18  2:34 PM   Result Value Ref Range    Lipase 12 11 - 82 U/L   ESTIMATED GFR    Collection Time: 11/19/18  2:34 PM   Result Value Ref Range    GFR If African American >60 >60 mL/min/1.73 m 2    GFR If Non African American 50 (A) >60 mL/min/1.73 m 2   CBC without Differential    Collection Time: 11/20/18  2:15 AM   Result Value Ref Range    WBC 9.5 4.8 - 10.8 K/uL    RBC 3.36 (L) 4.70 - 6.10 M/uL    Hemoglobin 10.5 (L) 14.0 - 18.0 g/dL    Hematocrit 33.1 (L) 42.0 - 52.0 %    MCV 98.5 (H) 81.4 - 97.8 fL    MCH 31.3 27.0 - 33.0 pg    MCHC 31.7 (L) 33.7 - 35.3 g/dL    RDW 50.4 (H) 35.9 - 50.0 fL    Platelet Count 205 164 - 446 K/uL    MPV 10.5 9.0 - 12.9 fL   Comp Metabolic Panel (CMP)    Collection Time: 11/20/18  2:15 AM   Result Value Ref Range    Sodium 142 135 - 145 mmol/L    Potassium 3.8 3.6 - 5.5 mmol/L    Chloride 105 96 - 112 mmol/L    Co2 25 20 - 33 mmol/L    Anion Gap 12.0 (H) 0.0 - 11.9    Glucose 93 65 - 99 mg/dL    Bun 24 (H) 8 - 22 mg/dL    Creatinine 1.38 0.50 - 1.40 mg/dL    Calcium 9.9 8.5 - 10.5 mg/dL    AST(SGOT) 16 12 - 45 U/L    ALT(SGPT) 16 2 - 50 U/L    Alkaline Phosphatase 54 30 - 99 U/L    Total Bilirubin 0.8 0.1 - 1.5 mg/dL    Albumin 4.1 3.2 - 4.9 g/dL    Total Protein 6.7 6.0 - 8.2 g/dL    Globulin 2.6 1.9 - 3.5 g/dL    A-G Ratio 1.6 g/dL   ESTIMATED  GFR    Collection Time: 11/20/18  2:15 AM   Result Value Ref Range    GFR If  59 (A) >60 mL/min/1.73 m 2    GFR If Non African American 49 (A) >60 mL/min/1.73 m 2   EC-ECHOCARDIOGRAM COMPLETE W/O CONT    Collection Time: 11/20/18  9:15 AM   Result Value Ref Range    Eject.Frac. MOD BP 30.04     Eject.Frac. MOD 4C 28.33     Eject.Frac. MOD 2C 28.77     Left Ventrical Ejection Fraction 28    FERRITIN    Collection Time: 11/20/18 10:45 AM   Result Value Ref Range    Ferritin 191.7 22.0 - 322.0 ng/mL   TROPONIN    Collection Time: 11/20/18 10:46 AM   Result Value Ref Range    Troponin I 0.13 (H) 0.00 - 0.04 ng/mL   IRON/TOTAL IRON BIND    Collection Time: 11/20/18 10:46 AM   Result Value Ref Range    Iron 65 50 - 180 ug/dL    Total Iron Binding 370 250 - 450 ug/dL    % Saturation 18 15 - 55 %       Imaging/Procedures Review:    Chest Xray:  Reviewed    EKG:   As in HPI. Sinus rhythm with PVCs.    MDM (Assessment and Plan):     Active Hospital Problems    Diagnosis   • Acute on chronic systolic (congestive) heart failure (HCC) [I50.23]     Priority: High   • Chest pain [R07.9]     Priority: Medium   • Essential hypertension [I10]     Priority: Low         At this time, patient is euvolemic.  He is DNI DNR.  I will switch IV Lasix to torsemide 100 mg p.o. once a day anticipating discharge.  I will switch lisinopril to losartan 25 mg p.o. once a day.  Patient is a good candidate for ARNI therapy. Will consider in the outpatient setting.  Continue spironolactone and carvedilol at current dosage.      Thank you for referring this patient to our cardiology service.    Tawanna Fletcher MD.   Cardiology Inpatient Service.  [facility].  [phone].  Danae Raymond.

## 2018-11-20 NOTE — PROGRESS NOTES
Assumed care at 0715. Bedside report received from Night RN Lucinda. Patient's chart and MAR reviewed. 12 hour chart check complete. Patient was updated on plan of care for the day. Questions answered and concerns addressed.  Pt denies any additional needs at this time. White board updated. Call light, phone and personal belongings within reach. Bed alarm on and working appropriately. Vital signs stable

## 2018-11-20 NOTE — ASSESSMENT & PLAN NOTE
LVEF 45% in 2017  Started on IV Lasix 40 mg twice daily  Continue beta-blocker, lisinopril and spironolactone  Check echocardiogram  Monitor fluid status closely  Adjust medication as needed  2 g salt diet  Strict intake output

## 2018-11-20 NOTE — RESPIRATORY CARE
COPD EDUCATION by COPD CLINICAL EDUCATOR  11/20/2018 at 7:47 AM by Nathalie Valverde     Patient reviewed by COPD education team. Patient does not qualify for COPD program.

## 2018-11-20 NOTE — CARE PLAN
Problem: Safety  Goal: Will remain free from falls  Outcome: PROGRESSING AS EXPECTED  Educated pt on use of call light and importance of staff presence when ambulating. Bed locked and in lowest position. Pt verbalized understanding and calls appropriately.     Problem: Pain Management  Goal: Pain level will decrease to patient's comfort goal  Discussed with pt importance of pain management. Pt having pain in shoulders, abdomen, and legs. Patient given pain medication and muscle relaxants. Pt states pain level decreased.

## 2018-11-20 NOTE — PROGRESS NOTES
Received bedside report and assumed care of pt. Pt and personal belongings brought from ED to Tele 8. Pt placed on Tele monitor and monitor room notified. Pt in SR. VSS. Discussed pt plan of care. Any questions and concerns addressed at this time.  Bed locked and in lowest position. Educated pt on use of call light and call light within reach. No other needs at this time.

## 2018-11-20 NOTE — ASSESSMENT & PLAN NOTE
Post stent  With elevated troponin: Could be also related to demand ischemia from above  Angiogram done in July 2017 showed Patent stent in proximal LAD, Diffuse nonobstructive lesions in RCA and 40% mid RCA,49% mid Circ.  EF 35-40%  Trend trop and ekg  Monitor on telemetry closely

## 2018-11-20 NOTE — PROGRESS NOTES
Monitor Summary:    SR 65-90  0.20/0.10/0.40  Triplet bigeminal PVC  Bigeminal PVC  Bigeminal Couplet PVC

## 2018-11-20 NOTE — H&P
Hospital Medicine History and Physical      Date of Service  11/19/2018    Chief Complaint  Chief Complaint   Patient presents with   • Shortness of Breath   • Chest Pain     took 2 nitros today       History of Presenting Illness  Tanmay is a 86 y.o. male PMH of ischemic cardiomyopathy with LVEF 45%, essential hypertension, COPD who presents with worsening shortness of breath over the past 2 weeks.  Associated with dyspnea on exertion and orthopnea.  This morning the patient stated that he had 2 episodes of chest pressure relieved by nitroglycerin.  The patient had angiogram done about 15 months ago showedPatent stent in proximal LAD, Diffuse nonobstructive lesions in RCA and 40% mid RCA,49% mid Circ.EF 35-40%.  In the ER the patient was found to have volume overload from acute systolic heart failure exacerbation.  He will be admitted to telemetry floor for further management.  Primary Care Physician  Hernan Trimble Jr., M.D.      Code Status  Full code    Review of Systems  Review of Systems   Constitutional: Negative for chills, fever and weight loss.   HENT: Negative for congestion and nosebleeds.    Eyes: Negative for blurred vision, pain, discharge and redness.   Respiratory: Positive for shortness of breath. Negative for cough, sputum production and stridor.    Cardiovascular: Positive for orthopnea. Negative for chest pain and palpitations.   Gastrointestinal: Negative for abdominal pain, diarrhea, heartburn, nausea and vomiting.   Genitourinary: Negative for dysuria, frequency and urgency.   Musculoskeletal: Negative for back pain, myalgias and neck pain.   Skin: Negative for itching and rash.   Neurological: Negative for dizziness, focal weakness, seizures and headaches.   Psychiatric/Behavioral: Negative for depression. The patient is not nervous/anxious and does not have insomnia.      Please see HPI, all other systems were reviewed and are negative (AMA/CMS criteria)     Past Medical History  Past Medical  History:   Diagnosis Date   • Acute anterolateral myocardial infarction (HCC) 1995   • CAD (coronary artery disease) 1995    PCI/stent to the LAD. April 2017: MPI with scar in anterior/inferior wall, no ischemia, LVEF 30%. Echocardiogram with fixed defects in anterior wall and septal inferior wall, no ischemia, LVEF 40%. July 2017: Coronary angiogram with patent LAD stent, diffuse nonobstructive lesions in RCA, 40% mid RCA, 50% mid circumflex.   • COPD (chronic obstructive pulmonary disease) ([facility])    • Hyperlipidemia    • Hypertension    • PSVT (paroxysmal supraventricular tachycardia) ([facility])    • Shoulder pain        Surgical History  Past Surgical History:   Procedure Laterality Date   • ELBOWPLASTY      ulnar nerve transposition   • HAND SURGERY     • SHOULDER ORIF         Medications  No current facility-administered medications on file prior to encounter.      Current Outpatient Prescriptions on File Prior to Encounter   Medication Sig Dispense Refill   • doxycycline (VIBRAMYCIN) 100 MG Tab Take 1 Tab by mouth 2 times a day for 10 days. 20 Tab 0   • albuterol 108 (90 Base) MCG/ACT Aero Soln inhalation aerosol Inhale 2 Puffs by mouth every 6 hours as needed for Shortness of Breath. 8.5 g 0   • azithromycin (ZITHROMAX) 250 MG Tab Take 2 tabs by mouth on day 1, then take 1 tab on days 2-5 6 Tab 0   • verapamil ER (CALAN-SR) 180 MG Tab CR Take 180 mg by mouth every day. PT TAKES HALF TAB DAILY      • Lactobacillus (ACIDOPHILUS PO) Take  by mouth.     • carvedilol (COREG) 6.25 MG Tab Take 1 Tab by mouth 2 times a day, with meals. (Patient taking differently: Take 3,125 mg by mouth 2 times a day, with meals. PT TAKES HALF TAB TWICE A DAY) 180 Tab 1   • lisinopril (PRINIVIL) 10 MG Tab Take 1 Tab by mouth every day. 30 Tab 1   • Cholecalciferol (VITAMIN D3) 2000 UNIT Cap Take  by mouth.     • nitroglycerin (NITROSTAT) 0.4 MG SL Tab Place 1 Tab under tongue as needed for Chest Pain. 25 Tab 3   • oxycodone-acetaminophen  (PERCOCET) 5-325 MG Tab Take 1-2 Tabs by mouth every four hours as needed. (Patient not taking: Reported on 2018) 20 Tab 0   • methocarbamol (ROBAXIN) 750 MG Tab Take 750 mg by mouth 4 times a day.     • Cyanocobalamin (VITAMIN B-12) 1000 MCG Tab Take 1,000 mcg by mouth every day.     • omeprazole (PRILOSEC) 20 MG delayed-release capsule Take 20 mg by mouth 2 times a day.     • spironolactone (ALDACTONE) 25 MG Tab Take 25 mg by mouth every day.     • pravastatin (PRAVACHOL) 40 MG tablet Take 40 mg by mouth every evening.     • tramadol (ULTRAM) 50 MG Tab Take  mg by mouth every four hours as needed.     • aspirin 81 MG tablet Take 81 mg by mouth every day.       Family History  Family History   Problem Relation Age of Onset   • Heart Disease Father          Social History  Social History   Substance Use Topics   • Smoking status: Former Smoker     Quit date: 1995   • Smokeless tobacco: Never Used   • Alcohol use Yes       Allergies  No Known Allergies     Physical Exam  Laboratory   Hemodynamics  Temp (24hrs), Av.6 °C (97.9 °F), Min:36.6 °C (97.9 °F), Max:36.6 °C (97.9 °F)   Temperature: 36.6 °C (97.9 °F)  Pulse  Av.5  Min: 78  Max: 93    Blood Pressure : 138/80      Respiratory      Respiration: 17, Pulse Oximetry: 94 %     Work Of Breathing / Effort: Increased Work of Breathing       Physical Exam   Constitutional: He is oriented to person, place, and time. No distress.   HENT:   Head: Normocephalic and atraumatic.   Mouth/Throat: Oropharynx is clear and moist.   Eyes: Pupils are equal, round, and reactive to light. Conjunctivae and EOM are normal.   Neck: Normal range of motion. Neck supple. No tracheal deviation present. No thyromegaly present.   Cardiovascular: Normal rate and regular rhythm.    No murmur heard.  Pulmonary/Chest: Effort normal. No respiratory distress. He has no wheezes. He has rales.   Abdominal: Soft. Bowel sounds are normal. He exhibits no distension. There is no  tenderness.   Musculoskeletal: He exhibits no edema or tenderness.   Neurological: He is alert and oriented to person, place, and time. No cranial nerve deficit.   Skin: Skin is warm and dry. He is not diaphoretic. No erythema.   Psychiatric: He has a normal mood and affect. His behavior is normal. Thought content normal.       Recent Labs      11/19/18   1434   WBC  6.0   RBC  3.47*   HEMOGLOBIN  11.1*   HEMATOCRIT  34.5*   MCV  99.4*   MCH  32.0   MCHC  32.2*   RDW  51.3*   PLATELETCT  195   MPV  10.5     Recent Labs      11/19/18   1434   SODIUM  142   POTASSIUM  3.9   CHLORIDE  109   CO2  24   GLUCOSE  111*   BUN  22   CREATININE  1.35   CALCIUM  9.9     Recent Labs      11/19/18   1434   ALTSGPT  18   ASTSGOT  17   ALKPHOSPHAT  61   TBILIRUBIN  0.7   LIPASE  12   GLUCOSE  111*     Recent Labs      11/19/18   1434   APTT  26.8   INR  1.33*     Recent Labs      11/19/18   1434   BNPBTYPENAT  612*         Lab Results   Component Value Date    TROPONINI 0.11 (H) 11/19/2018       Imaging  DX-CHEST-LIMITED (1 VIEW)   Final Result      No significant interval change.      EC-ECHOCARDIOGRAM COMPLETE W/O CONT    (Results Pending)     EKG  per my independant read:  QTc: 488, HR: 91, Normal Sinus Rhythm, no ST/T changes     Assessment/Plan     I anticipate this patient will require at least two midnights for appropriate medical management, necessitating inpatient admission.    Acute on chronic systolic (congestive) heart failure (HCC)- (present on admission)   Assessment & Plan    LVEF 45% in 2017  Started on IV Lasix 40 mg twice daily  Continue beta-blocker, lisinopril and spironolactone  Check echocardiogram  Monitor fluid status closely  Adjust medication as needed  2 g salt diet  Strict intake output     Chest pain- (present on admission)   Assessment & Plan    Post stent  With elevated troponin: Could be also related to demand ischemia from above  Angiogram done in July 2017 showed Patent stent in proximal LAD,  Diffuse nonobstructive lesions in RCA and 40% mid RCA,49% mid Circ.  EF 35-40%  Trend trop and ekg  Monitor on telemetry closely     Essential hypertension- (present on admission)   Assessment & Plan    Continue outpatient medications         Prophylaxis:  lovenox

## 2018-11-21 ENCOUNTER — PATIENT OUTREACH (OUTPATIENT)
Dept: HEALTH INFORMATION MANAGEMENT | Facility: OTHER | Age: 83
End: 2018-11-21

## 2018-11-21 NOTE — DISCHARGE SUMMARY
Discharge Summary    CHIEF COMPLAINT ON ADMISSION  Chief Complaint   Patient presents with   • Shortness of Breath   • Chest Pain     took 2 nitros today       Reason for Admission  Unable to Sleep     Admission Date  11/19/2018    CODE STATUS  DNAR/DNI    HPI & HOSPITAL COURSE  This is a 86 y.o. male here with above medical issues. Patient was found to be in acute on chronic systolic CHF exacerbation. Etiology is related to is ischemic cardiomyopathy and has known significant coronary artery disease with CATH in 2017 performed. He was admitted to telemetry and diuresed aggressively with robust clinical response. New ECHO shows a reduced EF of 25% with no clear valvular issues. Cardiology was consulted and patient's coreg was increased slightly, losartan was started and he was transitioned from IV lasix to oral torsemide. He is stable for discharge home. I did speak with is PCP at [facility] and we decided to DECREASE his torsemide to 40 mg daily rather than 100 mg daily given some soft blood pressures.       Therefore, he is discharged in fair and stable condition to home with close outpatient follow-up.    The patient recovered much more quickly than anticipated on admission.    Discharge Date  11/20/2018    FOLLOW UP ITEMS POST DISCHARGE  F/U with Dr Fletcher in 1-2 weeks for CHF clinic, might be a candidate for entresto therapy  [facility]    DISCHARGE DIAGNOSES  Active Problems:    Essential hypertension POA: Yes  Resolved Problems:    Acute on chronic systolic (congestive) heart failure (HCC) POA: Yes    Chest pain POA: Yes      FOLLOW UP  Future Appointments  Date Time Provider Department Center   12/6/2018 3:00 PM Uzair Vogt M.D. [facility] None   3/5/2019 3:00 PM Carlos Mccormack M.D. [facility] None     Hernan Trimble Jr., M.D.  [address] [facility] [address]  [phone]    Go on 11/26/2018  Please arrive at 3:00 pm for your appointment with primary care.    Tawanna Fletcher M.D.  [address]  [facility] [address]  [facility] [address]  [phone]    In 2 weeks  follow up for heart failure      MEDICATIONS ON DISCHARGE     Medication List      START taking these medications      Instructions   aspirin 81 MG Chew chewable tablet  Start taking on:  11/21/2018  Commonly known as:  ASA  Replaces:  aspirin 81 MG tablet   Take 1 Tab by mouth every day.  Dose:  81 mg     losartan 25 MG Tabs  Commonly known as:  COZAAR   Take 1 Tab by mouth every day.  Dose:  25 mg     magnesium chloride 64 MG Tbec  Commonly known as:  MAG-64   Take 1 Tab by mouth 2 Times a Day.  Dose:  64 mg     torsemide 100 MG Tabs  Commonly known as:  DEMADEX   Take 1 Tab by mouth every day.  Dose:  100 mg        CHANGE how you take these medications      Instructions   carvedilol 6.25 MG Tabs  What changed:  · how much to take  · additional instructions  · Another medication with the same name was removed. Continue taking this medication, and follow the directions you see here.  Commonly known as:  COREG   Take 1 Tab by mouth 2 times a day, with meals.  Dose:  6.25 mg        CONTINUE taking these medications      Instructions   albuterol 108 (90 Base) MCG/ACT Aers inhalation aerosol   Inhale 2 Puffs by mouth every 6 hours as needed for Shortness of Breath.  Dose:  2 Puff     ALLERGY RELIEF 25 MG Tabs  Generic drug:  diphenhydrAMINE   Take 25 mg by mouth every evening.  Dose:  25 mg     methocarbamol 500 MG Tabs  Commonly known as:  ROBAXIN   Take 500 mg by mouth every bedtime.  Dose:  500 mg     omeprazole 20 MG delayed-release capsule  Commonly known as:  PRILOSEC   Take 20 mg by mouth 2 times a day.  Dose:  20 mg     pravastatin 80 MG tablet  Commonly known as:  PRAVACHOL   Take 40 mg by mouth every bedtime.  Dose:  40 mg     spironolactone 25 MG Tabs  Commonly known as:  ALDACTONE   Take 25 mg by mouth every morning.  Dose:  25 mg     tramadol 50 MG Tabs  Commonly known as:  ULTRAM   Take 50 mg by mouth every 8 hours as needed (for pain).  Dose:   50 mg     Vitamin B-12 5000 MCG Subl   Place 5,000 mcg under tongue every evening.  Dose:  5000 mcg     Vitamin D3 2000 UNIT Caps   Take 2,000 Units by mouth every evening.  Dose:  2000 Units        STOP taking these medications    aspirin 81 MG tablet  Replaced by:  aspirin 81 MG Chew chewable tablet     azithromycin 250 MG Tabs  Commonly known as:  ZITHROMAX     doxycycline 100 MG Tabs  Commonly known as:  VIBRAMYCIN     verapamil  MG Tbcr  Commonly known as:  CALAN-SR            Allergies  No Known Allergies    DIET  Orders Placed This Encounter   Procedures   • Diet Order 2 Gram Sodium     Standing Status:   Standing     Number of Occurrences:   1     Order Specific Question:   Diet:     Answer:   2 Gram Sodium [7]       ACTIVITY  As tolerated.  Weight bearing as tolerated    CONSULTATIONS  cards    PROCEDURES  EC-ECHOCARDIOGRAM COMPLETE W/O CONT   Final Result      DX-CHEST-LIMITED (1 VIEW)   Final Result      No significant interval change.          ECHO-  Severely reduced left ventricular systolic function.  Left ventricular ejection fraction is visually estimated to be 25-30%.  Global hypokinesis with regional variation.  Aortic sclerosis without stenosis.  Unable to estimate pulmonary artery pressure due to an inadequate   tricuspid regurgitant jet.  Mild mitral regurgitation.  Compared to the report of the study done 4/7/2017  there has been   worsening of left ventricular ejection fraction.       LABORATORY  Lab Results   Component Value Date    SODIUM 142 11/20/2018    POTASSIUM 3.8 11/20/2018    CHLORIDE 105 11/20/2018    CO2 25 11/20/2018    GLUCOSE 93 11/20/2018    BUN 24 (H) 11/20/2018    CREATININE 1.38 11/20/2018        Lab Results   Component Value Date    WBC 9.5 11/20/2018    HEMOGLOBIN 10.5 (L) 11/20/2018    HEMATOCRIT 33.1 (L) 11/20/2018    PLATELETCT 205 11/20/2018        Total time of the discharge process exceeds 41 minutes.

## 2018-11-21 NOTE — PROGRESS NOTES
Provided discharge instructions, HF packet and prescriptions to patient. IV and tele box removed. MR notified. Patient down to car with this RN and wife. Pt off unit without incident.

## 2018-11-26 ENCOUNTER — PATIENT OUTREACH (OUTPATIENT)
Dept: HEALTH INFORMATION MANAGEMENT | Facility: OTHER | Age: 83
End: 2018-11-26

## 2018-11-28 NOTE — DOCUMENTATION QUERY
"DOCUMENTATION QUERY    PROVIDERS: Please select “Cosign w/ note”to reply to query.    To better represent the severity of illness of your patient, please review the following information and exercise your independent professional judgment in responding to this query.     Patient, with a history of ischemic cardiomyopathy, presented with systolic CHF exacerbation. History and Physical documents, \"With elevated troponin: Could be also related to demand ischemia from above\". Demand ischemia is not documented elsewhere in the chart.    Based on clinical findings, risk factors and treatment, can demand ischemia be further clarified as    1. Ruled in  2. Ruled out  3. Other explanation of clinical presentation (please document)  4. Unable to determine    The medical record reflects the following:   Clinical Findings  elevated troponin 0.11   demand ischemia?   ischemic cardiomyopathy   systolic CHF exacerbation   Treatment  trend troponin, EKG, monitor on telemetry   Risk Factors     Location within medical record  History and Physical, Progress Notes and Discharge Summary     Thank you,   Hawa Hatch          "

## 2018-12-20 ENCOUNTER — PATIENT OUTREACH (OUTPATIENT)
Dept: HEALTH INFORMATION MANAGEMENT | Facility: OTHER | Age: 83
End: 2018-12-20

## 2018-12-27 NOTE — PROGRESS NOTES
Alissa Donato was admitted to [facility] on 11/19/18 for shortness and chest pain. Patient discharged home on 11/20/18. [facility] patient advocate was able to successfully engage with patient post-discharge and throughout lifecycle. [employer] sent patient a list of care giving agencies for hire, the application for seniors in service respite care jerald, and information of [employer], after patient stated he is his wife's primary care giver. Per discharge orders, patient was instructed to see his PCP and cardiologist post-discharge. Patient saw his PCP on 12/5/18 and will return on 12/26/18. Patient was scheduled to see his cardiologist on 12/6/18, but rescheduled to 12/28/18. PPS score 90%.

## 2018-12-28 ENCOUNTER — OFFICE VISIT (OUTPATIENT)
Dept: CARDIOLOGY | Facility: MEDICAL CENTER | Age: 83
End: 2018-12-28
Payer: MEDICARE

## 2018-12-28 VITALS
OXYGEN SATURATION: 97 % | WEIGHT: 144 LBS | HEART RATE: 76 BPM | BODY MASS INDEX: 20.62 KG/M2 | SYSTOLIC BLOOD PRESSURE: 94 MMHG | HEIGHT: 70 IN | DIASTOLIC BLOOD PRESSURE: 52 MMHG

## 2018-12-28 DIAGNOSIS — I49.3 PREMATURE VENTRICULAR COMPLEX: ICD-10-CM

## 2018-12-28 DIAGNOSIS — Z95.5 STENTED CORONARY ARTERY: ICD-10-CM

## 2018-12-28 DIAGNOSIS — I51.89 LEFT VENTRICULAR SYSTOLIC DYSFUNCTION, NYHA CLASS 3: ICD-10-CM

## 2018-12-28 DIAGNOSIS — E78.00 HYPERCHOLESTEROLEMIA: ICD-10-CM

## 2018-12-28 DIAGNOSIS — I25.5 ISCHEMIC CARDIOMYOPATHY: ICD-10-CM

## 2018-12-28 DIAGNOSIS — Z79.899 HIGH RISK MEDICATION USE: ICD-10-CM

## 2018-12-28 DIAGNOSIS — I10 HTN (HYPERTENSION), MALIGNANT: ICD-10-CM

## 2018-12-28 DIAGNOSIS — I50.20 ACC/AHA STAGE C SYSTOLIC HEART FAILURE (HCC): ICD-10-CM

## 2018-12-28 DIAGNOSIS — R06.09 DYSPNEA ON EXERTION: ICD-10-CM

## 2018-12-28 PROCEDURE — 94618 PULMONARY STRESS TESTING: CPT | Performed by: INTERNAL MEDICINE

## 2018-12-28 PROCEDURE — 99215 OFFICE O/P EST HI 40 MIN: CPT | Mod: 25 | Performed by: INTERNAL MEDICINE

## 2018-12-28 RX ORDER — METOPROLOL SUCCINATE 25 MG/1
25 TABLET, EXTENDED RELEASE ORAL DAILY
Qty: 30 TAB | Refills: 11 | Status: ON HOLD | OUTPATIENT
Start: 2018-12-28 | End: 2020-03-24

## 2018-12-28 RX ORDER — FLUOCINONIDE 0.5 MG/G
OINTMENT TOPICAL 2 TIMES DAILY
COMMUNITY
End: 2020-03-22

## 2018-12-28 RX ORDER — UREA 1 ML/10ML
LOTION TOPICAL
COMMUNITY
End: 2020-03-22

## 2018-12-28 RX ORDER — TORSEMIDE 20 MG/1
20 TABLET ORAL DAILY
Qty: 30 TAB | Refills: 3 | Status: SHIPPED | OUTPATIENT
Start: 2018-12-28 | End: 2019-01-16 | Stop reason: SDUPTHER

## 2018-12-28 RX ORDER — NITROGLYCERIN 0.4 MG/1
0.4 TABLET SUBLINGUAL
Status: ON HOLD | COMMUNITY
End: 2020-03-27

## 2018-12-28 RX ORDER — CLOBETASOL PROPIONATE 0.5 MG/G
OINTMENT TOPICAL 2 TIMES DAILY
COMMUNITY
End: 2020-03-22

## 2018-12-28 RX ORDER — MAGNESIUM OXIDE 400 MG/1
420 TABLET ORAL DAILY
Status: ON HOLD | COMMUNITY
End: 2020-03-27

## 2018-12-28 ASSESSMENT — MINNESOTA LIVING WITH HEART FAILURE QUESTIONNAIRE (MLHF)
TOTAL_SCORE: 23
DIFFICULTY TO CONCENTRATE OR REMEMBERING THINGS: 3
SWELLING IN ANKLES OR LEGS: 0
HAVING TO SIT OR LIE DOWN DURING THE DAY: 3
EATING LESS FOODS YOU LIKE: 0
TIRED, FATIGUED OR LOW ON ENERGY: 3
WORKING AROUND THE HOUSE OR YARD DIFFICULT: 2
LOSS OF SELF CONTROL IN YOUR LIFE: 0
DIFFICULTY GOING AWAY FROM HOME: 0
DIFFICULTY WITH RECREATIONAL PASTIMES, SPORTS, HOBBIES: 0
DIFFICULTY WITH SEXUAL ACTIVITIES: 2
WALKING ABOUT OR CLIMBING STAIRS DIFFICULT: 2
FEELING LIKE A BURDEN TO FAMILY AND FRIENDS: 0
MAKING YOU WORRY: 0
DIFFICULTY WORKING TO EARN A LIVING: 0
DIFFICULTY SLEEPING WELL AT NIGHT: 2
MAKING YOU STAY IN A HOSPITAL: 0
DIFFICULTY SOCIALIZING WITH FAMILY OR FRIENDS: 0
MAKING YOU FEEL DEPRESSED: 0
COSTING YOU MONEY FOR MEDICAL CARE: 0
GIVING YOU SIDE EFFECTS FROM TREATMENTS: 3
MAKING YOU SHORT OF BREATH: 3

## 2018-12-28 ASSESSMENT — ENCOUNTER SYMPTOMS
BRUISES/BLEEDS EASILY: 0
COUGH: 0
DEPRESSION: 0
DIZZINESS: 0
DOUBLE VISION: 0
PALPITATIONS: 0
SENSORY CHANGE: 0
MEMORY LOSS: 0
FALLS: 0
ABDOMINAL PAIN: 0
BLURRED VISION: 0
SHORTNESS OF BREATH: 1
DIAPHORESIS: 0
HEADACHES: 0
MYALGIAS: 0
FEVER: 0

## 2018-12-28 ASSESSMENT — 6 MINUTE WALK TEST (6MWT): TOTAL DISTANCE WALKED (METERS): 238

## 2018-12-28 ASSESSMENT — NEW YORK HEART ASSOCIATION (NYHA) CLASSIFICATION: NYHA FUNCTIONAL CLASS: CLASS III

## 2018-12-28 NOTE — PROGRESS NOTES
Chief Complaint   Patient presents with   • Congestive Heart Failure     NEW HEART FAILURE       Subjective:   Kyler Donato is a 86 y.o. male who presents today for cardiac care and management of ischemic cardiomyopathy (LVEF of 25%), prior proximal LAD stent and non-obstructive CAD in RCA and Lcx, HTN, HLP.    Was in hospital for HF exacerbation in 11/2018.    Patient was able to complete 238 m during his 6 minute walk test. his O2 saturation at baseline was 97% and at the end of the test, the O2 saturation was 98%. he reported 5 level of dyspnea on Steph scale.    On Torsemide 20 mg po daily.      Past Medical History:   Diagnosis Date   • Acute anterolateral myocardial infarction (HCC) 1995   • CAD (coronary artery disease) 1995    PCI/stent to the LAD. April 2017: MPI with scar in anterior/inferior wall, no ischemia, LVEF 30%. Echocardiogram with fixed defects in anterior wall and septal inferior wall, no ischemia, LVEF 40%. July 2017: Coronary angiogram with patent LAD stent, diffuse nonobstructive lesions in RCA, 40% mid RCA, 50% mid circumflex.   • COPD (chronic obstructive pulmonary disease) ([facility])    • Hyperlipidemia    • Hypertension    • PSVT (paroxysmal supraventricular tachycardia) ([facility])    • Shoulder pain      Past Surgical History:   Procedure Laterality Date   • ELBOWPLASTY      ulnar nerve transposition   • HAND SURGERY     • SHOULDER ORIF       Family History   Problem Relation Age of Onset   • Heart Disease Father      Social History     Social History   • Marital status:      Spouse name: N/A   • Number of children: N/A   • Years of education: N/A     Occupational History   • Not on file.     Social History Main Topics   • Smoking status: Former Smoker     Quit date: 9/24/1995   • Smokeless tobacco: Never Used   • Alcohol use Yes   • Drug use: No   • Sexual activity: Not on file     Other Topics Concern   • Not on file     Social History Narrative   • No narrative on file     No Known  Allergies  Outpatient Encounter Prescriptions as of 12/28/2018   Medication Sig Dispense Refill   • nitroglycerin (NITROSTAT) 0.4 MG SL Tab Place 0.4 mg under tongue every 5 minutes as needed for Chest Pain.     • clobetasol (TEMOVATE) 0.05 % Ointment Apply  to affected area(s) 2 times a day.     • Diclofenac Sodium 1 % Gel Apply  to skin as directed.     • fluocinonide (LIDEX) 0.05 % Ointment Apply  to affected area(s) 2 times a day.     • magnesium oxide (MAG-OX) 400 MG Tab Take 420 mg by mouth every day.     • urea 10 % lotion Apply  to affected area(s).     • torsemide (DEMADEX) 20 MG Tab Take 1 Tab by mouth every day. 30 Tab 3   • metoprolol SR (TOPROL XL) 25 MG TABLET SR 24 HR Take 1 Tab by mouth every day. 30 Tab 11   • losartan (COZAAR) 25 MG Tab Take 1 Tab by mouth every day. 30 Tab 1   • pravastatin (PRAVACHOL) 80 MG tablet Take 40 mg by mouth every bedtime.     • Cyanocobalamin (VITAMIN B-12) 5000 MCG SL Tab Place 5,000 mcg under tongue every evening.     • methocarbamol (ROBAXIN) 500 MG Tab Take 500 mg by mouth every bedtime.     • Cholecalciferol (VITAMIN D3) 1000 units Cap Take 1,000 Units by mouth 2 Times a Day.     • omeprazole (PRILOSEC) 20 MG delayed-release capsule Take 20 mg by mouth 2 times a day.     • spironolactone (ALDACTONE) 25 MG Tab Take 25 mg by mouth every morning.     • [DISCONTINUED] aspirin (ASA) 81 MG Chew Tab chewable tablet Take 1 Tab by mouth every day. (Patient not taking: Reported on 12/28/2018) 100 Tab 1   • [DISCONTINUED] carvedilol (COREG) 6.25 MG Tab Take 1 Tab by mouth 2 times a day, with meals. (Patient taking differently: Take 3.25 mg by mouth 2 times a day.) 60 Tab 1   • [DISCONTINUED] magnesium chloride (MAG-64) 64 MG Tablet Delayed Response Take 1 Tab by mouth 2 Times a Day. (Patient not taking: Reported on 12/28/2018) 60 Tab 1   • [DISCONTINUED] torsemide (DEMADEX) 100 MG Tab Take 1 Tab by mouth every day. (Patient taking differently: Take 20 mg by mouth every day.)  "30 Tab 3   • [DISCONTINUED] diphenhydrAMINE (ALLERGY RELIEF) 25 MG Tab Take 25 mg by mouth every evening.     • albuterol 108 (90 Base) MCG/ACT Aero Soln inhalation aerosol Inhale 2 Puffs by mouth every 6 hours as needed for Shortness of Breath. 8.5 g 0   • tramadol (ULTRAM) 50 MG Tab Take 50 mg by mouth every 8 hours as needed (for pain).       No facility-administered encounter medications on file as of 12/28/2018.      Review of Systems   Constitutional: Negative for diaphoresis and fever.   HENT: Negative for nosebleeds.    Eyes: Negative for blurred vision and double vision.   Respiratory: Positive for shortness of breath. Negative for cough.    Cardiovascular: Negative for chest pain and palpitations.   Gastrointestinal: Negative for abdominal pain.   Genitourinary: Negative for dysuria and frequency.   Musculoskeletal: Negative for falls and myalgias.   Skin: Negative for rash.   Neurological: Negative for dizziness, sensory change and headaches.   Endo/Heme/Allergies: Does not bruise/bleed easily.   Psychiatric/Behavioral: Negative for depression and memory loss.        Objective:   BP (!) 94/52 (BP Location: Left arm, Patient Position: Sitting, BP Cuff Size: Adult)   Pulse 76   Ht 1.778 m (5' 10\")   Wt 65.3 kg (144 lb)   SpO2 97%   BMI 20.66 kg/m²     Physical Exam   Constitutional: He is oriented to person, place, and time. No distress.   HENT:   Head: Normocephalic and atraumatic.   Right Ear: External ear normal.   Left Ear: External ear normal.   Eyes: Right eye exhibits no discharge. Left eye exhibits no discharge.   Neck: No JVD present. No thyromegaly present.   Cardiovascular: Normal rate, regular rhythm, normal heart sounds and intact distal pulses.  Exam reveals no gallop and no friction rub.    No murmur heard.  Pulmonary/Chest: Breath sounds normal. No respiratory distress.   Abdominal: Bowel sounds are normal. He exhibits no distension. There is no tenderness.   Musculoskeletal: He " exhibits no edema or tenderness.   Neurological: He is alert and oriented to person, place, and time. No cranial nerve deficit.   Skin: Skin is warm and dry. He is not diaphoretic.   Psychiatric: He has a normal mood and affect. His behavior is normal.   Nursing note and vitals reviewed.      Assessment:     1. ACC/AHA stage C systolic heart failure (HCC)  torsemide (DEMADEX) 20 MG Tab    metoprolol SR (TOPROL XL) 25 MG TABLET SR 24 HR   2. Left ventricular systolic dysfunction, NYHA class 3  torsemide (DEMADEX) 20 MG Tab    metoprolol SR (TOPROL XL) 25 MG TABLET SR 24 HR   3. Ischemic cardiomyopathy     4. HTN (hypertension), malignant     5. Hypercholesterolemia     6. Dyspnea on exertion     7. High risk medication use     8. Stented coronary artery     9. Premature ventricular complex         Medical Decision Making:  Today's Assessment / Status / Plan:   Today, based on physical examination findings, patient is euvolemic. No JVD, lungs are clear to auscultation, no pitting edema in bilateral lower extremities, no ascites.    Dry weight is 144 lbs.    Will stop Carvedilol.    Will start Toprol XL 25 mg once daily.    Continue Spironolactone and Losartan at current dose.    I spent 30 minutes talking to patient and family members at bedside about end-of-life issue.  At this time, patient has end-stage heart failure with multiple comorbidities including end organ damage.  Mortality is high.  Further invasive cardiac procedure or surgery would not change the overall mortality of this patient.  Patient is not a candidate for further advanced heart failure therapies such as left ventricular assistance device or heart transplant.  The best option for this patient is hospice care.  I explained to patient and family member about the option and they both agreed to proceed.  We will place hospice consult.    Will continue to closely monitor for side effects of patient's high risk medication(s) including liver, renal  function and electrolytes.    I will see patient back in our Heart Failure Clinic with lab tests and studies results in 2 weeks.    I thank you for referring patient to our Heart Failure Clinic today.

## 2018-12-28 NOTE — LETTER
[facility]   [address], [address]  [place] [address]  Phone: [phone]  Fax: [phone]              Kyler Donato  11/6/1932    Encounter Date: 12/28/2018    Tawanna Fletcher M.D.          PROGRESS NOTE:  Chief Complaint   Patient presents with   • Congestive Heart Failure     NEW HEART FAILURE       Subjective:   Kyler Donato is a 86 y.o. male who presents today for cardiac care and management of ischemic cardiomyopathy (LVEF of 20%), prior proximal LAD stent and non-obstructive CAD in RCA and Lcx, HTN, HLP.    Was in hospital for HF exacerbation in 11/2018.    Patient was able to complete 238 m during his 6 minute walk test. his O2 saturation at baseline was 97% and at the end of the test, the O2 saturation was 98%. he reported 5 level of dyspnea on Steph scale.      Past Medical History:   Diagnosis Date   • Acute anterolateral myocardial infarction (HCC) 1995   • CAD (coronary artery disease) 1995    PCI/stent to the LAD. April 2017: MPI with scar in anterior/inferior wall, no ischemia, LVEF 30%. Echocardiogram with fixed defects in anterior wall and septal inferior wall, no ischemia, LVEF 40%. July 2017: Coronary angiogram with patent LAD stent, diffuse nonobstructive lesions in RCA, 40% mid RCA, 50% mid circumflex.   • COPD (chronic obstructive pulmonary disease) ([facility])    • Hyperlipidemia    • Hypertension    • PSVT (paroxysmal supraventricular tachycardia) ([facility])    • Shoulder pain      Past Surgical History:   Procedure Laterality Date   • ELBOWPLASTY      ulnar nerve transposition   • HAND SURGERY     • SHOULDER ORIF       Family History   Problem Relation Age of Onset   • Heart Disease Father      Social History     Social History   • Marital status:      Spouse name: N/A   • Number of children: N/A   • Years of education: N/A     Occupational History   • Not on file.     Social History Main Topics   • Smoking status: Former Smoker    Quit date: 9/24/1995   • Smokeless tobacco: Never Used   • Alcohol use Yes   • Drug use: No   • Sexual activity: Not on file     Other Topics Concern   • Not on file     Social History Narrative   • No narrative on file     No Known Allergies  Outpatient Encounter Prescriptions as of 12/28/2018   Medication Sig Dispense Refill   • nitroglycerin (NITROSTAT) 0.4 MG SL Tab Place 0.4 mg under tongue every 5 minutes as needed for Chest Pain.     • clobetasol (TEMOVATE) 0.05 % Ointment Apply  to affected area(s) 2 times a day.     • Diclofenac Sodium 1 % Gel Apply  to skin as directed.     • fluocinonide (LIDEX) 0.05 % Ointment Apply  to affected area(s) 2 times a day.     • magnesium oxide (MAG-OX) 400 MG Tab Take 420 mg by mouth every day.     • urea 10 % lotion Apply  to affected area(s).     • carvedilol (COREG) 6.25 MG Tab Take 1 Tab by mouth 2 times a day, with meals. (Patient taking differently: Take 3.25 mg by mouth 2 times a day.) 60 Tab 1   • losartan (COZAAR) 25 MG Tab Take 1 Tab by mouth every day. 30 Tab 1   • torsemide (DEMADEX) 100 MG Tab Take 1 Tab by mouth every day. (Patient taking differently: Take 20 mg by mouth every day.) 30 Tab 3   • pravastatin (PRAVACHOL) 80 MG tablet Take 40 mg by mouth every bedtime.     • Cyanocobalamin (VITAMIN B-12) 5000 MCG SL Tab Place 5,000 mcg under tongue every evening.     • methocarbamol (ROBAXIN) 500 MG Tab Take 500 mg by mouth every bedtime.     • Cholecalciferol (VITAMIN D3) 1000 units Cap Take 1,000 Units by mouth 2 Times a Day.     • omeprazole (PRILOSEC) 20 MG delayed-release capsule Take 20 mg by mouth 2 times a day.     • spironolactone (ALDACTONE) 25 MG Tab Take 25 mg by mouth every morning.     • aspirin (ASA) 81 MG Chew Tab chewable tablet Take 1 Tab by mouth every day. (Patient not taking: Reported on 12/28/2018) 100 Tab 1   • magnesium chloride (MAG-64) 64 MG Tablet Delayed Response Take 1 Tab by mouth 2 Times a Day. (Patient not taking: Reported on  "12/28/2018) 60 Tab 1   • diphenhydrAMINE (ALLERGY RELIEF) 25 MG Tab Take 25 mg by mouth every evening.     • albuterol 108 (90 Base) MCG/ACT Aero Soln inhalation aerosol Inhale 2 Puffs by mouth every 6 hours as needed for Shortness of Breath. 8.5 g 0   • tramadol (ULTRAM) 50 MG Tab Take 50 mg by mouth every 8 hours as needed (for pain).       No facility-administered encounter medications on file as of 12/28/2018.      Review of Systems   Constitutional: Negative for diaphoresis and fever.   HENT: Negative for nosebleeds.    Eyes: Negative for blurred vision and double vision.   Respiratory: Positive for shortness of breath. Negative for cough.    Cardiovascular: Negative for chest pain and palpitations.   Gastrointestinal: Negative for abdominal pain.   Genitourinary: Negative for dysuria and frequency.   Musculoskeletal: Negative for falls and myalgias.   Skin: Negative for rash.   Neurological: Negative for dizziness, sensory change and headaches.   Endo/Heme/Allergies: Does not bruise/bleed easily.   Psychiatric/Behavioral: Negative for depression and memory loss.        Objective:   BP (!) 94/52 (BP Location: Left arm, Patient Position: Sitting, BP Cuff Size: Adult)   Pulse 76   Ht 1.778 m (5' 10\")   Wt 65.3 kg (144 lb)   SpO2 97%   BMI 20.66 kg/m²      Physical Exam   Constitutional: He is oriented to person, place, and time. No distress.   HENT:   Head: Normocephalic and atraumatic.   Right Ear: External ear normal.   Left Ear: External ear normal.   Eyes: Right eye exhibits no discharge. Left eye exhibits no discharge.   Neck: No JVD present. No thyromegaly present.   Cardiovascular: Normal rate, regular rhythm, normal heart sounds and intact distal pulses.  Exam reveals no gallop and no friction rub.    No murmur heard.  Pulmonary/Chest: Breath sounds normal. No respiratory distress.   Abdominal: Bowel sounds are normal. He exhibits no distension. There is no tenderness.   Musculoskeletal: He exhibits " no edema or tenderness.   Neurological: He is alert and oriented to person, place, and time. No cranial nerve deficit.   Skin: Skin is warm and dry. He is not diaphoretic.   Psychiatric: He has a normal mood and affect. His behavior is normal.   Nursing note and vitals reviewed.      Assessment:     1. ACC/AHA stage C systolic heart failure (HCC)     2. Left ventricular systolic dysfunction, NYHA class 3     3. Ischemic cardiomyopathy     4. HTN (hypertension), malignant     5. Hypercholesterolemia     6. Dyspnea on exertion     7. High risk medication use     8. Stented coronary artery     9. Premature ventricular complex         Medical Decision Making:  Today's Assessment / Status / Plan:            Hernan Trimble Jr., M.D.  [address] [place] [address]  VIA Facsimile: [phone]

## 2018-12-28 NOTE — PROGRESS NOTES
"Education Narrative  Reviewed anatomy and physiology of heart failure with patient. Went over heart failure worksheet and patient's individual HF diagnosis, EF, risk factors, general medication classes and indications, as well as personal goals.  Goals: Patient's primary goal is to just enjoy the rest of his life.     Discussed daily weights, sodium restriction, worsening signs and symptoms to report to physician, heart medications, and importance of adherence to medication regimen. Emphasized recommendation from AHA/AAHFN to keep daily sodium intake between 1500mg-2000mg.      Reviewed dietary handouts, advanced care planning classes, and advance directive planning handout. Discussed dietary considerations and reviewed Seven Day Heart Healthy Meal Plan by Down.     Invited patient and family members/friends to HF support group and encouraged patient to call Heart Failure clinic during normal business hours with any questions.  Heart Failure program card with number given to patient.           Patient continued to change the subject every time we began talking about anything from medications to diet. When speaking to patient about his diet, he states that he doesn't use salt and doesn't read nutrition labels and doesn't intend to start now. When speaking to patient about weighing himself daily and the importance he replied \" I wish I would have some swelling, then at least I could gain weight\" I attempted reinforcing the difference between water weight that is not beneficial to his health and increased calorie and protein intake that may gradually increase his weight. Patient is relatively active, cleans house and takes care of wife daily. Reinforced multiple times when to call the PUMP line and why it is important for preventing further hospitalizations. Patient verbalized understanding but I suspect that he may not act if he begins developing any symptoms besides SOB because he believes he needs to gain " "weight. Per Dr. Fletcher note a referral to hospice has been made and POLST  Has been filled out.        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  Level of Support: No Support  Advance Directives: POLST completed    Education  Symptoms: Needs Reinforcement  Weighing: Needs Reinforcement  Weight Gain Response: Needs Reinforcement   Recording Data: Needs Reinforcement  Teach Back Failures:  (needs reinforcement)  Compliance: Patient is Compliant       Medications  Medication Reconciliation : Complete  Medication Counseling Provided: Verbalizes Understanding  Able to Accurately Identify Medication Indications: Some  Medication Discrepancies: None  Is Patient on an Evidence Based Beta Blocker: Yes  Is Patient on ACE-1 or ARB: Yes    Dietary Assessment  Food Labels: Needs Reinforcement  Foods High in Sodium: Needs Reinforcement  Daily Sodium Intake: Needs Reinforcement  Diet: Needs Reinforcement  Food Preparation: Needs Reinforcement  Eating Out Plan: Needs Reinforcement  Healthier Options: Needs Reinforcement  Fluid Restriction: Not Applicable    MN Living with Heart Failure  Swelling in Ankles or Legs: 0  Having to Sit or Lie Down During the Day: 3  Walking About or Climbing Stairs Difficult: 2  Working Around the House or Yard Difficult: 2  Difficulty Going Away from Home: 0  Difficulty Sleeping Well at Night: 2  Difficulty Socializing with Family or Friends: 0  Difficulty Working to Earn a Livin  Difficulty with Recreational Pastimes, Sports, Hobbies: 0  Difficulty " with Sexual Activities: 2  Eating Less Foods You Like: 0  Making you Short of Breath: 3  Tired, Fatigued or Low on Energy: 3  Making you Stay in a Hospital: 0  Costing you Money for Medical Care?: 0  Giving you Side Effects from Treatments: 3  Feeling like a [place] to Family and Friends: 0  Loss of Self Control in your Life: 0  Making You Worry: 0  Difficulty to Concentrate or Remembering Things: 3  Making you Feel Depressed: 0  MLHF Total Score : 23    6 Minute Walk Test  Baseline to end of test: 6M  Total meters walked: 238  Comments: Pt felt winded and his legs felt tired as well.

## 2019-01-16 ENCOUNTER — OFFICE VISIT (OUTPATIENT)
Dept: CARDIOLOGY | Facility: MEDICAL CENTER | Age: 84
End: 2019-01-16
Payer: MEDICARE

## 2019-01-16 VITALS
HEART RATE: 68 BPM | DIASTOLIC BLOOD PRESSURE: 60 MMHG | OXYGEN SATURATION: 97 % | HEIGHT: 70 IN | BODY MASS INDEX: 20.9 KG/M2 | SYSTOLIC BLOOD PRESSURE: 102 MMHG | WEIGHT: 146 LBS

## 2019-01-16 DIAGNOSIS — Z95.5 STENTED CORONARY ARTERY: ICD-10-CM

## 2019-01-16 DIAGNOSIS — I10 HTN (HYPERTENSION), MALIGNANT: ICD-10-CM

## 2019-01-16 DIAGNOSIS — I51.89 LEFT VENTRICULAR SYSTOLIC DYSFUNCTION, NYHA CLASS 3: ICD-10-CM

## 2019-01-16 DIAGNOSIS — I25.5 ISCHEMIC CARDIOMYOPATHY: ICD-10-CM

## 2019-01-16 DIAGNOSIS — I50.20 ACC/AHA STAGE C SYSTOLIC HEART FAILURE (HCC): ICD-10-CM

## 2019-01-16 DIAGNOSIS — I25.10 CORONARY ARTERY DISEASE INVOLVING NATIVE CORONARY ARTERY OF NATIVE HEART WITHOUT ANGINA PECTORIS: ICD-10-CM

## 2019-01-16 PROCEDURE — 99214 OFFICE O/P EST MOD 30 MIN: CPT | Performed by: NURSE PRACTITIONER

## 2019-01-16 RX ORDER — TORSEMIDE 20 MG/1
20 TABLET ORAL
Qty: 30 TAB | Refills: 11 | Status: SHIPPED | OUTPATIENT
Start: 2019-01-16 | End: 2020-03-22

## 2019-01-16 ASSESSMENT — ENCOUNTER SYMPTOMS
ORTHOPNEA: 0
PALPITATIONS: 0
ABDOMINAL PAIN: 0
DIZZINESS: 0
COUGH: 0
CLAUDICATION: 0
PND: 0
FEVER: 0
SHORTNESS OF BREATH: 0
MYALGIAS: 0

## 2019-01-16 NOTE — PROGRESS NOTES
Chief Complaint   Patient presents with   • CHF (Systolic)     F/V: 2 WEEK       Subjective:   Kyler Donato is a 86 y.o. male who presents today for follow up on his heart failure.    Patient of Dr. Mccormack and Dr. Fletcher in the HF clinic. Pt was last seen 12/28/2018.  During his last visit, his carvedilol was stopped and patient was started on Toprol-XL 25 mg daily.  Patient reports no problems with the dose.    Since his last visit, patient reports he is been feeling fairly well.  He denies chest pain, palpitations, shortness of breath at rest and with ADLs, orthopnea, PND, edema.  He does report dizziness if standing up too quickly and he states he is not exerting himself much besides within his home and is unsure if he has shortness of breath with exertion.    Patient reports his home weights have been decreasing.  He is weighing 137 pounds this morning.    Patient reports he is experiencing stress related to his wife's health, and his children's life.    Additonally, patient has the following medical problems:    -Ischemic cardiomyopathy, LVEF 25%    -CAD, prior stent to the proximal LAD in 1995    -Hypertension    -Hyperlipidemia    -Possible COPD    -Former smoker for over 50 years    Past Medical History:   Diagnosis Date   • Acute anterolateral myocardial infarction (HCC) 1995   • CAD (coronary artery disease) 1995    PCI/stent to the LAD. April 2017: MPI with scar in anterior/inferior wall, no ischemia, LVEF 30%. Echocardiogram with fixed defects in anterior wall and septal inferior wall, no ischemia, LVEF 40%. July 2017: Coronary angiogram with patent LAD stent, diffuse nonobstructive lesions in RCA, 40% mid RCA, 50% mid circumflex.   • COPD (chronic obstructive pulmonary disease) ([facility])    • Hyperlipidemia    • Hypertension    • PSVT (paroxysmal supraventricular tachycardia) ([facility])    • Shoulder pain      Past Surgical History:   Procedure Laterality Date   • ELBOWPLASTY      ulnar nerve transposition   •  HAND SURGERY     • SHOULDER ORIF       Family History   Problem Relation Age of Onset   • Heart Disease Father      Social History     Social History   • Marital status:      Spouse name: N/A   • Number of children: N/A   • Years of education: N/A     Occupational History   • Not on file.     Social History Main Topics   • Smoking status: Former Smoker     Quit date: 9/24/1995   • Smokeless tobacco: Never Used   • Alcohol use Yes   • Drug use: No   • Sexual activity: Not on file     Other Topics Concern   • Not on file     Social History Narrative   • No narrative on file     No Known Allergies  Outpatient Encounter Prescriptions as of 1/16/2019   Medication Sig Dispense Refill   • clobetasol (TEMOVATE) 0.05 % Ointment Apply  to affected area(s) 2 times a day.     • Diclofenac Sodium 1 % Gel Apply  to skin as directed.     • fluocinonide (LIDEX) 0.05 % Ointment Apply  to affected area(s) 2 times a day.     • magnesium oxide (MAG-OX) 400 MG Tab Take 420 mg by mouth every day.     • urea 10 % lotion Apply  to affected area(s).     • torsemide (DEMADEX) 20 MG Tab Take 1 Tab by mouth every day. 30 Tab 3   • metoprolol SR (TOPROL XL) 25 MG TABLET SR 24 HR Take 1 Tab by mouth every day. 30 Tab 11   • losartan (COZAAR) 25 MG Tab Take 1 Tab by mouth every day. 30 Tab 1   • pravastatin (PRAVACHOL) 80 MG tablet Take 40 mg by mouth every bedtime.     • Cyanocobalamin (VITAMIN B-12) 5000 MCG SL Tab Place 5,000 mcg under tongue every evening.     • Cholecalciferol (VITAMIN D3) 1000 units Cap Take 1,000 Units by mouth 2 Times a Day.     • omeprazole (PRILOSEC) 20 MG delayed-release capsule Take 20 mg by mouth 2 times a day.     • spironolactone (ALDACTONE) 25 MG Tab Take 25 mg by mouth every morning.     • nitroglycerin (NITROSTAT) 0.4 MG SL Tab Place 0.4 mg under tongue every 5 minutes as needed for Chest Pain.     • methocarbamol (ROBAXIN) 500 MG Tab Take 500 mg by mouth every bedtime.     • albuterol 108 (90 Base)  "MCG/ACT Aero Soln inhalation aerosol Inhale 2 Puffs by mouth every 6 hours as needed for Shortness of Breath. 8.5 g 0   • tramadol (ULTRAM) 50 MG Tab Take 50 mg by mouth every 8 hours as needed (for pain).       No facility-administered encounter medications on file as of 1/16/2019.      Review of Systems   Constitutional: Negative for fever and malaise/fatigue.   HENT: Positive for hearing loss.    Respiratory: Negative for cough and shortness of breath.    Cardiovascular: Negative for chest pain, palpitations, orthopnea, claudication, leg swelling and PND.   Gastrointestinal: Negative for abdominal pain.   Musculoskeletal: Negative for myalgias.   Neurological: Negative for dizziness.   All other systems reviewed and are negative.       Objective:   /60 (BP Location: Right arm, Patient Position: Sitting, BP Cuff Size: Adult)   Pulse 68   Ht 1.778 m (5' 10\")   Wt 66.2 kg (146 lb)   SpO2 97%   BMI 20.95 kg/m²     Physical Exam   Constitutional: He is oriented to person, place, and time. He appears well-developed and well-nourished.   HENT:   Head: Normocephalic and atraumatic.   Bilateral Hearing Aids in place   Eyes: Pupils are equal, round, and reactive to light. EOM are normal.   Neck: Normal range of motion. Neck supple. No JVD present.   Cardiovascular: Normal rate, regular rhythm and normal heart sounds.    Pulmonary/Chest: Effort normal and breath sounds normal. No respiratory distress. He has no wheezes. He has no rales.   Abdominal: Soft. Bowel sounds are normal.   Musculoskeletal: He exhibits no edema.   Neurological: He is alert and oriented to person, place, and time.   Skin: Skin is warm and dry.   Psychiatric: He has a normal mood and affect. His behavior is normal.   Vitals reviewed.       No results found for: CHOLSTRLTOT, LDL, HDL, TRIGLYCERIDE    Lab Results   Component Value Date/Time    SODIUM 142 11/20/2018 02:15 AM    POTASSIUM 3.8 11/20/2018 02:15 AM    CHLORIDE 105 11/20/2018 02:15 " AM    CO2 25 11/20/2018 02:15 AM    GLUCOSE 93 11/20/2018 02:15 AM    BUN 24 (H) 11/20/2018 02:15 AM    CREATININE 1.38 11/20/2018 02:15 AM     Lab Results   Component Value Date/Time    ALKPHOSPHAT 54 11/20/2018 02:15 AM    ASTSGOT 16 11/20/2018 02:15 AM    ALTSGPT 16 11/20/2018 02:15 AM    TBILIRUBIN 0.8 11/20/2018 02:15 AM     Heart Cath 7/21/2017  Impression:  Pain stent in proximal LAD without significant in-stent stenosis.  Nonobstructive stenosis mid circumflex.  Diffuse plaquing in proximal to mid RCA with 40% stenosis at worst point.  Global hypokinesis of the left ventricle EF 36%  Significant coronary calcification.     Transthoracic Echo Report 4/7/2017  No prior study is available for comparison.   Left ventricular ejection fraction is visually estimated to be 45%.  Akinetic apical segment.  Mid to distal anterior wall is akinetic.  Mild mitral regurgitation.  Aortic sclerosis without stenosis.  Unable to estimate pulmonary artery pressure due to an inadequate   tricuspid regurgitant jet.  Normal aortic root for body surface area.    Myocardial Perfusion Report 4/10/2017   NUCLEAR IMAGING INTERPRETATION   Large sized, nonreversible, decreased uptake of severe severity in the apex    (LAD), anterior (LAD), apical septal (LAD), apical inferior and mid anteroseptal (LAD) segments during post stress images suggestive of scar.  No evidence of ischemia.   Enlarged ventricle with global hypokinesis and likely akinesis of the anteroseptal and anterior walls (difficult to determine with severely decrease radiotracer uptake). Measured ejection fraction is 30%.   ECG INTERPRETATION   Negative stress ECG for ischemia.    Transthoracic Echo Report 11/20/2018  Severely reduced left ventricular systolic function.  Left ventricular ejection fraction is visually estimated to be 25-30%.  Global hypokinesis with regional variation.  Aortic sclerosis without stenosis.  Unable to estimate pulmonary artery pressure due to an  inadequate   tricuspid regurgitant jet.  Mild mitral regurgitation.  Compared to the report of the study done 4/7/2017  there has been worsening of left ventricular ejection fraction.     Assessment:     1. ACC/AHA stage C systolic heart failure (HCC)  BASIC METABOLIC PANEL    torsemide (DEMADEX) 20 MG Tab   2. Left ventricular systolic dysfunction, NYHA class 3  BASIC METABOLIC PANEL    torsemide (DEMADEX) 20 MG Tab   3. Ischemic cardiomyopathy     4. Stented coronary artery     5. Coronary artery disease involving native coronary artery of native heart without angina pectoris     6. HTN (hypertension), malignant         Medical Decision Making:  Today's Assessment / Status / Plan:   1. HFrEF, Stage C, Class 2, LVEF 25-30%: Based on physical examination findings, patient is euvolemic. No JVD, lungs are clear to auscultation, no pitting edema in bilateral lower extremities, no ascites.  -Try decreasing torsemide to 20 mg daily as needed  -Continue Toprol-XL 25 mg daily  -Continue losartan 25 mg daily  -Continue Spironolactone 25 mg daily  -Obtain a BMP before next visit  -Reinforced s/sx of worsening heart failure with patient and weight monitoring. Pt verbalizes understanding. Pt to call office or RTC if present.     2. CAD, hx Stent/HLD:  -Continue pravastatin 40 mg nightly    3. HTN: stable  -Continue recommendations per above    FU in clinic in 6 weeks with Dr. Mccormack. Sooner if needed.    Patient verbalizes understanding and agrees with the plan of care.     Collaborating MD: Jarrod Fletcher MD

## 2019-01-22 ENCOUNTER — HOSPITAL ENCOUNTER (OUTPATIENT)
Dept: LAB | Facility: MEDICAL CENTER | Age: 84
End: 2019-01-22
Attending: NURSE PRACTITIONER
Payer: MEDICARE

## 2019-01-22 DIAGNOSIS — I50.20 ACC/AHA STAGE C SYSTOLIC HEART FAILURE (HCC): ICD-10-CM

## 2019-01-22 DIAGNOSIS — I51.89 LEFT VENTRICULAR SYSTOLIC DYSFUNCTION, NYHA CLASS 3: ICD-10-CM

## 2019-01-22 LAB
ANION GAP SERPL CALC-SCNC: 10 MMOL/L (ref 0–11.9)
BUN SERPL-MCNC: 39 MG/DL (ref 8–22)
CALCIUM SERPL-MCNC: 9.5 MG/DL (ref 8.5–10.5)
CHLORIDE SERPL-SCNC: 104 MMOL/L (ref 96–112)
CO2 SERPL-SCNC: 28 MMOL/L (ref 20–33)
CREAT SERPL-MCNC: 1.59 MG/DL (ref 0.5–1.4)
GLUCOSE SERPL-MCNC: 94 MG/DL (ref 65–99)
POTASSIUM SERPL-SCNC: 4.3 MMOL/L (ref 3.6–5.5)
SODIUM SERPL-SCNC: 142 MMOL/L (ref 135–145)

## 2019-01-22 PROCEDURE — 80048 BASIC METABOLIC PNL TOTAL CA: CPT

## 2019-01-22 PROCEDURE — 36415 COLL VENOUS BLD VENIPUNCTURE: CPT

## 2019-01-24 DIAGNOSIS — I10 HTN (HYPERTENSION), MALIGNANT: ICD-10-CM

## 2019-01-24 DIAGNOSIS — I50.20 ACC/AHA STAGE C SYSTOLIC HEART FAILURE (HCC): ICD-10-CM

## 2019-01-24 DIAGNOSIS — I25.5 ISCHEMIC CARDIOMYOPATHY: ICD-10-CM

## 2019-01-25 ENCOUNTER — TELEPHONE (OUTPATIENT)
Dept: CARDIOLOGY | Facility: MEDICAL CENTER | Age: 84
End: 2019-01-25

## 2019-01-25 NOTE — TELEPHONE ENCOUNTER
BASIC METABOLIC PANEL   Order: [id number]   Status:  Final result   Visible to patient:  Yes (MyChart) Dx:  Left ventricular systolic dysfunction...   Notes recorded by CLEVELAND Ruby on 1/24/2019 at 12:48 PM PST  Can you let Mr. Donato know that his kidney function has worsened slightly. Please ask him if hehas tried taking his diuretic as needed as discussed at his last visit. Also, please make sure he is adequately hydrating. Please repeat BMP in 2-3 weeks.     Patient called. Spoke with patient and pts wife. Pt has been taking Torsemide daily. Discussed changing that to PRN as directed on 1/16 by APRN for SOB, edema or weight gain. Reviewed 3 lbs in one day or 5 lbs in one week. Pt is also not drinking nearly enough water. Pt states approx 30 ozs per day. Adequate hydration discussed. Asked patient to consider our discussion and repeat labs as ordered. Pt and pts wife state understanding.

## 2019-02-28 ENCOUNTER — HOSPITAL ENCOUNTER (OUTPATIENT)
Dept: LAB | Facility: MEDICAL CENTER | Age: 84
End: 2019-02-28
Attending: NURSE PRACTITIONER
Payer: MEDICARE

## 2019-02-28 DIAGNOSIS — I50.20 ACC/AHA STAGE C SYSTOLIC HEART FAILURE (HCC): ICD-10-CM

## 2019-02-28 DIAGNOSIS — I25.5 ISCHEMIC CARDIOMYOPATHY: ICD-10-CM

## 2019-02-28 DIAGNOSIS — I10 HTN (HYPERTENSION), MALIGNANT: ICD-10-CM

## 2019-02-28 LAB
ANION GAP SERPL CALC-SCNC: 8 MMOL/L (ref 0–11.9)
BUN SERPL-MCNC: 28 MG/DL (ref 8–22)
CALCIUM SERPL-MCNC: 9.9 MG/DL (ref 8.5–10.5)
CHLORIDE SERPL-SCNC: 109 MMOL/L (ref 96–112)
CO2 SERPL-SCNC: 25 MMOL/L (ref 20–33)
CREAT SERPL-MCNC: 1.62 MG/DL (ref 0.5–1.4)
GLUCOSE SERPL-MCNC: 92 MG/DL (ref 65–99)
POTASSIUM SERPL-SCNC: 4.6 MMOL/L (ref 3.6–5.5)
SODIUM SERPL-SCNC: 142 MMOL/L (ref 135–145)

## 2019-02-28 PROCEDURE — 36415 COLL VENOUS BLD VENIPUNCTURE: CPT

## 2019-02-28 PROCEDURE — 80048 BASIC METABOLIC PNL TOTAL CA: CPT

## 2019-03-05 ENCOUNTER — OFFICE VISIT (OUTPATIENT)
Dept: CARDIOLOGY | Facility: MEDICAL CENTER | Age: 84
End: 2019-03-05
Payer: MEDICARE

## 2019-03-05 VITALS
WEIGHT: 150 LBS | SYSTOLIC BLOOD PRESSURE: 126 MMHG | HEART RATE: 72 BPM | BODY MASS INDEX: 21 KG/M2 | HEIGHT: 71 IN | DIASTOLIC BLOOD PRESSURE: 62 MMHG | OXYGEN SATURATION: 94 %

## 2019-03-05 DIAGNOSIS — I25.5 ISCHEMIC CARDIOMYOPATHY: ICD-10-CM

## 2019-03-05 DIAGNOSIS — I50.23 ACUTE ON CHRONIC SYSTOLIC (CONGESTIVE) HEART FAILURE (HCC): ICD-10-CM

## 2019-03-05 DIAGNOSIS — I10 ESSENTIAL HYPERTENSION: ICD-10-CM

## 2019-03-05 DIAGNOSIS — I25.10 CORONARY ARTERY DISEASE INVOLVING NATIVE CORONARY ARTERY OF NATIVE HEART WITHOUT ANGINA PECTORIS: ICD-10-CM

## 2019-03-05 DIAGNOSIS — E78.00 HYPERCHOLESTEROLEMIA: ICD-10-CM

## 2019-03-05 PROBLEM — I50.22 CHRONIC SYSTOLIC HEART FAILURE (HCC): Status: ACTIVE | Noted: 2018-11-19

## 2019-03-05 PROCEDURE — 99214 OFFICE O/P EST MOD 30 MIN: CPT | Performed by: INTERNAL MEDICINE

## 2019-03-05 RX ORDER — LOSARTAN POTASSIUM 50 MG/1
50 TABLET ORAL DAILY
Qty: 90 TAB | Refills: 3 | Status: ON HOLD | OUTPATIENT
Start: 2019-03-05 | End: 2020-03-27

## 2019-03-05 ASSESSMENT — ENCOUNTER SYMPTOMS
RESPIRATORY NEGATIVE: 1
CONSTITUTIONAL NEGATIVE: 1
NEUROLOGICAL NEGATIVE: 1
GASTROINTESTINAL NEGATIVE: 1
CARDIOVASCULAR NEGATIVE: 1
EYES NEGATIVE: 1

## 2019-03-05 NOTE — PROGRESS NOTES
Chief Complaint   Patient presents with   • Cardiomyopathy (Ischemic)       Subjective:   Kyler Donato is a 86 y.o. male who presents today for follow-up of coronary disease and chronic LV systolic failure.  He suffered an anterior wall MI in 1995 and had an LAD stent placed at [facility] at that time.  He subsequently underwent coronary angiography in July, 2017 at [facility] and was found to have a patent stent in his proximal LAD and diffuse, nonobstructive lesions in his RCA with a 40% lesion,and nonobstructive disease in the mid circumflex.  There is global hypokinesis with a EF of 35% at that time.  He was hospitalized last November with progressive exertional dyspnea and echo at that time revealed EF in the 25-30% range.  He was discharged on Demadex but stopped this on his own.  Currently, he denies exertional dyspnea or edema.  He said no chest pain.  The patient is the primary caregiver for his wife so other than housework, he is not been exercising much.  Lab from February 28 reveals potassium 4.6 EGFR 41 creatinine 1.60    Past Medical History:   Diagnosis Date   • Acute anterolateral myocardial infarction (HCC) 1995   • CAD (coronary artery disease) 1995    PCI/stent to the LAD. April 2017: MPI with scar in anterior/inferior wall, no ischemia, LVEF 30%. Echocardiogram with fixed defects in anterior wall and septal inferior wall, no ischemia, LVEF 40%. July 2017: Coronary angiogram with patent LAD stent, diffuse nonobstructive lesions in RCA, 40% mid RCA, 50% mid circumflex.   • Hyperlipidemia    • Hypertension    • PSVT (paroxysmal supraventricular tachycardia) ([facility])    • Shoulder pain      Past Surgical History:   Procedure Laterality Date   • ANGIOPLASTY  1995    stents   • ELBOWPLASTY      ulnar nerve transposition   • HAND SURGERY     • SHOULDER ORIF       Family History   Problem Relation Age of Onset   • Heart Disease Father    • Arthritis Mother    • Breast Cancer Sister    • Prostate cancer  Brother      Social History     Social History   • Marital status:      Spouse name: N/A   • Number of children: N/A   • Years of education: N/A     Occupational History   • Not on file.     Social History Main Topics   • Smoking status: Former Smoker     Packs/day: 1.00     Years: 50.00     Quit date: 9/24/1995   • Smokeless tobacco: Never Used      Comment: up to 3 PPD per day at times   • Alcohol use 4.2 oz/week     7 Cans of beer per week   • Drug use: No   • Sexual activity: Not on file     Other Topics Concern   • Not on file     Social History Narrative   • No narrative on file     No Known Allergies  Outpatient Encounter Prescriptions as of 3/5/2019   Medication Sig Dispense Refill   • losartan (COZAAR) 50 MG Tab Take 1 Tab by mouth every day. 90 Tab 3   • clobetasol (TEMOVATE) 0.05 % Ointment Apply  to affected area(s) 2 times a day.     • Diclofenac Sodium 1 % Gel Apply  to skin as directed.     • fluocinonide (LIDEX) 0.05 % Ointment Apply  to affected area(s) 2 times a day.     • magnesium oxide (MAG-OX) 400 MG Tab Take 420 mg by mouth every day.     • metoprolol SR (TOPROL XL) 25 MG TABLET SR 24 HR Take 1 Tab by mouth every day. 30 Tab 11   • pravastatin (PRAVACHOL) 80 MG tablet Take 40 mg by mouth every bedtime.     • Cyanocobalamin (VITAMIN B-12) 5000 MCG SL Tab Place 5,000 mcg under tongue every evening.     • Cholecalciferol (VITAMIN D3) 1000 units Cap Take 1,000 Units by mouth 2 Times a Day.     • omeprazole (PRILOSEC) 20 MG delayed-release capsule Take 20 mg by mouth 2 times a day.     • spironolactone (ALDACTONE) 25 MG Tab Take 25 mg by mouth every morning.     • torsemide (DEMADEX) 20 MG Tab Take 1 Tab by mouth 1 time daily as needed. (Patient not taking: Reported on 3/5/2019) 30 Tab 11   • nitroglycerin (NITROSTAT) 0.4 MG SL Tab Place 0.4 mg under tongue every 5 minutes as needed for Chest Pain.     • urea 10 % lotion Apply  to affected area(s).     • [DISCONTINUED] losartan (COZAAR) 25  "MG Tab Take 1 Tab by mouth every day. 30 Tab 1   • methocarbamol (ROBAXIN) 500 MG Tab Take 500 mg by mouth every bedtime.     • albuterol 108 (90 Base) MCG/ACT Aero Soln inhalation aerosol Inhale 2 Puffs by mouth every 6 hours as needed for Shortness of Breath. (Patient not taking: Reported on 3/5/2019) 8.5 g 0   • tramadol (ULTRAM) 50 MG Tab Take 50 mg by mouth every 8 hours as needed (for pain).       No facility-administered encounter medications on file as of 3/5/2019.      Review of Systems   Constitutional: Negative.    HENT: Negative.    Eyes: Negative.    Respiratory: Negative.    Cardiovascular: Negative.    Gastrointestinal: Negative.    Musculoskeletal: Positive for joint pain.   Skin: Negative.    Neurological: Negative.    Endo/Heme/Allergies: Negative.    Psychiatric/Behavioral:        Situational stress.  He is the primary caregiver for his wife who is quite ill.        Objective:   /62 (BP Location: Left arm, Patient Position: Sitting, BP Cuff Size: Adult)   Pulse 72   Ht 1.803 m (5' 11\")   Wt 68 kg (150 lb)   SpO2 94%   BMI 20.92 kg/m²     Physical Exam   Constitutional: He is oriented to person, place, and time. He appears well-nourished. No distress.   HENT:   Head: Normocephalic and atraumatic.   Eyes: Pupils are equal, round, and reactive to light. No scleral icterus.   Neck: No tracheal deviation present.   Borderline JVD   Cardiovascular: Normal rate and regular rhythm.    No murmur heard.  Pulmonary/Chest: Breath sounds normal. No respiratory distress. He has no wheezes.   Abdominal: Soft. Bowel sounds are normal. He exhibits no distension. There is no tenderness.   Musculoskeletal: He exhibits no edema.   Neurological: He is alert and oriented to person, place, and time.   Skin: Skin is warm and dry.   Psychiatric: He has a normal mood and affect.       Assessment:     1. Acute on chronic systolic (congestive) heart failure (HCC)  losartan (COZAAR) 50 MG Tab   2. Ischemic " cardiomyopathy  losartan (COZAAR) 50 MG Tab   3. Coronary artery disease involving native coronary artery of native heart without angina pectoris     4. Hypercholesterolemia     5. Essential hypertension         Medical Decision Making:  Today's Assessment / Status / Plan:   Systolic heart failure, chronic.  Appears to be nonischemic in etiology as the LV dysfunction is diffuse.  He is a good fluid balance today.  Recommend increasing his losartan to 50 mg a day.  We also discussed the fact that if he gets increasing shortness of breath or edema that he should take the Demadex daily for 2 days.  This may avoid a hospitalization.  He should be considered for primary prevention AICD and this will be discussed with him next visit.    Coronary artery disease: Status post remote stenting of LAD.  By angiography in 2017, the LAD was widely patent.    History of hyperlipidemia: Treated with statin.    [facility]  His medicines were reviewed.  Again we discussed taking as needed Demadex should he have an exacerbation of his heart failure.  Return in 6 months, sooner if troubles arise.  Will discuss primary prevention AICD at that time.

## 2019-09-03 ENCOUNTER — OFFICE VISIT (OUTPATIENT)
Dept: CARDIOLOGY | Facility: MEDICAL CENTER | Age: 84
End: 2019-09-03
Payer: MEDICARE

## 2019-09-03 VITALS
DIASTOLIC BLOOD PRESSURE: 60 MMHG | SYSTOLIC BLOOD PRESSURE: 118 MMHG | OXYGEN SATURATION: 92 % | HEIGHT: 71 IN | WEIGHT: 135 LBS | HEART RATE: 66 BPM | BODY MASS INDEX: 18.9 KG/M2

## 2019-09-03 DIAGNOSIS — I10 ESSENTIAL HYPERTENSION: ICD-10-CM

## 2019-09-03 DIAGNOSIS — I47.10 SVT (SUPRAVENTRICULAR TACHYCARDIA): ICD-10-CM

## 2019-09-03 DIAGNOSIS — I25.5 ISCHEMIC CARDIOMYOPATHY: ICD-10-CM

## 2019-09-03 DIAGNOSIS — I25.10 CORONARY ARTERY DISEASE INVOLVING NATIVE CORONARY ARTERY OF NATIVE HEART WITHOUT ANGINA PECTORIS: ICD-10-CM

## 2019-09-03 DIAGNOSIS — E78.00 HYPERCHOLESTEROLEMIA: ICD-10-CM

## 2019-09-03 PROCEDURE — 99214 OFFICE O/P EST MOD 30 MIN: CPT | Performed by: INTERNAL MEDICINE

## 2019-09-03 ASSESSMENT — ENCOUNTER SYMPTOMS
RESPIRATORY NEGATIVE: 1
MUSCULOSKELETAL NEGATIVE: 1
BRUISES/BLEEDS EASILY: 0
WEIGHT LOSS: 1
NEUROLOGICAL NEGATIVE: 1
EYES NEGATIVE: 1
GASTROINTESTINAL NEGATIVE: 1

## 2019-09-03 NOTE — PROGRESS NOTES
Chief Complaint   Patient presents with   • CHF (Chronic)       Subjective:   Todd Donato is a 86 y.o. male who presents today for follow-up of coronary artery disease and ischemic cardia myopathy.  He suffered an anterior wall MI 1995 and had a stent placed in his LAD at that time.  The patient has known severe LV dysfunction with most recent echo showing EF of 30% range.  [facility]  He has had occasional angina manifested as right scapular pain with activity this is relieved by rest.  He is taken an occasional nitroglycerin.  He has Demadex at home and takes this only if he gets short of breath.  He was hospitalized over a year ago with mild exacerbation of his heart failure.  Since then he is managed well using the strategy of taking as needed diuretic along with his other daily heart failure medications.    He is under significant stress at home and that his wife has dementia and he is a sole caregiver.  We briefly discussed end-of-life issues today in regard to his wife's illness.  Does not sound as though this is been approached previously.    Past Medical History:   Diagnosis Date   • Acute anterolateral myocardial infarction (HCC) 1995   • CAD (coronary artery disease) 1995    PCI/stent to the LAD. April 2017: MPI with scar in anterior/inferior wall, no ischemia, LVEF 30%. Echocardiogram with fixed defects in anterior wall and septal inferior wall, no ischemia, LVEF 40%. July 2017: Coronary angiogram with patent LAD stent, diffuse nonobstructive lesions in RCA, 40% mid RCA, 50% mid circumflex.   • Hyperlipidemia    • Hypertension    • PSVT (paroxysmal supraventricular tachycardia) (Formerly [facility])    • Shoulder pain      Past Surgical History:   Procedure Laterality Date   • ANGIOPLASTY  1995    stents   • ELBOWPLASTY      ulnar nerve transposition   • HAND SURGERY     • SHOULDER ORIF       Family History   Problem Relation Age of Onset   • Heart Disease Father    •  Arthritis Mother    • Breast Cancer Sister    • Prostate cancer Brother      Social History     Socioeconomic History   • Marital status:      Spouse name: Not on file   • Number of children: Not on file   • Years of education: Not on file   • Highest education level: Not on file   Occupational History   • Not on file   Social Needs   • Financial resource strain: Not on file   • Food insecurity:     Worry: Not on file     Inability: Not on file   • Transportation needs:     Medical: Not on file     Non-medical: Not on file   Tobacco Use   • Smoking status: Former Smoker     Packs/day: 1.00     Years: 50.00     Pack years: 50.00     Last attempt to quit: 1995     Years since quittin.9   • Smokeless tobacco: Never Used   • Tobacco comment: up to 3 PPD per day at times   Substance and Sexual Activity   • Alcohol use: Yes     Alcohol/week: 4.2 oz     Types: 7 Cans of beer per week   • Drug use: No   • Sexual activity: Not on file   Lifestyle   • Physical activity:     Days per week: Not on file     Minutes per session: Not on file   • Stress: Not on file   Relationships   • Social connections:     Talks on phone: Not on file     Gets together: Not on file     Attends Muslim service: Not on file     Active member of club or organization: Not on file     Attends meetings of clubs or organizations: Not on file     Relationship status: Not on file   • Intimate partner violence:     Fear of current or ex partner: Not on file     Emotionally abused: Not on file     Physically abused: Not on file     Forced sexual activity: Not on file   Other Topics Concern   • Not on file   Social History Narrative   • Not on file     No Known Allergies  Outpatient Encounter Medications as of 9/3/2019   Medication Sig Dispense Refill   • losartan (COZAAR) 50 MG Tab Take 1 Tab by mouth every day. 90 Tab 3   • nitroglycerin (NITROSTAT) 0.4 MG SL Tab Place 0.4 mg under tongue every 5 minutes as needed for Chest Pain.     •  clobetasol (TEMOVATE) 0.05 % Ointment Apply  to affected area(s) 2 times a day.     • Diclofenac Sodium 1 % Gel Apply  to skin as directed.     • fluocinonide (LIDEX) 0.05 % Ointment Apply  to affected area(s) 2 times a day.     • magnesium oxide (MAG-OX) 400 MG Tab Take 420 mg by mouth every day.     • urea 10 % lotion Apply  to affected area(s).     • metoprolol SR (TOPROL XL) 25 MG TABLET SR 24 HR Take 1 Tab by mouth every day. 30 Tab 11   • pravastatin (PRAVACHOL) 80 MG tablet Take 40 mg by mouth every bedtime.     • Cyanocobalamin (VITAMIN B-12) 5000 MCG SL Tab Place 5,000 mcg under tongue every evening.     • methocarbamol (ROBAXIN) 500 MG Tab Take 500 mg by mouth every bedtime.     • albuterol 108 (90 Base) MCG/ACT Aero Soln inhalation aerosol Inhale 2 Puffs by mouth every 6 hours as needed for Shortness of Breath. 8.5 g 0   • Cholecalciferol (VITAMIN D3) 1000 units Cap Take 1,000 Units by mouth 2 Times a Day.     • omeprazole (PRILOSEC) 20 MG delayed-release capsule Take 20 mg by mouth 2 times a day.     • spironolactone (ALDACTONE) 25 MG Tab Take 25 mg by mouth every morning.     • tramadol (ULTRAM) 50 MG Tab Take 50 mg by mouth every 8 hours as needed (for pain).     • torsemide (DEMADEX) 20 MG Tab Take 1 Tab by mouth 1 time daily as needed. (Patient not taking: Reported on 3/5/2019) 30 Tab 11     No facility-administered encounter medications on file as of 9/3/2019.      Review of Systems   Constitutional: Positive for weight loss.   HENT: Positive for hearing loss.    Eyes: Negative.    Respiratory: Negative.    Cardiovascular: Positive for chest pain.   Gastrointestinal: Negative.    Musculoskeletal: Negative.    Skin: Negative.    Neurological: Negative.    Endo/Heme/Allergies: Negative.  Does not bruise/bleed easily.   Psychiatric/Behavioral:        Situational stress.  His wife has dementia and he is the sole caretaker.        Objective:   /60 (BP Location: Left arm, Patient Position: Sitting,  "BP Cuff Size: Adult)   Pulse 66   Ht 1.803 m (5' 11\")   Wt 61.2 kg (135 lb)   SpO2 92%   BMI 18.83 kg/m²     Physical Exam   Constitutional: He is oriented to person, place, and time. He appears well-nourished. No distress.   HENT:   Head: Normocephalic and atraumatic.   Eyes: Pupils are equal, round, and reactive to light. No scleral icterus.   Neck: No JVD present. No tracheal deviation present.   Cardiovascular: Normal rate and regular rhythm.   No murmur heard.  Pulmonary/Chest: Breath sounds normal. No respiratory distress. He has no wheezes.   Abdominal: Soft. Bowel sounds are normal. He exhibits no distension. There is no tenderness.   Musculoskeletal: He exhibits no edema.   Neurological: He is alert and oriented to person, place, and time.   Skin: Skin is warm and dry.   Psychiatric: He has a normal mood and affect.       Assessment:     1. Ischemic cardiomyopathy     2. Hypercholesterolemia     3. Coronary artery disease involving native coronary artery of native heart without angina pectoris     4. Essential hypertension     5. SVT (supraventricular tachycardia) (CMS-[facility])         Medical Decision Making:  Today's Assessment / Status / Plan:   Ischemic cardiomyopathy: Patient is in good fluid balance today.  He is stage III with class II symptoms.  EF of 30% range.  He takes his diuretic as needed and seems to be working well for him.  We also discussed weighing daily as he has a scale at home.    Coronary disease: Status post LAD stenting.  Patient has occasional angina with activity which is manifested by right scapular pain we discussed use of nitro with this today.\    Hypertension: Under good control with current medications.    History of SVT: No symptoms.    Situational stress: Patient is the caregiver for his wife.  This was discussed today.  Does not sound like he has much family help.  I discussed end-of-life issues regarding his wife today.  Apparently this has not been addressed " previously.  [facility] and return here in 6 months

## 2020-03-22 ENCOUNTER — APPOINTMENT (OUTPATIENT)
Dept: RADIOLOGY | Facility: MEDICAL CENTER | Age: 85
DRG: 246 | End: 2020-03-22
Attending: EMERGENCY MEDICINE
Payer: MEDICARE

## 2020-03-22 ENCOUNTER — HOSPITAL ENCOUNTER (INPATIENT)
Facility: MEDICAL CENTER | Age: 85
LOS: 5 days | DRG: 246 | End: 2020-03-27
Attending: EMERGENCY MEDICINE | Admitting: HOSPITALIST
Payer: MEDICARE

## 2020-03-22 DIAGNOSIS — I50.23 ACUTE ON CHRONIC SYSTOLIC HEART FAILURE (HCC): ICD-10-CM

## 2020-03-22 DIAGNOSIS — I10 ESSENTIAL HYPERTENSION: ICD-10-CM

## 2020-03-22 DIAGNOSIS — I63.9 CEREBROVASCULAR ACCIDENT (CVA), UNSPECIFIED MECHANISM (HCC): ICD-10-CM

## 2020-03-22 DIAGNOSIS — I25.10 CORONARY ARTERY DISEASE INVOLVING NATIVE CORONARY ARTERY OF NATIVE HEART WITHOUT ANGINA PECTORIS: ICD-10-CM

## 2020-03-22 DIAGNOSIS — I21.4 NSTEMI (NON-ST ELEVATED MYOCARDIAL INFARCTION) (HCC): ICD-10-CM

## 2020-03-22 DIAGNOSIS — I21.4 NON-STEMI (NON-ST ELEVATED MYOCARDIAL INFARCTION) (HCC): ICD-10-CM

## 2020-03-22 DIAGNOSIS — I63.10 CEREBROVASCULAR ACCIDENT (CVA) DUE TO EMBOLISM OF PRECEREBRAL ARTERY (HCC): ICD-10-CM

## 2020-03-22 PROBLEM — N17.9 ACUTE RENAL FAILURE (ARF) (HCC): Status: ACTIVE | Noted: 2020-03-22

## 2020-03-22 PROBLEM — D64.9 ANEMIA: Status: ACTIVE | Noted: 2020-03-22

## 2020-03-22 PROBLEM — J96.01 ACUTE RESPIRATORY FAILURE WITH HYPOXIA (HCC): Status: ACTIVE | Noted: 2020-03-22

## 2020-03-22 LAB
ALBUMIN SERPL BCP-MCNC: 4.5 G/DL (ref 3.2–4.9)
ALBUMIN/GLOB SERPL: 1.9 G/DL
ALP SERPL-CCNC: 83 U/L (ref 30–99)
ALT SERPL-CCNC: 26 U/L (ref 2–50)
ANION GAP SERPL CALC-SCNC: 14 MMOL/L (ref 7–16)
APPEARANCE UR: CLEAR
APTT PPP: 27.3 SEC (ref 24.7–36)
AST SERPL-CCNC: 85 U/L (ref 12–45)
BASOPHILS # BLD AUTO: 0.3 % (ref 0–1.8)
BASOPHILS # BLD: 0.03 K/UL (ref 0–0.12)
BILIRUB SERPL-MCNC: 0.5 MG/DL (ref 0.1–1.5)
BILIRUB UR QL STRIP.AUTO: NEGATIVE
BUN SERPL-MCNC: 58 MG/DL (ref 8–22)
CALCIUM SERPL-MCNC: 9.3 MG/DL (ref 8.5–10.5)
CHLORIDE SERPL-SCNC: 102 MMOL/L (ref 96–112)
CO2 SERPL-SCNC: 21 MMOL/L (ref 20–33)
COLOR UR: YELLOW
CREAT SERPL-MCNC: 2.08 MG/DL (ref 0.5–1.4)
EKG IMPRESSION: NORMAL
EOSINOPHIL # BLD AUTO: 0.07 K/UL (ref 0–0.51)
EOSINOPHIL NFR BLD: 0.7 % (ref 0–6.9)
ERYTHROCYTE [DISTWIDTH] IN BLOOD BY AUTOMATED COUNT: 48.2 FL (ref 35.9–50)
GLOBULIN SER CALC-MCNC: 2.4 G/DL (ref 1.9–3.5)
GLUCOSE SERPL-MCNC: 144 MG/DL (ref 65–99)
GLUCOSE UR STRIP.AUTO-MCNC: NEGATIVE MG/DL
HCT VFR BLD AUTO: 35 % (ref 42–52)
HGB BLD-MCNC: 11.2 G/DL (ref 14–18)
IMM GRANULOCYTES # BLD AUTO: 0.03 K/UL (ref 0–0.11)
IMM GRANULOCYTES NFR BLD AUTO: 0.3 % (ref 0–0.9)
INR PPP: 1.2 (ref 0.87–1.13)
KETONES UR STRIP.AUTO-MCNC: NEGATIVE MG/DL
LEUKOCYTE ESTERASE UR QL STRIP.AUTO: NEGATIVE
LYMPHOCYTES # BLD AUTO: 1.2 K/UL (ref 1–4.8)
LYMPHOCYTES NFR BLD: 12.8 % (ref 22–41)
MCH RBC QN AUTO: 32.1 PG (ref 27–33)
MCHC RBC AUTO-ENTMCNC: 32 G/DL (ref 33.7–35.3)
MCV RBC AUTO: 100.3 FL (ref 81.4–97.8)
MICRO URNS: NORMAL
MONOCYTES # BLD AUTO: 0.9 K/UL (ref 0–0.85)
MONOCYTES NFR BLD AUTO: 9.6 % (ref 0–13.4)
NEUTROPHILS # BLD AUTO: 7.12 K/UL (ref 1.82–7.42)
NEUTROPHILS NFR BLD: 76.3 % (ref 44–72)
NITRITE UR QL STRIP.AUTO: NEGATIVE
NRBC # BLD AUTO: 0 K/UL
NRBC BLD-RTO: 0 /100 WBC
NT-PROBNP SERPL IA-MCNC: 6942 PG/ML (ref 0–125)
PH UR STRIP.AUTO: 6 [PH] (ref 5–8)
PLATELET # BLD AUTO: 202 K/UL (ref 164–446)
PMV BLD AUTO: 10.6 FL (ref 9–12.9)
POTASSIUM SERPL-SCNC: 3.9 MMOL/L (ref 3.6–5.5)
PROT SERPL-MCNC: 6.9 G/DL (ref 6–8.2)
PROT UR QL STRIP: NEGATIVE MG/DL
PROTHROMBIN TIME: 15.5 SEC (ref 12–14.6)
RBC # BLD AUTO: 3.49 M/UL (ref 4.7–6.1)
RBC UR QL AUTO: NEGATIVE
SODIUM SERPL-SCNC: 137 MMOL/L (ref 135–145)
SP GR UR STRIP.AUTO: 1.02
TROPONIN T SERPL-MCNC: 1193 NG/L (ref 6–19)
TROPONIN T SERPL-MCNC: 1885 NG/L (ref 6–19)
TROPONIN T SERPL-MCNC: 967 NG/L (ref 6–19)
UROBILINOGEN UR STRIP.AUTO-MCNC: 0.2 MG/DL
WBC # BLD AUTO: 9.4 K/UL (ref 4.8–10.8)

## 2020-03-22 PROCEDURE — 700111 HCHG RX REV CODE 636 W/ 250 OVERRIDE (IP): Performed by: EMERGENCY MEDICINE

## 2020-03-22 PROCEDURE — 93005 ELECTROCARDIOGRAM TRACING: CPT | Performed by: EMERGENCY MEDICINE

## 2020-03-22 PROCEDURE — 93005 ELECTROCARDIOGRAM TRACING: CPT

## 2020-03-22 PROCEDURE — 93010 ELECTROCARDIOGRAM REPORT: CPT | Mod: 76 | Performed by: INTERNAL MEDICINE

## 2020-03-22 PROCEDURE — 36415 COLL VENOUS BLD VENIPUNCTURE: CPT

## 2020-03-22 PROCEDURE — 85025 COMPLETE CBC W/AUTO DIFF WBC: CPT

## 2020-03-22 PROCEDURE — 99223 1ST HOSP IP/OBS HIGH 75: CPT | Performed by: INTERNAL MEDICINE

## 2020-03-22 PROCEDURE — 770020 HCHG ROOM/CARE - TELE (206)

## 2020-03-22 PROCEDURE — 83880 ASSAY OF NATRIURETIC PEPTIDE: CPT

## 2020-03-22 PROCEDURE — 70450 CT HEAD/BRAIN W/O DYE: CPT

## 2020-03-22 PROCEDURE — 700102 HCHG RX REV CODE 250 W/ 637 OVERRIDE(OP): Performed by: HOSPITALIST

## 2020-03-22 PROCEDURE — 85610 PROTHROMBIN TIME: CPT

## 2020-03-22 PROCEDURE — 96375 TX/PRO/DX INJ NEW DRUG ADDON: CPT

## 2020-03-22 PROCEDURE — 82436 ASSAY OF URINE CHLORIDE: CPT

## 2020-03-22 PROCEDURE — 84133 ASSAY OF URINE POTASSIUM: CPT

## 2020-03-22 PROCEDURE — 700111 HCHG RX REV CODE 636 W/ 250 OVERRIDE (IP): Performed by: HOSPITALIST

## 2020-03-22 PROCEDURE — 99291 CRITICAL CARE FIRST HOUR: CPT | Performed by: HOSPITALIST

## 2020-03-22 PROCEDURE — 84156 ASSAY OF PROTEIN URINE: CPT

## 2020-03-22 PROCEDURE — 93005 ELECTROCARDIOGRAM TRACING: CPT | Performed by: HOSPITALIST

## 2020-03-22 PROCEDURE — 84484 ASSAY OF TROPONIN QUANT: CPT

## 2020-03-22 PROCEDURE — 99285 EMERGENCY DEPT VISIT HI MDM: CPT

## 2020-03-22 PROCEDURE — 71045 X-RAY EXAM CHEST 1 VIEW: CPT

## 2020-03-22 PROCEDURE — 85730 THROMBOPLASTIN TIME PARTIAL: CPT

## 2020-03-22 PROCEDURE — 82570 ASSAY OF URINE CREATININE: CPT

## 2020-03-22 PROCEDURE — A9270 NON-COVERED ITEM OR SERVICE: HCPCS | Performed by: HOSPITALIST

## 2020-03-22 PROCEDURE — 84300 ASSAY OF URINE SODIUM: CPT

## 2020-03-22 PROCEDURE — 80053 COMPREHEN METABOLIC PANEL: CPT

## 2020-03-22 PROCEDURE — 81003 URINALYSIS AUTO W/O SCOPE: CPT

## 2020-03-22 PROCEDURE — 96365 THER/PROPH/DIAG IV INF INIT: CPT

## 2020-03-22 RX ORDER — ONDANSETRON 2 MG/ML
4 INJECTION INTRAMUSCULAR; INTRAVENOUS EVERY 4 HOURS PRN
Status: DISCONTINUED | OUTPATIENT
Start: 2020-03-22 | End: 2020-03-27 | Stop reason: HOSPADM

## 2020-03-22 RX ORDER — HEPARIN SODIUM 1000 [USP'U]/ML
2600 INJECTION, SOLUTION INTRAVENOUS; SUBCUTANEOUS PRN
Status: DISCONTINUED | OUTPATIENT
Start: 2020-03-22 | End: 2020-03-23

## 2020-03-22 RX ORDER — TORSEMIDE 20 MG/1
20 TABLET ORAL
Status: DISCONTINUED | OUTPATIENT
Start: 2020-03-22 | End: 2020-03-22

## 2020-03-22 RX ORDER — MORPHINE SULFATE 4 MG/ML
2-4 INJECTION, SOLUTION INTRAMUSCULAR; INTRAVENOUS
Status: DISCONTINUED | OUTPATIENT
Start: 2020-03-22 | End: 2020-03-27 | Stop reason: HOSPADM

## 2020-03-22 RX ORDER — OMEPRAZOLE 20 MG/1
20 CAPSULE, DELAYED RELEASE ORAL 2 TIMES DAILY
Status: DISCONTINUED | OUTPATIENT
Start: 2020-03-22 | End: 2020-03-27 | Stop reason: HOSPADM

## 2020-03-22 RX ORDER — METHOCARBAMOL 500 MG/1
500 TABLET, FILM COATED ORAL NIGHTLY PRN
Status: DISCONTINUED | OUTPATIENT
Start: 2020-03-22 | End: 2020-03-27 | Stop reason: HOSPADM

## 2020-03-22 RX ORDER — METOPROLOL SUCCINATE 25 MG/1
25 TABLET, EXTENDED RELEASE ORAL DAILY
Status: DISCONTINUED | OUTPATIENT
Start: 2020-03-23 | End: 2020-03-23

## 2020-03-22 RX ORDER — ONDANSETRON 2 MG/ML
4 INJECTION INTRAMUSCULAR; INTRAVENOUS ONCE
Status: COMPLETED | OUTPATIENT
Start: 2020-03-22 | End: 2020-03-22

## 2020-03-22 RX ORDER — BISACODYL 10 MG
10 SUPPOSITORY, RECTAL RECTAL
Status: DISCONTINUED | OUTPATIENT
Start: 2020-03-22 | End: 2020-03-27 | Stop reason: HOSPADM

## 2020-03-22 RX ORDER — ONDANSETRON 4 MG/1
4 TABLET, ORALLY DISINTEGRATING ORAL EVERY 4 HOURS PRN
Status: DISCONTINUED | OUTPATIENT
Start: 2020-03-22 | End: 2020-03-27 | Stop reason: HOSPADM

## 2020-03-22 RX ORDER — AMOXICILLIN 250 MG
2 CAPSULE ORAL 2 TIMES DAILY
Status: DISCONTINUED | OUTPATIENT
Start: 2020-03-22 | End: 2020-03-27 | Stop reason: HOSPADM

## 2020-03-22 RX ORDER — ACETAMINOPHEN 325 MG/1
650 TABLET ORAL EVERY 6 HOURS PRN
Status: DISCONTINUED | OUTPATIENT
Start: 2020-03-22 | End: 2020-03-27 | Stop reason: HOSPADM

## 2020-03-22 RX ORDER — VITAMIN B COMPLEX
1000 TABLET ORAL 2 TIMES DAILY
Status: DISCONTINUED | OUTPATIENT
Start: 2020-03-22 | End: 2020-03-27 | Stop reason: HOSPADM

## 2020-03-22 RX ORDER — SPIRONOLACTONE 25 MG/1
25 TABLET ORAL EVERY MORNING
Status: DISCONTINUED | OUTPATIENT
Start: 2020-03-22 | End: 2020-03-22

## 2020-03-22 RX ORDER — TRAMADOL HYDROCHLORIDE 50 MG/1
50 TABLET ORAL EVERY 8 HOURS PRN
Status: DISCONTINUED | OUTPATIENT
Start: 2020-03-22 | End: 2020-03-27 | Stop reason: HOSPADM

## 2020-03-22 RX ORDER — ASPIRIN 300 MG/1
300 SUPPOSITORY RECTAL DAILY
Status: DISCONTINUED | OUTPATIENT
Start: 2020-03-22 | End: 2020-03-27 | Stop reason: HOSPADM

## 2020-03-22 RX ORDER — LOSARTAN POTASSIUM 50 MG/1
50 TABLET ORAL DAILY
Status: DISCONTINUED | OUTPATIENT
Start: 2020-03-23 | End: 2020-03-24

## 2020-03-22 RX ORDER — HEPARIN SODIUM 5000 [USP'U]/100ML
INJECTION, SOLUTION INTRAVENOUS CONTINUOUS
Status: DISCONTINUED | OUTPATIENT
Start: 2020-03-22 | End: 2020-03-23

## 2020-03-22 RX ORDER — ATORVASTATIN CALCIUM 80 MG/1
80 TABLET, FILM COATED ORAL EVERY EVENING
Status: DISCONTINUED | OUTPATIENT
Start: 2020-03-22 | End: 2020-03-27 | Stop reason: HOSPADM

## 2020-03-22 RX ORDER — HEPARIN SODIUM 1000 [USP'U]/ML
5000 INJECTION, SOLUTION INTRAVENOUS; SUBCUTANEOUS ONCE
Status: COMPLETED | OUTPATIENT
Start: 2020-03-22 | End: 2020-03-22

## 2020-03-22 RX ORDER — CLOPIDOGREL BISULFATE 75 MG/1
75 TABLET ORAL DAILY
Status: DISCONTINUED | OUTPATIENT
Start: 2020-03-23 | End: 2020-03-23

## 2020-03-22 RX ORDER — POLYETHYLENE GLYCOL 3350 17 G/17G
1 POWDER, FOR SOLUTION ORAL
Status: DISCONTINUED | OUTPATIENT
Start: 2020-03-22 | End: 2020-03-27 | Stop reason: HOSPADM

## 2020-03-22 RX ORDER — NITROGLYCERIN 0.4 MG/1
0.4 TABLET SUBLINGUAL
Status: DISCONTINUED | OUTPATIENT
Start: 2020-03-22 | End: 2020-03-27 | Stop reason: HOSPADM

## 2020-03-22 RX ORDER — CLOPIDOGREL BISULFATE 75 MG/1
300 TABLET ORAL ONCE
Status: COMPLETED | OUTPATIENT
Start: 2020-03-22 | End: 2020-03-22

## 2020-03-22 RX ORDER — ALBUTEROL SULFATE 90 UG/1
2 AEROSOL, METERED RESPIRATORY (INHALATION) EVERY 6 HOURS PRN
Status: DISCONTINUED | OUTPATIENT
Start: 2020-03-22 | End: 2020-03-22

## 2020-03-22 RX ORDER — ASPIRIN 81 MG/1
324 TABLET, CHEWABLE ORAL DAILY
Status: DISCONTINUED | OUTPATIENT
Start: 2020-03-22 | End: 2020-03-27 | Stop reason: HOSPADM

## 2020-03-22 RX ORDER — TRAMADOL HYDROCHLORIDE 50 MG/1
50 TABLET ORAL EVERY 6 HOURS PRN
Status: DISCONTINUED | OUTPATIENT
Start: 2020-03-22 | End: 2020-03-22

## 2020-03-22 RX ORDER — MORPHINE SULFATE 4 MG/ML
4 INJECTION, SOLUTION INTRAMUSCULAR; INTRAVENOUS ONCE
Status: COMPLETED | OUTPATIENT
Start: 2020-03-22 | End: 2020-03-22

## 2020-03-22 RX ORDER — ASPIRIN 325 MG
325 TABLET ORAL DAILY
Status: DISCONTINUED | OUTPATIENT
Start: 2020-03-22 | End: 2020-03-27 | Stop reason: HOSPADM

## 2020-03-22 RX ADMIN — MORPHINE SULFATE 4 MG: 4 INJECTION INTRAVENOUS at 15:36

## 2020-03-22 RX ADMIN — CLOPIDOGREL BISULFATE 300 MG: 75 TABLET ORAL at 20:42

## 2020-03-22 RX ADMIN — HEPARIN SODIUM 1050 UNITS/HR: 5000 INJECTION, SOLUTION INTRAVENOUS at 19:31

## 2020-03-22 RX ADMIN — TRAMADOL HYDROCHLORIDE 50 MG: 50 TABLET, FILM COATED ORAL at 20:41

## 2020-03-22 RX ADMIN — OMEPRAZOLE 20 MG: 20 CAPSULE, DELAYED RELEASE ORAL at 20:41

## 2020-03-22 RX ADMIN — MELATONIN 1000 UNITS: at 20:41

## 2020-03-22 RX ADMIN — METHOCARBAMOL TABLETS 500 MG: 500 TABLET, COATED ORAL at 20:42

## 2020-03-22 RX ADMIN — ATORVASTATIN CALCIUM 80 MG: 80 TABLET, FILM COATED ORAL at 17:54

## 2020-03-22 RX ADMIN — ASPIRIN 81 MG 324 MG: 81 TABLET ORAL at 17:54

## 2020-03-22 RX ADMIN — HEPARIN SODIUM 5000 UNITS: 1000 INJECTION, SOLUTION INTRAVENOUS; SUBCUTANEOUS at 19:30

## 2020-03-22 RX ADMIN — ONDANSETRON 4 MG: 2 SOLUTION INTRAMUSCULAR; INTRAVENOUS at 15:36

## 2020-03-22 ASSESSMENT — ENCOUNTER SYMPTOMS
HEADACHES: 0
VOMITING: 1
FALLS: 0
EYE PAIN: 0
NAUSEA: 1
DIZZINESS: 1
PALPITATIONS: 0
PHOTOPHOBIA: 0
SINUS PAIN: 0
STRIDOR: 0
MYALGIAS: 0
SORE THROAT: 0
SHORTNESS OF BREATH: 0
WHEEZING: 0
PND: 0
WEAKNESS: 1
BACK PAIN: 0
BRUISES/BLEEDS EASILY: 0
FEVER: 0
POLYDIPSIA: 0
HEARTBURN: 0
TINGLING: 0
DIARRHEA: 0
DIAPHORESIS: 1
DOUBLE VISION: 0
CHILLS: 0
DEPRESSION: 0
ORTHOPNEA: 0
ABDOMINAL PAIN: 0
CONSTIPATION: 0
HALLUCINATIONS: 0
FLANK PAIN: 0
HEMOPTYSIS: 0
TREMORS: 0
CLAUDICATION: 0
BLOOD IN STOOL: 0
NECK PAIN: 0
BLURRED VISION: 0
SPUTUM PRODUCTION: 0
COUGH: 0

## 2020-03-22 ASSESSMENT — FIBROSIS 4 INDEX
FIB4 SCORE: 1.7
FIB4 SCORE: 7.18

## 2020-03-22 ASSESSMENT — PAIN DESCRIPTION - DESCRIPTORS: DESCRIPTORS: PRESSURE

## 2020-03-22 ASSESSMENT — LIFESTYLE VARIABLES: SUBSTANCE_ABUSE: 0

## 2020-03-22 NOTE — ED PROVIDER NOTES
ED Provider Note    Scribed for Dr. Jarrod Ruiz M.D. by Maico Bar. 3/22/2020  3:21 PM    Primary care provider: Pcp Not In Computer  Means of arrival: EMS  History obtained from: Patient  History limited by: None    CHIEF COMPLAINT  Chief Complaint   Patient presents with   • Chest Pain     Starting last night   • Shortness of Breath     Starting last  night   • Weakness     Generalized weakness       [facility]  Kyler Donato is a 87 y.o. male with a history of coronary artery disease and acute MI who presents to the Emergency Department for evaluation of acute chest pain and shortness of breath onset last night. He states that the pain started around 3am, so he took 2 nitro and that somewhat alleviated his pain.  He did develop a headache following this with some dizziness after this his developed a headache on the left side of his head behind his ear. He endorses associated jaw pain, generalized weakness, dizziness, diaphoresis, and vomiting. He denies any sore throat, cough, or fever. He states that he has never felt dizziness like this before.  He does not have any residual chest pain at all    REVIEW OF SYSTEMS  Pertinent positives include chest pain, shortness of breath, headache, jaw pain, generalized weakness, dizziness, diaphoresis, and vomiting. Pertinent negatives include no sore throat, cough, or fever. As above, all other systems reviewed and are negative.   See [facility] for further details.     PAST MEDICAL HISTORY   has a past medical history of Acute anterolateral myocardial infarction (HCC) (1995), CAD (coronary artery disease) (1995), Hyperlipidemia, Hypertension, PSVT (paroxysmal supraventricular tachycardia) (HCC), and Shoulder pain.    SURGICAL HISTORY   has a past surgical history that includes shoulder orif; hand surgery; elbowplasty; and angioplasty (1995).    SOCIAL HISTORY  Social History     Tobacco Use   • Smoking status: Former Smoker     Packs/day: 1.00     Years: 50.00     Pack years:  50.00     Last attempt to quit: 1995     Years since quittin.5   • Smokeless tobacco: Never Used   • Tobacco comment: up to 3 PPD per day at times   Substance Use Topics   • Alcohol use: Yes     Alcohol/week: 4.2 oz     Types: 7 Cans of beer per week   • Drug use: No      Social History     Substance and Sexual Activity   Drug Use No       FAMILY HISTORY  Family History   Problem Relation Age of Onset   • Heart Disease Father    • Arthritis Mother    • Breast Cancer Sister    • Prostate cancer Brother        CURRENT MEDICATIONS  No current facility-administered medications for this encounter.     Current Outpatient Medications:   •  losartan (COZAAR) 50 MG Tab, Take 1 Tab by mouth every day., Disp: 90 Tab, Rfl: 3  •  torsemide (DEMADEX) 20 MG Tab, Take 1 Tab by mouth 1 time daily as needed. (Patient not taking: Reported on 3/5/2019), Disp: 30 Tab, Rfl: 11  •  nitroglycerin (NITROSTAT) 0.4 MG SL Tab, Place 0.4 mg under tongue every 5 minutes as needed for Chest Pain., Disp: , Rfl:   •  clobetasol (TEMOVATE) 0.05 % Ointment, Apply  to affected area(s) 2 times a day., Disp: , Rfl:   •  Diclofenac Sodium 1 % Gel, Apply  to skin as directed., Disp: , Rfl:   •  fluocinonide (LIDEX) 0.05 % Ointment, Apply  to affected area(s) 2 times a day., Disp: , Rfl:   •  magnesium oxide (MAG-OX) 400 MG Tab, Take 420 mg by mouth every day., Disp: , Rfl:   •  urea 10 % lotion, Apply  to affected area(s)., Disp: , Rfl:   •  metoprolol SR (TOPROL XL) 25 MG TABLET SR 24 HR, Take 1 Tab by mouth every day., Disp: 30 Tab, Rfl: 11  •  pravastatin (PRAVACHOL) 80 MG tablet, Take 40 mg by mouth every bedtime., Disp: , Rfl:   •  Cyanocobalamin (VITAMIN B-12) 5000 MCG SL Tab, Place 5,000 mcg under tongue every evening., Disp: , Rfl:   •  methocarbamol (ROBAXIN) 500 MG Tab, Take 500 mg by mouth every bedtime., Disp: , Rfl:   •  albuterol 108 (90 Base) MCG/ACT Aero Soln inhalation aerosol, Inhale 2 Puffs by mouth every 6 hours as needed for  "Shortness of Breath., Disp: 8.5 g, Rfl: 0  •  Cholecalciferol (VITAMIN D3) 1000 units Cap, Take 1,000 Units by mouth 2 Times a Day., Disp: , Rfl:   •  omeprazole (PRILOSEC) 20 MG delayed-release capsule, Take 20 mg by mouth 2 times a day., Disp: , Rfl:   •  spironolactone (ALDACTONE) 25 MG Tab, Take 25 mg by mouth every morning., Disp: , Rfl:   •  tramadol (ULTRAM) 50 MG Tab, Take 50 mg by mouth every 8 hours as needed (for pain)., Disp: , Rfl:     ALLERGIES  No Known Allergies    PHYSICAL EXAM  VITAL SIGNS: Temp 36.5 °C (97.7 °F) (Temporal)   Ht 1.803 m (5' 11\")   Wt 63.5 kg (140 lb)   BMI 19.53 kg/m²     Constitutional: Well developed, Well nourished, mild distress,    HENT: Normocephalic, Atraumatic, Bilateral external ears normal, Oropharynx moist, No oral exudates.   Eyes: PERRLA, EOMI, Conjunctiva normal, No discharge.   Neck: No tenderness, Supple, No stridor.   Lymphatic: No lymphadenopathy noted.   Cardiovascular: Normal heart rate, Normal rhythm.   Thorax & Lungs: Clear to auscultation bilaterally, No respiratory distress, No wheezing, No crackles.   Abdomen: Soft, No tenderness, No masses, No pulsatile masses. Actively vomiting.   Skin: Warm, Dry, No erythema, No rash.   Extremities:, No edema No cyanosis.   Musculoskeletal: No tenderness to palpation or major deformities noted.  Intact distal pulses  Neurologic: Awake, alert. Moves all extremities spontaneously. Moving head reproduces dizziness.  But no nystagmus seen  Psychiatric: Affect normal, Judgment normal, Mood normal.     LABS  Results for orders placed or performed during the hospital encounter of 03/22/20   CBC with Differential   Result Value Ref Range    WBC 9.4 4.8 - 10.8 K/uL    RBC 3.49 (L) 4.70 - 6.10 M/uL    Hemoglobin 11.2 (L) 14.0 - 18.0 g/dL    Hematocrit 35.0 (L) 42.0 - 52.0 %    .3 (H) 81.4 - 97.8 fL    MCH 32.1 27.0 - 33.0 pg    MCHC 32.0 (L) 33.7 - 35.3 g/dL    RDW 48.2 35.9 - 50.0 fL    Platelet Count 202 164 - 446 K/uL "    MPV 10.6 9.0 - 12.9 fL    Neutrophils-Polys 76.30 (H) 44.00 - 72.00 %    Lymphocytes 12.80 (L) 22.00 - 41.00 %    Monocytes 9.60 0.00 - 13.40 %    Eosinophils 0.70 0.00 - 6.90 %    Basophils 0.30 0.00 - 1.80 %    Immature Granulocytes 0.30 0.00 - 0.90 %    Nucleated RBC 0.00 /100 WBC    Neutrophils (Absolute) 7.12 1.82 - 7.42 K/uL    Lymphs (Absolute) 1.20 1.00 - 4.80 K/uL    Monos (Absolute) 0.90 (H) 0.00 - 0.85 K/uL    Eos (Absolute) 0.07 0.00 - 0.51 K/uL    Baso (Absolute) 0.03 0.00 - 0.12 K/uL    Immature Granulocytes (abs) 0.03 0.00 - 0.11 K/uL    NRBC (Absolute) 0.00 K/uL   Complete Metabolic Panel (CMP)   Result Value Ref Range    Sodium 137 135 - 145 mmol/L    Potassium 3.9 3.6 - 5.5 mmol/L    Chloride 102 96 - 112 mmol/L    Co2 21 20 - 33 mmol/L    Anion Gap 14.0 7.0 - 16.0    Glucose 144 (H) 65 - 99 mg/dL    Bun 58 (H) 8 - 22 mg/dL    Creatinine 2.08 (H) 0.50 - 1.40 mg/dL    Calcium 9.3 8.5 - 10.5 mg/dL    AST(SGOT) 85 (H) 12 - 45 U/L    ALT(SGPT) 26 2 - 50 U/L    Alkaline Phosphatase 83 30 - 99 U/L    Total Bilirubin 0.5 0.1 - 1.5 mg/dL    Albumin 4.5 3.2 - 4.9 g/dL    Total Protein 6.9 6.0 - 8.2 g/dL    Globulin 2.4 1.9 - 3.5 g/dL    A-G Ratio 1.9 g/dL   Troponin   Result Value Ref Range    Troponin T 1193 (H) 6 - 19 ng/L   proBrain Natriuretic Peptide, NT   Result Value Ref Range    NT-proBNP 6942 (H) 0 - 125 pg/mL   ESTIMATED GFR   Result Value Ref Range    GFR If  37 (A) >60 mL/min/1.73 m 2    GFR If Non African American 30 (A) >60 mL/min/1.73 m 2   EKG (NOW)   Result Value Ref Range    Report       [facility] Emergency Dept.    Test Date:  2020  Pt Name:    ELIZABETH SOMMER       Department: ER  MRN:        [id number]                      Room:       [facility]  Gender:     Male                         Technician: [id number]  :        1932                   Requested By:ER TRIAGE PROTOCOL  Order #:    [id number]                    Nikia MD: MATTIE  MD KEVIN    Measurements  Intervals                                Axis  Rate:       63                           P:          81  MA:         224                          QRS:        -60  QRSD:       120                          T:          102  QT:         484  QTc:        496    Interpretive Statements  SINUS ARRHYTHMIA, RATE  53- 70  FIRST DEGREE AV BLOCK  LEFT ANTERIOR FASCICULAR BLOCK  LATERAL INFARCT, AGE INDETERMINATE  Compared to ECG 2018 14:05:38  First degree AV block now present  Left anterior fascicular block now present  Sinus rhythm no longer present  Ventricular premature complex(es)  no longer present  Myocardial infarct finding still present  Electronically Signed On 3- 16:23:27 PDT by MATTIE BROWN MD       All labs reviewed by me.    EKG  Interpreted by me    Results for orders placed or performed during the hospital encounter of 20   EKG (NOW)   Result Value Ref Range    Report       [facility] Emergency Dept.    Test Date:  2020  Pt Name:    ELIZABETH SOMMER       Department: ER  MRN:        [id number]                      Room:       [facility]  Gender:     Male                         Technician: [id number]  :        1932                   Requested By:ER TRIAGE PROTOCOL  Order #:    [id number]                    Reading MD: MATTIE BROWN MD    Measurements  Intervals                                Axis  Rate:       63                           P:          81  MA:         224                          QRS:        -60  QRSD:       120                          T:          102  QT:         484  QTc:        496    Interpretive Statements  SINUS ARRHYTHMIA, RATE  53- 70  FIRST DEGREE AV BLOCK  LEFT ANTERIOR FASCICULAR BLOCK  LATERAL INFARCT, AGE INDETERMINATE  Compared to ECG 2018 14:05:38  First degree AV block now present  Left anterior fascicular block now present  Sinus rhythm no longer present  Ventricular premature complex(es)  no  longer present  Myocardial infarct finding still present  Electronically Signed On 3- 16:23:27 PDT by MATTIE BROWN MD          RADIOLOGY  CT-HEAD W/O   Final Result      No acute intracranial abnormality is identified.      Atrophy      There are periventricular and subcortical white matter changes present.  This finding is nonspecific and could be from previous small vessel ischemia, demyelination, or gliosis.         DX-CHEST-PORTABLE (1 VIEW)   Final Result      Cardiomegaly.      Pulmonary interstitial prominence likely represents interstitial edema. Pneumonitis not excluded.      Atherosclerotic plaque.         EC-ECHOCARDIOGRAM COMPLETE W/O CONT    (Results Pending)     The radiologist's interpretation of all radiological studies have been reviewed by me.    COURSE & MEDICAL DECISION MAKING  Pertinent Labs & Imaging studies reviewed. (See chart for details)    3:21 PM - Patient seen and examined at bedside. Patient will be treated with morphine and Zofran. Ordered DX-chest, CT-head, CBC with differential, CMP, Troponin, BNP, Estimated GFR, Lactic acid, UA, and EKG to evaluate his symptoms. The differential diagnoses include but are not limited to: TIA CVA intracranial hemorrhage, MI congestive heart failure    4:13 PM - Critical Troponin levels reported.     4:30 PM - I discussed the patient's case and the above findings with Dr. Deshpande (Cardiology) who agreed to evaluate the patient.    4:35 PM I discussed the patient's case and the above findings with Dr. Atkinson (Hospitalist) who agreed to evaluate the patient for admission.     Decision Making:  Case is discussed with the cardiologist on call who is  who will be seeing the patient, and with Dr. Atkinson as above.  Hospital admission planned possible cardiac catheterization with  Medical treatment for now  DISPOSITION:  Patient will be hospitalized by Dr. Atkinson in guarded condition.    FINAL IMPRESSION  1. Non-STEMI (non-ST elevated  myocardial infarction) (HCC)    2. Coronary artery disease involving native coronary artery of native heart without angina pectoris          IMaico ([place]), am scribing for, and in the presence of, Jarrod Ruiz M.D..    Electronically signed by: Maico Bar ([place]), 3/22/2020    IJarrod M.D. personally performed the services described in this documentation, as scribed by Maico Bar in my presence, and it is both accurate and complete. C    The note accurately reflects work and decisions made by me.  Jarrod Ruiz M.D.  3/22/2020  5:59 PM

## 2020-03-22 NOTE — H&P
Hospital Medicine History & Physical Note    Date of Service  3/22/2020    Primary Care Physician  Pcp Not In Computer    Consultants  Cardiology    Code Status  DNR/DNI    Chief Complaint  Chest pain    History of Presenting Illness  87 y.o. male who presented 3/22/2020 with past medical history of coronary artery disease, ischemic cardiomyopathy EF 25%, hypertension, SVT comes in for chest pain started this morning at 9 AM.  The pain started suddenly in the center of his chest radiating to the neck and the back.  He describes the pain as a burning sensation.  Patient was unable to stand properly and felt dizzy.  It is associated with nausea and vomiting.  He took a nitroglycerin which helped him relieve his chest pain.  Patient does not know if the chest pain was exertional.  The pain is non-positional, nonreproducible and does not increase with taking a deep breath.  Patient denies any fever, cough, nasal congestion, headaches, abdominal pain, changes in stool and changes in urine.  Patient arrived to the hospital with normal vital signs.  EKG interpreted by me found normal sinus rhythm with left anterior fascicular block, first-degree AV block, Q waves in anterior precordial leads without ST segment changes.  Chest x-ray interpreted by me found to increase intravascular congestion, peribronchial cuffing indicating pulmonary edema with a possible right lower lobe opacity  CT scan of the head found no acute intracranial process    Review of Systems  Review of Systems   Constitutional: Positive for diaphoresis and malaise/fatigue. Negative for chills and fever.   HENT: Negative for congestion, ear discharge, ear pain, hearing loss, nosebleeds, sinus pain, sore throat and tinnitus.    Eyes: Negative for blurred vision, double vision, photophobia and pain.   Respiratory: Negative for cough, hemoptysis, sputum production, shortness of breath, wheezing and stridor.    Cardiovascular: Negative for chest pain,  palpitations, orthopnea, claudication, leg swelling and PND.   Gastrointestinal: Positive for nausea and vomiting. Negative for abdominal pain, blood in stool, constipation, diarrhea, heartburn and melena.   Genitourinary: Negative for dysuria, flank pain, frequency, hematuria and urgency.   Musculoskeletal: Negative for back pain, falls, joint pain, myalgias and neck pain.   Skin: Negative for itching and rash.   Neurological: Positive for dizziness and weakness. Negative for tingling, tremors and headaches.   Endo/Heme/Allergies: Negative for environmental allergies and polydipsia. Does not bruise/bleed easily.   Psychiatric/Behavioral: Negative for depression, hallucinations, substance abuse and suicidal ideas.       Past Medical History   has a past medical history of Acute anterolateral myocardial infarction (HCC) (1995), CAD (coronary artery disease) (1995), Hyperlipidemia, Hypertension, PSVT (paroxysmal supraventricular tachycardia) (HCC), and Shoulder pain.    Surgical History   has a past surgical history that includes shoulder orif; hand surgery; elbowplasty; and angioplasty (1995).     Family History  family history includes Arthritis in his mother; Breast Cancer in his sister; Heart Disease in his father; Prostate cancer in his brother.     Social History   reports that he quit smoking about 24 years ago. He has a 50.00 pack-year smoking history. He has never used smokeless tobacco. He reports current alcohol use of about 4.2 oz of alcohol per week. He reports that he does not use drugs.    Allergies  No Known Allergies    Medications  Prior to Admission Medications   Prescriptions Last Dose Informant Patient Reported? Taking?   Cholecalciferol (VITAMIN D3) 1000 units Cap   Yes No   Sig: Take 1,000 Units by mouth 2 Times a Day.   Cyanocobalamin (VITAMIN B-12) 5000 MCG SL Tab   Yes No   Sig: Place 5,000 mcg under tongue every evening.   Diclofenac Sodium 1 % Gel   Yes No   Sig: Apply  to skin as directed.    albuterol 108 (90 Base) MCG/ACT Aero Soln inhalation aerosol   No No   Sig: Inhale 2 Puffs by mouth every 6 hours as needed for Shortness of Breath.   clobetasol (TEMOVATE) 0.05 % Ointment   Yes No   Sig: Apply  to affected area(s) 2 times a day.   fluocinonide (LIDEX) 0.05 % Ointment   Yes No   Sig: Apply  to affected area(s) 2 times a day.   losartan (COZAAR) 50 MG Tab   No No   Sig: Take 1 Tab by mouth every day.   magnesium oxide (MAG-OX) 400 MG Tab   Yes No   Sig: Take 420 mg by mouth every day.   methocarbamol (ROBAXIN) 500 MG Tab   Yes No   Sig: Take 500 mg by mouth every bedtime.   metoprolol SR (TOPROL XL) 25 MG TABLET SR 24 HR   No No   Sig: Take 1 Tab by mouth every day.   nitroglycerin (NITROSTAT) 0.4 MG SL Tab   Yes No   Sig: Place 0.4 mg under tongue every 5 minutes as needed for Chest Pain.   omeprazole (PRILOSEC) 20 MG delayed-release capsule   Yes No   Sig: Take 20 mg by mouth 2 times a day.   pravastatin (PRAVACHOL) 80 MG tablet   Yes No   Sig: Take 40 mg by mouth every bedtime.   spironolactone (ALDACTONE) 25 MG Tab   Yes No   Sig: Take 25 mg by mouth every morning.   torsemide (DEMADEX) 20 MG Tab   No No   Sig: Take 1 Tab by mouth 1 time daily as needed.   Patient not taking: Reported on 3/5/2019   tramadol (ULTRAM) 50 MG Tab   Yes No   Sig: Take 50 mg by mouth every 8 hours as needed (for pain).   urea 10 % lotion   Yes No   Sig: Apply  to affected area(s).      Facility-Administered Medications: None       Physical Exam  Temp:  [36.2 °C (97.1 °F)-36.5 °C (97.7 °F)] 36.2 °C (97.1 °F)  Pulse:  [60-92] 92  Resp:  [14-21] 18  BP: (117-130)/(56-65) 122/65  SpO2:  [98 %-100 %] 99 %    Physical Exam  Vitals signs and nursing note reviewed.   Constitutional:       General: He is not in acute distress.     Appearance: Normal appearance. He is ill-appearing and diaphoretic. He is not toxic-appearing.   HENT:      Head: Normocephalic and atraumatic.      Nose: No congestion or rhinorrhea.       Mouth/Throat:      Pharynx: No oropharyngeal exudate or posterior oropharyngeal erythema.   Eyes:      General: No scleral icterus.  Neck:      Musculoskeletal: No neck rigidity or muscular tenderness.      Vascular: No carotid bruit.      Comments: Elevated JVD  Cardiovascular:      Rate and Rhythm: Normal rate and regular rhythm.      Pulses: Normal pulses.      Heart sounds: Murmur present. No friction rub. No gallop.    Pulmonary:      Effort: Pulmonary effort is normal. No respiratory distress.      Breath sounds: No stridor. Rales present. No wheezing or rhonchi.   Abdominal:      General: Abdomen is flat. There is no distension.      Palpations: There is no mass.      Tenderness: There is no abdominal tenderness. There is no left CVA tenderness, guarding or rebound.      Hernia: No hernia is present.   Musculoskeletal: Normal range of motion.         General: No swelling.      Right lower leg: No edema.      Left lower leg: No edema.   Lymphadenopathy:      Cervical: No cervical adenopathy.   Skin:     General: Skin is warm.      Capillary Refill: Capillary refill takes more than 3 seconds.      Coloration: Skin is not jaundiced or pale.      Findings: No bruising or erythema.   Neurological:      Mental Status: He is alert.         Laboratory:  Recent Labs     03/22/20  1520   WBC 9.4   RBC 3.49*   HEMOGLOBIN 11.2*   HEMATOCRIT 35.0*   .3*   MCH 32.1   MCHC 32.0*   RDW 48.2   PLATELETCT 202   MPV 10.6     Recent Labs     03/22/20  1520   SODIUM 137   POTASSIUM 3.9   CHLORIDE 102   CO2 21   GLUCOSE 144*   BUN 58*   CREATININE 2.08*   CALCIUM 9.3     Recent Labs     03/22/20  1520   ALTSGPT 26   ASTSGOT 85*   ALKPHOSPHAT 83   TBILIRUBIN 0.5   GLUCOSE 144*     Recent Labs     03/22/20  1520   APTT 27.3   INR 1.20*     Recent Labs     03/22/20  1520   NTPROBNP 6942*         Recent Labs     03/22/20  1520 03/22/20  1809   TROPONINT 1193* 967*       Urinalysis:    No results found     Imaging:  CT-HEAD W/O    Final Result      No acute intracranial abnormality is identified.      Atrophy      There are periventricular and subcortical white matter changes present.  This finding is nonspecific and could be from previous small vessel ischemia, demyelination, or gliosis.         DX-CHEST-PORTABLE (1 VIEW)   Final Result      Cardiomegaly.      Pulmonary interstitial prominence likely represents interstitial edema. Pneumonitis not excluded.      Atherosclerotic plaque.         EC-ECHOCARDIOGRAM COMPLETE W/O CONT    (Results Pending)   CL-LEFT HEART CATHETERIZATION WITH POSSIBLE INTERVENTION    (Results Pending)         Assessment/Plan:  I anticipate this patient will require at least two midnights for appropriate medical management, necessitating inpatient admission.    * NSTEMI (non-ST elevated myocardial infarction) ([facility])  Assessment & Plan  Patient will be admitted to the telemetry unit with close cardiac monitoring, serial EKG and troponin  Patient has been given full dose of aspirin and is started on IV heparin, monitor APTT  I will start the patient on metoprolol 25 mg and atorvastatin 40 mg daily  Check 2D echo, lipid panel, TSH and hemoglobin A1c  Nitro and morphine when necessary for chest pain  Cardiology has been consulted-recommend to load with Plavix  Keep n.p.o. after midnight for possible angiogram      Acute on chronic systolic heart failure (HCC)  Assessment & Plan  Decompensated  OMT medications including spironolactone, Demadex, metoprolol, losartan    COPD (chronic obstructive pulmonary disease) ([facility])- (present on admission)  Assessment & Plan  Not in exacerbation  Continue home inhalers    Acute respiratory failure with hypoxia ([facility])  Assessment & Plan  On 2 L of O2 above baseline  Likely secondary to acute pulmonary edema  ISS and ambulation    Anemia  Assessment & Plan  Unknown etiology   check B12, folate, iron panel    Acute renal failure (ARF) ([facility])  Assessment & Plan  Unknown etiology at this  point  Monitor BMP and assess response  Avoid IV contrast/nephrotoxins/NSAIDs  Dose adjust meds for decreased GFR  Ordered urine electrolytes  Hold Demadex, losartan and spironolactone      Essential hypertension- (present on admission)  Assessment & Plan  Uncontrolled  Continue current home medications  IV as needed medications have been ordered        VTE prophylaxis: heparin    The patient is critically ill, with high chance of deterioration into cardiogenic shock and eventually death if left untreated with NSTEMI. The care that has been undertaken is medically  complex starting heparin, troponin greater than thousand, consulting cardiology for cardiac catheterization. I spent 55 minutes of critical care time, including managing medical   issues, coordination of care, not including doing procedures, with no overlap in critical  care time.

## 2020-03-22 NOTE — CONSULTS
Cardiology Initial Consult Note    Date of note:    3/22/2020      Consulting Physician: Jarrod Ruiz M.D.    Patient ID:    Name:   Kyler Donato     YOB: 1932  Age:   87 y.o.  male   MRN:   [id number]      Reason for Consultation: chest pain    HPI:  Kyler Donato is a 87 y.o.-year-old male with a history of CAD, chronic systolic heart failure secondary to ischemic cardiomyopathy, hypertension, dyslipidemia, and PSVT who presents with chest pain.     He was last seen in our cardiology clinic by my colleague Dr. Mccormcak on 9/3/2019 and was stable at that time.     Yesterday evening while sleeping he had acute onset of severe substernal chest pressure associated with nausea and shortness of breath. This improved after two nitroglycerin taken 7 minutes apart but did not completely resolve. He was going to take a third nitro but checked his blood pressure and said it was 60/40 so he just laid down and eventually went back to sleep. This morning he work up without chest pain, but did notice sudden acute onset of dizziness, pre-syncope, headache and neck pain. He was so diffusely weak that he had his wife call 911.     In the ED his troponin was 1193, and thus cardiology was consulted.     He continues to have neck pain. Of note, he has not been on aspirin for the last year due to bruising. He also has been taken his prn torsemide recently, two doses 2 days ago and another dose yesterday.     At baseline he can walk around 1 block without stopping, limited primarily by MSK complaints.      ROS  Constitution: Negative for chills, fever and night sweats.   HENT: Negative for nosebleeds.    Eyes: Negative for vision loss in left eye and vision loss in right eye.   Respiratory: Negative for hemoptysis.    Gastrointestinal: Negative for hematemesis, hematochezia and melena.   Genitourinary: Negative for hematuria.   Neurological: Negative for focal weakness, numbness and paresthesias.      All others  reviewed and negative.      Past Medical History:   Diagnosis Date   • Acute anterolateral myocardial infarction (HCC) 1995   • CAD (coronary artery disease) 1995    PCI/stent to the LAD. April 2017: MPI with scar in anterior/inferior wall, no ischemia, LVEF 30%. Echocardiogram with fixed defects in anterior wall and septal inferior wall, no ischemia, LVEF 40%. July 2017: Coronary angiogram with patent LAD stent, diffuse nonobstructive lesions in RCA, 40% mid RCA, 50% mid circumflex.   • Hyperlipidemia    • Hypertension    • PSVT (paroxysmal supraventricular tachycardia) ([facility])    • Shoulder pain        Past Surgical History:   Procedure Laterality Date   • ANGIOPLASTY  1995    stents   • ELBOWPLASTY      ulnar nerve transposition   • HAND SURGERY     • SHOULDER ORIF         Current Outpatient Medications   Medication Sig Dispense Refill   • losartan (COZAAR) 50 MG Tab Take 1 Tab by mouth every day. 90 Tab 3   • torsemide (DEMADEX) 20 MG Tab Take 1 Tab by mouth 1 time daily as needed. (Patient not taking: Reported on 3/5/2019) 30 Tab 11   • nitroglycerin (NITROSTAT) 0.4 MG SL Tab Place 0.4 mg under tongue every 5 minutes as needed for Chest Pain.     • clobetasol (TEMOVATE) 0.05 % Ointment Apply  to affected area(s) 2 times a day.     • Diclofenac Sodium 1 % Gel Apply  to skin as directed.     • fluocinonide (LIDEX) 0.05 % Ointment Apply  to affected area(s) 2 times a day.     • magnesium oxide (MAG-OX) 400 MG Tab Take 420 mg by mouth every day.     • urea 10 % lotion Apply  to affected area(s).     • metoprolol SR (TOPROL XL) 25 MG TABLET SR 24 HR Take 1 Tab by mouth every day. 30 Tab 11   • pravastatin (PRAVACHOL) 80 MG tablet Take 40 mg by mouth every bedtime.     • Cyanocobalamin (VITAMIN B-12) 5000 MCG SL Tab Place 5,000 mcg under tongue every evening.     • methocarbamol (ROBAXIN) 500 MG Tab Take 500 mg by mouth every bedtime.     • albuterol 108 (90 Base) MCG/ACT Aero Soln inhalation aerosol Inhale 2 Puffs by  mouth every 6 hours as needed for Shortness of Breath. 8.5 g 0   • Cholecalciferol (VITAMIN D3) 1000 units Cap Take 1,000 Units by mouth 2 Times a Day.     • omeprazole (PRILOSEC) 20 MG delayed-release capsule Take 20 mg by mouth 2 times a day.     • spironolactone (ALDACTONE) 25 MG Tab Take 25 mg by mouth every morning.     • tramadol (ULTRAM) 50 MG Tab Take 50 mg by mouth every 8 hours as needed (for pain).           No Known Allergies      Family History   Problem Relation Age of Onset   • Heart Disease Father    • Arthritis Mother    • Breast Cancer Sister    • Prostate cancer Brother          Social History     Socioeconomic History   • Marital status:      Spouse name: Not on file   • Number of children: Not on file   • Years of education: Not on file   • Highest education level: Not on file   Occupational History   • Not on file   Social Needs   • Financial resource strain: Not on file   • Food insecurity     Worry: Not on file     Inability: Not on file   • Transportation needs     Medical: Not on file     Non-medical: Not on file   Tobacco Use   • Smoking status: Former Smoker     Packs/day: 1.00     Years: 50.00     Pack years: 50.00     Last attempt to quit: 1995     Years since quittin.5   • Smokeless tobacco: Never Used   • Tobacco comment: up to 3 PPD per day at times   Substance and Sexual Activity   • Alcohol use: Yes     Alcohol/week: 4.2 oz     Types: 7 Cans of beer per week   • Drug use: No   • Sexual activity: Not on file   Lifestyle   • Physical activity     Days per week: Not on file     Minutes per session: Not on file   • Stress: Not on file   Relationships   • Social connections     Talks on phone: Not on file     Gets together: Not on file     Attends Sabianism service: Not on file     Active member of club or organization: Not on file     Attends meetings of clubs or organizations: Not on file     Relationship status: Not on file   • Intimate partner violence     Fear of  "current or ex partner: Not on file     Emotionally abused: Not on file     Physically abused: Not on file     Forced sexual activity: Not on file   Other Topics Concern   • Not on file   Social History Narrative   • Not on file         Physical Exam  Body mass index is 19.53 kg/m².  /56   Pulse 64   Temp 36.5 °C (97.7 °F) (Temporal)   Resp 19   Ht 1.803 m (5' 11\")   Wt 63.5 kg (140 lb)   SpO2 99%   Vitals:    03/22/20 1515 03/22/20 1518 03/22/20 1551 03/22/20 1601   BP:   130/63 117/56   Pulse:   67 64   Resp:   (!) 21 19   Temp:  36.5 °C (97.7 °F)     TempSrc:  Temporal     SpO2:   98% 99%   Weight: 63.5 kg (140 lb)      Height: 1.803 m (5' 11\")        Oxygen Therapy:  Pulse Oximetry: 99 %    General: No apparent distress  Eyes: nl conjunctiva  ENT: OP clear  Neck: JVP 6-7 cm H2O, no carotid bruits  Lungs: normal respiratory effort, CTAB  Heart: RRR, 2/6 systolic murmur at LUSB, no rubs or gallops, no edema bilateral lower extremities. No LV/RV heave on cardiac palpatation. 2+ bilateral radial pulses.  2+ bilateral dp pulses.   Abdomen: soft, non tender, non distended, no masses, normal bowel sounds.  No HSM.  Extremities/MSK: no clubbing, no cyanosis  Neurological: No focal sensory deficits  Psychiatric: Appropriate affect, A/O x 3  Skin: Warm extremities        Labs (personally reviewed and notable for):   Creatinine 1.6      Cardiac Imaging and Procedures Review:    EKG dated 3/22/2020: My personal interpretation is NSR, LAFB, first degree AV block, lateral infarct, non-specific st changes.     Echo dated 11/20/2018:   Echocardiography Laboratory     CONCLUSIONS  Severely reduced left ventricular systolic function.  Left ventricular ejection fraction is visually estimated to be 25-30%.  Global hypokinesis with regional variation.  Aortic sclerosis without stenosis.  Unable to estimate pulmonary artery pressure due to an inadequate   tricuspid regurgitant jet.  Mild mitral regurgitation.  Compared to " the report of the study done 4/7/2017  there has been   worsening of left ventricular ejection fraction.     Nuclear Perfusion Imaging (4/2017):   Myocardial Perfusion   Report   NUCLEAR IMAGING INTERPRETATION   Large sized, nonreversible, decreased uptake of severe severity in the apex    (LAD), anterior (LAD), apical septal (LAD), apical inferior and mid    anteroseptal (LAD) segments during post stress images suggestive of scar.  No  evidence of ischemia.   Enlarged ventricle with global hypokinesis and likely akinesis of the    anteroseptal and anterior walls (difficult to determine with severely    decrease radiotracer uptake). Measured ejection fraction is 30%.   ECG INTERPRETATION   Negative stress ECG for ischemia.    [facility] 7/21/2017:  Impression:  Pain stent in proximal LAD without significant in-stent stenosis.  Nonobstructive stenosis mid circumflex.  Diffuse plaquing in proximal to mid RCA with 40% stenosis at worst point.  Global hypokinesis of the left ventricle EF 36%  Significant coronary calcification.      Radiology test Review:  CXR:   IMPRESSION:     Cardiomegaly.     Pulmonary interstitial prominence likely represents interstitial edema. Pneumonitis not excluded.     Atherosclerotic plaque.     CT head:  IMPRESSION:     No acute intracranial abnormality is identified.     Atrophy     There are periventricular and subcortical white matter changes present.  This finding is nonspecific and could be from previous small vessel ischemia, demyelination, or gliosis.      Impression and Medical Decision Making:  # NSTEMI while off aspirin, happened last night and might already be completed infarct, I did discuss with him and he would like procedures and aggressive measures to prolong his life.   # Pre-syncope, concern for ventricular arrhythmia given ischemic cardiomyopathy and acute infarction.   # CAD s/p large anterior MI in 1995 with LAD stent placed  # Chronic systolic heart failure secondary to  ischemic cardiomyopathy. NYHA class II, stage C at baseline with stable chronic angina previously.   # Hypertension  # Dyslipidemia  # PSVT  # [facility]  # Acute on chronic renal insufficiency.   # DNR/DNI noted.     Recommendations:  # echo  # start aspirin, plavix, heparin gtt, head CT reviewed and is negative, but watch neuro status closely.   # NPO for cath potentially tomorrow depending on echo findings, extent of infarction. Per my conversation with patient today he is very interested in procedures to prolong his life.   # statin, will intensify to lipitor  # restart home meds, uptitrate as tolerated      Thank you for allowing me to participate in the care of this patient, Cardiology will continue to follow.  Please contact me with any questions.      Antwon Deshpande MD  Cardiologist, [facility] [place]   [phone]

## 2020-03-22 NOTE — ED TRIAGE NOTES
Pt. States chest pain last night, pt. States headache and shortness of breath at this time. Pt. Denies trauma. Pt. Has hX of MI and stent placement.

## 2020-03-23 ENCOUNTER — APPOINTMENT (OUTPATIENT)
Dept: CARDIOLOGY | Facility: MEDICAL CENTER | Age: 85
DRG: 246 | End: 2020-03-23
Attending: HOSPITALIST
Payer: MEDICARE

## 2020-03-23 ENCOUNTER — APPOINTMENT (OUTPATIENT)
Dept: CARDIOLOGY | Facility: MEDICAL CENTER | Age: 85
DRG: 246 | End: 2020-03-23
Attending: INTERNAL MEDICINE
Payer: MEDICARE

## 2020-03-23 LAB
ALBUMIN SERPL BCP-MCNC: 4.2 G/DL (ref 3.2–4.9)
ALBUMIN/GLOB SERPL: 1.7 G/DL
ALP SERPL-CCNC: 77 U/L (ref 30–99)
ALT SERPL-CCNC: 30 U/L (ref 2–50)
ANION GAP SERPL CALC-SCNC: 13 MMOL/L (ref 7–16)
APTT PPP: 168.6 SEC (ref 24.7–36)
APTT PPP: 169.3 SEC (ref 24.7–36)
APTT PPP: 80.8 SEC (ref 24.7–36)
AST SERPL-CCNC: 143 U/L (ref 12–45)
BASOPHILS # BLD AUTO: 0.1 % (ref 0–1.8)
BASOPHILS # BLD: 0.01 K/UL (ref 0–0.12)
BILIRUB SERPL-MCNC: 0.6 MG/DL (ref 0.1–1.5)
BUN SERPL-MCNC: 51 MG/DL (ref 8–22)
CALCIUM SERPL-MCNC: 9.3 MG/DL (ref 8.5–10.5)
CHLORIDE SERPL-SCNC: 103 MMOL/L (ref 96–112)
CHLORIDE UR-SCNC: <20 MMOL/L
CHOLEST SERPL-MCNC: 110 MG/DL (ref 100–199)
CO2 SERPL-SCNC: 21 MMOL/L (ref 20–33)
CREAT SERPL-MCNC: 1.74 MG/DL (ref 0.5–1.4)
CREAT UR-MCNC: 89.54 MG/DL
EKG IMPRESSION: NORMAL
EOSINOPHIL # BLD AUTO: 0 K/UL (ref 0–0.51)
EOSINOPHIL NFR BLD: 0 % (ref 0–6.9)
ERYTHROCYTE [DISTWIDTH] IN BLOOD BY AUTOMATED COUNT: 46.5 FL (ref 35.9–50)
EST. AVERAGE GLUCOSE BLD GHB EST-MCNC: 111 MG/DL
FERRITIN SERPL-MCNC: 427 NG/ML (ref 22–322)
FOLATE SERPL-MCNC: 15.9 NG/ML
GLOBULIN SER CALC-MCNC: 2.5 G/DL (ref 1.9–3.5)
GLUCOSE SERPL-MCNC: 177 MG/DL (ref 65–99)
HBA1C MFR BLD: 5.5 % (ref 0–5.6)
HCT VFR BLD AUTO: 30.9 % (ref 42–52)
HDLC SERPL-MCNC: 60 MG/DL
HGB BLD-MCNC: 10.3 G/DL (ref 14–18)
IMM GRANULOCYTES # BLD AUTO: 0.03 K/UL (ref 0–0.11)
IMM GRANULOCYTES NFR BLD AUTO: 0.4 % (ref 0–0.9)
IRON SATN MFR SERPL: 16 % (ref 15–55)
IRON SERPL-MCNC: 40 UG/DL (ref 50–180)
LACTATE BLD-SCNC: 1.5 MMOL/L (ref 0.5–2)
LDLC SERPL CALC-MCNC: 42 MG/DL
LV EJECT FRACT  99904: 25
LV EJECT FRACT MOD 2C 99903: 38.17
LV EJECT FRACT MOD 4C 99902: 30.33
LV EJECT FRACT MOD BP 99901: 33.46
LYMPHOCYTES # BLD AUTO: 0.6 K/UL (ref 1–4.8)
LYMPHOCYTES NFR BLD: 8.4 % (ref 22–41)
MAGNESIUM SERPL-MCNC: 2.3 MG/DL (ref 1.5–2.5)
MCH RBC QN AUTO: 32.7 PG (ref 27–33)
MCHC RBC AUTO-ENTMCNC: 33.3 G/DL (ref 33.7–35.3)
MCV RBC AUTO: 98.1 FL (ref 81.4–97.8)
MONOCYTES # BLD AUTO: 0.45 K/UL (ref 0–0.85)
MONOCYTES NFR BLD AUTO: 6.3 % (ref 0–13.4)
NEUTROPHILS # BLD AUTO: 6.06 K/UL (ref 1.82–7.42)
NEUTROPHILS NFR BLD: 84.8 % (ref 44–72)
NRBC # BLD AUTO: 0 K/UL
NRBC BLD-RTO: 0 /100 WBC
PLATELET # BLD AUTO: 183 K/UL (ref 164–446)
PMV BLD AUTO: 10.6 FL (ref 9–12.9)
POTASSIUM SERPL-SCNC: 4 MMOL/L (ref 3.6–5.5)
POTASSIUM UR-SCNC: 49 MMOL/L
PROT SERPL-MCNC: 6.7 G/DL (ref 6–8.2)
PROT UR-MCNC: 13 MG/DL (ref 0–15)
RBC # BLD AUTO: 3.15 M/UL (ref 4.7–6.1)
SODIUM SERPL-SCNC: 137 MMOL/L (ref 135–145)
SODIUM UR-SCNC: 26 MMOL/L
TIBC SERPL-MCNC: 246 UG/DL (ref 250–450)
TRIGL SERPL-MCNC: 40 MG/DL (ref 0–149)
UIBC SERPL-MCNC: 206 UG/DL (ref 110–370)
VIT B12 SERPL-MCNC: 1506 PG/ML (ref 211–911)
WBC # BLD AUTO: 7.2 K/UL (ref 4.8–10.8)

## 2020-03-23 PROCEDURE — A9270 NON-COVERED ITEM OR SERVICE: HCPCS

## 2020-03-23 PROCEDURE — B2111ZZ FLUOROSCOPY OF MULTIPLE CORONARY ARTERIES USING LOW OSMOLAR CONTRAST: ICD-10-PCS | Performed by: INTERNAL MEDICINE

## 2020-03-23 PROCEDURE — 700102 HCHG RX REV CODE 250 W/ 637 OVERRIDE(OP): Performed by: HOSPITALIST

## 2020-03-23 PROCEDURE — 700111 HCHG RX REV CODE 636 W/ 250 OVERRIDE (IP)

## 2020-03-23 PROCEDURE — 700111 HCHG RX REV CODE 636 W/ 250 OVERRIDE (IP): Performed by: INTERNAL MEDICINE

## 2020-03-23 PROCEDURE — 700117 HCHG RX CONTRAST REV CODE 255: Performed by: INTERNAL MEDICINE

## 2020-03-23 PROCEDURE — 4A023N7 MEASUREMENT OF CARDIAC SAMPLING AND PRESSURE, LEFT HEART, PERCUTANEOUS APPROACH: ICD-10-PCS | Performed by: INTERNAL MEDICINE

## 2020-03-23 PROCEDURE — 99233 SBSQ HOSP IP/OBS HIGH 50: CPT | Performed by: INTERNAL MEDICINE

## 2020-03-23 PROCEDURE — A9270 NON-COVERED ITEM OR SERVICE: HCPCS | Performed by: HOSPITALIST

## 2020-03-23 PROCEDURE — 93458 L HRT ARTERY/VENTRICLE ANGIO: CPT | Mod: 26,59 | Performed by: INTERNAL MEDICINE

## 2020-03-23 PROCEDURE — 93306 TTE W/DOPPLER COMPLETE: CPT

## 2020-03-23 PROCEDURE — 770020 HCHG ROOM/CARE - TELE (206)

## 2020-03-23 PROCEDURE — 83605 ASSAY OF LACTIC ACID: CPT

## 2020-03-23 PROCEDURE — 700105 HCHG RX REV CODE 258: Performed by: INTERNAL MEDICINE

## 2020-03-23 PROCEDURE — 027034Z DILATION OF CORONARY ARTERY, ONE ARTERY WITH DRUG-ELUTING INTRALUMINAL DEVICE, PERCUTANEOUS APPROACH: ICD-10-PCS | Performed by: INTERNAL MEDICINE

## 2020-03-23 PROCEDURE — 82746 ASSAY OF FOLIC ACID SERUM: CPT

## 2020-03-23 PROCEDURE — 700111 HCHG RX REV CODE 636 W/ 250 OVERRIDE (IP): Performed by: HOSPITALIST

## 2020-03-23 PROCEDURE — 93005 ELECTROCARDIOGRAM TRACING: CPT | Performed by: INTERNAL MEDICINE

## 2020-03-23 PROCEDURE — 99153 MOD SED SAME PHYS/QHP EA: CPT

## 2020-03-23 PROCEDURE — 700102 HCHG RX REV CODE 250 W/ 637 OVERRIDE(OP)

## 2020-03-23 PROCEDURE — 700101 HCHG RX REV CODE 250

## 2020-03-23 PROCEDURE — 80061 LIPID PANEL: CPT

## 2020-03-23 PROCEDURE — 83540 ASSAY OF IRON: CPT

## 2020-03-23 PROCEDURE — 93010 ELECTROCARDIOGRAM REPORT: CPT | Mod: 77 | Performed by: INTERNAL MEDICINE

## 2020-03-23 PROCEDURE — 700117 HCHG RX CONTRAST REV CODE 255: Performed by: HOSPITALIST

## 2020-03-23 PROCEDURE — A9270 NON-COVERED ITEM OR SERVICE: HCPCS | Performed by: INTERNAL MEDICINE

## 2020-03-23 PROCEDURE — 92928 PRQ TCAT PLMT NTRAC ST 1 LES: CPT | Mod: LC | Performed by: INTERNAL MEDICINE

## 2020-03-23 PROCEDURE — 83735 ASSAY OF MAGNESIUM: CPT

## 2020-03-23 PROCEDURE — 82607 VITAMIN B-12: CPT

## 2020-03-23 PROCEDURE — 92978 ENDOLUMINL IVUS OCT C 1ST: CPT | Mod: 26,LM | Performed by: INTERNAL MEDICINE

## 2020-03-23 PROCEDURE — 93306 TTE W/DOPPLER COMPLETE: CPT | Mod: 26 | Performed by: INTERNAL MEDICINE

## 2020-03-23 PROCEDURE — 99152 MOD SED SAME PHYS/QHP 5/>YRS: CPT | Performed by: INTERNAL MEDICINE

## 2020-03-23 PROCEDURE — B240ZZ3 ULTRASONOGRAPHY OF SINGLE CORONARY ARTERY, INTRAVASCULAR: ICD-10-PCS | Performed by: INTERNAL MEDICINE

## 2020-03-23 PROCEDURE — 85025 COMPLETE CBC W/AUTO DIFF WBC: CPT

## 2020-03-23 PROCEDURE — 99233 SBSQ HOSP IP/OBS HIGH 50: CPT | Mod: 25 | Performed by: INTERNAL MEDICINE

## 2020-03-23 PROCEDURE — 83036 HEMOGLOBIN GLYCOSYLATED A1C: CPT

## 2020-03-23 PROCEDURE — 700102 HCHG RX REV CODE 250 W/ 637 OVERRIDE(OP): Performed by: INTERNAL MEDICINE

## 2020-03-23 PROCEDURE — 80053 COMPREHEN METABOLIC PANEL: CPT

## 2020-03-23 PROCEDURE — 36415 COLL VENOUS BLD VENIPUNCTURE: CPT

## 2020-03-23 PROCEDURE — 82728 ASSAY OF FERRITIN: CPT

## 2020-03-23 PROCEDURE — 83550 IRON BINDING TEST: CPT

## 2020-03-23 PROCEDURE — 85730 THROMBOPLASTIN TIME PARTIAL: CPT

## 2020-03-23 RX ORDER — LIDOCAINE HYDROCHLORIDE 20 MG/ML
INJECTION, SOLUTION INFILTRATION; PERINEURAL
Status: COMPLETED
Start: 2020-03-23 | End: 2020-03-23

## 2020-03-23 RX ORDER — CLOPIDOGREL BISULFATE 75 MG/1
300 TABLET ORAL ONCE
Status: DISCONTINUED | OUTPATIENT
Start: 2020-03-23 | End: 2020-03-23

## 2020-03-23 RX ORDER — PROCHLORPERAZINE EDISYLATE 5 MG/ML
10 INJECTION INTRAMUSCULAR; INTRAVENOUS ONCE
Status: COMPLETED | OUTPATIENT
Start: 2020-03-23 | End: 2020-03-23

## 2020-03-23 RX ORDER — ONDANSETRON 2 MG/ML
INJECTION INTRAMUSCULAR; INTRAVENOUS
Status: DISPENSED
Start: 2020-03-23 | End: 2020-03-24

## 2020-03-23 RX ORDER — HEPARIN SODIUM,PORCINE 1000/ML
VIAL (ML) INJECTION
Status: COMPLETED
Start: 2020-03-23 | End: 2020-03-23

## 2020-03-23 RX ORDER — BIVALIRUDIN 250 MG/5ML
INJECTION, POWDER, LYOPHILIZED, FOR SOLUTION INTRAVENOUS
Status: COMPLETED
Start: 2020-03-23 | End: 2020-03-23

## 2020-03-23 RX ORDER — CLOPIDOGREL 300 MG/1
TABLET, FILM COATED ORAL
Status: COMPLETED
Start: 2020-03-23 | End: 2020-03-23

## 2020-03-23 RX ORDER — SODIUM CHLORIDE 9 MG/ML
INJECTION, SOLUTION INTRAVENOUS CONTINUOUS
Status: DISCONTINUED | OUTPATIENT
Start: 2020-03-23 | End: 2020-03-24

## 2020-03-23 RX ORDER — METOPROLOL SUCCINATE 50 MG/1
50 TABLET, EXTENDED RELEASE ORAL DAILY
Status: DISCONTINUED | OUTPATIENT
Start: 2020-03-24 | End: 2020-03-24

## 2020-03-23 RX ORDER — SPIRONOLACTONE 25 MG/1
25 TABLET ORAL
Status: DISCONTINUED | OUTPATIENT
Start: 2020-03-23 | End: 2020-03-24

## 2020-03-23 RX ORDER — CLOPIDOGREL BISULFATE 75 MG/1
75 TABLET ORAL DAILY
Status: DISCONTINUED | OUTPATIENT
Start: 2020-03-24 | End: 2020-03-27 | Stop reason: HOSPADM

## 2020-03-23 RX ORDER — SODIUM CHLORIDE 9 MG/ML
INJECTION, SOLUTION INTRAVENOUS CONTINUOUS
Status: DISCONTINUED | OUTPATIENT
Start: 2020-03-23 | End: 2020-03-23

## 2020-03-23 RX ORDER — HEPARIN SODIUM 200 [USP'U]/100ML
INJECTION, SOLUTION INTRAVENOUS
Status: COMPLETED
Start: 2020-03-23 | End: 2020-03-23

## 2020-03-23 RX ORDER — PHENYLEPHRINE HCL IN 0.9% NACL 0.5 MG/5ML
SYRINGE (ML) INTRAVENOUS
Status: COMPLETED
Start: 2020-03-23 | End: 2020-03-23

## 2020-03-23 RX ORDER — VERAPAMIL HYDROCHLORIDE 2.5 MG/ML
INJECTION, SOLUTION INTRAVENOUS
Status: COMPLETED
Start: 2020-03-23 | End: 2020-03-23

## 2020-03-23 RX ORDER — MIDAZOLAM HYDROCHLORIDE 1 MG/ML
INJECTION INTRAMUSCULAR; INTRAVENOUS
Status: COMPLETED
Start: 2020-03-23 | End: 2020-03-23

## 2020-03-23 RX ADMIN — ONDANSETRON 2 MG: 2 INJECTION INTRAMUSCULAR; INTRAVENOUS at 05:07

## 2020-03-23 RX ADMIN — MELATONIN 1000 UNITS: at 17:27

## 2020-03-23 RX ADMIN — BIVALIRUDIN 1.75 MG/KG/HR: 250 INJECTION INTRACAVERNOUS at 13:12

## 2020-03-23 RX ADMIN — Medication 100 MCG: at 13:30

## 2020-03-23 RX ADMIN — SODIUM CHLORIDE: 9 INJECTION, SOLUTION INTRAVENOUS at 02:25

## 2020-03-23 RX ADMIN — SPIRONOLACTONE 25 MG: 25 TABLET ORAL at 17:28

## 2020-03-23 RX ADMIN — METOPROLOL SUCCINATE 25 MG: 25 TABLET, EXTENDED RELEASE ORAL at 06:13

## 2020-03-23 RX ADMIN — ONDANSETRON 4 MG: 2 INJECTION INTRAMUSCULAR; INTRAVENOUS at 14:10

## 2020-03-23 RX ADMIN — LOSARTAN POTASSIUM 50 MG: 50 TABLET, FILM COATED ORAL at 06:14

## 2020-03-23 RX ADMIN — MELATONIN 1000 UNITS: at 06:13

## 2020-03-23 RX ADMIN — SENNOSIDES AND DOCUSATE SODIUM 2 TABLET: 8.6; 5 TABLET ORAL at 17:28

## 2020-03-23 RX ADMIN — MORPHINE SULFATE 4 MG: 4 INJECTION INTRAVENOUS at 07:33

## 2020-03-23 RX ADMIN — PROCHLORPERAZINE EDISYLATE 10 MG: 5 INJECTION INTRAMUSCULAR; INTRAVENOUS at 16:06

## 2020-03-23 RX ADMIN — FENTANYL CITRATE 75 MCG: 50 INJECTION, SOLUTION INTRAMUSCULAR; INTRAVENOUS at 13:43

## 2020-03-23 RX ADMIN — HUMAN ALBUMIN MICROSPHERES AND PERFLUTREN 3 ML: 10; .22 INJECTION, SOLUTION INTRAVENOUS at 08:33

## 2020-03-23 RX ADMIN — OMEPRAZOLE 20 MG: 20 CAPSULE, DELAYED RELEASE ORAL at 06:14

## 2020-03-23 RX ADMIN — MIDAZOLAM HYDROCHLORIDE 1.5 MG: 1 INJECTION, SOLUTION INTRAMUSCULAR; INTRAVENOUS at 13:44

## 2020-03-23 RX ADMIN — Medication 400 MG: at 06:13

## 2020-03-23 RX ADMIN — ACETAMINOPHEN 650 MG: 325 TABLET, FILM COATED ORAL at 01:17

## 2020-03-23 RX ADMIN — CLOPIDOGREL BISULFATE 75 MG: 75 TABLET ORAL at 06:14

## 2020-03-23 RX ADMIN — HEPARIN SODIUM: 1000 INJECTION, SOLUTION INTRAVENOUS; SUBCUTANEOUS at 12:01

## 2020-03-23 RX ADMIN — MORPHINE SULFATE 4 MG: 4 INJECTION INTRAVENOUS at 10:14

## 2020-03-23 RX ADMIN — LIDOCAINE HYDROCHLORIDE: 20 INJECTION, SOLUTION INFILTRATION; PERINEURAL at 12:01

## 2020-03-23 RX ADMIN — MORPHINE SULFATE 4 MG: 4 INJECTION INTRAVENOUS at 15:37

## 2020-03-23 RX ADMIN — NITROGLYCERIN 10 ML: 20 INJECTION INTRAVENOUS at 12:01

## 2020-03-23 RX ADMIN — ASPIRIN 325 MG: 325 TABLET, FILM COATED ORAL at 06:13

## 2020-03-23 RX ADMIN — TRAMADOL HYDROCHLORIDE 50 MG: 50 TABLET, FILM COATED ORAL at 06:13

## 2020-03-23 RX ADMIN — HEPARIN SODIUM 2000 UNITS: 200 INJECTION, SOLUTION INTRAVENOUS at 14:06

## 2020-03-23 RX ADMIN — ONDANSETRON 4 MG: 2 INJECTION INTRAMUSCULAR; INTRAVENOUS at 00:56

## 2020-03-23 RX ADMIN — VERAPAMIL HYDROCHLORIDE 2.5 MG: 2.5 INJECTION, SOLUTION INTRAVENOUS at 12:01

## 2020-03-23 RX ADMIN — SODIUM CHLORIDE: 9 INJECTION, SOLUTION INTRAVENOUS at 18:38

## 2020-03-23 RX ADMIN — OMEPRAZOLE 20 MG: 20 CAPSULE, DELAYED RELEASE ORAL at 17:28

## 2020-03-23 RX ADMIN — CLOPIDOGREL BISULFATE 300 MG: 300 TABLET, FILM COATED ORAL at 14:06

## 2020-03-23 RX ADMIN — IOHEXOL 172 ML: 350 INJECTION, SOLUTION INTRAVENOUS at 14:07

## 2020-03-23 RX ADMIN — ATORVASTATIN CALCIUM 80 MG: 80 TABLET, FILM COATED ORAL at 17:27

## 2020-03-23 ASSESSMENT — ENCOUNTER SYMPTOMS
PALPITATIONS: 0
SPEECH CHANGE: 0
WEIGHT LOSS: 0
FOCAL WEAKNESS: 0
EYE PAIN: 0
HALLUCINATIONS: 0
BACK PAIN: 0
ORTHOPNEA: 0
NECK PAIN: 0
SPUTUM PRODUCTION: 0
ABDOMINAL PAIN: 0
CONSTIPATION: 0
DIARRHEA: 0
VOMITING: 0
SHORTNESS OF BREATH: 0
CHILLS: 0
TINGLING: 0
NAUSEA: 0
DOUBLE VISION: 0
HEADACHES: 0
MYALGIAS: 0
SENSORY CHANGE: 0
TREMORS: 0
COUGH: 0
DIZZINESS: 0
PHOTOPHOBIA: 0
FEVER: 0
BLURRED VISION: 0

## 2020-03-23 ASSESSMENT — COGNITIVE AND FUNCTIONAL STATUS - GENERAL
SUGGESTED CMS G CODE MODIFIER DAILY ACTIVITY: CH
DAILY ACTIVITIY SCORE: 24
SUGGESTED CMS G CODE MODIFIER MOBILITY: CH
MOBILITY SCORE: 24

## 2020-03-23 ASSESSMENT — LIFESTYLE VARIABLES
EVER FELT BAD OR GUILTY ABOUT YOUR DRINKING: NO
TOTAL SCORE: 0
ALCOHOL_USE: YES
DOES PATIENT WANT TO STOP DRINKING: NO
ON A TYPICAL DAY WHEN YOU DRINK ALCOHOL HOW MANY DRINKS DO YOU HAVE: 1
CONSUMPTION TOTAL: NEGATIVE
EVER HAD A DRINK FIRST THING IN THE MORNING TO STEADY YOUR NERVES TO GET RID OF A HANGOVER: NO
HAVE YOU EVER FELT YOU SHOULD CUT DOWN ON YOUR DRINKING: NO
TOTAL SCORE: 0
TOTAL SCORE: 0
AVERAGE NUMBER OF DAYS PER WEEK YOU HAVE A DRINK CONTAINING ALCOHOL: 7
HAVE PEOPLE ANNOYED YOU BY CRITICIZING YOUR DRINKING: NO
SUBSTANCE_ABUSE: 0
HOW MANY TIMES IN THE PAST YEAR HAVE YOU HAD 5 OR MORE DRINKS IN A DAY: 0

## 2020-03-23 ASSESSMENT — FIBROSIS 4 INDEX: FIB4 SCORE: 12.41

## 2020-03-23 NOTE — PROGRESS NOTES
2 RN Skin Check    2 RN skin check complete.   Devices in place: Nasal Cannula.  Skin assessed under devices: yes.  Confirmed pressure ulcers found on: N/A.  New potential pressure ulcers noted on reddened skin behind the ears. Wound consult placed No.  The following interventions in place Pillows, nasal cannula tubing changed to silicone tubing with ear foam protectors.

## 2020-03-23 NOTE — ASSESSMENT & PLAN NOTE
Unknown etiology at this point  Renal function has been improving.  Avoid nephrotoxins.  Continue monitor closely.  3/25- stable

## 2020-03-23 NOTE — ASSESSMENT & PLAN NOTE
Unknown etiology   Iron panel did not show any deficiency anemia.  Vitamin B12 and folic acid did not show deficiency

## 2020-03-23 NOTE — ED NOTES
Gave report to rudy CHATTERJEE. Pt being transported to [facility] 7 in stable condition, with acls nurse and blayne

## 2020-03-23 NOTE — ASSESSMENT & PLAN NOTE
On 2 L of O2   Current oxygen saturation is above 95%.  Titrate oxygen as tolerated.  Likely secondary to acute pulmonary edema  3/25- wean off oxygen

## 2020-03-23 NOTE — THERAPY
PT cardiac rehab eval order received. Currently trending trops and pending formal cardiac POC with potential for cardiac cath. Will defer PT eval until pt stabilized and cardiac interventions completed.

## 2020-03-23 NOTE — PROCEDURES
DATE OF SERVICE:  03/23/2020    PROCEDURE:  Cardiac catheterization and percutaneous coronary intervention.  A.  Left heart catheterization.  B.  Selective coronary angiography.  C.  Coronary stent implantation, mid circumflex artery 2.5x12 mm [place]   drug-eluting stent.  D.  Intravascular ultrasound  (IVUS) left main artery.  E.  Right femoral artery approach.  F.  Conscious sedation supervision.    PREPROCEDURE DIAGNOSES:  1.  Prior anterior myocardial infarction, 1995, [facility]  2.  Prior left anterior descending artery stent, 1995, [facility]  3.  Ischemic cardiomyopathy, ejection fraction of 25%.  4.  Non-ST elevation myocardial infarction.  5.  Chronic kidney disease.    POSTPROCEDURE DIAGNOSES:  1.  Mid circumflex artery eccentric 75-90% stenosis, mid 95% stenosis medial   branch of the posterior descending artery.  2.  Patent left anterior descending artery stent.  3.  Mildly elevated left ventricular end-diastolic pressure 18 mmHg.  4.  Noncritical ostial left main artery stenosis.    PHYSICIAN:  Luis Mccann MD    REFERRING PHYSICIAN:  Antwon Deshpande MD    COMPLICATIONS:  None.    DESCRIPTION OF PROCEDURE:  Prior to the procedure, the most recent coronary   angiograms on 07/21/2017 were reviewed.    After informed consent was obtained, the patient was brought to the cardiac   catheterization laboratory where he was prepped, draped, and anesthetized in   usual manner.    Using ultrasound guidance and modified Seldinger technique, a 4-Luxembourger x 10 cm   introducer sheath was inserted in the right femoral artery.    Next, a JL 4.5 left coronary catheter was required to selectively cannulate   the left coronary artery and left coronary angiograms were obtained in various   projections.    All subsequent catheter exchanges were done over a 0.035 J-tipped exchange   length guidewire.    Next, a 4-Luxembourger 3D right coronary catheter was inserted in the ostium of the   right coronary  artery and right coronary angiograms were obtained in various   projections.    Next, a 4-B[place]i JR 4.0 right coronary catheter was advanced across the aortic   valve into the left ventricle.  Baseline LVEDP, LV, and pullback aortic   pressures were obtained.    After reviewing the angiogram films and comparing it with the 2017 study, it was   elected to proceed with coronary intervention of the mid circumflex artery.    Next, Angiomax was given.    Next, the 4-B[place]raini introducer sheath was exchanged for a 6-Bahraini 23 mm   destination introducer sheath.    Next, a 6-B[place]raini extra backup 3.75 guiding catheter was inserted in the ostium   of the left coronary artery.  Notably, throughout the case, there was   ventricularization and dampening of the systemic aortic pressures and during   the coronary intervention of the circumflex artery the patient developed significant   asymptomatic hypotension requiring IV Brooks-Synephrine 100 mcg.    Next, a 0.014 Whisper wire was advanced into the distal circumflex artery.    Next, a 2.0x8 mm balloon catheter was inserted across the midvessel stenosis   and inflated up to 14 atmospheres.    Next, a 2.5x12 mm Gabe drug-eluting stent was placed across the midvessel   stenosis and deployed at 12 atmospheres.  Unfortunately, in removing the balloon,   the wire position was lost.  Multiple attempts at re-crossing the stent were   made using a Whisper wire, BMW wire and Runthrough wire, all of which were   unsuccessful.    Next, because of the hemodynamic instability with intubating the left main artery   an IVUS interrogation was performed of the left main artery using a 5-Bahraini Eagle   Eye platinum device.      At the end the procedure, catheters were removed.  A right femoral artery angiogram   was performed.  A Perclose device was attempted but could not be advanced into   the vessel and was removed. Subsequently a 6-B[place]raini Angio-Seal device was   successfully deployed forhemostasis.   The patient tolerated the procedure well.    CONSCIOUS SEDATION SUPERVISION:  I supervised and monitored the administration   of intravenous conscious sedation from 12:33 p.m. until 1:53 p.m.    MEDICATIONS:  1.  Lidocaine 2% subcutaneous.  2.  Versed 1.5 mg IV.  3.  Fentanyl 75 mcg IV.  4.  Angiomax IV bolus and infusion.  5.  Brooks-Synephrine 100 mcg IV.  6.  Plavix 300 mg p.o.    FINDINGS:  HEMODYNAMICS:  LEFT HEART PRESSURES:  1.  LVEDP 18 mmHg.  2.  Left ventricular systolic pressure 104 mmHg.  3.  Central aortic pressure systolic 105, diastolic 55, mean of 72 mmHg.    CORONARY ARTERIOGRAPHY:  1.  LEFT MAIN ARTERY:  Left main artery is a moderately large caliber vessel   with eccentric ostial stenosis of approximately 30%.  The left main artery   bifurcates to left anterior descending artery and circumflex artery.  2.  LEFT ANTERIOR DESCENDING ARTERY:  Left anterior descending artery gives   rise to normal complement of septal and diagonal branches and extends around   the apex.  The proximal left anterior descending artery has an estimated   relative 40% stenosis followed by what appears to be a stent that is patent.    The remainder of the vessel is patent with mild diffuse intimal atheromatous   disease.  3.  CIRCUMFLEX ARTERY:  The circumflex gives rise to 2 major marginal   branches.  The mid circumflex has an eccentric 75-90% hazy stenosis, which is   new compared to the 2017 study.  4.  RIGHT CORONARY ARTERY:  The right coronary artery gives rise to a   bifurcating posterior descending artery and a posterolateral system.  The   right coronary artery has a midvessel eccentric 30% stenosis.  The medial   branch of the posterior descending artery has a midvessel 95% stenosis.  This   vessel is small caliber.    INTRAVASCULAR ULTRASOUND (IVUS), left main artery demonstrates an   ostial minimal luminal area (MLA) of 8.0-9.3 square millimeters indicating a   nonsignificant obstructive stenosis..    POST-STENT  IMPLANTATION, mid circumflex artery with a 2.5x12 mm [place]   drug-eluting stent demonstrates 0% residual stenosis, no evidence of   dissection or thrombus and ROBBIE 3 antegrade flow.    PLAN:  Maximize optimal medical therapy for the patient's coronary artery   disease and systolic congestive heart failure, ischemic cardiomyopathy.       ____________________________________     MD ARNALDO LOPEZ / OFE    DD:  03/23/2020 14:31:53  DT:  03/23/2020 15:05:51    D#:  [id number]  Job#:  [id number]

## 2020-03-23 NOTE — ASSESSMENT & PLAN NOTE
He did receive heparin on admission   Stent placed 3/23. On plavix, statin,   3/25- no chest pain

## 2020-03-23 NOTE — DIETARY
"Nutrition services: Day 1 of admit.  Kyler Donato is a 87 y.o. male with admitting DX of NSTEMI  Consult received for MST 4 (weight loss >34 lb in >1 year)    Assessment:  Height: 180.3 cm (5' 11\")  Weight: 62.2 kg (137 lb 2 oz)  Body mass index is 19.13 kg/m²., BMI classification: Normal  Diet/Intake: NPO pending procedure    Evaluation:   1. Weight trend per chart review: 150 lb (11/17/18), 150 lb (3/5/19), 135 lb (9/3/19). 8.6% weight loss over 1 year (not significant, but worth noting). Weight appears stable last 6 months.   2. Negative for poor PO on nutrition admit screen  3. PMH: Heart attack, HLD, HTN, CAD  4. Labs: Glu 177, BUN 51 (trending down), Creat 1.74 (trending down)  5. Meds: Lipitor, Mag-Ox, Prilosec, Zofran (given today), Phentlephrine, Vit D    Malnutrition Risk: Does not meet criteria at this time    Recommendations/Plan:  1. Advance diet as medically able  2. RD to provide diet education prior to discharge pending pt availability  3. Encourage intake of meals  4. Document intake of all meals as % taken in ADL's to provide interdisciplinary communication across all shifts.   5. Monitor weight.  6. Nutrition rep will continue to see patient for ongoing meal and snack preferences.     RD following for diet advancement, diet education, and adequate PO intake        "

## 2020-03-23 NOTE — PROGRESS NOTES
Assumed care of patient at 0715, received bedside report from night shift RN. Bed is locked and in lowest position with call light within reach. Treaded socks in place. Patient updated on plan of care, no complaints or pain at this time. White board updated. Pt A&O4. Patients respiratory status is unlabored.Tele monitor in place and cardiac rhythm being monitored. All needs met at this time.

## 2020-03-23 NOTE — PROGRESS NOTES
[facility] Cardiology Follow-up Note    Date of Service:    3/23/2020      Name:   Kyler Donato     YOB: 1932  Age:   87 y.o.  male   MRN:   [id number]      Chief Complaint: CP    Primary Cardiologist:  Dr. Mccormack    HPI:  Mr Donato is an 87 y.o fellow with hx of CAD s/p large anterior MI in 1995 wtih stent to the LAD.  He has hx of ICMO with EF in the 25-35% range.   Some notes of COPD? with a 50 pack year hx.  He has hx of paroxysmal SVT.  Presented to [facility] on 3/22 after waking overnight with CP which did not resolve w 1 SL nitro.  Thereafter he noted hypotension BP 60/40, began feeling very weak and pre-syncopal.  His wife called 911.      On presentation troponin up:  1193, 967, 1885.  BNP 6942.  He was in SELWYN with Cr of 2.08 (baseline 1.3-1.5).  He was hypoxic with pulmonary edema.     Started on heparin ggt.  Updated echo pending.  It appears Dr. Deshpande had a lengthy discussion with patient 3/22 and pt wants to pursue all agressive procedures.    Interim Events:  Patient CP free overnight.   States he has some musculoskeletal aches and pains (chronic)  Denies shortness of breath  Straight caths himself at home.   Echo just completed at bedside.  They are getting him ready to go down for [facility].      ROS  Constitutional:  + fatigue.  Respiratory:  Denies shortness of breath, no cough.  Cardiovascular:  No chest pain.  no lower extremity edema.  Denies orthopnea or PND.  : denies polyuria, no dysuria.  GI:  Denies nausea/vomiting.  No abdominal distention.  Neuro:  Denies dizziness, syncope.  Hem/lymph: Denies easy bleeding/bruising.      All other review of systems reviewed and negative.    Past medical, surgical, social, and family history reviewed and unchanged from admission except as noted in assessment and plan.    Medications: Reviewed in MAR  Current Facility-Administered Medications   Medication Dose Frequency Provider Last Rate Last Dose   • lidocaine  (XYLOCAINE) 1 % injection 0.5 mL  0.5 mL Once PRN Antwon Deshpande M.D.       • NS infusion   Continuous Antwon Deshpande M.D. 75 mL/hr at 03/23/20 0225     • senna-docusate (PERICOLACE or SENOKOT S) 8.6-50 MG per tablet 2 Tab  2 Tab BID Tierra Atkinson M.D.        And   • polyethylene glycol/lytes (MIRALAX) PACKET 1 Packet  1 Packet QDAY PRN Tierra Atkinson M.D.        And   • magnesium hydroxide (MILK OF MAGNESIA) suspension 30 mL  30 mL QDAY PRN Tierra Atkinson M.D.        And   • bisacodyl (DULCOLAX) suppository 10 mg  10 mg QDAY PRN Tierra Atkinson M.D.       • acetaminophen (TYLENOL) tablet 650 mg  650 mg Q6HRS PRN Tierra Atkinson M.D.   650 mg at 03/23/20 0117   • ondansetron (ZOFRAN) syringe/vial injection 4 mg  4 mg Q4HRS PRN Tierra Atkinson M.D.   2 mg at 03/23/20 0507   • ondansetron (ZOFRAN ODT) dispertab 4 mg  4 mg Q4HRS PRN Tierra Atkinson M.D.       • aspirin (ASA) tablet 325 mg  325 mg DAILY Tierra Atkinson M.D.   325 mg at 03/23/20 0613    Or   • aspirin (ASA) chewable tab 324 mg  324 mg DAILY Tierra Atkinson M.D.   324 mg at 03/22/20 1754    Or   • aspirin (ASA) suppository 300 mg  300 mg DAILY Tierra Atkinson M.D.       • atorvastatin (LIPITOR) tablet 80 mg  80 mg Q EVENING Tierra Atkinson M.D.   80 mg at 03/22/20 1754   • nitroglycerin (NITROSTAT) tablet 0.4 mg  0.4 mg Q5 MIN PRN Tierra Atkinson M.D.       • morphine (pf) 4 MG/ML injection 2-4 mg  2-4 mg Q5 MIN PRN Tierra Atkinson M.D.   4 mg at 03/23/20 0733   • vitamin D (cholecalciferol) tablet 1,000 Units  1,000 Units BID Tierra Atkinson M.D.   1,000 Units at 03/23/20 0613   • losartan (COZAAR) tablet 50 mg  50 mg DAILY Tierra Atkinson M.D.   50 mg at 03/23/20 0614   • methocarbamol (ROBAXIN) tablet 500 mg  500 mg HS PRN Tierra Atkinson M.D.   500 mg at 03/22/20 2042   • omeprazole (PRILOSEC) capsule 20 mg  20 mg BID Tierra Atkinson M.D.   20 mg at 03/23/20 0614   • metoprolol SR (TOPROL XL) tablet 25 mg  25 mg DAILY Tierra Atkinson M.D.   25 mg at 03/23/20 0613   • heparin  "injection 2,600 Units  2,600 Units PRN Tierra Atkinson M.D.        And   • heparin infusion 25,000 units in 500 mL 0.45% NACL   Continuous Tierra Atkinson M.D. 21 mL/hr at 03/23/20 0714 1,050 Units/hr at 03/23/20 0714   • magnesium oxide (MAG-OX) tablet 400 mg  400 mg DAILY Tierra Atkinson M.D.   400 mg at 03/23/20 0613   • clopidogrel (PLAVIX) tablet 75 mg  75 mg DAILY Tierra Atkinson M.D.   75 mg at 03/23/20 0614   • tramadol (ULTRAM) 50 MG tablet 50 mg  50 mg Q8HRS PRN Tierra Atkinson M.D.   50 mg at 03/23/20 0613   Last reviewed on 3/22/2020  5:17 PM by Madonna Warner    No Known Allergies    Physical Exam  Body mass index is 19.13 kg/m². /54   Pulse 64   Temp 36.6 °C (97.9 °F) (Temporal)   Resp 16   Ht 1.803 m (5' 11\")   Wt 62.2 kg (137 lb 2 oz)   SpO2 99%    Vitals:    03/22/20 1913 03/22/20 2015 03/23/20 0000 03/23/20 0733   BP:  122/65 117/54    Pulse:  92 64    Resp:  18 18 16   Temp:  36.2 °C (97.1 °F) 36.6 °C (97.9 °F)    TempSrc:  Temporal Temporal    SpO2:  99% 99%    Weight: 62.2 kg (137 lb 2 oz)      Height:        Oxygen Therapy:  Pulse Oximetry: 99 %, O2 (LPM): 2, O2 Delivery Device: Silicone Nasal Cannula    General: no apparent distress  Eyes: normal conjunctiva  ENT: OP clear  Neck: no JVD   Lungs: normal respiratory effort,  With bibasilar crackles, no wheezing or rhonchi.  Heart: normal rate,  regular rhythm, 3/6 systolic murmur at the USB,  3/6 mid systolic murmur at the apex.   EXT: no edema bilateral lower extremities. + bilateral pedal pulses. no cyanosis  Abdomen: soft, non tender, non distended,  Neurological: No focal deficits, no facial asymmetry.  Normal speech.  Psychiatric: Appropriate affect, alert and oriented x 3.   Skin: Warm extremities, no rash.    Labs (personally reviewed):     Lab Results   Component Value Date/Time    SODIUM 137 03/23/2020 01:21 AM    POTASSIUM 4.0 03/23/2020 01:21 AM    CHLORIDE 103 03/23/2020 01:21 AM    CO2 21 03/23/2020 01:21 AM    GLUCOSE 177 (H) " 03/23/2020 01:21 AM    BUN 51 (H) 03/23/2020 01:21 AM    CREATININE 1.74 (H) 03/23/2020 01:21 AM     Lab Results   Component Value Date/Time    ALKPHOSPHAT 77 03/23/2020 01:21 AM    ASTSGOT 143 (H) 03/23/2020 01:21 AM    ALTSGPT 30 03/23/2020 01:21 AM    TBILIRUBIN 0.6 03/23/2020 01:21 AM      Lab Results   Component Value Date/Time    CHOLSTRLTOT 110 03/23/2020 01:21 AM    LDL 42 03/23/2020 01:21 AM    HDL 60 03/23/2020 01:21 AM    TRIGLYCERIDE 40 03/23/2020 01:21 AM     Lab Results   Component Value Date/Time    BNPBTYPENAT 612 (H) 11/19/2018 02:34 PM         Cardiac Imaging and Procedures Review:      Personal Telemetry Review:  SR with PVCs, some bigeminy.    Personal EKG Interpretation:    NSR HR 70 , PACs, LBBB (new) Qrsd 126, Qtc 519.    Echo 11/20/18:  CONCLUSIONS  Severely reduced left ventricular systolic function.  Left ventricular ejection fraction is visually estimated to be 25-30%.  Global hypokinesis with regional variation.  Aortic sclerosis without stenosis.  Unable to estimate pulmonary artery pressure due to an inadequate   tricuspid regurgitant jet.  Mild mitral regurgitation.  Compared to the report of the study done 4/7/2017  there has been   worsening of left ventricular ejection fraction.    Stress Test 4/10/17:  NUCLEAR IMAGING INTERPRETATION   Large sized, nonreversible, decreased uptake of severe severity in the apex    (LAD), anterior (LAD), apical septal (LAD), apical inferior and mid    anteroseptal (LAD) segments during post stress images suggestive of scar.  No      evidence of ischemia.   Enlarged ventricle with global hypokinesis and likely akinesis of the    anteroseptal and anterior walls (difficult to determine with severely    decrease radiotracer uptake). Measured ejection fraction is 30%.   ECG INTERPRETATION   Negative stress ECG for ischemia.    Left Heart Cath 7/21/2017  Impression:  Pain stent in proximal LAD without significant in-stent stenosis.  Nonobstructive stenosis  mid circumflex.  Diffuse plaquing in proximal to mid RCA with 40% stenosis at worst point.  Global hypokinesis of the left ventricle EF 36%  Significant coronary calcification.      Assessment and Medical Decision Makin   NSTEMI.  Troponin trend indicative of ACS, patient agreeable to proceed with coronary angiogram.  Risks of angiogram were discussed with patient including the risk of bleeding, heart attack, stroke, and kidney injury.  Patient denies allergy to IV contrast.      - Will need to suspend DNR for procedure.     2   Acute pulmonary edema  Doing well on 2 lpm O2.  Laying flat comfortably.     3   Presyncope.  Could be related to hypotension in the setting of taking SL nitro vs. ventricular arrhythmia.  No VT on tele.    4   Frequent PVCs with some bigeminy.  Continue  Toprol XL 25 daily.    5   Chronic systolic congestive heart failure, NYHA class II, stage C, secondary to ischemic cardiomyopathy.   Continue losartan 50, Toprol XL 25, may add back spironolactone when renal function improves.     6   CAD with hx of anterior MI , s/p LAD stent.  Hx of moderate disease to the mid RCA on  [facility]    7   Acute on chronic renal failure.  Cr 1.3-1.5 at baseline.  2.08 on presentation improved to 1.7 today.  Getting some mild IVF prior to procedure, however, would be cautious giving much fluid as he clearly has pulm edema on CXR and an EF of about 30%.    8   Paroxysmal SVT. None on tele, continue BB.    9   Essential hypertension.  Stable.    10  Hyperlipidemia. On pravastatin 80 mg at home.  LDL 42.  Can  Continue current regimen (though not considered high intensity).    Plan for [facility] today, further recs to follow outcome.      Lian Quintana PA-C  [facility]

## 2020-03-23 NOTE — PROGRESS NOTES
from lab called with critical result of Troponin of 1885 at 2145. Critical lab result read back to . Dr. Atkinson notified of critical lab result at 2200. Critical lab result read back by Dr. Atkinson.

## 2020-03-23 NOTE — PROGRESS NOTES
Pt back on floor post cath. Placed back on monitor, monitor room notified. Heat pack applied to right leg for complaints of baseline pain. No signs or symptoms of resp distress noted or reported on 2 L/min via NC. Bed in low and locked position <30 degrees. Pt aware that he must lie flat x6 hours. Call button within reach and fall precaution sare in place

## 2020-03-23 NOTE — PROGRESS NOTES
Bedside report received from night shift RN. Pt alert sitting up in bed complaining of chest pain, pt will bed medicated per orders. No signs or symptoms of resp distress noted or reported on 2 L/min via NC. Bed in low and locked position, call button within reach and fall precautions are in place. Pt remains NPO

## 2020-03-23 NOTE — CARE PLAN
Problem: Nutritional:  Goal: Achieve adequate nutritional intake  Description: Diet advanced and patient will consume 50% of meals, RD to provide diet education     Outcome: NOT MET

## 2020-03-23 NOTE — RESPIRATORY CARE
COPD EDUCATION by COPD CLINICAL EDUCATOR  3/23/2020 at 9:29 AM by Enedina Stringer, RRT       Patient reviewed by COPD education team. Patient does not have a formal   diagnosis of COPD (reviewed EMR) and is a non-smoker (Quit 24 yrs ago), therefore does not qualify for the COPD program at this time.

## 2020-03-23 NOTE — HEART FAILURE PROGRAM
Cardiovascular Nurse Navigator () Advanced Heart Failure Program HF Exacerbation Consult Note:     Patient presented to ED via EMS on the afternoon of 3/22/20. He noted chest pain the night prior only partially relieved with 2 nitro. Patient has a history of CAD with prior revascularization to LAD. Per ERP note, patient quit smoking in 1995 but was quite a heavy smoker prior to that.    Patient has been diagnosed with nSTEMI and HF exacerbation. He is to go for angio today no notes or results yet. He also has diagnoses of presyncope, frequent PVC's with some bigeminy, AOC renal failure, and PSVT.    Notes indicate that a thorough discussion was had with patient about angiogram and that he would need to reverse his DNR for the procedure. Patient stated he wanted aggressive treatment, he is 87 years old.     · HFrEF (25%)  · NYHA: III  · Precipitant of exacerbation: presumably ACS  · Consider for CardioMEMS commercial or GUIDE HF? no  · Diuresis: none currently ordered  · Diabetic or newly diagnosed DM?: no  · Atrial fibrillation?: no  · Smoking history? Quit 25 yrs ago  · PHQ-2 score:0    [facility] Notes: none    Therapy Notes: therapist note deferring service until after cath.    Demographics:    · Residence: [place]  · Insurance: Medicare and [employer]    GD Secondary Prevention Interventions:.  ·   · Influenza vaccine:   ·     Daily Weights: ordered by me today    I's and O's: already ordered    ICR order placed yesterday    HFrEF & ACS Specific GDMT:  · NAYELY - I: losartan  · Evidence based BB (bisoprolol, carvedilol, or Toprol XL): toprol xl  · Aldosterone receptor antagonist: per cardiology PA note today, may add back spironolactone when renal fx improves  · DAPT: ASA and Clopidogrel   · Consider for hydralazine dinitrate?: does not self identify as  per facesheet    HFrEF Specific Device Therapy Screening Tool   Dear Doctor,  Based on the Device Therapy Screen, this patient meets criteria for  consideration of cardiac resynchronization therapy (BiV Pacer) and/or implantable cardiac defibrillator (ICD).    Criteria for Cardiac Resynchronization Therapy (must meet all 3)     · EF <35%: yes  · NYHA Class III or ambulatory Class IV: yes  · QRS Duration >120 ms: yes is 126ms      * CRT is a Class I recommendation for patients with Sinus Rhythm    Criteria for Implantable Cardiac Defibrillator (must meet all 3)     · EF <35%: yes  · > 40 days post MI: yes for his last MI, No for this one  · Post revascularization or non-ischemic dilated CMP > 3 months: same as above       * ICD therapy is a Class I recommendation    Source: [facility]- [facility] Prevention Program Screening Tool  2013 ACC/ AHA Heart Failure Guidelines  Rev date: 12/2014    Advanced Care Planning: POLST on file. DNAR/DNI code status at the time of this note's filing. The ACC recommends engaging palliative care as part of optimization of HF treatment to solicit goals of care and focus on quality of life throughout the clinical course of HF.    Once all diagnostics are in, please consider an order for palliative to discuss Advanced Care Planning.      Speaking with patients frankly about their end-of-life wishes is one of the most important things a palliative care team can do. This is especially important in the context of heart failure, since it’s such an unpredictable disease.    Follow up appointment:   • If discharged from acute care to home (exception hospice discharge), pt must have an appointment scheduled within 7 days of discharge (Cardiology, PCP, or DC Clinic).    • If discharged to Transitional Care Facility (LTAC, SNF, IRH), appointment should be made about a month out for after TCF.     Bedside Nursing Education:  Please provide HF booklet and repeated, ongoing education while administering medications, weighing patient, discussing management of symptoms, diet and need to follow up and act on changes. Please target education to the  "precipitant of the exacerbation.    Bedside Nursing Discharge:  When completing the after visit summary (discharge instructions) please select \"Cardiac Diagnosis, and Heart Failure\" in the special instructions section to populate the heart failure specific discharge instructions.     Referrals/Orders Placed:    Hospital Schedulers for HF f/u?  yes  Social Work   yes  Registered Dietician  Yes - is already ordered  REMS[facility] CP Program for patients with Medicaid, [facility], or [facility] coverage?  no  Outpatient Care Coordination for patients with Medicaid?  no    Many thanks, Belen, Cardiovascular Nurse Navigator, RN, [facility] [phone], & [employer] M-F (excluding holidays).          "

## 2020-03-23 NOTE — ASSESSMENT & PLAN NOTE
Decompensated  Cardiology consulted.  Very poor prognosis   Stop medications for permissive hypertension with new CVA   3/25- will resume BP meds/heart failure meds tomorrow. Continue to monitor. Stable. No signs of decompensation at this time.

## 2020-03-23 NOTE — PROGRESS NOTES
Monitor Summary:    SB 57-65  (R-O) PAC, (R-F) PVC  1.2sec pause    0.20/0.12/0.44        12 hr cc

## 2020-03-23 NOTE — PROGRESS NOTES
[facility] Medicine Daily Progress Note    Date of Service  3/23/2020    Chief Complaint  87 y.o. male admitted 3/22/2020 with chest pain    Hospital Course    *47-year-old male with past medical history of coronary disease status post large anterior MI and stent in past presented to the hospital on March 22, 2020 with complaint of chest pain.  He found a diagnosis of an STEMI and he was started on heparin drip and cardiology was consulted.*      Interval Problem Update  I ordered and examined him at the bedside he denies any acute symptoms of chest pain and shortness of breath.  Continue heparin drip.  Plan is to perform cardiac catheterization.  I discussed plan of care with him    Consultants/Specialty  Cardiology    Code Status  DNR/DNI    Disposition  To be decided    Review of Systems  Review of Systems   Constitutional: Negative for chills, fever and weight loss.   HENT: Negative for hearing loss and tinnitus.    Eyes: Negative for blurred vision, double vision, photophobia and pain.   Respiratory: Negative for cough, sputum production and shortness of breath.    Cardiovascular: Negative for chest pain, palpitations, orthopnea and leg swelling.   Gastrointestinal: Negative for abdominal pain, constipation, diarrhea, nausea and vomiting.   Genitourinary: Negative for dysuria, frequency and urgency.   Musculoskeletal: Negative for back pain, joint pain, myalgias and neck pain.   Skin: Negative for rash.   Neurological: Negative for dizziness, tingling, tremors, sensory change, speech change, focal weakness and headaches.   Psychiatric/Behavioral: Negative for hallucinations and substance abuse.   All other systems reviewed and are negative.       Physical Exam  Temp:  [36.2 °C (97.1 °F)-36.6 °C (97.9 °F)] 36.4 °C (97.6 °F)  Pulse:  [60-92] 63  Resp:  [14-21] 18  BP: (108-130)/(54-65) 108/65  SpO2:  [98 %-100 %] 98 %    Physical Exam  Vitals signs reviewed.   Constitutional:       General: He is not in acute  distress.  HENT:      Head: Normocephalic and atraumatic. No contusion.      Right Ear: External ear normal.      Left Ear: External ear normal.      Nose: Nose normal.      Mouth/Throat:      Mouth: Mucous membranes are moist.      Pharynx: No oropharyngeal exudate.   Eyes:      General:         Right eye: No discharge.         Left eye: No discharge.      Pupils: Pupils are equal, round, and reactive to light.   Neck:      Musculoskeletal: No neck rigidity or muscular tenderness.   Cardiovascular:      Rate and Rhythm: Normal rate and regular rhythm.      Heart sounds: No murmur. No friction rub. No gallop.    Pulmonary:      Effort: Pulmonary effort is normal.      Breath sounds: No wheezing or rhonchi.   Abdominal:      General: Bowel sounds are normal. There is no distension.      Palpations: Abdomen is soft.      Tenderness: There is no abdominal tenderness. There is no rebound.   Musculoskeletal: Normal range of motion.         General: No swelling or tenderness.   Skin:     General: Skin is warm and dry.      Coloration: Skin is not jaundiced.   Neurological:      General: No focal deficit present.      Mental Status: He is alert and oriented to person, place, and time.      Cranial Nerves: No cranial nerve deficit.      Sensory: No sensory deficit.      Comments: No focal neurological deficit.     Psychiatric:         Mood and Affect: Mood normal.         Fluids    Intake/Output Summary (Last 24 hours) at 3/23/2020 0940  Last data filed at 3/23/2020 0613  Gross per 24 hour   Intake --   Output 850 ml   Net -850 ml       Laboratory  Recent Labs     03/22/20  1520 03/23/20  0121   WBC 9.4 7.2   RBC 3.49* 3.15*   HEMOGLOBIN 11.2* 10.3*   HEMATOCRIT 35.0* 30.9*   .3* 98.1*   MCH 32.1 32.7   MCHC 32.0* 33.3*   RDW 48.2 46.5   PLATELETCT 202 183   MPV 10.6 10.6     Recent Labs     03/22/20  1520 03/23/20  0121   SODIUM 137 137   POTASSIUM 3.9 4.0   CHLORIDE 102 103   CO2 21 21   GLUCOSE 144* 177*   BUN 58*  51*   CREATININE 2.08* 1.74*   CALCIUM 9.3 9.3     Recent Labs     03/22/20  1520 03/23/20  0121 03/23/20  0806   APTT 27.3 169.3* 168.6*   INR 1.20*  --   --          Recent Labs     03/23/20  0121   TRIGLYCERIDE 40   HDL 60   LDL 42       Imaging  EC-ECHOCARDIOGRAM COMPLETE W/ CONT   Final Result      CT-HEAD W/O   Final Result      No acute intracranial abnormality is identified.      Atrophy      There are periventricular and subcortical white matter changes present.  This finding is nonspecific and could be from previous small vessel ischemia, demyelination, or gliosis.         DX-CHEST-PORTABLE (1 VIEW)   Final Result      Cardiomegaly.      Pulmonary interstitial prominence likely represents interstitial edema. Pneumonitis not excluded.      Atherosclerotic plaque.         CL-LEFT HEART CATHETERIZATION WITH POSSIBLE INTERVENTION    (Results Pending)        Assessment/Plan  * NSTEMI (non-ST elevated myocardial infarction) ([facility])  Assessment & Plan  Patient will be admitted to the telemetry unit with close cardiac monitoring, serial EKG and troponin  Cardiology consulted and patient is going to have cardiac catheterization but  Continue heparin and dose adjustment as per APTT and monitor for adverse effect of heparin.  Currently he is n.p.o.  I discussed plan of care with him.      Acute on chronic systolic heart failure ([facility])  Assessment & Plan  Decompensated  Cardiology consulted.  Continue heart failure medication metoprolol and losartan    COPD (chronic obstructive pulmonary disease) ([facility])- (present on admission)  Assessment & Plan  Not in exacerbation  Continue home inhalers  Oxygen as needed.    Acute respiratory failure with hypoxia ([facility])  Assessment & Plan  On 2 L of O2   Current oxygen saturation is above 95%.  Titrate oxygen as tolerated.  Likely secondary to acute pulmonary edema      Anemia  Assessment & Plan  Unknown etiology   Iron panel did not show any deficiency anemia.  Vitamin B12 and folic  acid did not show deficiency    Acute renal failure (ARF) ([facility])  Assessment & Plan  Unknown etiology at this point  Renal function has been improving.  Avoid nephrotoxins.  Continue monitor closely.        Essential hypertension- (present on admission)  Assessment & Plan  Continue current home medications  IV as needed medications have been ordered  Continue to monitor.       I discussed plan of care during multidisciplinary rounds    VTE prophylaxis: Heparin GTT

## 2020-03-23 NOTE — PROGRESS NOTES
Patient arrived from ED via stretcher to room via RN. Pt A&Ox4, denies any nausea, or SOB. Complains of pain that is an 8/10 from left side of head down to neck, on 2LNC. Patient oriented to room, hospital policies/procedures, and plan of care. Call light within reach. Bed locked and in lowest position with bed alarm in place. Tele monitor placed on patient and verified.

## 2020-03-23 NOTE — CARE PLAN
Problem: Safety  Goal: Will remain free from falls  Outcome: PROGRESSING AS EXPECTED  Intervention: Implement fall precautions  Flowsheets (Taken 3/23/2020 0800)  Environmental Precautions:   Treaded Slipper Socks on Patient   Personal Belongings, Wastebasket, Call Bell etc. in Easy Reach   Bed in Low Position (Pended)  Note: Patient educated on use of call light system. Patient agreeable to information provided. Bed is locked and in lowest position, treaded socks in place, well lit and clutter free environment maintained. Will continue to monitor.     Problem: Pain Management  Goal: Pain level will decrease to patient's comfort goal  Outcome: PROGRESSING AS EXPECTED  Flowsheets  Taken 3/23/2020 1014  Comfort Goal:   Comfort at Rest   Comfort with Movement (Pended)  Taken 3/23/2020 1113  Non Verbal Scale: Calm (Pended)  Intervention: Follow pain managment plan developed in collaboration with patient and Interdisciplinary Team  Note: Patient educated on pain management medications and medicated according to MAR for radiating chest pain. Patient demonstrated verbal understanding of information provided. Will continue to monitor for increase in pain.

## 2020-03-23 NOTE — ED NOTES
Med rec complete per patient at bedside. Pt provided a medication list but could not verify last dose. Allergies verified. No ABX in last 14 days.    Chris

## 2020-03-23 NOTE — PROGRESS NOTES
Hospitalist and cardiology notified that pt is complaining of chest pain, heart burn and is nauseous despite recent dose of IV zofran

## 2020-03-23 NOTE — ED NOTES
Patient observed resting in gurney, no s/s of discomfort or distress at this time. Unlabored breathing & visible chest rise noted. Patient repositions self occasionally. Bed in lowest position, room & floor clear.

## 2020-03-23 NOTE — CARE PLAN
Problem: Communication  Goal: The ability to communicate needs accurately and effectively will improve  Outcome: PROGRESSING AS EXPECTED     Problem: Safety  Goal: Will remain free from falls  Outcome: PROGRESSING AS EXPECTED     Problem: Pain Management  Goal: Pain level will decrease to patient's comfort goal  Outcome: PROGRESSING SLOWER THAN EXPECTED

## 2020-03-24 ENCOUNTER — APPOINTMENT (OUTPATIENT)
Dept: RADIOLOGY | Facility: MEDICAL CENTER | Age: 85
DRG: 246 | End: 2020-03-24
Attending: INTERNAL MEDICINE
Payer: MEDICARE

## 2020-03-24 ENCOUNTER — APPOINTMENT (OUTPATIENT)
Dept: RADIOLOGY | Facility: MEDICAL CENTER | Age: 85
DRG: 246 | End: 2020-03-24
Attending: PSYCHIATRY & NEUROLOGY
Payer: MEDICARE

## 2020-03-24 PROBLEM — I63.9 CVA (CEREBRAL VASCULAR ACCIDENT) (HCC): Status: ACTIVE | Noted: 2020-03-24

## 2020-03-24 LAB
ANION GAP SERPL CALC-SCNC: 10 MMOL/L (ref 7–16)
BUN SERPL-MCNC: 36 MG/DL (ref 8–22)
CALCIUM SERPL-MCNC: 9.2 MG/DL (ref 8.5–10.5)
CHLORIDE SERPL-SCNC: 108 MMOL/L (ref 96–112)
CO2 SERPL-SCNC: 23 MMOL/L (ref 20–33)
CREAT SERPL-MCNC: 1.53 MG/DL (ref 0.5–1.4)
ERYTHROCYTE [DISTWIDTH] IN BLOOD BY AUTOMATED COUNT: 50.9 FL (ref 35.9–50)
GLUCOSE SERPL-MCNC: 122 MG/DL (ref 65–99)
HCT VFR BLD AUTO: 29.3 % (ref 42–52)
HGB BLD-MCNC: 9.5 G/DL (ref 14–18)
MAGNESIUM SERPL-MCNC: 2.3 MG/DL (ref 1.5–2.5)
MCH RBC QN AUTO: 33.5 PG (ref 27–33)
MCHC RBC AUTO-ENTMCNC: 32.4 G/DL (ref 33.7–35.3)
MCV RBC AUTO: 103.2 FL (ref 81.4–97.8)
PLATELET # BLD AUTO: 163 K/UL (ref 164–446)
PMV BLD AUTO: 10.7 FL (ref 9–12.9)
POTASSIUM SERPL-SCNC: 4.1 MMOL/L (ref 3.6–5.5)
RBC # BLD AUTO: 2.84 M/UL (ref 4.7–6.1)
SODIUM SERPL-SCNC: 141 MMOL/L (ref 135–145)
WBC # BLD AUTO: 9.2 K/UL (ref 4.8–10.8)

## 2020-03-24 PROCEDURE — 99233 SBSQ HOSP IP/OBS HIGH 50: CPT | Performed by: INTERNAL MEDICINE

## 2020-03-24 PROCEDURE — 700105 HCHG RX REV CODE 258: Performed by: INTERNAL MEDICINE

## 2020-03-24 PROCEDURE — 80048 BASIC METABOLIC PNL TOTAL CA: CPT

## 2020-03-24 PROCEDURE — 700111 HCHG RX REV CODE 636 W/ 250 OVERRIDE (IP): Performed by: HOSPITALIST

## 2020-03-24 PROCEDURE — A9270 NON-COVERED ITEM OR SERVICE: HCPCS | Performed by: HOSPITALIST

## 2020-03-24 PROCEDURE — 700102 HCHG RX REV CODE 250 W/ 637 OVERRIDE(OP): Performed by: INTERNAL MEDICINE

## 2020-03-24 PROCEDURE — A9270 NON-COVERED ITEM OR SERVICE: HCPCS | Performed by: INTERNAL MEDICINE

## 2020-03-24 PROCEDURE — 700117 HCHG RX CONTRAST REV CODE 255: Performed by: PSYCHIATRY & NEUROLOGY

## 2020-03-24 PROCEDURE — 770020 HCHG ROOM/CARE - TELE (206)

## 2020-03-24 PROCEDURE — 36415 COLL VENOUS BLD VENIPUNCTURE: CPT

## 2020-03-24 PROCEDURE — 0042T CT-CEREBRAL PERFUSION ANALYSIS: CPT

## 2020-03-24 PROCEDURE — 83735 ASSAY OF MAGNESIUM: CPT

## 2020-03-24 PROCEDURE — 70450 CT HEAD/BRAIN W/O DYE: CPT

## 2020-03-24 PROCEDURE — 85027 COMPLETE CBC AUTOMATED: CPT

## 2020-03-24 PROCEDURE — 99232 SBSQ HOSP IP/OBS MODERATE 35: CPT | Performed by: INTERNAL MEDICINE

## 2020-03-24 PROCEDURE — 700102 HCHG RX REV CODE 250 W/ 637 OVERRIDE(OP): Performed by: HOSPITALIST

## 2020-03-24 PROCEDURE — 99223 1ST HOSP IP/OBS HIGH 75: CPT | Performed by: PSYCHIATRY & NEUROLOGY

## 2020-03-24 PROCEDURE — 70496 CT ANGIOGRAPHY HEAD: CPT

## 2020-03-24 PROCEDURE — 70498 CT ANGIOGRAPHY NECK: CPT

## 2020-03-24 RX ORDER — CLOPIDOGREL BISULFATE 75 MG/1
75 TABLET ORAL DAILY
Qty: 30 TAB | Refills: 11 | Status: SHIPPED | OUTPATIENT
Start: 2020-03-25 | End: 2020-03-27

## 2020-03-24 RX ORDER — ATORVASTATIN CALCIUM 80 MG/1
80 TABLET, FILM COATED ORAL EVERY EVENING
Qty: 30 TAB | Refills: 11 | Status: SHIPPED | OUTPATIENT
Start: 2020-03-24 | End: 2020-03-27

## 2020-03-24 RX ORDER — METOPROLOL SUCCINATE 50 MG/1
50 TABLET, EXTENDED RELEASE ORAL DAILY
Qty: 30 TAB | Refills: 11 | Status: SHIPPED | OUTPATIENT
Start: 2020-03-25 | End: 2020-03-27

## 2020-03-24 RX ADMIN — SODIUM CHLORIDE: 9 INJECTION, SOLUTION INTRAVENOUS at 04:47

## 2020-03-24 RX ADMIN — Medication 400 MG: at 04:50

## 2020-03-24 RX ADMIN — MELATONIN 1000 UNITS: at 16:57

## 2020-03-24 RX ADMIN — SENNOSIDES AND DOCUSATE SODIUM 2 TABLET: 8.6; 5 TABLET ORAL at 16:57

## 2020-03-24 RX ADMIN — TRAMADOL HYDROCHLORIDE 50 MG: 50 TABLET, FILM COATED ORAL at 01:16

## 2020-03-24 RX ADMIN — IOHEXOL 100 ML: 350 INJECTION, SOLUTION INTRAVENOUS at 14:31

## 2020-03-24 RX ADMIN — METHOCARBAMOL TABLETS 500 MG: 500 TABLET, COATED ORAL at 01:16

## 2020-03-24 RX ADMIN — IOHEXOL 40 ML: 350 INJECTION, SOLUTION INTRAVENOUS at 14:45

## 2020-03-24 RX ADMIN — MELATONIN 1000 UNITS: at 04:51

## 2020-03-24 RX ADMIN — OMEPRAZOLE 20 MG: 20 CAPSULE, DELAYED RELEASE ORAL at 16:57

## 2020-03-24 RX ADMIN — SENNOSIDES AND DOCUSATE SODIUM 2 TABLET: 8.6; 5 TABLET ORAL at 04:49

## 2020-03-24 RX ADMIN — ACETAMINOPHEN 650 MG: 325 TABLET, FILM COATED ORAL at 04:47

## 2020-03-24 RX ADMIN — ONDANSETRON 4 MG: 2 INJECTION INTRAMUSCULAR; INTRAVENOUS at 04:54

## 2020-03-24 RX ADMIN — ASPIRIN 325 MG: 325 TABLET, FILM COATED ORAL at 04:49

## 2020-03-24 RX ADMIN — TRAMADOL HYDROCHLORIDE 50 MG: 50 TABLET, FILM COATED ORAL at 16:54

## 2020-03-24 RX ADMIN — OMEPRAZOLE 20 MG: 20 CAPSULE, DELAYED RELEASE ORAL at 04:50

## 2020-03-24 RX ADMIN — ATORVASTATIN CALCIUM 80 MG: 80 TABLET, FILM COATED ORAL at 16:57

## 2020-03-24 RX ADMIN — CLOPIDOGREL BISULFATE 75 MG: 75 TABLET ORAL at 04:50

## 2020-03-24 RX ADMIN — SPIRONOLACTONE 25 MG: 25 TABLET ORAL at 04:49

## 2020-03-24 ASSESSMENT — ENCOUNTER SYMPTOMS
DIARRHEA: 0
PALPITATIONS: 0
ABDOMINAL PAIN: 0
FOCAL WEAKNESS: 1
CONSTIPATION: 0
VOMITING: 0
ORTHOPNEA: 0
EYE PAIN: 0
DOUBLE VISION: 0
MYALGIAS: 0
NAUSEA: 0
COUGH: 0
TREMORS: 0
TINGLING: 0
BLURRED VISION: 0
CHILLS: 0
SPUTUM PRODUCTION: 0
HALLUCINATIONS: 0
DIZZINESS: 1
HEADACHES: 1
SENSORY CHANGE: 0
PHOTOPHOBIA: 0
BACK PAIN: 0
WEIGHT LOSS: 0
SHORTNESS OF BREATH: 0
SPEECH CHANGE: 1
NECK PAIN: 0
FEVER: 0

## 2020-03-24 ASSESSMENT — PATIENT HEALTH QUESTIONNAIRE - PHQ9
1. LITTLE INTEREST OR PLEASURE IN DOING THINGS: NOT AT ALL
2. FEELING DOWN, DEPRESSED, IRRITABLE, OR HOPELESS: NOT AT ALL
SUM OF ALL RESPONSES TO PHQ9 QUESTIONS 1 AND 2: 0

## 2020-03-24 ASSESSMENT — LIFESTYLE VARIABLES: SUBSTANCE_ABUSE: 0

## 2020-03-24 ASSESSMENT — FIBROSIS 4 INDEX: FIB4 SCORE: 13.94

## 2020-03-24 NOTE — DISCHARGE PLANNING
Care Transition Team Assessment    Information Source  Orientation : Oriented x 4  Informant's Name: Yesica Driver - patient's step daughter    In the case of an emergency, please contact Georgie Luke at [phone].     This RN Case Manager spoke with Yesica Driver via telephone [phone] and obtained the information used in this assessment. Yesica verified accuracy of facesheet. Patient lives with his wife in a one story home. Patient is the primary caregiver for his wife who is in the early stages of dementia. Yesica is concerned that the patient will need help taking care of her. This RN Case Manager emailed Yesica a list of Home Health Agencies that operate in the [place] area. Encouraged Yesica to call Lia's primary care physician to set up Home Health. Georgie Luke, patient's daughter, returned phone call to this RN - discussed everything previously mentioned. Notified Edie RN that Georgie would like an update on patient's condition.     [facility]. Prior to current hospitalization, patient was completely independent with ADLS/IADLS. Patient drives his vehicle to and from his doctors appointments and is the primary caregiver for his wife, Lia. Patient is a retired [employer]. Patient has a daughter that lives locally and the rest of his adult children live out of town. Patient's discharge plan is pending therapy evaluation.    Elopement Risk  Legal Hold: No  Ambulatory or Self Mobile in Wheelchair: No-Not an Elopement Risk  Disoriented: No  Psychiatric Symptoms: None  History of Wandering: No  Elopement this Admit: No  Vocalizing Wanting to Leave: No  Displays Behaviors, Body Language Wanting to Leave: No-Not at Risk for Elopement  Elopement Risk: Not at Risk for Elopement    Interdisciplinary Discharge Planning  [facility]  Lives with - Patient's Self Care Capacity: Spouse  Patient or legal guardian wants to designate a caregiver (see  row info): No  Support Systems: Children  Housing / Facility: [address]  Do You Take your Prescribed Medications Regularly: Yes  Able to Return to Previous ADL's: (unknown)  Prior Services: None  Assistance Needed: Unknown at this Time  Durable Medical Equipment: Not Applicable    Discharge Preparedness  What is your plan after discharge?: Uncertain - pending medical team collaboration  What are your discharge supports?: Child  Prior Functional Level: Ambulatory, Independent with Activities of Daily Living, Independent with Medication Management  Difficulity with ADLs: None  Difficulity with IADLs: None    Functional Assesment  Prior Functional Level: Ambulatory, Independent with Activities of Daily Living, Independent with Medication Management    Finances  Financial Barriers to Discharge: No  Prescription Coverage: Yes    Vision / Hearing Impairment  Right Eye Vision: Impaired, Wears Glasses, Other (Comments)  Left Eye Vision: Impaired, Wears Glasses, Other (Comments)  Hearing Impairment: Both Ears, Other (Comments), Hearing Device Not Available     Domestic Abuse  Have you ever been the victim of abuse or violence?: No  Physical Abuse or Sexual Abuse: No  Verbal Abuse or Emotional Abuse: No  Possible Abuse Reported to:: Not Applicable      Discharge Risks or Barriers  Discharge risks or barriers?: Complex medical needs  Patient risk factors: Other (comment)(patient is primary caregiver for wife)    Anticipated Discharge Information  Anticipated discharge disposition: Discharge needs currently unknown  Discharge Address: [address] [place] [id number]  Discharge Contact Phone Number: [phone]

## 2020-03-24 NOTE — CARE PLAN
Problem: Safety  Goal: Will remain free from injury  Outcome: PROGRESSING AS EXPECTED  Goal: Will remain free from falls  Outcome: PROGRESSING AS EXPECTED   Patient RHODES, BUE 5/5, BLE 4/5 strength. Patient has remained bed rest since admission 2/2 dizziness. Safety precautions maintained, bed alarm on, call bell within reach, no slip socks in place, hourly rounding completed. Will continue to monitor.     Problem: Infection  Goal: Will remain free from infection  Outcome: PROGRESSING AS EXPECTED   Patient afebrile during shift, all other vital signs stable. Last WBC 7.2, no new s/sx of infection noted. Will continue to monitor.

## 2020-03-24 NOTE — THERAPY
Physical Therapy Contact Note    PT cardiac rehab consult attempted. On first attempt, patient eating lunch and talking with other care provider. On second attempt, RN reported patient newly diagnosed with CVA. Will hold phase I CR education for now and re attempt as appropriate and able if patient able to participate.    Fatoumata Mendoza, PT, DPT  [phone]

## 2020-03-24 NOTE — PROGRESS NOTES
"Patient states he is seeing bugs in the room, on the curtains and on the image of trees on the ceiling. \"They look like dragon flies.\" Patient AAOx4, completely oriented, no other confusion or disorientation noted. MD paged, will continue to monitor.   "

## 2020-03-24 NOTE — CONSULTS
[facility] Neurology Stroke Consult:    Referring Physician: Pauly Lennon M.D.    Reason for consultation: Stroke    Last Known Well: Unknown  TPA Decision: No TPA due to unknown last known well, as well as visible new cerebellar hypodensities on CT    HPI: Kyler Donato is a 87 y.o. male with history of acute end STEMI status post heart cath, coronary artery disease, hypertension, hyperlipidemia, paroxysmal supraventricular tachycardia presenting to the hospital for an STEMI and consulted for stroke.  Patient's last known well is unknown.  Originally stated to be 12:45 PM.  At that time, the patient's nurse called rapid response for neurologic changes which were noted to be headache, left-sided weakness, left-sided ataxia, and nystagmus.  CT head without contrast was performed which showed cerebellar hypodensities which were new from CT head without contrast performed on 3/22/2020.  CTA of the head and neck demonstrated left vertebral artery occlusion and CT perfusion did not demonstrate mismatch.  Upon further investigation, it is difficult to determine when the patient started complaining of his headache as well as when the neurologic symptoms truly began.  Given that there was also evidence of hypodensities on CT scan which were in the acute to subacute phase and given that they were large territory (for cerebellum) TPA was deferred.    ROS:     As above. All other systems reviewed and are negative.    Past Medical History:    has a past medical history of Acute anterolateral myocardial infarction (HCC) (1995), CAD (coronary artery disease) (1995), Hyperlipidemia, Hypertension, PSVT (paroxysmal supraventricular tachycardia) (HCC), and Shoulder pain.    FHx:  family history includes Arthritis in his mother; Breast Cancer in his sister; Heart Disease in his father; Prostate cancer in his brother.    SHx:   reports that he quit smoking about 24 years ago. He has a 50.00 pack-year smoking history. He has never used  smokeless tobacco. He reports current alcohol use of about 4.2 oz of alcohol per week. He reports that he does not use drugs.    Allergies:  No Known Allergies    Medications:    Current Facility-Administered Medications:   •  [COMPLETED] iohexol (OMNIPAQUE) 350 mg/mL, 40 mL, Intravenous, Once, Thai Ordonez M.D., 40 mL at 03/24/20 1445  •  Pharmacy consult request - Allow for permissive hypertension: SBP up to 220 mmHg/DBP up to 120 mmHg x 48 hours, , Other, PHARMACY TO DOSE, Pauly Lennon M.D.  •  clopidogrel (PLAVIX) tablet 75 mg, 75 mg, Oral, DAILY, Luis Mccann M.D., 75 mg at 03/24/20 0450  •  metoprolol SR (TOPROL XL) tablet 50 mg, 50 mg, Oral, DAILY, Tawanna Fletcher M.D., Stopped at 03/24/20 0600  •  spironolactone (ALDACTONE) tablet 25 mg, 25 mg, Oral, Q DAY, Tawanna Fletcher M.D., 25 mg at 03/24/20 0449  •  senna-docusate (PERICOLACE or SENOKOT S) 8.6-50 MG per tablet 2 Tab, 2 Tab, Oral, BID, 2 Tab at 03/24/20 0449 **AND** polyethylene glycol/lytes (MIRALAX) PACKET 1 Packet, 1 Packet, Oral, QDAY PRN **AND** magnesium hydroxide (MILK OF MAGNESIA) suspension 30 mL, 30 mL, Oral, QDAY PRN **AND** bisacodyl (DULCOLAX) suppository 10 mg, 10 mg, Rectal, QDAY PRN, Tierra Atkinson M.D.  •  acetaminophen (TYLENOL) tablet 650 mg, 650 mg, Oral, Q6HRS PRN, Tierra Atkinson M.D., 650 mg at 03/24/20 0447  •  ondansetron (ZOFRAN) syringe/vial injection 4 mg, 4 mg, Intravenous, Q4HRS PRN, Tierra Atkinson M.D., 4 mg at 03/24/20 0454  •  ondansetron (ZOFRAN ODT) dispertab 4 mg, 4 mg, Oral, Q4HRS PRN, Tierra Atkinson M.D.  •  aspirin (ASA) tablet 325 mg, 325 mg, Oral, DAILY, 325 mg at 03/24/20 0449 **OR** aspirin (ASA) chewable tab 324 mg, 324 mg, Oral, DAILY, 324 mg at 03/22/20 1754 **OR** aspirin (ASA) suppository 300 mg, 300 mg, Rectal, DAILY, Tierra Atkinson M.D.  •  atorvastatin (LIPITOR) tablet 80 mg, 80 mg, Oral, Q EVENING, Tierra Atkinson M.D., 80 mg at 03/23/20 1727  •  nitroglycerin (NITROSTAT) tablet 0.4 mg,  0.4 mg, Sublingual, Q5 MIN PRN, Tierra Atkinson M.D.  •  morphine (pf) 4 MG/ML injection 2-4 mg, 2-4 mg, Intravenous, Q5 MIN PRN, Tierra Atkinson M.D., 4 mg at 03/23/20 1537  •  vitamin D (cholecalciferol) tablet 1,000 Units, 1,000 Units, Oral, BID, Tierra Atkinson M.D., 1,000 Units at 03/24/20 0451  •  losartan (COZAAR) tablet 50 mg, 50 mg, Oral, DAILY, Tierra Atkinson M.D., Stopped at 03/24/20 0600  •  methocarbamol (ROBAXIN) tablet 500 mg, 500 mg, Oral, HS PRN, Tierra Atkinson M.D., 500 mg at 03/24/20 0116  •  omeprazole (PRILOSEC) capsule 20 mg, 20 mg, Oral, BID, Tierra Atkinson M.D., 20 mg at 03/24/20 0450  •  magnesium oxide (MAG-OX) tablet 400 mg, 400 mg, Oral, DAILY, Tierra Atkinson M.D., 400 mg at 03/24/20 0450  •  tramadol (ULTRAM) 50 MG tablet 50 mg, 50 mg, Oral, Q8HRS PRN, Tierra Atkinson M.D., 50 mg at 03/24/20 0116    Vitals:   Vitals:    03/24/20 0700 03/24/20 1100 03/24/20 1359 03/24/20 1435   BP: 106/59 102/54 132/74 128/70   Pulse: 66 65 84 70   Resp: 17 18 16 16   Temp: 36.7 °C (98 °F) 36.4 °C (97.6 °F)     TempSrc: Temporal Temporal     SpO2: 99% 96% 100% 99%   Weight:       Height:           Labs:  Lab Results   Component Value Date/Time    PROTHROMBTM 15.5 (H) 03/22/2020 03:20 PM    INR 1.20 (H) 03/22/2020 03:20 PM      Lab Results   Component Value Date/Time    WBC 9.2 03/24/2020 03:46 AM    RBC 2.84 (L) 03/24/2020 03:46 AM    HEMOGLOBIN 9.5 (L) 03/24/2020 03:46 AM    HEMATOCRIT 29.3 (L) 03/24/2020 03:46 AM    .2 (H) 03/24/2020 03:46 AM    MCH 33.5 (H) 03/24/2020 03:46 AM    MCHC 32.4 (L) 03/24/2020 03:46 AM    MPV 10.7 03/24/2020 03:46 AM    NEUTSPOLYS 84.80 (H) 03/23/2020 01:21 AM    LYMPHOCYTES 8.40 (L) 03/23/2020 01:21 AM    MONOCYTES 6.30 03/23/2020 01:21 AM    EOSINOPHILS 0.00 03/23/2020 01:21 AM    BASOPHILS 0.10 03/23/2020 01:21 AM      Lab Results   Component Value Date/Time    SODIUM 141 03/24/2020 03:46 AM    POTASSIUM 4.1 03/24/2020 03:46 AM    CHLORIDE 108 03/24/2020 03:46 AM    CO2 23  03/24/2020 03:46 AM    GLUCOSE 122 (H) 03/24/2020 03:46 AM    BUN 36 (H) 03/24/2020 03:46 AM    CREATININE 1.53 (H) 03/24/2020 03:46 AM      Lab Results   Component Value Date/Time    CHOLSTRLTOT 110 03/23/2020 01:21 AM    LDL 42 03/23/2020 01:21 AM    HDL 60 03/23/2020 01:21 AM    TRIGLYCERIDE 40 03/23/2020 01:21 AM       Lab Results   Component Value Date/Time    ALKPHOSPHAT 77 03/23/2020 01:21 AM    ASTSGOT 143 (H) 03/23/2020 01:21 AM    ALTSGPT 30 03/23/2020 01:21 AM    TBILIRUBIN 0.6 03/23/2020 01:21 AM        Imaging:  CT head without contrast was personally reviewed and discussed with the interpreting radiologist and showed a evolving left cerebellar infarct.    CTA head and neck and CT perfusion were reviewed and showed a left vertebral artery occlusion.    Physical Exam:     General: 87-year-old male in bed in no acute distress  Cardio: Normal S1/S2. No peripheral edema.   Pulm: CTAX2. No respiratory distress.   Skin: Warm, dry, no rashes or lesions   Psychiatric: Appropriate affect. No active psychosis.  HEENT: Atraumatic head, normal sclera and conjunctiva, moist oral mucosa. No lid lag.  Abdomen: Soft, non tender. No masses or hepatosplenomegaly.    Physical Exam:    NIH Stroke Scale:    1a. Level of Consciousness (Alert, drowsy, etc): 0= Alert    1b. LOC Questions (Month, age): 0= Answers both correctly    1c. LOC Commands (Open/close eyes make fist/let go): 0= Obeys both correctly    2.   Best Gaze (Eyes open - patient follows examiner's finger on face): 0= Normal    3.   Visual Fields (introduce visual stimulus/threat to patient's field quadrants): 1= Partial Hemiania  4.   Facial Paresis (Show teeth, raise eyebrows and squeeze eyes shut): 1= Minor     5a. Motor Arm - Left (Elevate arm to 90 degrees if patient is sitting, 45 degrees if  supine): 2= Can't resist gravity    5b. Motor Arm - Right (Elevate arm to 90 degrees if patient is sitting, 45 degrees if supine): 0= No drift    6a. Motor Leg - Left  (Elevate leg 30 degrees with patient supine): 1= Drift    6b. Motor Leg - Right  (Elevate leg 30 degrees with patient supine): 0= No drift    7.   Limb Ataxia (Finger-nose, heel down shin): 1= Present in 1 limb    8.   Sensory (Pin prick to face, arm, trunk and leg - compare side to side): 0= Normal    9.  Best Language (Name item, describe a picture and read sentences): 0= No aphasia    10. Dysarthria (Evaluate speech clarity by patient repeating listed words): 1= Mild to moderate slurring    11. Extinction and Inattention (Use information from prior testing to identify neglect or  double simultaneous stimuli testing): 0= No neglect    Total NIH Score: 7    Assessment/Plan:    Kyler Donato is a 87 y.o. male with history of acute end STEMI status post heart cath, coronary artery disease, hypertension, hyperlipidemia, paroxysmal supraventricular tachycardia presenting to the hospital for an STEMI and consulted for stroke.  On exam patient has evidence of left cerebellar infarction as evidenced by ataxia on the left as well as unilateral nystagmus however furthermore he has evidence of weakness involving the left side as well as double vision which point to further infarction of the brainstem.  At this time, unfortunately due to a unknown last known well and prominent evidence of hypodensities on CT involving the left cerebellum the patient is not a candidate for TPA.  Etiology of stroke likely secondary to cardioembolic phenomenon given ejection fraction of less than 30%.    Plan:   -Neuro checks/vital signs per protocol.  -Permissive hypertension for 24 hours  -MRI brain without contrast  -Obtain CBC/CMP/TSH/LDL/Hgb A1C  -Initiate smoking cessation/stroke education.  -Schedule evaluation with PT/OT/ST  -Continue antiplatelet therapy for now.  -If neurologic worsening occurs obtain a stat CT head without contrast and consider transfer to ICU pending on findings.  -Plan discussed with consulting physician and  patient's nurse      Thai Ordonez M.D., Diplomat of the American Board of Psychiatry and Neurology  Diplomat of [facility]   Assistant Clinical Professor, [facility]

## 2020-03-24 NOTE — CODE DOCUMENTATION
Rapid called on pt for code stroke.   1345 RRT to bedside, hospitalist was at bedside already and ordered CT head. Pt was already attached to zoll monitor and bedside RNs starting to roll pt out of room. Code stroke paged to ER charge after RRT RN performed brief neuro check, unable to perform NIH as pt was being transported. Pt was A&Ox4 with slurred speech, slight L facial droop, L arm drift, slight headache. Last known well per bedside RNs 1245.  1355 Pt in CT, neurologist to bedside.  1425 Pt back to T735-1 with neurologist at bedside.

## 2020-03-24 NOTE — PROGRESS NOTES
[facility] Cardiology Follow-up Note    Date of Service:    3/24/2020      Name:   Kyler Donato     YOB: 1932  Age:   87 y.o.  male   MRN:   [id number]      Chief Complaint: CP    Primary Cardiologist:  Dr. Mccormack    HPI:  Mr Donato is an 87 y.o fellow with hx of CAD s/p large anterior MI in 1995 wtih stent to the LAD.  He has hx of ICMO with EF in the 25-35% range.   Some notes of COPD? with a 50 pack year hx.  He has hx of paroxysmal SVT.  Presented to [facility] on 3/22 after waking overnight with CP which did not resolve w 1 SL nitro.  Thereafter he noted hypotension BP 60/40, began feeling very weak and pre-syncopal.  His wife called 911.      On presentation troponin up:  1193, 967, 1885.  BNP 6942.  He was in SELWYN with Cr of 2.08 (baseline 1.3-1.5).  He was hypoxic with pulmonary edema.     Started on heparin ggt.  Updated echo pending.  It appears Dr. Deshpande had a lengthy discussion with patient 3/22 and pt wants to pursue all agressive procedures.    Interim Events:  Had some CP after stent last evening, some mild CP this morning, but doing well now.  He states he got up this morning to use restroom and got pretty dizzy.  Nurse state he was dizzy laying in bed when he turned his head.     States he had some difficulty sleeping last night due to orthopnea.   Denies shortness of breath at rest or sitting in bed this morning    Mild tenderness at the R fem access site.   No bleeding/oozing.         ROS  Constitutional:  + fatigue.  Respiratory:  Denies shortness of breath, no cough. + orthopnea  Cardiovascular:  No chest pain.  no lower extremity edema.  Denies orthopnea or PND.  : denies polyuria, no dysuria.  GI:  Denies nausea/vomiting.  No abdominal distention.  Neuro: + dizziness, no syncope or pre-syncope.  Hem/lymph: Denies easy bleeding/bruising.      All other review of systems reviewed and negative.    Past medical, surgical, social, and family history reviewed  and unchanged from admission except as noted in assessment and plan.    Medications: Reviewed in MAR  Current Facility-Administered Medications   Medication Dose Frequency Provider Last Rate Last Dose   • NS infusion   Continuous Luis Mccann M.D. 100 mL/hr at 03/24/20 0447     • clopidogrel (PLAVIX) tablet 75 mg  75 mg DAILY Luis Mccann M.D.   75 mg at 03/24/20 0450   • metoprolol SR (TOPROL XL) tablet 50 mg  50 mg DAILY Tawanna Fletcher M.D.   Stopped at 03/24/20 0600   • spironolactone (ALDACTONE) tablet 25 mg  25 mg Q DAY Tawanna Fletcher M.D.   25 mg at 03/24/20 0449   • senna-docusate (PERICOLACE or SENOKOT S) 8.6-50 MG per tablet 2 Tab  2 Tab BID Tierra Atkinson M.D.   2 Tab at 03/24/20 0449    And   • polyethylene glycol/lytes (MIRALAX) PACKET 1 Packet  1 Packet QDAY PRN Tierra Atkinson M.D.        And   • magnesium hydroxide (MILK OF MAGNESIA) suspension 30 mL  30 mL QDAY PRN Tierra Atkinson M.D.        And   • bisacodyl (DULCOLAX) suppository 10 mg  10 mg QDAY PRN Tierra Atkinson M.D.       • acetaminophen (TYLENOL) tablet 650 mg  650 mg Q6HRS PRN Tierra Atkinson M.D.   650 mg at 03/24/20 0447   • ondansetron (ZOFRAN) syringe/vial injection 4 mg  4 mg Q4HRS PRN Tierra Atkinson M.D.   4 mg at 03/24/20 0454   • ondansetron (ZOFRAN ODT) dispertab 4 mg  4 mg Q4HRS PRN Tierra Atkinson M.D.       • aspirin (ASA) tablet 325 mg  325 mg DAILY Tierra Atkinson M.D.   325 mg at 03/24/20 0449    Or   • aspirin (ASA) chewable tab 324 mg  324 mg DAILY Tierra Atkinson M.D.   324 mg at 03/22/20 1754    Or   • aspirin (ASA) suppository 300 mg  300 mg DAILY Tierra Atkinson M.D.       • atorvastatin (LIPITOR) tablet 80 mg  80 mg Q EVENING Tierra Atkinson M.D.   80 mg at 03/23/20 1727   • nitroglycerin (NITROSTAT) tablet 0.4 mg  0.4 mg Q5 MIN PRN Tierra Atkinson M.D.       • morphine (pf) 4 MG/ML injection 2-4 mg  2-4 mg Q5 MIN PRN Tierra Atkinson M.D.   4 mg at 03/23/20 1537   • vitamin D (cholecalciferol) tablet 1,000 Units   "1,000 Units BID Tierra Atkinson M.D.   1,000 Units at 03/24/20 0451   • losartan (COZAAR) tablet 50 mg  50 mg DAILY Tierra Atkinson M.D.   Stopped at 03/24/20 0600   • methocarbamol (ROBAXIN) tablet 500 mg  500 mg HS PRN Tierra Atkinson M.D.   500 mg at 03/24/20 0116   • omeprazole (PRILOSEC) capsule 20 mg  20 mg BID Tierra Atkinson M.D.   20 mg at 03/24/20 0450   • magnesium oxide (MAG-OX) tablet 400 mg  400 mg DAILY Tierra Atkinson M.D.   400 mg at 03/24/20 0450   • tramadol (ULTRAM) 50 MG tablet 50 mg  50 mg Q8HRS PRN Tierra Atkinson M.D.   50 mg at 03/24/20 0116   Last reviewed on 3/22/2020  5:17 PM by Madonna Warner    No Known Allergies    Physical Exam  Body mass index is 19.46 kg/m². /59   Pulse 66   Temp 36.7 °C (98 °F) (Temporal)   Resp 17   Ht 1.803 m (5' 11\")   Wt 63.3 kg (139 lb 8.8 oz)   SpO2 99%    Vitals:    03/23/20 2000 03/24/20 0000 03/24/20 0400 03/24/20 0700   BP: 100/50 103/50 105/55 106/59   Pulse: 71 73 61 66   Resp: 16 16 18 17   Temp: 36.7 °C (98 °F) 36.5 °C (97.7 °F) 36.8 °C (98.3 °F) 36.7 °C (98 °F)   TempSrc: Temporal Temporal Temporal Temporal   SpO2: 94% 91% 94% 99%   Weight: 63.3 kg (139 lb 8.8 oz)      Height:        Oxygen Therapy:  Pulse Oximetry: 99 %, O2 (LPM): 1, O2 Delivery Device: Silicone Nasal Cannula    General: no apparent distress  Eyes: normal conjunctiva  ENT: OP clear  Neck: no JVD   Lungs: normal respiratory effort,  Completely clear without crackles, no wheezing or rhonchi.  Heart: normal rate, regular rhythm, 3/6 systolic murmur at the USB,  3/6 mid systolic murmur at the apex.   EXT: no edema bilateral lower extremities. + bilateral pedal pulses. no cyanosis.  Right femoral access site soft, mildly tender, no hematoma. 2 + Rt fem pulse palpable.  Right lower ext distal pulses palpable as well.  Abdomen: soft, non tender, non distended,  Neurological: No focal deficits, no facial asymmetry.  Normal speech.  Psychiatric: Appropriate affect, alert and oriented x 3. "   Skin: Warm extremities, no rash.    Labs (personally reviewed):     Lab Results   Component Value Date/Time    SODIUM 141 03/24/2020 03:46 AM    POTASSIUM 4.1 03/24/2020 03:46 AM    CHLORIDE 108 03/24/2020 03:46 AM    CO2 23 03/24/2020 03:46 AM    GLUCOSE 122 (H) 03/24/2020 03:46 AM    BUN 36 (H) 03/24/2020 03:46 AM    CREATININE 1.53 (H) 03/24/2020 03:46 AM     Lab Results   Component Value Date/Time    ALKPHOSPHAT 77 03/23/2020 01:21 AM    ASTSGOT 143 (H) 03/23/2020 01:21 AM    ALTSGPT 30 03/23/2020 01:21 AM    TBILIRUBIN 0.6 03/23/2020 01:21 AM      Lab Results   Component Value Date/Time    CHOLSTRLTOT 110 03/23/2020 01:21 AM    LDL 42 03/23/2020 01:21 AM    HDL 60 03/23/2020 01:21 AM    TRIGLYCERIDE 40 03/23/2020 01:21 AM     Lab Results   Component Value Date/Time    BNPBTYPENAT 612 (H) 11/19/2018 02:34 PM         Cardiac Imaging and Procedures Review:      Personal Telemetry Review:  SR with PVCs, some bigeminy.    Personal EKG Interpretation:    NSR HR 70 , PACs, LBBB (new) Qrsd 126, Qtc 519.    Echo 11/20/18:  CONCLUSIONS  Severely reduced left ventricular systolic function.  Left ventricular ejection fraction is visually estimated to be 25-30%.  Global hypokinesis with regional variation.  Aortic sclerosis without stenosis.  Unable to estimate pulmonary artery pressure due to an inadequate   tricuspid regurgitant jet.  Mild mitral regurgitation.  Compared to the report of the study done 4/7/2017  there has been   worsening of left ventricular ejection fraction.    Stress Test 4/10/17:  NUCLEAR IMAGING INTERPRETATION   Large sized, nonreversible, decreased uptake of severe severity in the apex    (LAD), anterior (LAD), apical septal (LAD), apical inferior and mid    anteroseptal (LAD) segments during post stress images suggestive of scar.  No      evidence of ischemia.   Enlarged ventricle with global hypokinesis and likely akinesis of the    anteroseptal and anterior walls (difficult to determine with  severely    decrease radiotracer uptake). Measured ejection fraction is 30%.   ECG INTERPRETATION   Negative stress ECG for ischemia.    Left Heart Cath 7/21/2017  Impression:  Pain stent in proximal LAD without significant in-stent stenosis.  Nonobstructive stenosis mid circumflex.  Diffuse plaquing in proximal to mid RCA with 40% stenosis at worst point.  Global hypokinesis of the left ventricle EF 36%  Significant coronary calcification.    [facility] 3/23/20:    FINDINGS:  HEMODYNAMICS:  LEFT HEART PRESSURES:  1.  LVEDP 18 mmHg.  2.  Left ventricular systolic pressure 104 mmHg.  3.  Central aortic pressure systolic 105, diastolic 55, mean of 72 mmHg.     CORONARY ARTERIOGRAPHY:  1.  LEFT MAIN ARTERY:  Left main artery is a moderately large caliber vessel   with eccentric ostial stenosis of approximately 30%.  The left main artery   bifurcates to left anterior descending artery and circumflex artery.  2.  LEFT ANTERIOR DESCENDING ARTERY:  Left anterior descending artery gives   rise to normal complement of septal and diagonal branches and extends around   the apex.  The proximal left anterior descending artery has an estimated   relative 40% stenosis followed by what appears to be a stent that is patent.    The remainder of the vessel is patent with mild diffuse intimal atheromatous   disease.  3.  CIRCUMFLEX ARTERY:  The circumflex gives rise to 2 major marginal   branches.  The mid circumflex has an eccentric 75-90% hazy stenosis, which is   new compared to the 2017 study.  4.  RIGHT CORONARY ARTERY:  The right coronary artery gives rise to a   bifurcating posterior descending artery and a posterolateral system.  The   right coronary artery has a midvessel eccentric 30% stenosis.  The medial   branch of the posterior descending artery has a midvessel 95% stenosis.  This   vessel is small caliber.     INTRAVASCULAR ULTRASOUND (IVUS), left main artery demonstrates an   ostial minimal luminal area (MLA) of 8.0-9.3 square  millimeters indicating a   nonsignificant obstructive stenosis..     POST-STENT IMPLANTATION, mid circumflex artery with a 2.5x12 mm Gabe   drug-eluting stent demonstrates 0% residual stenosis, no evidence of   dissection or thrombus and ROBBIE 3 antegrade flow.      Assessment and Medical Decision Makin   NSTEMI.  S/p 2.5 x 12 MATY to the mid LCx 3/23/20.  Continue DAPT with ASA, Plavix.  Switched to high intensity statin, continue BB.   I sent prescriptions for his meds with refills.     2   Acute pulmonary edema  Doing well on 1 lpm O2.  Wean O2 today.    3   Presyncope.  Could be related to hypotension in the setting of taking SL nitro vs. ventricular arrhythmia.  No VT on tele.    4   Frequent PVCs.  Continue  Toprol XL 50 daily.    5   Chronic systolic congestive heart failure, NYHA class II, stage C, secondary to ischemic cardiomyopathy.       - Continue losartan 50, Toprol XL 50, and spironolactone   - LVEDP slightly elevated, but lungs clear, no swelling.    6   CAD with hx of anterior MI , s/p LAD stent - patent .  Hx of moderate disease to the mid RCA, 95% mid PDA.  LM 30% - not significant by IVUS.    7   Acute on chronic renal failure.  2.08 on presentation improved 1.5 today, which is approx baseline.    8   Paroxysmal SVT. None on tele, continue BB.    9   Essential hypertension.  Stable.    10  Hyperlipidemia. Change pravastatin to atorvastatin 80.  LDL 42.         Cardiology will sign off at this time.  Please contact our service directly with further questions/concerns.    Future Appointments   Date Time Provider Department Center   3/27/2020  1:40 PM Carlos Mccormack M.D. [facility] None   2020 10:15 AM [place] [facility] None         Lian Quintana PA-C  [facility]

## 2020-03-24 NOTE — CONSULTS
"Reason for PC Consult: Advance Care Planning    Consulted by: Dr. Atkinson; currently Dr. Lennon    Assessment:  General: 88 y/o male from home with chest pain, associated N/V and dizziness. Pt worked up for cardiac source and found to have nSTEMI. PMHx of CAD with prior stenting, CHF with EF 25%, HTN, anemia, and acute renal failure. Pt went to cath lab and had stent placed 3/23/20; prior stent remains patent. Todd is [place] but responding appropriately to questions.    Social: Pt lives with his wife Lia in [place]. He is her primary caregiver given she has dementia and a recent back surgery. He has 3 daughters; 2 in [place] and one in [place]. Mariia from [place] is currently helping with wife Lia. He is a retired [employer] [facility]     Consults: cardiology    Dyspnea: No-    Last BM: (PTA)-    Pain: No-    Depression: Mood appropriate for situation-    Dementia: No;       Spiritual:  Is Jainism or spirituality important for coping with this illness? No-    Has a  or spiritual provider visit been requested? No    Palliative Performance Scale: 60%    Advance Directive: None-  [facility] his AD  DPOA: No-    POLST: Yes-      Code Status: DNR- DNI    Outcome:  Case discussed with MD noting referral prior to cath lab interventions. Plan made to assess any needs for Todd at home.     PC RN met with Todd at bedside and explained the role of PC. He was able to provide his reason for admission and social history. He spoke mostly of his wife and her needs vs his own, but states that he helps her daily with all of her needs and is able to manage this on his own. He tell this RN that his daughter works with hospice and they are planning to have \"a family meeting Thursday to discuss this\" but in relation to his wife. He recalls that his first wife  of cancer and he spoke about the use of hospice for her. PC asked about his feelings caring for Lia and he says \"we make it work.\" He " states that he will have help from Mariia when he is discharged as well. He gave this RN her number to call and check in [phone]. Todd denied any questions or concerns and was unable to identify any needs for himself at home.     Call attempted to Mariia at the above number but it was not in service. [facility] file. Will await [facility] the [facility] and assess need for AD given his wife has dementia.    Updated: MD    Plan: f/u for AD if needed    Thank you for allowing Palliative Care to support this patient and family. Contact [phone] for additional assistance, change in patient status, or with any questions/concerns.

## 2020-03-24 NOTE — PROGRESS NOTES
[facility] Medicine Daily Progress Note    Date of Service  3/24/2020    Chief Complaint  87 y.o. male admitted 3/22/2020 with chest pain    Hospital Course    87-year-old male with past medical history of coronary disease status post large anterior MI and stent in past presented to the hospital on March 22, 2020 with complaint of chest pain.  He found a diagnosis of an STEMI and he was started on heparin drip and cardiology was consulted. He did have another stent 3/23 mid lx. He was continues on medication for cardiomyopathy. 3/24- he had new acute neurological changes. CT head new evolving stroke. Neurology/stroke code was initiated. 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 No intervention. He was not a candidate for t-Pa. Family was updated that pt condition might worse. Pt DNAR/DNI. BP meds discontinued for permissive hypertension. Stroke protocol initiated.         Interval Problem Update  Pt did complain of dizziness in the morning but it did resolved. He was slight more worried for his wife, and he was asking to go home. 2:47 Rapid team was paged due to new neurological findings. Dr. Ordonez did see patient immediately. Not a candidate for tPa. BP meds discontinued. I did spoke with his daughter, and Lia (step daughter). I did explain new findings. I did prepare that their father condition might worse overnight. Vitals are stable at this time, but his mentation continues to worsen. He might need restrain. He remain DNAR/DNI. Prognosis is poor. Family appreciative and has full understanding of new events.      Consultants/Specialty  Cardiology    Code Status  DNR/DNI    Disposition  To be decided    Review of Systems  Review of Systems   Constitutional: Positive for malaise/fatigue. Negative for chills, fever and weight loss.   HENT: Negative for hearing loss and tinnitus.    Eyes: Negative for blurred vision, double vision, photophobia and pain.   Respiratory: Negative for cough, sputum  production and shortness of breath.    Cardiovascular: Negative for chest pain, palpitations, orthopnea and leg swelling.   Gastrointestinal: Negative for abdominal pain, constipation, diarrhea, nausea and vomiting.   Genitourinary: Negative for dysuria, frequency and urgency.   Musculoskeletal: Negative for back pain, joint pain, myalgias and neck pain.   Skin: Negative for rash.   Neurological: Positive for dizziness, speech change, focal weakness and headaches. Negative for tingling, tremors and sensory change.   Psychiatric/Behavioral: Negative for hallucinations and substance abuse.   All other systems reviewed and are negative.       Physical Exam  Temp:  [36.4 °C (97.6 °F)-36.8 °C (98.3 °F)] 36.4 °C (97.6 °F)  Pulse:  [61-84] 70  Resp:  [16-18] 16  BP: (100-132)/(50-74) 128/70  SpO2:  [91 %-100 %] 99 %    Physical Exam  Vitals signs and nursing note reviewed.   Constitutional:       General: He is not in acute distress.     Appearance: He is ill-appearing.   HENT:      Head: Normocephalic and atraumatic. No contusion.      Right Ear: External ear normal.      Left Ear: External ear normal.      Nose: Nose normal.      Mouth/Throat:      Mouth: Mucous membranes are moist.      Pharynx: No oropharyngeal exudate.   Eyes:      General:         Right eye: No discharge.         Left eye: No discharge.      Pupils: Pupils are equal, round, and reactive to light.   Neck:      Musculoskeletal: No neck rigidity or muscular tenderness.   Cardiovascular:      Rate and Rhythm: Normal rate and regular rhythm.      Heart sounds: No murmur. No friction rub. No gallop.    Pulmonary:      Effort: Pulmonary effort is normal.      Breath sounds: No wheezing or rhonchi.   Abdominal:      General: Bowel sounds are normal. There is no distension.      Palpations: Abdomen is soft.      Tenderness: There is no abdominal tenderness. There is no rebound.   Musculoskeletal: Normal range of motion.         General: No swelling or  tenderness.   Skin:     General: Skin is warm and dry.      Coloration: Skin is not jaundiced.   Neurological:      Mental Status: He is lethargic and disoriented.      GCS: GCS verbal subscore is 3.      Cranial Nerves: Cranial nerve deficit, dysarthria and facial asymmetry present.      Sensory: No sensory deficit.      Comments: Positive right nystagmus   Left facial drop, and weakness on left hand      Psychiatric:         Mood and Affect: Mood normal.         Fluids    Intake/Output Summary (Last 24 hours) at 3/24/2020 1709  Last data filed at 3/24/2020 0600  Gross per 24 hour   Intake 1475 ml   Output 1200 ml   Net 275 ml       Laboratory  Recent Labs     03/22/20  1520 03/23/20  0121 03/24/20  0346   WBC 9.4 7.2 9.2   RBC 3.49* 3.15* 2.84*   HEMOGLOBIN 11.2* 10.3* 9.5*   HEMATOCRIT 35.0* 30.9* 29.3*   .3* 98.1* 103.2*   MCH 32.1 32.7 33.5*   MCHC 32.0* 33.3* 32.4*   RDW 48.2 46.5 50.9*   PLATELETCT 202 183 163*   MPV 10.6 10.6 10.7     Recent Labs     03/22/20  1520 03/23/20  0121 03/24/20  0346   SODIUM 137 137 141   POTASSIUM 3.9 4.0 4.1   CHLORIDE 102 103 108   CO2 21 21 23   GLUCOSE 144* 177* 122*   BUN 58* 51* 36*   CREATININE 2.08* 1.74* 1.53*   CALCIUM 9.3 9.3 9.2     Recent Labs     03/22/20  1520 03/23/20  0121 03/23/20  0806 03/23/20  1612   APTT 27.3 169.3* 168.6* 80.8*   INR 1.20*  --   --   --          Recent Labs     03/23/20  0121   TRIGLYCERIDE 40   HDL 60   LDL 42       Imaging  CT-CTA NECK WITH & W/O-POST PROCESSING   Final Result      1.  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 Right vertebral artery is patent.      2.  Atherosclerotic change of the carotid arteries bilaterally without significant flow limitation.      This was discussed with ADALBERTO MEZA at 2:57 PM on 3/24/2020.      CT-CTA HEAD WITH & W/O-POST PROCESS   Final Result      Occluded left vertebral artery.            CT-CEREBRAL PERFUSION ANALYSIS   Final Result      1.  Cerebral blood flow less than 30% likely  representing completed infarct = 0 mL.      2.  T Max more than 6 seconds likely representing combination of completed infarct and ischemia = 0 mL.      3.  Mismatched volume likely representing ischemic brain/penumbra = None      4.  Please note that the cerebral perfusion was performed on the limited brain tissue around the basal ganglia region. Infarct/ischemia outside the CT perfusion sections can be missed in this study.      CT-HEAD W/O   Final Result      Evolving left cerebellar infarction.      Hyperdensity within the left vertebral artery may represent a thrombus.      Atrophy      There are periventricular and subcortical white matter changes present.  This finding is nonspecific and could be from previous small vessel ischemia, demyelination, or gliosis.         EC-ECHOCARDIOGRAM COMPLETE W/ CONT   Final Result      CT-HEAD W/O   Final Result      No acute intracranial abnormality is identified.      Atrophy      There are periventricular and subcortical white matter changes present.  This finding is nonspecific and could be from previous small vessel ischemia, demyelination, or gliosis.         DX-CHEST-PORTABLE (1 VIEW)   Final Result      Cardiomegaly.      Pulmonary interstitial prominence likely represents interstitial edema. Pneumonitis not excluded.      Atherosclerotic plaque.         CL-LEFT HEART CATHETERIZATION WITH POSSIBLE INTERVENTION    (Results Pending)        Assessment/Plan  * NSTEMI (non-ST elevated myocardial infarction) ([facility])  Assessment & Plan  He did receive heparin on admission   Stent placed 3/23. On plavix, statin,   Hold BP meds for permissive hypertension       Acute on chronic systolic heart failure ([facility])  Assessment & Plan  Decompensated  Cardiology consulted.  Very poor prognosis   Stop medications for permissive hypertension with new CVA     COPD (chronic obstructive pulmonary disease) ([facility])- (present on admission)  Assessment & Plan  Not in exacerbation  Continue home  inhalers  Oxygen as needed.    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   Poor prognosis   Pt mentation worsen   Close monitoring on seizure, aspiration precaution   Hold BP meds for permissive hypertension      Acute respiratory failure with hypoxia ([facility])  Assessment & Plan  On 2 L of O2   Current oxygen saturation is above 95%.  Titrate oxygen as tolerated.  Likely secondary to acute pulmonary edema      Anemia  Assessment & Plan  Unknown etiology   Iron panel did not show any deficiency anemia.  Vitamin B12 and folic acid did not show deficiency    Acute renal failure (ARF) ([facility])  Assessment & Plan  Unknown etiology at this point  Renal function has been improving.  Avoid nephrotoxins.  Continue monitor closely.        Essential hypertension- (present on admission)  Assessment & Plan  Hold BP meds  Continue to monitor        I discussed plan of care during multidisciplinary rounds    VTE prophylaxis: SCD

## 2020-03-24 NOTE — PROGRESS NOTES
Notified this afternoon of patient's decline.   c/o dizziness, R sided HA, hallucinating and having L sided weakness and nystagmus around 1400.   Code stroke was called.  See neuro consult notes for more details.    Found to have evolving L cerebellar infarction on head CT,   CTA with L vertebral artery occlusion, patent R vertebral and carotid arteries.     I thoroughly reviewed patient's telemetry.   No sign of atrial fibrillation or flutter.   Possible cardioembolic source surrounding catheterization, plaque embolization??   He got heparin and bivalirudin perioperatively 3/23/20.  Please continue DAPT - ASA, Plavix.   No indication for anticoagulation at this time.     Discussed with Dr. Etelvina Quintana PA-C  3/24/2020

## 2020-03-25 ENCOUNTER — APPOINTMENT (OUTPATIENT)
Dept: RADIOLOGY | Facility: MEDICAL CENTER | Age: 85
DRG: 246 | End: 2020-03-25
Attending: STUDENT IN AN ORGANIZED HEALTH CARE EDUCATION/TRAINING PROGRAM
Payer: MEDICARE

## 2020-03-25 DIAGNOSIS — I63.529 ACUTE ISCHEMIC CEREBROVASCULAR ACCIDENT (CVA) INVOLVING ANTERIOR CEREBRAL ARTERY TERRITORY (HCC): Primary | ICD-10-CM

## 2020-03-25 LAB
ALBUMIN SERPL BCP-MCNC: 3.9 G/DL (ref 3.2–4.9)
ALBUMIN/GLOB SERPL: 1.7 G/DL
ALP SERPL-CCNC: 68 U/L (ref 30–99)
ALT SERPL-CCNC: 17 U/L (ref 2–50)
ANION GAP SERPL CALC-SCNC: 11 MMOL/L (ref 7–16)
AST SERPL-CCNC: 43 U/L (ref 12–45)
BASOPHILS # BLD AUTO: 0.3 % (ref 0–1.8)
BASOPHILS # BLD: 0.03 K/UL (ref 0–0.12)
BILIRUB SERPL-MCNC: 0.4 MG/DL (ref 0.1–1.5)
BUN SERPL-MCNC: 30 MG/DL (ref 8–22)
CALCIUM SERPL-MCNC: 9.3 MG/DL (ref 8.5–10.5)
CHLORIDE SERPL-SCNC: 108 MMOL/L (ref 96–112)
CHOLEST SERPL-MCNC: 91 MG/DL (ref 100–199)
CO2 SERPL-SCNC: 23 MMOL/L (ref 20–33)
CREAT SERPL-MCNC: 1.52 MG/DL (ref 0.5–1.4)
EOSINOPHIL # BLD AUTO: 0.06 K/UL (ref 0–0.51)
EOSINOPHIL NFR BLD: 0.6 % (ref 0–6.9)
ERYTHROCYTE [DISTWIDTH] IN BLOOD BY AUTOMATED COUNT: 50.4 FL (ref 35.9–50)
GLOBULIN SER CALC-MCNC: 2.3 G/DL (ref 1.9–3.5)
GLUCOSE SERPL-MCNC: 116 MG/DL (ref 65–99)
HCT VFR BLD AUTO: 29.5 % (ref 42–52)
HDLC SERPL-MCNC: 47 MG/DL
HGB BLD-MCNC: 9.4 G/DL (ref 14–18)
IMM GRANULOCYTES # BLD AUTO: 0.05 K/UL (ref 0–0.11)
IMM GRANULOCYTES NFR BLD AUTO: 0.5 % (ref 0–0.9)
LDLC SERPL CALC-MCNC: 30 MG/DL
LYMPHOCYTES # BLD AUTO: 1.2 K/UL (ref 1–4.8)
LYMPHOCYTES NFR BLD: 13 % (ref 22–41)
MCH RBC QN AUTO: 32.6 PG (ref 27–33)
MCHC RBC AUTO-ENTMCNC: 31.9 G/DL (ref 33.7–35.3)
MCV RBC AUTO: 102.4 FL (ref 81.4–97.8)
MONOCYTES # BLD AUTO: 1.18 K/UL (ref 0–0.85)
MONOCYTES NFR BLD AUTO: 12.8 % (ref 0–13.4)
NEUTROPHILS # BLD AUTO: 6.72 K/UL (ref 1.82–7.42)
NEUTROPHILS NFR BLD: 72.8 % (ref 44–72)
NRBC # BLD AUTO: 0 K/UL
NRBC BLD-RTO: 0 /100 WBC
PLATELET # BLD AUTO: 166 K/UL (ref 164–446)
PMV BLD AUTO: 10.8 FL (ref 9–12.9)
POTASSIUM SERPL-SCNC: 4.2 MMOL/L (ref 3.6–5.5)
PROT SERPL-MCNC: 6.2 G/DL (ref 6–8.2)
RBC # BLD AUTO: 2.88 M/UL (ref 4.7–6.1)
SODIUM SERPL-SCNC: 142 MMOL/L (ref 135–145)
TRIGL SERPL-MCNC: 69 MG/DL (ref 0–149)
WBC # BLD AUTO: 9.2 K/UL (ref 4.8–10.8)

## 2020-03-25 PROCEDURE — A9270 NON-COVERED ITEM OR SERVICE: HCPCS | Performed by: HOSPITALIST

## 2020-03-25 PROCEDURE — 92610 EVALUATE SWALLOWING FUNCTION: CPT

## 2020-03-25 PROCEDURE — 700102 HCHG RX REV CODE 250 W/ 637 OVERRIDE(OP): Performed by: HOSPITALIST

## 2020-03-25 PROCEDURE — 770020 HCHG ROOM/CARE - TELE (206)

## 2020-03-25 PROCEDURE — 85025 COMPLETE CBC W/AUTO DIFF WBC: CPT

## 2020-03-25 PROCEDURE — 80053 COMPREHEN METABOLIC PANEL: CPT

## 2020-03-25 PROCEDURE — 700102 HCHG RX REV CODE 250 W/ 637 OVERRIDE(OP): Performed by: INTERNAL MEDICINE

## 2020-03-25 PROCEDURE — 99232 SBSQ HOSP IP/OBS MODERATE 35: CPT | Performed by: INTERNAL MEDICINE

## 2020-03-25 PROCEDURE — 51798 US URINE CAPACITY MEASURE: CPT

## 2020-03-25 PROCEDURE — 97166 OT EVAL MOD COMPLEX 45 MIN: CPT

## 2020-03-25 PROCEDURE — 36415 COLL VENOUS BLD VENIPUNCTURE: CPT

## 2020-03-25 PROCEDURE — 70551 MRI BRAIN STEM W/O DYE: CPT

## 2020-03-25 PROCEDURE — 99233 SBSQ HOSP IP/OBS HIGH 50: CPT | Performed by: PSYCHIATRY & NEUROLOGY

## 2020-03-25 PROCEDURE — 97162 PT EVAL MOD COMPLEX 30 MIN: CPT

## 2020-03-25 PROCEDURE — 80061 LIPID PANEL: CPT

## 2020-03-25 PROCEDURE — A9270 NON-COVERED ITEM OR SERVICE: HCPCS | Performed by: INTERNAL MEDICINE

## 2020-03-25 RX ORDER — LOSARTAN POTASSIUM 50 MG/1
50 TABLET ORAL
Status: DISCONTINUED | OUTPATIENT
Start: 2020-03-25 | End: 2020-03-27

## 2020-03-25 RX ADMIN — CLOPIDOGREL BISULFATE 75 MG: 75 TABLET ORAL at 11:35

## 2020-03-25 RX ADMIN — TRAMADOL HYDROCHLORIDE 50 MG: 50 TABLET, FILM COATED ORAL at 11:41

## 2020-03-25 RX ADMIN — MELATONIN 1000 UNITS: at 11:35

## 2020-03-25 RX ADMIN — Medication 400 MG: at 11:35

## 2020-03-25 RX ADMIN — OMEPRAZOLE 20 MG: 20 CAPSULE, DELAYED RELEASE ORAL at 11:35

## 2020-03-25 RX ADMIN — OMEPRAZOLE 20 MG: 20 CAPSULE, DELAYED RELEASE ORAL at 18:01

## 2020-03-25 RX ADMIN — ASPIRIN 325 MG: 325 TABLET, FILM COATED ORAL at 11:34

## 2020-03-25 RX ADMIN — MELATONIN 1000 UNITS: at 18:01

## 2020-03-25 RX ADMIN — ATORVASTATIN CALCIUM 80 MG: 80 TABLET, FILM COATED ORAL at 18:01

## 2020-03-25 ASSESSMENT — COGNITIVE AND FUNCTIONAL STATUS - GENERAL
MOBILITY SCORE: 12
HELP NEEDED FOR BATHING: A LOT
SUGGESTED CMS G CODE MODIFIER MOBILITY: CL
SUGGESTED CMS G CODE MODIFIER DAILY ACTIVITY: CL
WALKING IN HOSPITAL ROOM: A LOT
EATING MEALS: A LOT
MOVING FROM LYING ON BACK TO SITTING ON SIDE OF FLAT BED: UNABLE
MOVING TO AND FROM BED TO CHAIR: UNABLE
DRESSING REGULAR UPPER BODY CLOTHING: A LOT
DRESSING REGULAR LOWER BODY CLOTHING: A LOT
TOILETING: A LOT
PERSONAL GROOMING: A LOT
DAILY ACTIVITIY SCORE: 12
STANDING UP FROM CHAIR USING ARMS: A LITTLE
CLIMB 3 TO 5 STEPS WITH RAILING: TOTAL

## 2020-03-25 ASSESSMENT — ENCOUNTER SYMPTOMS
EYE PAIN: 0
SENSORY CHANGE: 0
TINGLING: 0
ABDOMINAL PAIN: 0
NECK PAIN: 0
HEADACHES: 0
SPUTUM PRODUCTION: 0
WEIGHT LOSS: 0
TREMORS: 0
COUGH: 0
HALLUCINATIONS: 0
PHOTOPHOBIA: 0
SPEECH CHANGE: 0
ORTHOPNEA: 0
FOCAL WEAKNESS: 1
DIZZINESS: 0
MYALGIAS: 0
DIARRHEA: 0
VOMITING: 0
CONSTIPATION: 0
CHILLS: 0
PALPITATIONS: 0
FEVER: 0
BACK PAIN: 0
BLURRED VISION: 0
DOUBLE VISION: 0
SHORTNESS OF BREATH: 0
NAUSEA: 0

## 2020-03-25 ASSESSMENT — ACTIVITIES OF DAILY LIVING (ADL): TOILETING: INDEPENDENT

## 2020-03-25 ASSESSMENT — LIFESTYLE VARIABLES: SUBSTANCE_ABUSE: 0

## 2020-03-25 ASSESSMENT — FIBROSIS 4 INDEX: FIB4 SCORE: 5.47

## 2020-03-25 NOTE — PROGRESS NOTES
Hospital Medicine Daily Progress Note    Date of Service  3/25/2020    Chief Complaint  87 y.o. male admitted 3/22/2020 with chest pain    Hospital Course    87-year-old male with past medical history of coronary disease status post large anterior MI and stent in past presented to the hospital on March 22, 2020 with complaint of chest pain.  He found a diagnosis of an STEMI and he was started on heparin drip and cardiology was consulted. He did have another stent 3/23 mid lx. He was continues on medication for cardiomyopathy. 3/24- he had new acute neurological changes. CT head new evolving stroke. Neurology/stroke code was initiated. 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 No intervention. He was not a candidate for t-Pa. Family was updated that pt condition might worse. Pt DNAR/DNI. BP meds discontinued for permissive hypertension. Stroke protocol initiated.         Interval Problem Update  Pt did complain of dizziness in the morning but it did resolved. He was slight more worried for his wife, and he was asking to go home. 2:47 Rapid team was paged due to new neurological findings. Dr. Ordonez did see patient immediately. Not a candidate for tPa. BP meds discontinued. I did spoke with his daughter, and Lia (step daughter). I did explain new findings. I did prepare that their father condition might worse overnight. Vitals are stable at this time, but his mentation continues to worsen. He might need restrain. He remain DNAR/DNI. Prognosis is poor. Family appreciative and has full understanding of new events.    3/25- pt slight better today. Asking to eat. He remembers what happened. He is aware he had a stroke. Almost close to baseline. MRI brain pending. Ok to place garcia. Pt has chronic urinary retention and he is using straight cath at home.     Consultants/Specialty  Cardiology    Code Status  DNR/DNI    Disposition  To be decided    Review of Systems  Review of Systems   Constitutional:  Negative for chills, fever, malaise/fatigue and weight loss.   HENT: Negative for hearing loss and tinnitus.    Eyes: Negative for blurred vision, double vision, photophobia and pain.   Respiratory: Negative for cough, sputum production and shortness of breath.    Cardiovascular: Negative for chest pain, palpitations, orthopnea and leg swelling.   Gastrointestinal: Negative for abdominal pain, constipation, diarrhea, nausea and vomiting.   Genitourinary: Negative for dysuria, frequency and urgency.   Musculoskeletal: Negative for back pain, joint pain, myalgias and neck pain.   Skin: Negative for rash.   Neurological: Positive for focal weakness. Negative for dizziness, tingling, tremors, sensory change, speech change and headaches.   Psychiatric/Behavioral: Negative for hallucinations and substance abuse.   All other systems reviewed and are negative.       Physical Exam  Temp:  [36.3 °C (97.3 °F)-36.9 °C (98.5 °F)] 36.8 °C (98.2 °F)  Pulse:  [65-91] 68  Resp:  [16-18] 18  BP: (102-132)/(54-74) 126/65  SpO2:  [90 %-100 %] 99 %    Physical Exam  Vitals signs and nursing note reviewed.   Constitutional:       General: He is not in acute distress.     Appearance: He is ill-appearing.   HENT:      Head: Normocephalic and atraumatic. No contusion.      Right Ear: External ear normal.      Left Ear: External ear normal.      Nose: Nose normal.      Mouth/Throat:      Mouth: Mucous membranes are moist.      Pharynx: No oropharyngeal exudate.   Eyes:      General:         Right eye: No discharge.         Left eye: No discharge.      Pupils: Pupils are equal, round, and reactive to light.   Neck:      Musculoskeletal: No neck rigidity or muscular tenderness.   Cardiovascular:      Rate and Rhythm: Normal rate and regular rhythm.      Heart sounds: No murmur. No friction rub. No gallop.    Pulmonary:      Effort: Pulmonary effort is normal.      Breath sounds: No wheezing or rhonchi.   Abdominal:      General: Bowel sounds  are normal. There is no distension.      Palpations: Abdomen is soft.      Tenderness: There is no abdominal tenderness. There is no rebound.   Musculoskeletal: Normal range of motion.         General: No swelling or tenderness.   Skin:     General: Skin is warm and dry.      Coloration: Skin is not jaundiced.   Neurological:      Mental Status: He is oriented to person, place, and time. He is lethargic.      GCS: GCS verbal subscore is 3.      Cranial Nerves: Cranial nerve deficit, dysarthria and facial asymmetry present.      Sensory: No sensory deficit.      Comments: Positive right nystagmus   Left facial drop and weakness on left hand improving     Psychiatric:         Mood and Affect: Mood normal.         Fluids    Intake/Output Summary (Last 24 hours) at 3/25/2020 1016  Last data filed at 3/25/2020 0247  Gross per 24 hour   Intake --   Output 400 ml   Net -400 ml       Laboratory  Recent Labs     03/23/20  0121 03/24/20  0346 03/25/20  0219   WBC 7.2 9.2 9.2   RBC 3.15* 2.84* 2.88*   HEMOGLOBIN 10.3* 9.5* 9.4*   HEMATOCRIT 30.9* 29.3* 29.5*   MCV 98.1* 103.2* 102.4*   MCH 32.7 33.5* 32.6   MCHC 33.3* 32.4* 31.9*   RDW 46.5 50.9* 50.4*   PLATELETCT 183 163* 166   MPV 10.6 10.7 10.8     Recent Labs     03/23/20  0121 03/24/20  0346 03/25/20  0219   SODIUM 137 141 142   POTASSIUM 4.0 4.1 4.2   CHLORIDE 103 108 108   CO2 21 23 23   GLUCOSE 177* 122* 116*   BUN 51* 36* 30*   CREATININE 1.74* 1.53* 1.52*   CALCIUM 9.3 9.2 9.3     Recent Labs     03/22/20  1520 03/23/20  0121 03/23/20  0806 03/23/20  1612   APTT 27.3 169.3* 168.6* 80.8*   INR 1.20*  --   --   --          Recent Labs     03/23/20  0121 03/25/20  0219   TRIGLYCERIDE 40 69   HDL 60 47   LDL 42 30       Imaging  CT-CTA NECK WITH & W/O-POST PROCESSING   Final Result      1.  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 Right vertebral artery is patent.      2.  Atherosclerotic change of the carotid arteries bilaterally without significant flow  limitation.      This was discussed with ADALBERTO MEZA at 2:57 PM on 3/24/2020.      CT-CTA HEAD WITH & W/O-POST PROCESS   Final Result      Occluded left vertebral artery.            CT-CEREBRAL PERFUSION ANALYSIS   Final Result      1.  Cerebral blood flow less than 30% likely representing completed infarct = 0 mL.      2.  T Max more than 6 seconds likely representing combination of completed infarct and ischemia = 0 mL.      3.  Mismatched volume likely representing ischemic brain/penumbra = None      4.  Please note that the cerebral perfusion was performed on the limited brain tissue around the basal ganglia region. Infarct/ischemia outside the CT perfusion sections can be missed in this study.      CT-HEAD W/O   Final Result      Evolving left cerebellar infarction.      Hyperdensity within the left vertebral artery may represent a thrombus.      Atrophy      There are periventricular and subcortical white matter changes present.  This finding is nonspecific and could be from previous small vessel ischemia, demyelination, or gliosis.         EC-ECHOCARDIOGRAM COMPLETE W/ CONT   Final Result      CT-HEAD W/O   Final Result      No acute intracranial abnormality is identified.      Atrophy      There are periventricular and subcortical white matter changes present.  This finding is nonspecific and could be from previous small vessel ischemia, demyelination, or gliosis.         DX-CHEST-PORTABLE (1 VIEW)   Final Result      Cardiomegaly.      Pulmonary interstitial prominence likely represents interstitial edema. Pneumonitis not excluded.      Atherosclerotic plaque.         CL-LEFT HEART CATHETERIZATION WITH POSSIBLE INTERVENTION    (Results Pending)        Assessment/Plan  * NSTEMI (non-ST elevated myocardial infarction) ([facility])  Assessment & Plan  He did receive heparin on admission   Stent placed 3/23. On plavix, statin,   3/25- no chest pain       Acute on chronic systolic heart failure ([facility])  Assessment &  Plan  Decompensated  Cardiology consulted.  Very poor prognosis   Stop medications for permissive hypertension with new CVA   3/25- will resume BP meds/heart failure meds tomorrow. Continue to monitor. Stable. No signs of decompensation at this time.     COPD (chronic obstructive pulmonary disease) ([facility])- (present on admission)  Assessment & Plan  Not in exacerbation  Continue home inhalers  Oxygen as needed.    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 Some improvement on neurological signs. Continue to monitor       Acute respiratory failure with hypoxia ([facility])  Assessment & Plan  On 2 L of O2   Current oxygen saturation is above 95%.  Titrate oxygen as tolerated.  Likely secondary to acute pulmonary edema  3/25- wean off oxygen       Anemia  Assessment & Plan  Unknown etiology   Iron panel did not show any deficiency anemia.  Vitamin B12 and folic acid did not show deficiency    Acute renal failure (ARF) ([facility])  Assessment & Plan  Unknown etiology at this point  Renal function has been improving.  Avoid nephrotoxins.  Continue monitor closely.  3/25- stable         Essential hypertension- (present on admission)  Assessment & Plan  Hold BP meds  Continue to monitor        I discussed plan of care during multidisciplinary rounds    VTE prophylaxis: SCD

## 2020-03-25 NOTE — PROGRESS NOTES
Monitor Summary:   NSR 68-82 w/ freq PVCs/occ PACs  0.20/0.12/0.44        12 hour chart check complete

## 2020-03-25 NOTE — DISCHARGE PLANNING
[facility] Transitional [facility] Coordination     Referral from:  Dr. Lennon   Facesheet indicates: Medicare   Potential Rehab Diagnosis: Stroke protocol          Physiatry consultation Pended per protocol.        Waiting on additional information to determine appropriateness for acute inpatient rehabilitation. Will continue to follow.       Thank you for the referral.

## 2020-03-25 NOTE — ASSESSMENT & PLAN NOTE
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 Some improvement on neurological signs. Continue to monitor

## 2020-03-25 NOTE — PROGRESS NOTES
Hospital Neurology Progress Note:     Interval History: No acute events overnight.  States that her vertigo is better as well as headache.    Objective:   Vitals:    03/24/20 1915 03/24/20 2357 03/25/20 0335 03/25/20 0800   BP: 112/60 117/67 126/67 126/65   Pulse: 87 75 91 68   Resp: 17 16 17 18   Temp: 36.9 °C (98.4 °F) 36.3 °C (97.3 °F) 36.9 °C (98.5 °F) 36.8 °C (98.2 °F)   TempSrc: Temporal Temporal Temporal Temporal   SpO2: 90% 90% 99% 99%   Weight: 65 kg (143 lb 4.8 oz)      Height:           Labs:     Lab Results   Component Value Date/Time    PROTHROMBTM 15.5 (H) 03/22/2020 03:20 PM    INR 1.20 (H) 03/22/2020 03:20 PM      Lab Results   Component Value Date/Time    WBC 9.2 03/25/2020 02:19 AM    RBC 2.88 (L) 03/25/2020 02:19 AM    HEMOGLOBIN 9.4 (L) 03/25/2020 02:19 AM    HEMATOCRIT 29.5 (L) 03/25/2020 02:19 AM    .4 (H) 03/25/2020 02:19 AM    MCH 32.6 03/25/2020 02:19 AM    MCHC 31.9 (L) 03/25/2020 02:19 AM    MPV 10.8 03/25/2020 02:19 AM    NEUTSPOLYS 72.80 (H) 03/25/2020 02:19 AM    LYMPHOCYTES 13.00 (L) 03/25/2020 02:19 AM    MONOCYTES 12.80 03/25/2020 02:19 AM    EOSINOPHILS 0.60 03/25/2020 02:19 AM    BASOPHILS 0.30 03/25/2020 02:19 AM      Lab Results   Component Value Date/Time    SODIUM 142 03/25/2020 02:19 AM    POTASSIUM 4.2 03/25/2020 02:19 AM    CHLORIDE 108 03/25/2020 02:19 AM    CO2 23 03/25/2020 02:19 AM    GLUCOSE 116 (H) 03/25/2020 02:19 AM    BUN 30 (H) 03/25/2020 02:19 AM    CREATININE 1.52 (H) 03/25/2020 02:19 AM      Lab Results   Component Value Date/Time    CHOLSTRLTOT 91 (L) 03/25/2020 02:19 AM    LDL 30 03/25/2020 02:19 AM    HDL 47 03/25/2020 02:19 AM    TRIGLYCERIDE 69 03/25/2020 02:19 AM       Lab Results   Component Value Date/Time    ALKPHOSPHAT 68 03/25/2020 02:19 AM    ASTSGOT 43 03/25/2020 02:19 AM    ALTSGPT 17 03/25/2020 02:19 AM    TBILIRUBIN 0.4 03/25/2020 02:19 AM        Imaging/Testing:   No new imaging to review.    TTE reviewed in chart.     Physical Exam:       General: 87-year-old male in bed in no acute distress  Cardio: Normal S1/S2. No peripheral edema.   Pulm: CTAX2. No respiratory distress.   Skin: Warm, dry, no rashes or lesions   Psychiatric: Appropriate affect. No active psychosis.  HEENT: Atraumatic head, normal sclera and conjunctiva, moist oral mucosa. No lid lag.  Abdomen: Soft, non tender. No masses or hepatosplenomegaly.    1a. Level of Consciousness (Alert, drowsy, etc): 0= Alert    1b. LOC Questions (Month, age): 0= Answers both correctly    1c. LOC Commands (Open/close eyes make fist/let go): 0= Obeys both correctly    2.   Best Gaze (Eyes open - patient follows examiner's finger on face): 0= Normal    3.   Visual Fields (introduce visual stimulus/threat to patient's field quadrants): 0= No visual loss  4.   Facial Paresis (Show teeth, raise eyebrows and squeeze eyes shut): 1= Minor     5a. Motor Arm - Left (Elevate arm to 90 degrees if patient is sitting, 45 degrees if  supine): 1= Drift    5b. Motor Arm - Right (Elevate arm to 90 degrees if patient is sitting, 45 degrees if supine): 0= No drift    6a. Motor Leg - Left (Elevate leg 30 degrees with patient supine): 0= No drift    6b. Motor Leg - Right  (Elevate leg 30 degrees with patient supine): 0= No drift    7.   Limb Ataxia (Finger-nose, heel down shin): 1= Present in 1 limb    8.   Sensory (Pin prick to face, arm, trunk and leg - compare side to side): 1= Partial loss    9.  Best Language (Name item, describe a picture and read sentences): 0= No aphasia    10. Dysarthria (Evaluate speech clarity by patient repeating listed words): 1= Mild to moderate slurring    11. Extinction and Inattention (Use information from prior testing to identify neglect or  double simultaneous stimuli testing): 0= No neglect    Total NIH Score: 5    Assessment/Plan:    Kyler Donato is a 87 y.o. male with history of acute end STEMI status post heart cath, coronary artery disease, hypertension, hyperlipidemia,  paroxysmal supraventricular tachycardia presenting to the hospital for an STEMI and consulted for stroke.  On exam patient has evidence of left cerebellar infarction as evidenced by ataxia on the left as well as unilateral nystagmus however furthermore he has evidence of weakness involving the left side as well as double vision which point to further infarction of the brainstem.  Etiology of stroke likely secondary to cardioembolic phenomenon given ejection fraction of less than 30%.    Plan:  -Neuro checks/vitals per protocol.  -Permissive HTN 24 hours, normotension after.  -MR Brain W/O CST pending.  -LDL 30 at goal  -Hgb A1C pending.  -Continue antiplatelet therapy for now.  -Will need cardiac rhythm monitoring as outpatient.   -Will discuss case w/ cardiology.   -Outpatient stroke bridge clinic referral placed.  -Plan discussed with consulting physician and patient's nurse.     Thai Ordonez M.D., Diplomat of the American Board of Psychiatry and Neurology  Diplomat of [employer] Epilepsy Subspecialty   Assistant Clinical Professor, [facility] Neurology Consultant

## 2020-03-25 NOTE — THERAPY
"Physical Therapy Evaluation completed.   Bed Mobility:  Supine to Sit: Minimal Assist  Transfers: Sit to Stand: Minimal Assist(of 2, HHA )  Gait: min A  with HHA along EOB, session terminated by need for MRI transport      Plan of Care: Will benefit from Physical Therapy 5 times per week  Discharge Recommendations: Equipment: Will Continue to Assess for Equipment Needs    Pt presents with impaired dynamic balance, motor control, balance and strength associated with c/c of CP, foudn to have STEMI requiring MATY to circumflex, EF 25%. Hospital course c/b L PICA infarct with left vertebral artery occlusion. Pt very pleasant and agreeable, needs redirection to isolated left LE movement, volitionally presents 2-/5 but appeared to improve with single step function based commands; he is the primary caregiver for his wife, unable to articulate where she is now and who is caring for her; chart mentions a step daughter being involved. From a PT perspective, would recommend placement and PMR consult. Will follow.           See \"Rehab Therapy-Acute\" Patient Summary Report for complete documentation.     "

## 2020-03-25 NOTE — THERAPY
"Occupational Therapy Evaluation completed.   Functional Status: Min A supine>sit EOB, max A w/Lb dressing min A sit>Stand very unsteady, generalized incoordination, mod A w/side steps. BTB w/min A. RN aware pt leaving w/transport   Plan of Care: Will benefit from Occupational Therapy 5 times per week  Discharge Recommendations:  Equipment: Will Continue to Assess for Equipment Needs. Post-acute therapy Recommend post-acute placement for additional occupational therapy services prior to discharge home.      See \"Rehab Therapy-Acute\" Patient Summary Report for complete documentation.      87 yr old male admitted for chest pain, this admission pt dx w/NSTEMI, acute on chronic systolic heart failure, new CVA, acute renal failure and anemia. Pt is demonstrating global incoordination, poor balance and impaired vision and postural control impacting ADL's and txfs. Recommend post acute placement prior to d/c home   "

## 2020-03-25 NOTE — PROGRESS NOTES
"       [facility] Cardiology Follow-up Note    Date of Service:    3/25/2020      Name:   Kyler Donato     YOB: 1932  Age:   87 y.o.  male   MRN:   [id number]      Chief Complaint: CP    Primary Cardiologist:  Dr. Mccormack    HPI:  Mr Donato is an 87 y.o fellow with hx of CAD s/p large anterior MI in 1995 wtih stent to the LAD.  He has hx of ICMO with EF in the 25-35% range.   Some notes of COPD? with a 50 pack year hx.  He has hx of paroxysmal SVT.  Presented to [facility] on 3/22 after waking overnight with CP which did not resolve w 1 SL nitro.  Thereafter he noted hypotension BP 60/40, began feeling very weak and pre-syncopal.  His wife called 911.      On presentation troponin trending up:  1193, 967, 1885.  BNP 6942.  He was in SELWYN with Cr of 2.08 (baseline 1.3-1.5).  He was hypoxic with pulmonary edema.       3/23/20 Underwent [facility] S/p 2.5 x 12 MATY to the mid LCx.    3/24/20 overnight and in AM pt c/o dizziness with progression of neuro symptoms - L sided weakness, nystagmus in the afternoon.  Code stroke called.  CT found L vertebral artery occlusion with evolving cerebellar stroke.    Interim Events:  Patient states he is still dizzy today  HA improved.   L sided weakness.  Seems oriented.  Knows where he is and why he is here.   Tells me he had a heart attack and \"strokes.\"  Knows the names of his daughters.  Also knows is L side is weak.    Denies chest pain.  No shortness of breath  He is hungry, they are keeping NPO  Swallow eval pending.      ROS  Constitutional:  Tired and hungry.  Respiratory:  Denies shortness of breath, no cough. denies orthopnea  Cardiovascular:  No chest pain.  no lower extremity edema.  Denies orthopnea or PND.  : denies polyuria, no dysuria.  GI:  Denies nausea/vomiting.  No abdominal distention.  Neuro: + dizziness, L side weakness.  Hem/lymph: Denies easy bleeding/bruising.      All other review of systems reviewed and negative.    Past medical, " surgical, social, and family history reviewed and unchanged from admission except as noted in assessment and plan.    Medications: Reviewed in MAR  Current Facility-Administered Medications   Medication Dose Frequency Provider Last Rate Last Dose   • Pharmacy consult request - Allow for permissive hypertension: SBP up to 220 mmHg/DBP up to 120 mmHg x 48 hours   PHARMACY TO DOSE Pauly Lennon M.D.       • clopidogrel (PLAVIX) tablet 75 mg  75 mg DAILY Luis Mccann M.D.   75 mg at 03/24/20 0450   • senna-docusate (PERICOLACE or SENOKOT S) 8.6-50 MG per tablet 2 Tab  2 Tab BID Tierra Atkinson M.D.   2 Tab at 03/24/20 1657    And   • polyethylene glycol/lytes (MIRALAX) PACKET 1 Packet  1 Packet QDAY PRN Tierra Atkinson M.D.        And   • magnesium hydroxide (MILK OF MAGNESIA) suspension 30 mL  30 mL QDAY PRN Tierra Atkinson M.D.        And   • bisacodyl (DULCOLAX) suppository 10 mg  10 mg QDAY PRN Tierra Atkinson M.D.       • acetaminophen (TYLENOL) tablet 650 mg  650 mg Q6HRS PRN Tierra Atkinson M.D.   650 mg at 03/24/20 0447   • ondansetron (ZOFRAN) syringe/vial injection 4 mg  4 mg Q4HRS PRN Tierra Atkinson M.D.   4 mg at 03/24/20 0454   • ondansetron (ZOFRAN ODT) dispertab 4 mg  4 mg Q4HRS PRN Tierra Atkinson M.D.       • aspirin (ASA) tablet 325 mg  325 mg DAILY Tierra Atkinson M.D.   325 mg at 03/24/20 0449    Or   • aspirin (ASA) chewable tab 324 mg  324 mg DAILY Tierra Atkinson M.D.   324 mg at 03/22/20 1754    Or   • aspirin (ASA) suppository 300 mg  300 mg DAILY Tierra Atkinson M.D.       • atorvastatin (LIPITOR) tablet 80 mg  80 mg Q EVENING Tierra Atkinson M.D.   80 mg at 03/24/20 1657   • nitroglycerin (NITROSTAT) tablet 0.4 mg  0.4 mg Q5 MIN PRN Tierra Atkinson M.D.       • morphine (pf) 4 MG/ML injection 2-4 mg  2-4 mg Q5 MIN PRN Tierra Atkinson M.D.   4 mg at 03/23/20 1537   • vitamin D (cholecalciferol) tablet 1,000 Units  1,000 Units BID Tierra Atkinson M.D.   1,000 Units at 03/24/20 1657   • methocarbamol (ROBAXIN) tablet  "500 mg  500 mg HS PRN Tierra ElliottLUCAS rendon   500 mg at 03/24/20 0116   • omeprazole (PRILOSEC) capsule 20 mg  20 mg BID Andréskatharina ChinaLUCAS mccarty   20 mg at 03/24/20 1657   • magnesium oxide (MAG-OX) tablet 400 mg  400 mg DAILY Andréskatharina ChinaLUCAS mccarty   400 mg at 03/24/20 0450   • tramadol (ULTRAM) 50 MG tablet 50 mg  50 mg Q8HRS PRN Andréskatharina ChinaLUCAS mccarty   50 mg at 03/24/20 1654   Last reviewed on 3/22/2020  5:17 PM by Madonna Warner    No Known Allergies    Physical Exam  Body mass index is 19.99 kg/m². /65   Pulse 68   Temp 36.8 °C (98.2 °F) (Temporal)   Resp 18   Ht 1.803 m (5' 11\")   Wt 65 kg (143 lb 4.8 oz)   SpO2 99%    Vitals:    03/24/20 1915 03/24/20 2357 03/25/20 0335 03/25/20 0800   BP: 112/60 117/67 126/67 126/65   Pulse: 87 75 91 68   Resp: 17 16 17 18   Temp: 36.9 °C (98.4 °F) 36.3 °C (97.3 °F) 36.9 °C (98.5 °F) 36.8 °C (98.2 °F)   TempSrc: Temporal Temporal Temporal Temporal   SpO2: 90% 90% 99% 99%   Weight: 65 kg (143 lb 4.8 oz)      Height:        Oxygen Therapy:  Pulse Oximetry: 99 %, O2 (LPM): 2, O2 Delivery Device: Silicone Nasal Cannula    General: no apparent distress  Lungs: normal respiratory effort,  Completely clear without crackles, no wheezing or rhonchi.  Heart: normal rate, regular rhythm, 3/6 systolic murmur at the USB,  3/6 mid systolic murmur at the apex.   EXT: no edema bilateral lower extremities. + bilateral pedal pulses. no cyanosis.   Abdomen: soft, non tender, non distended.  Neurological: speech normal, L sided facial weakness with smile, R upper and lower ext weakness.  No hallucinations on exam this morning.    Psychiatric: Appropriate affect, alert and oriented to person, place and situation.  Skin: Warm extremities, no rash.    Labs (personally reviewed):     Lab Results   Component Value Date/Time    SODIUM 142 03/25/2020 02:19 AM    POTASSIUM 4.2 03/25/2020 02:19 AM    CHLORIDE 108 03/25/2020 02:19 AM    CO2 23 03/25/2020 02:19 AM    GLUCOSE 116 (H) 03/25/2020 02:19 AM    " BUN 30 (H) 03/25/2020 02:19 AM    CREATININE 1.52 (H) 03/25/2020 02:19 AM     Lab Results   Component Value Date/Time    ALKPHOSPHAT 68 03/25/2020 02:19 AM    ASTSGOT 43 03/25/2020 02:19 AM    ALTSGPT 17 03/25/2020 02:19 AM    TBILIRUBIN 0.4 03/25/2020 02:19 AM      Lab Results   Component Value Date/Time    CHOLSTRLTOT 91 (L) 03/25/2020 02:19 AM    LDL 30 03/25/2020 02:19 AM    HDL 47 03/25/2020 02:19 AM    TRIGLYCERIDE 69 03/25/2020 02:19 AM     Lab Results   Component Value Date/Time    BNPBTYPENAT 612 (H) 11/19/2018 02:34 PM         Cardiac Imaging and Procedures Review:      Personal Telemetry Review 3/25/20:  NSR in the 80s.  Some PACs and PVCs.  No evidence of atrial tachyarrhythmia, no Afib/flutter.    Echo 3/23/20:  CONCLUSIONS  Compared to the images of the prior echocardiogram dated 11/20/18 -   there is now severe mitral regurgitation.  Severely reduced left ventricular systolic function.  Global hypokinesis,  function is best preserved in the basal anterior   and inferolateral wall.  Grade III diastolic dysfunction.  Reduced right ventricular systolic function.  Severe mitral regurgitation.  Possible severe aortic stenosis in the setting of reduced cardiac output.  Mild aortic insufficiency.    Echo 11/20/18:  CONCLUSIONS  Severely reduced left ventricular systolic function.  Left ventricular ejection fraction is visually estimated to be 25-30%.  Global hypokinesis with regional variation.  Aortic sclerosis without stenosis.  Unable to estimate pulmonary artery pressure due to an inadequate   tricuspid regurgitant jet.  Mild mitral regurgitation.  Compared to the report of the study done 4/7/2017  there has been   worsening of left ventricular ejection fraction.     Stress Test 4/10/17:  NUCLEAR IMAGING INTERPRETATION   Large sized, nonreversible, decreased uptake of severe severity in the apex    (LAD), anterior (LAD), apical septal (LAD), apical inferior and mid    anteroseptal (LAD) segments during  post stress images suggestive of scar.  No      evidence of ischemia.   Enlarged ventricle with global hypokinesis and likely akinesis of the    anteroseptal and anterior walls (difficult to determine with severely    decrease radiotracer uptake). Measured ejection fraction is 30%.   ECG INTERPRETATION   Negative stress ECG for ischemia.    Left Heart Cath 7/21/2017  Impression:  Pain stent in proximal LAD without significant in-stent stenosis.  Nonobstructive stenosis mid circumflex.  Diffuse plaquing in proximal to mid RCA with 40% stenosis at worst point.  Global hypokinesis of the left ventricle EF 36%  Significant coronary calcification.    [facility] 3/23/20:    FINDINGS:  HEMODYNAMICS:  LEFT HEART PRESSURES:  1.  LVEDP 18 mmHg.  2.  Left ventricular systolic pressure 104 mmHg.  3.  Central aortic pressure systolic 105, diastolic 55, mean of 72 mmHg.     CORONARY ARTERIOGRAPHY:  1.  LEFT MAIN ARTERY:  Left main artery is a moderately large caliber vessel   with eccentric ostial stenosis of approximately 30%.  The left main artery   bifurcates to left anterior descending artery and circumflex artery.  2.  LEFT ANTERIOR DESCENDING ARTERY:  Left anterior descending artery gives   rise to normal complement of septal and diagonal branches and extends around   the apex.  The proximal left anterior descending artery has an estimated   relative 40% stenosis followed by what appears to be a stent that is patent.    The remainder of the vessel is patent with mild diffuse intimal atheromatous   disease.  3.  CIRCUMFLEX ARTERY:  The circumflex gives rise to 2 major marginal   branches.  The mid circumflex has an eccentric 75-90% hazy stenosis, which is   new compared to the 2017 study.  4.  RIGHT CORONARY ARTERY:  The right coronary artery gives rise to a   bifurcating posterior descending artery and a posterolateral system.  The   right coronary artery has a midvessel eccentric 30% stenosis.  The medial   branch of the  posterior descending artery has a midvessel 95% stenosis.  This   vessel is small caliber.     INTRAVASCULAR ULTRASOUND (IVUS), left main artery demonstrates an   ostial minimal luminal area (MLA) of 8.0-9.3 square millimeters indicating a   nonsignificant obstructive stenosis..     POST-STENT IMPLANTATION, mid circumflex artery with a 2.5x12 mm [place]   drug-eluting stent demonstrates 0% residual stenosis, no evidence of   dissection or thrombus and ROBBIE 3 antegrade flow.    CTA head/neck 3/24/20:  IMPRESSION:  1.  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 Right vertebral artery is patent.  2.  Atherosclerotic change of the carotid arteries bilaterally without significant flow limitation.       Assessment and Medical Decision Makin   Cerebellular CVA, L vertebral artery occlusion.  Evolving on head CT 3/24/20.  Plans for MRI today.  Suspicious for cardioembolic event - have not seen Afib/flutter, but could be attributed to plaque emboli from recent cardiac cath??  Carotids with disease, but are patent.  Swallow study pending.    2   NSTEMI.  S/p 2.5 x 12 MATY to the mid LCx 3/23/20.  Continue DAPT with ASA, Plavix.   Stressed to nurse importance of uninterrupted DAPT.  If patient does not pass swallow eval and cannot get his oral Plavix, we need to be notified.  Continue high intensity statin.  BB held in light of CVA.      5   Frequent PACs, occasional PVCs.  BB held.    6   Chronic systolic congestive heart failure, NYHA class II, stage C, secondary to ischemic cardiomyopathy. EF 25%  GDMT on hold surrounding CVA.  Resume when appropriate.     7   CAD with hx of anterior MI , s/p LAD stent - patent .  Hx of moderate disease to the mid RCA, 95% mid PDA.  LM 30% - not significant by IVUS.  Continue med mgmt.    8   Acute on chronic renal failure.  2.08 on presentation improved 1.5 and stable. approx baseline.     9   History of paroxysmal SVT. Researched origin of this- seen on 24 hour Holter monitor  "on 10/19/2015.  Had 308 \"runs\" of atrial tachycardia (PACs) longest run was 16 beats.  Reveiwed tracings, these episodes were regular without flutter waves.  No atrial fibrillation or flutter identified on Holter.  I personally reviewed patient's tele monitor today, again only PACs.  I did not find indication for anticoagulation at this time.  Will review with Dr. Fletcher.    10  Essential hypertension.  BP goal per neuro.    11  Hyperlipidemia. Continue atorvastatin 80.  LDL 42.     12  Aortic stenosis, at least moderate, may be severe in the setting of low out put.      Future Appointments   Date Time Provider Department Center   3/27/2020  1:40 PM Carlos Mccormack M.D. [facility] None   4/2/2020 10:15 AM C[place]AND Ugalde [facility] None         Lian Quintana PA-C  [facility]                "

## 2020-03-25 NOTE — CARE PLAN
Problem: Communication  Goal: The ability to communicate needs accurately and effectively will improve  Outcome: PROGRESSING AS EXPECTED     Problem: Safety  Goal: Will remain free from injury  Outcome: PROGRESSING AS EXPECTED  Goal: Will remain free from falls  Outcome: PROGRESSING AS EXPECTED     Problem: Infection  Goal: Will remain free from infection  Outcome: PROGRESSING AS EXPECTED     Problem: Venous Thromboembolism (VTW)/Deep Vein Thrombosis (DVT) Prevention:  Goal: Patient will participate in Venous Thrombosis (VTE)/Deep Vein Thrombosis (DVT)Prevention Measures  Outcome: PROGRESSING AS EXPECTED     Problem: Bowel/Gastric:  Goal: Normal bowel function is maintained or improved  Outcome: PROGRESSING AS EXPECTED  Goal: Will not experience complications related to bowel motility  Outcome: PROGRESSING AS EXPECTED     Problem: Knowledge Deficit  Goal: Knowledge of disease process/condition, treatment plan, diagnostic tests, and medications will improve  Outcome: PROGRESSING AS EXPECTED  Goal: Knowledge of the prescribed therapeutic regimen will improve  Outcome: PROGRESSING AS EXPECTED     Problem: Pain Management  Goal: Pain level will decrease to patient's comfort goal  Outcome: PROGRESSING AS EXPECTED     Problem: Fluid Volume:  Goal: Will maintain balanced intake and output  Outcome: PROGRESSING AS EXPECTED     Problem: Respiratory:  Goal: Respiratory status will improve  Outcome: PROGRESSING AS EXPECTED     Problem: Urinary Elimination:  Goal: Ability to reestablish a normal urinary elimination pattern will improve  Outcome: PROGRESSING AS EXPECTED     Problem: Discharge Barriers/Planning  Goal: Patient's continuum of care needs will be met  Outcome: PROGRESSING SLOWER THAN EXPECTED

## 2020-03-25 NOTE — THERAPY
"Speech Language Therapy Clinical Swallow Evaluation completed.    Functional Status: Pt was seen today for CSE. Pt was alert and fully participatory with therapy objectives. Pt has been hallucinating, per RN, and had stroke yesterday; MRI is pending. Pt denied baseline dysphagia, hx of PNA and/or other swallowing difficulties. [facility] reduced lingual strength bilaterally, reduced labial strength, reduced laryngeal elevation and mild L facial droop (which is improving, per RN).     Pt was administered PO trials of ice chips, thins (tsps, cup sips), mildly thick liquids, puree, pudding, soft/mixed solids and dry/regular solids. Pt demonstrated no subtle nor overt clinical s/sx of aspiration/penetration with any consistency/texture trialed today. However, Pt with slightly reduced L-hand  strength and coordination, and occasionally required cue to use both hands to hold cup, as to avoid spilling liquid. At this time, recommend strict adherence of safe swallow precautions and PO diet of Soft, bite sized solids (SB6) and thin liquids (TN0) with meds with liquid wash one at a time. Pt verbalized understanding. RN aware. SLP following. Thank you for the consult.    Recommendations - Diet: Soft & Bite-Sized (6) - (Dysphagia III), Thin (0)                          Strategies: Monitor during meals and Head of Bed at 90 Degrees                          Medication Administration: Whole with Liquid Wash    Plan of Care: Will benefit from Speech Therapy 3 times per week  Post-Acute Therapy: Anticipate that the patient will have no further speech therapy needs after discharge from the hospital.    See \"Rehab Therapy-Acute\" Patient Summary Report for complete documentation.   "

## 2020-03-25 NOTE — PROGRESS NOTES
Patient is resting in bed, remains alert and oriented X4. Please refer to neuro assessment. MD is aware of occasional visual hallucinations (was seeing spiders and smoke in his room earlier today). Complains of headache, was given ultram at 11:42 and has ice pack to head, has good effect but headache is still present, MD aware. No signs of aspiration, was seen by speech therapy earlier for swallow eval, eating and drinking with assistance. PIV's are saline locked. Sun catheter was placed at 11:10 today per MD order, draining sufficient amount of CYU. Resps unlabored, currently on 1L O2 NC, denies sob. Tele reading NSR, BP's stable, denies chest pain and dizziness, no edema, pulses palpable. Right groin cath incision site is CDI, no hematoma, soft to touch, denies pain to groin, dressing in place. MRI of brain done this shift. Fall precautions in place, pending physical therapy eval. Call light within reach, patient uses appropriately, will continue to monitor.

## 2020-03-26 LAB
ALBUMIN SERPL BCP-MCNC: 3.8 G/DL (ref 3.2–4.9)
ALBUMIN/GLOB SERPL: 1.5 G/DL
ALP SERPL-CCNC: 66 U/L (ref 30–99)
ALT SERPL-CCNC: 15 U/L (ref 2–50)
ANION GAP SERPL CALC-SCNC: 9 MMOL/L (ref 7–16)
AST SERPL-CCNC: 27 U/L (ref 12–45)
BASOPHILS # BLD AUTO: 0.3 % (ref 0–1.8)
BASOPHILS # BLD: 0.03 K/UL (ref 0–0.12)
BILIRUB SERPL-MCNC: 0.7 MG/DL (ref 0.1–1.5)
BUN SERPL-MCNC: 28 MG/DL (ref 8–22)
CALCIUM SERPL-MCNC: 9.1 MG/DL (ref 8.5–10.5)
CHLORIDE SERPL-SCNC: 106 MMOL/L (ref 96–112)
CO2 SERPL-SCNC: 23 MMOL/L (ref 20–33)
CREAT SERPL-MCNC: 1.38 MG/DL (ref 0.5–1.4)
EOSINOPHIL # BLD AUTO: 0.07 K/UL (ref 0–0.51)
EOSINOPHIL NFR BLD: 0.7 % (ref 0–6.9)
ERYTHROCYTE [DISTWIDTH] IN BLOOD BY AUTOMATED COUNT: 50.4 FL (ref 35.9–50)
GLOBULIN SER CALC-MCNC: 2.5 G/DL (ref 1.9–3.5)
GLUCOSE SERPL-MCNC: 113 MG/DL (ref 65–99)
HCT VFR BLD AUTO: 30.4 % (ref 42–52)
HGB BLD-MCNC: 9.6 G/DL (ref 14–18)
IMM GRANULOCYTES # BLD AUTO: 0.05 K/UL (ref 0–0.11)
IMM GRANULOCYTES NFR BLD AUTO: 0.5 % (ref 0–0.9)
LYMPHOCYTES # BLD AUTO: 0.95 K/UL (ref 1–4.8)
LYMPHOCYTES NFR BLD: 9.7 % (ref 22–41)
MCH RBC QN AUTO: 32.7 PG (ref 27–33)
MCHC RBC AUTO-ENTMCNC: 31.6 G/DL (ref 33.7–35.3)
MCV RBC AUTO: 103.4 FL (ref 81.4–97.8)
MONOCYTES # BLD AUTO: 1.22 K/UL (ref 0–0.85)
MONOCYTES NFR BLD AUTO: 12.5 % (ref 0–13.4)
NEUTROPHILS # BLD AUTO: 7.45 K/UL (ref 1.82–7.42)
NEUTROPHILS NFR BLD: 76.3 % (ref 44–72)
NRBC # BLD AUTO: 0 K/UL
NRBC BLD-RTO: 0 /100 WBC
PLATELET # BLD AUTO: 156 K/UL (ref 164–446)
PMV BLD AUTO: 11 FL (ref 9–12.9)
POTASSIUM SERPL-SCNC: 4.2 MMOL/L (ref 3.6–5.5)
PROT SERPL-MCNC: 6.3 G/DL (ref 6–8.2)
RBC # BLD AUTO: 2.94 M/UL (ref 4.7–6.1)
SODIUM SERPL-SCNC: 138 MMOL/L (ref 135–145)
WBC # BLD AUTO: 9.8 K/UL (ref 4.8–10.8)

## 2020-03-26 PROCEDURE — 99222 1ST HOSP IP/OBS MODERATE 55: CPT | Performed by: PHYSICAL MEDICINE & REHABILITATION

## 2020-03-26 PROCEDURE — A9270 NON-COVERED ITEM OR SERVICE: HCPCS | Performed by: PHYSICAL MEDICINE & REHABILITATION

## 2020-03-26 PROCEDURE — 92523 SPEECH SOUND LANG COMPREHEN: CPT

## 2020-03-26 PROCEDURE — 97530 THERAPEUTIC ACTIVITIES: CPT

## 2020-03-26 PROCEDURE — 99231 SBSQ HOSP IP/OBS SF/LOW 25: CPT | Performed by: INTERNAL MEDICINE

## 2020-03-26 PROCEDURE — 99232 SBSQ HOSP IP/OBS MODERATE 35: CPT | Performed by: PSYCHIATRY & NEUROLOGY

## 2020-03-26 PROCEDURE — 85025 COMPLETE CBC W/AUTO DIFF WBC: CPT

## 2020-03-26 PROCEDURE — 97535 SELF CARE MNGMENT TRAINING: CPT

## 2020-03-26 PROCEDURE — 700102 HCHG RX REV CODE 250 W/ 637 OVERRIDE(OP): Performed by: HOSPITALIST

## 2020-03-26 PROCEDURE — 97116 GAIT TRAINING THERAPY: CPT

## 2020-03-26 PROCEDURE — A9270 NON-COVERED ITEM OR SERVICE: HCPCS | Performed by: INTERNAL MEDICINE

## 2020-03-26 PROCEDURE — 700102 HCHG RX REV CODE 250 W/ 637 OVERRIDE(OP): Performed by: PHYSICAL MEDICINE & REHABILITATION

## 2020-03-26 PROCEDURE — 770020 HCHG ROOM/CARE - TELE (206)

## 2020-03-26 PROCEDURE — A9270 NON-COVERED ITEM OR SERVICE: HCPCS | Performed by: HOSPITALIST

## 2020-03-26 PROCEDURE — 36415 COLL VENOUS BLD VENIPUNCTURE: CPT

## 2020-03-26 PROCEDURE — 92526 ORAL FUNCTION THERAPY: CPT

## 2020-03-26 PROCEDURE — 80053 COMPREHEN METABOLIC PANEL: CPT

## 2020-03-26 PROCEDURE — 700102 HCHG RX REV CODE 250 W/ 637 OVERRIDE(OP): Performed by: INTERNAL MEDICINE

## 2020-03-26 RX ORDER — GABAPENTIN 300 MG/1
300 CAPSULE ORAL 3 TIMES DAILY
Status: DISCONTINUED | OUTPATIENT
Start: 2020-03-26 | End: 2020-03-27 | Stop reason: HOSPADM

## 2020-03-26 RX ADMIN — CLOPIDOGREL BISULFATE 75 MG: 75 TABLET ORAL at 04:10

## 2020-03-26 RX ADMIN — GABAPENTIN 300 MG: 300 CAPSULE ORAL at 12:05

## 2020-03-26 RX ADMIN — LOSARTAN POTASSIUM 50 MG: 50 TABLET, FILM COATED ORAL at 04:11

## 2020-03-26 RX ADMIN — TRAMADOL HYDROCHLORIDE 50 MG: 50 TABLET, FILM COATED ORAL at 04:10

## 2020-03-26 RX ADMIN — GABAPENTIN 300 MG: 300 CAPSULE ORAL at 17:18

## 2020-03-26 RX ADMIN — MELATONIN 1000 UNITS: at 17:18

## 2020-03-26 RX ADMIN — OMEPRAZOLE 20 MG: 20 CAPSULE, DELAYED RELEASE ORAL at 17:18

## 2020-03-26 RX ADMIN — Medication 400 MG: at 04:12

## 2020-03-26 RX ADMIN — ATORVASTATIN CALCIUM 80 MG: 80 TABLET, FILM COATED ORAL at 17:18

## 2020-03-26 RX ADMIN — OMEPRAZOLE 20 MG: 20 CAPSULE, DELAYED RELEASE ORAL at 04:14

## 2020-03-26 RX ADMIN — SENNOSIDES AND DOCUSATE SODIUM 2 TABLET: 8.6; 5 TABLET ORAL at 04:10

## 2020-03-26 RX ADMIN — ASPIRIN 325 MG: 325 TABLET, FILM COATED ORAL at 04:10

## 2020-03-26 RX ADMIN — SENNOSIDES AND DOCUSATE SODIUM 2 TABLET: 8.6; 5 TABLET ORAL at 17:18

## 2020-03-26 RX ADMIN — MELATONIN 1000 UNITS: at 04:12

## 2020-03-26 ASSESSMENT — GAIT ASSESSMENTS
DEVIATION: ATAXIC;OTHER (COMMENT)
GAIT LEVEL OF ASSIST: MAXIMAL ASSIST
DISTANCE (FEET): 75
ASSISTIVE DEVICE: FRONT WHEEL WALKER

## 2020-03-26 ASSESSMENT — ENCOUNTER SYMPTOMS
HEADACHES: 0
COUGH: 0
DIZZINESS: 0
DOUBLE VISION: 0
SENSORY CHANGE: 0
DIARRHEA: 0
TINGLING: 0
PHOTOPHOBIA: 0
VOMITING: 0
PALPITATIONS: 0
ORTHOPNEA: 0
NAUSEA: 0
EYE PAIN: 0
TREMORS: 0
BLURRED VISION: 0
ABDOMINAL PAIN: 0
SPEECH CHANGE: 0
BACK PAIN: 0
HALLUCINATIONS: 0
FEVER: 0
CHILLS: 0
NECK PAIN: 0
SHORTNESS OF BREATH: 0
SPUTUM PRODUCTION: 0
MYALGIAS: 0
CONSTIPATION: 0
FOCAL WEAKNESS: 1
WEIGHT LOSS: 0

## 2020-03-26 ASSESSMENT — COGNITIVE AND FUNCTIONAL STATUS - GENERAL
WALKING IN HOSPITAL ROOM: A LOT
MOBILITY SCORE: 10
CLIMB 3 TO 5 STEPS WITH RAILING: TOTAL
EATING MEALS: A LITTLE
DRESSING REGULAR UPPER BODY CLOTHING: A LOT
SUGGESTED CMS G CODE MODIFIER MOBILITY: CL
TURNING FROM BACK TO SIDE WHILE IN FLAT BAD: A LITTLE
TOILETING: A LOT
SUGGESTED CMS G CODE MODIFIER DAILY ACTIVITY: CL
MOVING TO AND FROM BED TO CHAIR: UNABLE
STANDING UP FROM CHAIR USING ARMS: A LOT
MOVING FROM LYING ON BACK TO SITTING ON SIDE OF FLAT BED: UNABLE
HELP NEEDED FOR BATHING: A LOT
DRESSING REGULAR LOWER BODY CLOTHING: A LOT
DAILY ACTIVITIY SCORE: 13
PERSONAL GROOMING: A LOT

## 2020-03-26 ASSESSMENT — FIBROSIS 4 INDEX: FIB4 SCORE: 3.89

## 2020-03-26 ASSESSMENT — LIFESTYLE VARIABLES: SUBSTANCE_ABUSE: 0

## 2020-03-26 NOTE — THERAPY
"Speech Language Therapy dysphagia treatment completed.     Functional Status:  Pt was seen today for f/u dysphagia tx and therapeutic feeding session. Per RN, Pt has been tolerating current diet of Soft/bite-sized solids and thin liquids and pills with liquid wash with no observed difficulties. Pt's lunch tray consisted of: ground swedish meatballs, green beans, rice, apple juice, water (and pablito crackers, provided by SLP).     Pt demonstrated appropriate feeding rate and bolus size independently; however, attempted to talk with food in his mouth. Pt was receptive to education re: chew and swallow prior to talking. Additionally, Pt endorsed need to eat slowly now rather than eat quickly. Pt had no coughing/choking or other overt clinical s/sx of aspiration/penetration with meal; however, had slight residue in cheeks (which he was able to clear with lingual sweep and liquid wash). Pt reported that he con't to see bugs/spiders in room; however, is told he is hallucinating. He denies this to be the case. At this time, recommend to con't strict adherence to safe swallow precautions and current diet of Soft, bite-sized solids, thin liquids and meds with liquid wash. Please monitor x3 with meals. Will complete cognitive-linguistic evaluation during subsequent visit. RN notified and aware. Thank you.    Recommendations: At this time, recommend to con't strict adherence to safe swallow precautions and current diet of Soft, bite-sized solids, thin liquids and meds with liquid wash. Please monitor x3 with meals.     Plan of Care: Will benefit from Speech Therapy 5 times per week  Post-Acute Therapy: Recommend inpatient transitional care services for continued speech therapy services.      See \"Rehab Therapy-Acute\" Patient Summary Report for complete documentation.     "

## 2020-03-26 NOTE — PROGRESS NOTES
Hospital Neurology Progress Note:     Interval History: No acute events overnight.  States he is getting stronger and his double vision is improving.    Objective:   Vitals:    03/26/20 0000 03/26/20 0410 03/26/20 0411 03/26/20 0727   BP: 104/58 117/62 117/59 103/59   Pulse: 73 90 72 65   Resp: 18 18 18 17   Temp: 36.4 °C (97.5 °F) 36.4 °C (97.5 °F)  36.8 °C (98.2 °F)   TempSrc: Temporal Temporal  Temporal   SpO2: 98% 98%  94%   Weight:       Height:           Labs:     Lab Results   Component Value Date/Time    PROTHROMBTM 15.5 (H) 03/22/2020 03:20 PM    INR 1.20 (H) 03/22/2020 03:20 PM      Lab Results   Component Value Date/Time    WBC 9.8 03/26/2020 02:27 AM    RBC 2.94 (L) 03/26/2020 02:27 AM    HEMOGLOBIN 9.6 (L) 03/26/2020 02:27 AM    HEMATOCRIT 30.4 (L) 03/26/2020 02:27 AM    .4 (H) 03/26/2020 02:27 AM    MCH 32.7 03/26/2020 02:27 AM    MCHC 31.6 (L) 03/26/2020 02:27 AM    MPV 11.0 03/26/2020 02:27 AM    NEUTSPOLYS 76.30 (H) 03/26/2020 02:27 AM    LYMPHOCYTES 9.70 (L) 03/26/2020 02:27 AM    MONOCYTES 12.50 03/26/2020 02:27 AM    EOSINOPHILS 0.70 03/26/2020 02:27 AM    BASOPHILS 0.30 03/26/2020 02:27 AM      Lab Results   Component Value Date/Time    SODIUM 138 03/26/2020 02:27 AM    POTASSIUM 4.2 03/26/2020 02:27 AM    CHLORIDE 106 03/26/2020 02:27 AM    CO2 23 03/26/2020 02:27 AM    GLUCOSE 113 (H) 03/26/2020 02:27 AM    BUN 28 (H) 03/26/2020 02:27 AM    CREATININE 1.38 03/26/2020 02:27 AM      Lab Results   Component Value Date/Time    CHOLSTRLTOT 91 (L) 03/25/2020 02:19 AM    LDL 30 03/25/2020 02:19 AM    HDL 47 03/25/2020 02:19 AM    TRIGLYCERIDE 69 03/25/2020 02:19 AM       Lab Results   Component Value Date/Time    ALKPHOSPHAT 66 03/26/2020 02:27 AM    ASTSGOT 27 03/26/2020 02:27 AM    ALTSGPT 15 03/26/2020 02:27 AM    TBILIRUBIN 0.7 03/26/2020 02:27 AM        Imaging/Testing:   MRI of the brain without contrast on 3/25/2020 was personally reviewed with infarction corresponding to the left PICA  distribution.    Physical Exam:      General: 87-year-old male in bed in no acute distress  Cardio: Normal S1/S2. No peripheral edema.   Pulm: CTAX2. No respiratory distress.   Skin: Warm, dry, no rashes or lesions   Psychiatric: Appropriate affect. No active psychosis.  HEENT: Atraumatic head, normal sclera and conjunctiva, moist oral mucosa. No lid lag.  Abdomen: Soft, non tender. No masses or hepatosplenomegaly.    1a. Level of Consciousness (Alert, drowsy, etc): 0= Alert    1b. LOC Questions (Month, age): 0= Answers both correctly    1c. LOC Commands (Open/close eyes make fist/let go): 0= Obeys both correctly    2.   Best Gaze (Eyes open - patient follows examiner's finger on face): 0= Normal    3.   Visual Fields (introduce visual stimulus/threat to patient's field quadrants): 0= No visual loss  4.   Facial Paresis (Show teeth, raise eyebrows and squeeze eyes shut): 1= Minor     5a. Motor Arm - Left (Elevate arm to 90 degrees if patient is sitting, 45 degrees if  supine): 1= Drift    5b. Motor Arm - Right (Elevate arm to 90 degrees if patient is sitting, 45 degrees if supine): 0= No drift    6a. Motor Leg - Left (Elevate leg 30 degrees with patient supine): 0= No drift    6b. Motor Leg - Right  (Elevate leg 30 degrees with patient supine): 0= No drift    7.   Limb Ataxia (Finger-nose, heel down shin): 1= Present in 1 limb    8.   Sensory (Pin prick to face, arm, trunk and leg - compare side to side): 1= Partial loss    9.  Best Language (Name item, describe a picture and read sentences): 0= No aphasia    10. Dysarthria (Evaluate speech clarity by patient repeating listed words): 1= Mild to moderate slurring    11. Extinction and Inattention (Use information from prior testing to identify neglect or  double simultaneous stimuli testing): 0= No neglect    Total NIH Score: 5    Evaluated on 3/26/2020 and compared to 3/25/2020 no significant clinical change was seen.    Assessment/Plan:    Kyler Donato is a 87  y.o. male with history of acute end STEMI status post heart cath, coronary artery disease, hypertension, hyperlipidemia, paroxysmal supraventricular tachycardia presenting to the hospital for an STEMI and consulted for stroke.    MRI of the brain without contrast shows an infarction involving the left PICA territory.  There is a significant area of infarction and some edema as well with mild effacement of the fourth ventricle.  At this time, observation is important as if edema develops then hydrocephalus may soon follow and in the worse case scenario may become life-threatening.  However, clinically the patient appears to be doing quite well and he continues to improve. Etiology of stroke likely secondary to cardioembolic phenomenon given ejection fraction of less than 30%.    Plan:  -Patient needs close neuro checks.  Currently doing well and may remain a every 4 hours  -If patient has neurologic worsening (worsened level of consciousness, headache, nausea/vomiting) then obtain CT head without contrast stat and transfer to ICU.  -Aim for normotension  -MRI of the brain with left PICA artery territory infarction  -LDL 30 at goal  -Hemoglobin A1c 5.5.  At goal.  -Continue antiplatelet therapy for now.  -Will need cardiac rhythm monitoring as outpatient.   -Case discussed with cardiology.  At this time, they feel that the risk of hemorrhage is high and therefore anticoagulation not indicated.  -Outpatient stroke bridge clinic referral placed.  -Plan discussed with consulting physician and patient's nurse.     Thai Ordonez M.D., Diplomat of the American Board of Psychiatry and Neurology  Diplomat of [facility] Epilepsy Subspecialty   Assistant Clinical Professor, [facility] Neurology Consultant

## 2020-03-26 NOTE — THERAPY
"Physical Therapy Treatment completed.   Bed Mobility:  Supine to Sit: (received up )  Transfers: Sit to Stand: Moderate Assist  Gait: Level Of Assist: Maximal Assist with Front-Wheel Walker 75ft      Plan of Care: Will benefit from Physical Therapy 5 times per week  Discharge Recommendations: Equipment: Will Continue to Assess for Equipment Needs. Post-acute therapy Recommend post-acute placement for continued physical therapy services prior to discharge home.       Pt very pleasant and motivated. Is aware of decreased coordination on L. Pt presenting with absent righting responses on L. He is able to initiate weight shift L to advance R LE, however is lacking the postrual control to maintain balance and has uncontrolled LOB L. Full support provided by therapist on L to maintian balance. Manual facilitation to R hip/torso for weight shift R and to maintain balance. When provided with continual verbal/tactile cues pt able to maintain midline 25% of time. Pt very motivated and very receptive to all training. Recommend intensive post acute therapies. Acute PT to continue to follow while in house.     See \"Rehab Therapy-Acute\" Patient Summary Report for complete documentation.       "

## 2020-03-26 NOTE — PROGRESS NOTES
Pt is resting in bed, in no apparent distress at this time. VSS, tele monitor in place. Call light within reach, bed in lowest position and locked, side rails up x2, bed alarm activated, will continue to monitor.

## 2020-03-26 NOTE — CARE PLAN
Problem: Nutritional:  Goal: Achieve adequate nutritional intake  Description: Diet advanced and patient will consume 50% of meals   Outcome: PROGRESSING AS EXPECTED  Pt is on a cardiac, soft & bite sized diet with thin liquids. Per chart pt PO 50-75% 2 meals documented. RD will continue to monitor

## 2020-03-26 NOTE — DISCHARGE PLANNING
Follow up for rehab per MRI:  Large area of acute infarction in the left PICA territory including the left dorsal lateral medulla. This is concordant with the findings of left vertebral artery occlusion. No hemorrhagic transformation. 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    PLOF independent single story home. Need to reach out to spouse to determine level of assistance. RPG to consult per protocol.

## 2020-03-26 NOTE — PROGRESS NOTES
Hospital Medicine Daily Progress Note    Date of Service  3/26/2020    Chief Complaint  87 y.o. male admitted 3/22/2020 with chest pain    Hospital Course    87-year-old male with past medical history of coronary disease status post large anterior MI and stent in past presented to the hospital on March 22, 2020 with complaint of chest pain.  He found a diagnosis of an STEMI and he was started on heparin drip and cardiology was consulted. He did have another stent 3/23 mid lx. He was continues on medication for cardiomyopathy. 3/24- he had new acute neurological changes. CT head new evolving stroke. Neurology/stroke code was initiated. 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 No intervention. He was not a candidate for t-Pa. Family was updated that pt condition might worse. Pt DNAR/DNI. BP meds discontinued for permissive hypertension. Stroke protocol initiated.         Interval Problem Update  Pt did complain of dizziness in the morning but it did resolved. He was slight more worried for his wife, and he was asking to go home. 2:47 Rapid team was paged due to new neurological findings. Dr. Ordonez did see patient immediately. Not a candidate for tPa. BP meds discontinued. I did spoke with his daughter, and Lia (step daughter). I did explain new findings. I did prepare that their father condition might worse overnight. Vitals are stable at this time, but his mentation continues to worsen. He might need restrain. He remain DNAR/DNI. Prognosis is poor. Family appreciative and has full understanding of new events.    3/25- pt slight better today. Asking to eat. He remembers what happened. He is aware he had a stroke. Almost close to baseline. MRI brain pending. Ok to place garcia. Pt has chronic urinary retention and he is using straight cath at home.   3/26- pt is improving. He continues to improve. He is trying exercises on his own on his bed. We still anticipate fluctuation on neurological symptoms  since possible ongoing swelling per Dr. Ordonez, continue cardiac monitoring. SNF placement     Consultants/Specialty  Cardiology    Code Status  DNR/DNI    Disposition  To be decided    Review of Systems  Review of Systems   Constitutional: Negative for chills, fever, malaise/fatigue and weight loss.   HENT: Negative for hearing loss and tinnitus.    Eyes: Negative for blurred vision, double vision, photophobia and pain.   Respiratory: Negative for cough, sputum production and shortness of breath.    Cardiovascular: Negative for chest pain, palpitations, orthopnea and leg swelling.   Gastrointestinal: Negative for abdominal pain, constipation, diarrhea, nausea and vomiting.   Genitourinary: Negative for dysuria, frequency and urgency.   Musculoskeletal: Negative for back pain, joint pain, myalgias and neck pain.   Skin: Negative for rash.   Neurological: Positive for focal weakness. Negative for dizziness, tingling, tremors, sensory change, speech change and headaches.   Psychiatric/Behavioral: Negative for hallucinations and substance abuse.   All other systems reviewed and are negative.       Physical Exam  Temp:  [36.3 °C (97.3 °F)-36.8 °C (98.2 °F)] 36.3 °C (97.3 °F)  Pulse:  [] 77  Resp:  [17-20] 17  BP: (103-119)/(58-71) 119/71  SpO2:  [94 %-99 %] 94 %    Physical Exam  Vitals signs and nursing note reviewed.   Constitutional:       General: He is not in acute distress.     Appearance: He is ill-appearing.   HENT:      Head: Normocephalic and atraumatic. No contusion.      Right Ear: External ear normal.      Left Ear: External ear normal.      Nose: Nose normal.      Mouth/Throat:      Mouth: Mucous membranes are moist.      Pharynx: No oropharyngeal exudate.   Eyes:      General:         Right eye: No discharge.         Left eye: No discharge.      Pupils: Pupils are equal, round, and reactive to light.   Neck:      Musculoskeletal: No neck rigidity or muscular tenderness.   Cardiovascular:      Rate  and Rhythm: Normal rate and regular rhythm.      Heart sounds: No murmur. No friction rub. No gallop.    Pulmonary:      Effort: Pulmonary effort is normal.      Breath sounds: No wheezing or rhonchi.   Abdominal:      General: Bowel sounds are normal. There is no distension.      Palpations: Abdomen is soft.      Tenderness: There is no abdominal tenderness. There is no rebound.   Musculoskeletal: Normal range of motion.         General: No swelling or tenderness.   Skin:     General: Skin is warm and dry.      Coloration: Skin is not jaundiced.   Neurological:      Mental Status: He is oriented to person, place, and time. He is lethargic.      GCS: GCS verbal subscore is 3.      Cranial Nerves: Cranial nerve deficit, dysarthria and facial asymmetry present.      Sensory: No sensory deficit.      Comments: Positive right nystagmus   Left facial drop and weakness on left hand improving     Psychiatric:         Mood and Affect: Mood normal.         Fluids    Intake/Output Summary (Last 24 hours) at 3/26/2020 1435  Last data filed at 3/26/2020 1000  Gross per 24 hour   Intake 240 ml   Output 925 ml   Net -685 ml       Laboratory  Recent Labs     03/24/20  0346 03/25/20 0219 03/26/20 0227   WBC 9.2 9.2 9.8   RBC 2.84* 2.88* 2.94*   HEMOGLOBIN 9.5* 9.4* 9.6*   HEMATOCRIT 29.3* 29.5* 30.4*   .2* 102.4* 103.4*   MCH 33.5* 32.6 32.7   MCHC 32.4* 31.9* 31.6*   RDW 50.9* 50.4* 50.4*   PLATELETCT 163* 166 156*   MPV 10.7 10.8 11.0     Recent Labs     03/24/20  0346 03/25/20 0219 03/26/20 0227   SODIUM 141 142 138   POTASSIUM 4.1 4.2 4.2   CHLORIDE 108 108 106   CO2 23 23 23   GLUCOSE 122* 116* 113*   BUN 36* 30* 28*   CREATININE 1.53* 1.52* 1.38   CALCIUM 9.2 9.3 9.1     Recent Labs     03/23/20  1612   APTT 80.8*         Recent Labs     03/25/20 0219   TRIGLYCERIDE 69   HDL 47   LDL 30       Imaging  MR-BRAIN-W/O   Final Result      1.  Advanced cerebral atrophy.   2.  Mild supratentorial white matter disease  most consistent with microvascular ischemic change.   3.  12 mm curvilinear focus of acute infarction at the right occipital pole. Right posterior cerebral artery parieto-occipital branch territory.   4.  Large area of acute infarction in the left PICA territory including the left dorsal lateral medulla. This is concordant with the findings of left vertebral artery occlusion. No hemorrhagic transformation. Clinically, this type of infarction may be    associated with Wallenberg syndrome.   5.  Left distal vertebral artery occlusion concordant with CTA findings.      CT-CTA NECK WITH & W/O-POST PROCESSING   Final Result      1.  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 Right vertebral artery is patent.      2.  Atherosclerotic change of the carotid arteries bilaterally without significant flow limitation.      This was discussed with ADALBERTO MEZA at 2:57 PM on 3/24/2020.      CT-CTA HEAD WITH & W/O-POST PROCESS   Final Result      Occluded left vertebral artery.            CT-CEREBRAL PERFUSION ANALYSIS   Final Result      1.  Cerebral blood flow less than 30% likely representing completed infarct = 0 mL.      2.  T Max more than 6 seconds likely representing combination of completed infarct and ischemia = 0 mL.      3.  Mismatched volume likely representing ischemic brain/penumbra = None      4.  Please note that the cerebral perfusion was performed on the limited brain tissue around the basal ganglia region. Infarct/ischemia outside the CT perfusion sections can be missed in this study.      CT-HEAD W/O   Final Result      Evolving left cerebellar infarction.      Hyperdensity within the left vertebral artery may represent a thrombus.      Atrophy      There are periventricular and subcortical white matter changes present.  This finding is nonspecific and could be from previous small vessel ischemia, demyelination, or gliosis.         EC-ECHOCARDIOGRAM COMPLETE W/ CONT   Final Result      CT-HEAD W/O   Final  Result      No acute intracranial abnormality is identified.      Atrophy      There are periventricular and subcortical white matter changes present.  This finding is nonspecific and could be from previous small vessel ischemia, demyelination, or gliosis.         DX-CHEST-PORTABLE (1 VIEW)   Final Result      Cardiomegaly.      Pulmonary interstitial prominence likely represents interstitial edema. Pneumonitis not excluded.      Atherosclerotic plaque.         CL-LEFT HEART CATHETERIZATION WITH POSSIBLE INTERVENTION    (Results Pending)        Assessment/Plan  * NSTEMI (non-ST elevated myocardial infarction) ([facility])  Assessment & Plan  He did receive heparin on admission   Stent placed 3/23. On plavix, statin,   3/25- no chest pain       Acute on chronic systolic heart failure ([facility])  Assessment & Plan  Decompensated  Cardiology consulted.  Very poor prognosis   Stop medications for permissive hypertension with new CVA   3/25- will resume BP meds/heart failure meds tomorrow. Continue to monitor. Stable. No signs of decompensation at this time.     COPD (chronic obstructive pulmonary disease) ([facility])- (present on admission)  Assessment & Plan  Not in exacerbation  Continue home inhalers  Oxygen as needed.    CVA (cerebral vascular accident) ([facility])  Assessment & Plan  New even 3/24  CT head confirm evolving stroke   Occluded left vertebral artery at the C2 level  Continue statin/plavix   Neurology will follow   Poor prognosis   Pt mentation worsen   Close monitoring on seizure, aspiration precaution   Hold BP meds for permissive hypertension  3/25- brain MRI pending. Some improvement on neurological signs. Continue to monitor       Acute respiratory failure with hypoxia ([facility])  Assessment & Plan  On 2 L of O2   Current oxygen saturation is above 95%.  Titrate oxygen as tolerated.  Likely secondary to acute pulmonary edema  3/25- wean off oxygen       Anemia  Assessment & Plan  Unknown etiology   Iron panel did not show any  deficiency anemia.  Vitamin B12 and folic acid did not show deficiency    Acute renal failure (ARF) ([facility])  Assessment & Plan  Unknown etiology at this point  Renal function has been improving.  Avoid nephrotoxins.  Continue monitor closely.  3/25- stable         Essential hypertension- (present on admission)  Assessment & Plan  Hold BP meds  Continue to monitor        I discussed plan of care during multidisciplinary rounds    VTE prophylaxis: SCD

## 2020-03-26 NOTE — THERAPY
"Speech Language Therapy Evaluation completed to address cognition    Functional Status:  Pt was seen this afternoon for cognitive-linguistic evaluation. Pt was AAOx3 with confusion to month (April). Pt reported that his glasses are not in hospital, and per chart review, Pt with improving double vision.     Pt was administered the standardized assessment, the Cognitive-Linguistic Quick Test (CLQT) which evaluates various cognitive domains. Pt's scores are compared against same-aged peers and a composite severity rating is assigned. Pt scored WFL for measures of Executive Functions (20) and Language (29). Pt scored within the Mild range for measures of Memory (117). Pt scored within the MODERATE range for measures of Attention (53) and Visuospatial Skills (35). Pt received an overall composite severity rating of 3.0 which is considered Mild. Pt was also administered various informal assessments of reading comprehension (88% accuracy) and following written directions (80% accuracy). Pt's demonstrated good effort throughout testing; however, was limited by distractibility and visual deficits. Recommend cognitive-linguistic tx targeting attention, memory and visuospatial skills. RN notified and aware. Pt agreeable to tx plan. Thank you for the consult.    Recommendations:  Recommend cognitive-linguistic tx targeting attention, memory and visuospatial skills    Plan of Care: Will benefit from Speech Therapy 5 times per week  Post-Acute Therapy: Recommend inpatient transitional care services for continued speech therapy services.      See \"Rehab Therapy-Acute\" Patient Summary Report for complete documentation.   "

## 2020-03-26 NOTE — CONSULTS
Physical Medicine and Rehabilitation Consultation         Initial Consult      Date of Consultation: 3/26/2020  Consulting provider: Adolfo Lennon MD  Reason for consultation: assess for acute inpatient rehab appropriateness  LOS: 4 Day(s)    Chief complaint: NSTEMI. Stroke    HPI: The patient is a 87 y.o. right hand dominant male with a past medical history of coronary artery disease, ischemic cardiomyopathy with ejection fraction 25%, hypertension, SVT, chronic urinary retention requiring straight cath at home;  who presented on 3/22/2020  3:13 PM with burning chest pain radiating to neck and back with dizziness, nausea and vomiting.  Patient found to have an end STEMI and was admitted for close cardiac monitoring, serial EKGs and troponins.  Patient was initially treated with aspirin and started on IV heparin.  Patient underwent cardiac catheterization and PCI with stent placement in mid circumflex artery on 3/23/2020 by Luis Mccann MD. then on 3/24/2020 patient's nurse called rapid response for neurologic changes with headache, left-sided weakness, left-sided ataxia and nystagmus.  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  Patient did not receive TPA.  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     The patient currently reports painful paresthesias on left side of body, headache, nausea/vommiting, paresthesias, double vision, urinary retention requiring garcia. Patient is worried about his wife who has dementia and he is her caregiver. He has two daughters who are taking care of her at this time.     ROS:  Pertinent positives are listed in HPI, all other systems reviewed and are negative    Social Hx:  Pre-morbidly, this patient lived in a single  level home with ramped steps to enter, with spouse.   Employment: not working   Tobacco: denies   Alcohol: denies   Drugs: denies     Current level of function:  The patient was evaluated by acute care Physical Therapy, Occupational Therapy and Speech Language Pathology; currently requiring minimal assistance for mobility and minimal assistance for ADLs, also with ongoing swallowing deficits.    PMH:  Past Medical History:   Diagnosis Date   • Acute anterolateral myocardial infarction (HCC) 1995   • CAD (coronary artery disease) 1995    PCI/stent to the LAD. April 2017: MPI with scar in anterior/inferior wall, no ischemia, LVEF 30%. Echocardiogram with fixed defects in anterior wall and septal inferior wall, no ischemia, LVEF 40%. July 2017: Coronary angiogram with patent LAD stent, diffuse nonobstructive lesions in RCA, 40% mid RCA, 50% mid circumflex.   • Hyperlipidemia    • Hypertension    • PSVT (paroxysmal supraventricular tachycardia) ([facility])    • Shoulder pain        PSH:  Past Surgical History:   Procedure Laterality Date   • ANGIOPLASTY  1995    stents   • ELBOWPLASTY      ulnar nerve transposition   • HAND SURGERY     • SHOULDER ORIF         FHX:  Family History   Problem Relation Age of Onset   • Heart Disease Father    • Arthritis Mother    • Breast Cancer Sister    • Prostate cancer Brother        Medications:  Current Facility-Administered Medications   Medication Dose   • Influenza Vaccine High-Dose pf injection 0.5 mL  0.5 mL   • losartan (COZAAR) tablet 50 mg  50 mg   • Pharmacy consult request - Allow for permissive hypertension: SBP up to 220 mmHg/DBP up to 120 mmHg x 48 hours     • clopidogrel (PLAVIX) tablet 75 mg  75 mg   • senna-docusate (PERICOLACE or SENOKOT S) 8.6-50 MG per tablet 2 Tab  2 Tab    And   • polyethylene glycol/lytes (MIRALAX) PACKET 1 Packet  1 Packet    And   • magnesium hydroxide (MILK OF MAGNESIA) suspension 30 mL  30 mL    And   • bisacodyl (DULCOLAX) suppository 10 mg  10  "mg   • acetaminophen (TYLENOL) tablet 650 mg  650 mg   • ondansetron (ZOFRAN) syringe/vial injection 4 mg  4 mg   • ondansetron (ZOFRAN ODT) dispertab 4 mg  4 mg   • aspirin (ASA) tablet 325 mg  325 mg    Or   • aspirin (ASA) chewable tab 324 mg  324 mg    Or   • aspirin (ASA) suppository 300 mg  300 mg   • atorvastatin (LIPITOR) tablet 80 mg  80 mg   • nitroglycerin (NITROSTAT) tablet 0.4 mg  0.4 mg   • morphine (pf) 4 MG/ML injection 2-4 mg  2-4 mg   • vitamin D (cholecalciferol) tablet 1,000 Units  1,000 Units   • methocarbamol (ROBAXIN) tablet 500 mg  500 mg   • omeprazole (PRILOSEC) capsule 20 mg  20 mg   • magnesium oxide (MAG-OX) tablet 400 mg  400 mg   • tramadol (ULTRAM) 50 MG tablet 50 mg  50 mg       Allergies:  No Known Allergies    Physical Exam:  Vitals: /59   Pulse 65   Temp 36.8 °C (98.2 °F) (Temporal)   Resp 17   Ht 1.803 m (5' 11\")   Wt 67.9 kg (149 lb 11.1 oz)   SpO2 94%   Gen: NAD  Head: NC/AT  Eyes/ Nose/ Mouth: PERRLA, moist mucous membranes  Cardio: RRR, no mumurs  Pulm: CTAB, with normal respiratory effort  Abd: Soft NTND, active bowel sounds,   Ext: No peripheral edema. No calf tenderness. No clubbing/cyanosis.     Mental status: answers questions appropriately follows commands  Speech: fluent, no aphasia or dysarthria    CRANIAL NERVES:  2,3: visual acuity grossly intact, PERRL  3,4,6: EOMI bilaterally, no nystagmus or diplopia  5: sensation intact to light touch bilaterally and symmetric  7: no facial asymmetry  8: hearing grossly intact  9,10: symmetric palate elevation  11: SCM/Trapezius strength 5/5 bilaterally  12: tongue protrudes midline    Motor:      Upper Extremity  Myotome R L   Shoulder flexion C5 5 5   Elbow flexion C5 4/5 4/5   Wrist extension C6 4/5 4/5   Elbow extension C7 4/5 4/5   Finger flexion C8 4/5 3/5   Finger abduction T1 4/5 3/5     Lower Extremity Myotome R L   Hip flexion L2 45 3/5   Knee extension L3 4/5 3/5   Ankle dorsiflexion L4 4/5 3/5   Toe " extension L5 4/5 3/5   Ankle plantarflexion S1 4/5 3/5     Sensory:   Altered sensation to light touch on the left hemibody, face to foot    DTRs: 2+ in bilateral biceps, triceps, brachioradialis, 2+ in bilateral patellar and achilles tendons  No clonus at bilateral ankles  Negative babinski b/l  Negative Frye b/l     Tone: no spasticity noted, no cogwheeling noted    Labs: Reviewed and significant for   Recent Labs     03/23/20  1612 03/24/20 0346 03/25/20 0219 03/26/20 0227   RBC  --  2.84* 2.88* 2.94*   HEMOGLOBIN  --  9.5* 9.4* 9.6*   HEMATOCRIT  --  29.3* 29.5* 30.4*   PLATELETCT  --  163* 166 156*   APTT 80.8*  --   --   --      Recent Labs     03/24/20  0346 03/25/20 0219 03/26/20 0227   SODIUM 141 142 138   POTASSIUM 4.1 4.2 4.2   CHLORIDE 108 108 106   CO2 23 23 23   GLUCOSE 122* 116* 113*   BUN 36* 30* 28*   CREATININE 1.53* 1.52* 1.38   CALCIUM 9.2 9.3 9.1     Recent Results (from the past 24 hour(s))   CBC WITH DIFFERENTIAL    Collection Time: 03/26/20  2:27 AM   Result Value Ref Range    WBC 9.8 4.8 - 10.8 K/uL    RBC 2.94 (L) 4.70 - 6.10 M/uL    Hemoglobin 9.6 (L) 14.0 - 18.0 g/dL    Hematocrit 30.4 (L) 42.0 - 52.0 %    .4 (H) 81.4 - 97.8 fL    MCH 32.7 27.0 - 33.0 pg    MCHC 31.6 (L) 33.7 - 35.3 g/dL    RDW 50.4 (H) 35.9 - 50.0 fL    Platelet Count 156 (L) 164 - 446 K/uL    MPV 11.0 9.0 - 12.9 fL    Neutrophils-Polys 76.30 (H) 44.00 - 72.00 %    Lymphocytes 9.70 (L) 22.00 - 41.00 %    Monocytes 12.50 0.00 - 13.40 %    Eosinophils 0.70 0.00 - 6.90 %    Basophils 0.30 0.00 - 1.80 %    Immature Granulocytes 0.50 0.00 - 0.90 %    Nucleated RBC 0.00 /100 WBC    Neutrophils (Absolute) 7.45 (H) 1.82 - 7.42 K/uL    Lymphs (Absolute) 0.95 (L) 1.00 - 4.80 K/uL    Monos (Absolute) 1.22 (H) 0.00 - 0.85 K/uL    Eos (Absolute) 0.07 0.00 - 0.51 K/uL    Baso (Absolute) 0.03 0.00 - 0.12 K/uL    Immature Granulocytes (abs) 0.05 0.00 - 0.11 K/uL    NRBC (Absolute) 0.00 K/uL   Comp Metabolic Panel     Collection Time: 03/26/20  2:27 AM   Result Value Ref Range    Sodium 138 135 - 145 mmol/L    Potassium 4.2 3.6 - 5.5 mmol/L    Chloride 106 96 - 112 mmol/L    Co2 23 20 - 33 mmol/L    Anion Gap 9.0 7.0 - 16.0    Glucose 113 (H) 65 - 99 mg/dL    Bun 28 (H) 8 - 22 mg/dL    Creatinine 1.38 0.50 - 1.40 mg/dL    Calcium 9.1 8.5 - 10.5 mg/dL    AST(SGOT) 27 12 - 45 U/L    ALT(SGPT) 15 2 - 50 U/L    Alkaline Phosphatase 66 30 - 99 U/L    Total Bilirubin 0.7 0.1 - 1.5 mg/dL    Albumin 3.8 3.2 - 4.9 g/dL    Total Protein 6.3 6.0 - 8.2 g/dL    Globulin 2.5 1.9 - 3.5 g/dL    A-G Ratio 1.5 g/dL   ESTIMATED GFR    Collection Time: 03/26/20  2:27 AM   Result Value Ref Range    GFR If  59 (A) >60 mL/min/1.73 m 2    GFR If Non African American 49 (A) >60 mL/min/1.73 m 2       Imaging:   Ct-cta Head With & W/o-post Process    Result Date: 3/24/2020  3/24/2020 2:00 PM HISTORY/REASON FOR EXAM:  Stroke TECHNIQUE/EXAM DESCRIPTION: CT angiogram of the [place] without and with contrast.  Initial precontrast images were obtained of the head from the skull base through the vertex.  Postcontrast images were obtained of the [place] following the power injection of nonionic contrast at 5.0 mL/sec. Thin-section helical images were obtained with overlapping reconstruction interval. Coronal and sagittal multiplanar volume reformats were generated.  3D angiographic images were reviewed on PACS.  Maximum intensity projection (MIP) images were generated and reviewed. 100 mL of Omnipaque 350 nonionic contrast was injected intravenously. Low dose optimization technique was utilized for this CT exam including automated exposure control and adjustment of the mA and/or kV according to patient size. COMPARISON:  None. FINDINGS: Distal left internal carotid artery is patent. There is atherosclerotic plaque of the cavernous and supraclinoid ICA without significant stenosis. Left middle and anterior cerebral artery is  patent. Anterior communicating artery is seen. Distal right internal carotid artery is patent. Atherosclerotic plaque is seen of the cavernous and supraclinoid ICA. Right middle and anterior cerebral artery is patent. Right vertebral artery is patent. The left vertebral artery is occluded. Basilar artery, superior cerebellar and posterior cerebral arteries are patent. P1 segment on the left is diminutive. There is fetal origin of the left posterior cerebral artery. No  aneurysm is identified. Superior sagittal, transverse and sigmoid sinuses are patent. 3D angiographic/MIP images of the vasculature confirm the vascular findings as described above.     Occluded left vertebral artery.     Ct-cta Neck With & W/o-post Processing    Result Date: 3/24/2020  3/24/2020 2:01 PM HISTORY/REASON FOR EXAM:  Left-sided weakness. TECHNIQUE/EXAM DESCRIPTION: CT angiogram of the neck with contrast. Postcontrast images were obtained of the neck from the great vessels through the skull base following the power injection of nonionic contrast at 5.0 mL/sec. Thin-section helical images were obtained with overlapping reconstruction interval. Coronal and oblique multiplanar volume reformats were generated. Cervical internal carotid artery percent stenosis is calculated using the standard method according to the NASCET criteria wherein a segment of uniform caliber mid or distal cervical internal carotid is used as the reference denominator. 3D angiographic images were reviewed on PACS.  Maximum intensity projection (MIP) images were generated and reviewed 80 mL of Omnipaque 350 nonionic contrast was injected intravenously. Low dose optimization technique was utilized for this CT exam including automated exposure control and adjustment of the mA and/or kV according to patient size. COMPARISON:  None. FINDINGS: There are bilateral pleural effusions, right greater than left. There are patchy groundglass opacities within the lungs. There is  extensive atherosclerotic change of aorta. 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 There is atherosclerotic change of the right common carotid artery which extends into the bifurcation and internal carotid artery. This results in less than 50% diameter narrowing. There is atherosclerotic change of the left common carotid artery which extends into the bifurcation and the left internal carotid artery. This results in less than 50% diameter narrowing. The left vertebral artery is occluded at the C2 level. Right vertebral artery is patent. Basilar artery is patent. The neck soft tissues and lung apices in the field of view are unremarkable. 3D angiographic/MIP images of the vasculature confirm the vascular findings as described above.     1.  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 Right vertebral artery is patent. 2.  Atherosclerotic change of the carotid arteries bilaterally without significant flow limitation. This was discussed with ADALBERTO MEZA at 2:57 PM on 3/24/2020.    Ct-head W/o    Result Date: 3/24/2020  3/24/2020 1:54 PM HISTORY/REASON FOR EXAM:  Altered mental status; nystagmus, confusion. TECHNIQUE/EXAM DESCRIPTION AND NUMBER OF VIEWS: CT of the head without contrast. Contiguous axial sections were obtained from the skull base through the vertex. Up to date radiation dose reduction adjustments have been utilized to meet ALARA standards for radiation dose reduction. COMPARISON:  3/22/2020 FINDINGS: The is no evidence of intraparenchymal, intraventricular and extra-axial hemorrhage.  The ventricles and cortical sulci are prominent compatible with age related change.  There is no midline shift. There are periventricular and subcortical white matter changes present.  There is hypodensity in the left cerebellum which is new compared to prior and compatible with evolving infarction. There is hyperdensity within the left vertebral artery intracranially which may be  related to thrombosis. Visualized paranasal sinuses and mastoid air cells are clear. The calvarium is intact.     Evolving left cerebellar infarction. Hyperdensity within the left vertebral artery may represent a thrombus. Atrophy There are periventricular and subcortical white matter changes present.  This finding is nonspecific and could be from previous small vessel ischemia, demyelination, or gliosis.     Ct-head W/o    Result Date: 3/22/2020  3/22/2020 3:32 PM HISTORY/REASON FOR EXAM:  Ataxia, stroke suspected. TECHNIQUE/EXAM DESCRIPTION AND NUMBER OF VIEWS: CT of the head without contrast. Contiguous axial sections were obtained from the skull base through the vertex. Up to date radiation dose reduction adjustments have been utilized to meet ALARA standards for radiation dose reduction. COMPARISON:  None available FINDINGS: The is no evidence of intraparenchymal, intraventricular and extra-axial hemorrhage.  The ventricles and cortical sulci are prominent compatible with age related change.  There are periventricular and subcortical white matter changes present. There is no  midline shift. There is mild mucosal thickening in the inferior frontal sinuses. There is mucosal thickening in the ethmoid air cells on the right. Remaining visualized paranasal sinuses are clear. Visualized mastoid air cells are clear. The calvarium is intact.     No acute intracranial abnormality is identified. Atrophy There are periventricular and subcortical white matter changes present.  This finding is nonspecific and could be from previous small vessel ischemia, demyelination, or gliosis.     Dx-chest-portable (1 View)    Result Date: 3/22/2020  3/22/2020 3:24 PM HISTORY/REASON FOR EXAM:  Chest Pain. TECHNIQUE/EXAM DESCRIPTION AND NUMBER OF VIEWS: Single portable view of the chest. COMPARISON: 11/19/2018 FINDINGS: The cardiomediastinal silhouette is enlarged. There are interstitial opacities bilaterally. No pleural effusion or  pneumothorax is seen. Atherosclerotic calcification is seen. Rotator cuff anchors are seen on the left. Widened left acromioclavicular joint space is unchanged with postsurgical changes of the distal left clavicle.     Cardiomegaly. Pulmonary interstitial prominence likely represents interstitial edema. Pneumonitis not excluded. Atherosclerotic plaque.     Mr-brain-w/o    Result Date: 3/25/2020  3/25/2020 12:47 PM HISTORY/REASON FOR EXAM:  Stroke, follow up. Altered mental status.  Nystagmus. Weakness. Left vertebral artery occlusion seen on CTA. TECHNIQUE/EXAM DESCRIPTION: MRI of the brain without contrast. T1 sagittal, T2 fast spin-echo axial, T1 coronal, FLAIR coronal, Diffusion weighted and Apparent Diffusion Coefficient (ADC map) axial images were obtained of the whole brain. Additional FLAIR axial images were obtained. The study was performed on a [employer] 1.5 Me[employer] MRI scanner. COMPARISON:  Head CT 3/24/2020, CTA of the head 3/24/2020 FINDINGS: The calvariae are unremarkable.  There are no extra-axial fluid collections. There is pattern of advanced cerebral atrophy manifest as enlargement of sulcal markings and subarachnoid spaces as well as ventriculomegaly. There is a pattern of mild supratentorial white matter disease with patchy and focal areas of bright T2 and FLAIR signal in the subcortical and deep white matter of both hemispheres consistent with small vessel ischemic change versus demyelination or gliosis. There is a small approximately 12 mm curvilinear focus of bright diffusion signal at the right occipital pole with corresponding abnormal FLAIR hyperintensity. (Diffusion-weighted axial images 9, 10, FLAIR axial image 11, series 7, FLAIR coronal image 7,  series 8). This is consistent with an acute infarct in the right PCA territory. No hemorrhagic transformation. There are no mass effects or shift of midline structures.  There are no hemorrhagic lesions. The brainstem and posterior fossa  structures are remarkable for a large area of confluent T2 and FLAIR hyperintensity in the left inferior-medial cerebellar hemisphere, left paramedian inferior vermis and cerebellar tonsil, and left dorso-lateral aspect of the medulla with corresponding restricted diffusion consistent with acute infarction in the left PICA territory. No hemorrhagic transformation. Vascular flow voids show abnormal intermediate and increased T2 signal in the distal left vertebral artery consistent with occlusion as seen on CTA. The right distal vertebral artery flow void and basilar flow void are intact. There is carotid origin of the left posterior cerebral artery concordant with CTA findings, normal variation (T2 axial image 15, series 5). The carotid flow voids are intact. The dural venous sinuses are intact. The right transverse -- sigmoid sinus and right internal jugular vein  are dominant. The paranasal sinuses in the field of view are unremarkable. The mastoids are clear. There has been cataract surgery.     1.  Advanced cerebral atrophy. 2.  Mild supratentorial white matter disease most consistent with microvascular ischemic change. 3.  12 mm curvilinear focus of acute infarction at the right occipital pole. Right posterior cerebral artery parieto-occipital branch territory. 4.  Large area of acute infarction in the left PICA territory including the left dorsal lateral medulla. This is concordant with the findings of left vertebral artery occlusion. No hemorrhagic transformation. 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 5.  Left distal vertebral artery occlusion concordant with CTA findings.    Ct-cerebral Perfusion Analysis    Result Date: 3/24/2020  3/24/2020 2:00 PM HISTORY/REASON FOR EXAM:  Left-sided weakness. TECHNIQUE/EXAM DESCRIPTION AND NUMBER OF VIEWS: CT Cerebral Perfusion Analysis. The study was performed on a 128 slice [employer] Lightspeed Multidetector CT scanner. Perfusion data  and corresponding time-activity curves are processed and displayed as color-coded maps in the axial plane for Cerebral Blood Flow (CBF), Cerebral Blood Volume  (CBV),T Max and Mean Transit Time (MTT) and are post processed on the Ischemia view-RAPID virtual . 40 mL of Omnipaque 350 nonionic contrast was injected intravenously. Low dose optimization technique was utilized for this CT exam including automated exposure control and adjustment of the mA and/or kV according to patient size. COMPARISON:  None. FINDINGS: Cerebral blood flow less than 30% = 0 T Max more than 6 seconds = 0 Mismatch volume = None Mismatch ratio = None     1.  Cerebral blood flow less than 30% likely representing completed infarct = 0 mL. 2.  T Max more than 6 seconds likely representing combination of completed infarct and ischemia = 0 mL. 3.  Mismatched volume likely representing ischemic brain/penumbra = None 4.  Please note that the cerebral perfusion was performed on the limited brain tissue around the basal ganglia region. Infarct/ischemia outside the CT perfusion sections can be missed in this study.    Ec-echocardiogram Complete W/ Cont    Result Date: 3/23/2020  Transthoracic Echo Report Echocardiography Laboratory CONCLUSIONS Compared to the images of the prior echocardiogram dated 11/20/18 - there is now severe mitral regurgitation. Severely reduced left ventricular systolic function. Global hypokinesis,  function is best preserved in the basal anterior and inferolateral wall. Grade III diastolic dysfunction. Reduced right ventricular systolic function. Severe mitral regurgitation. Possible severe aortic stenosis in the setting of reduced cardiac output. Mild aortic insufficiency. ELIZABETH SOMMER MOGUS Exam Date:         03/23/2020                    06:40 Exam Location:     Inpatient Priority:          Routine Ordering Physician:        MARIELA CURRIE Referring Physician: Sonographer:               Kaiden                             Dismanopnarorhina, [facility] Age:    87     Gender:    M MRN:    [id number] :    [id number] BSA:    1.81   Ht (in):    71     Wt (lb):    140 Exam Type:     Complete, Contrast Indications:     Acute MI ICD Codes:       410.9 CPT Codes:       00631,  BP:   117    /   56     HR:   60 Technical Quality:       Fair MEASUREMENTS  (Male / Female) Normal Values 2D ECHO LV Diastolic Diameter PLAX        5 cm                  4.2 - 5.9 / 3.9 - 5.3 cm LV Systolic Diameter PLAX         4.2 cm                2.1 - 4.0 cm IVS Diastolic Thickness           0.94 cm               LVPW Diastolic Thickness          1.1 cm                LVOT Diameter                     2 cm                  Estimated LV Ejection Fraction    25 %                  LV Ejection Fraction MOD BP       33.5 %                >= 55  % LV Ejection Fraction MOD 4C       30.3 %                LV Ejection Fraction MOD 2C       38.2 %                IVC Diameter                      2.1 cm                DOPPLER AV Peak Velocity                  1.5 m/s               AV Peak Gradient                  9.2 mmHg              AV Mean Gradient                  5 mmHg                LVOT Peak Velocity                0.76 m/s              AV Area Cont Eq vti               1.7 cm2               Mitral E Point Velocity           1 m/s                 Mitral E to A Ratio               3.6                   MV Pressure Half Time             41.7 ms               MV Area PHT                       5.3 cm2               MV Deceleration Time              144 ms                MR ERO PISA                       0.39 cm2              MR Regurgitant Volume PISA        61.6 cm3              TR Peak Velocity                  266 cm/s              PV Peak Velocity                  0.8 m/s               PV Peak Gradient                  2.5 mmHg              RVOT Peak Velocity                0.61 m/s              * Indicates values subject to auto-interpretation LV EF:  25    %  FINDINGS Left Ventricle Normal left ventricular chamber size. Normal left ventricular wall thickness. Severely reduced left ventricular systolic function. Left ventricular ejection fraction is visually estimated to be 25%. Global hypokinesis,  function is best preserved in the basal anterior and inferolateral wall.  Grade III diastolic dysfunction. Contrast was used to enhance visualization of the endocardial border and to evaluate for thrombus in the left ventricular apex. Thrombus is not observed in the left ventricular apex. 1 mL of contrast was administered. Existing IV was used. Located at the right antecubital. Right Ventricle The right ventricle was normal in size. Reduced right ventricular systolic function. Right Atrium Enlarged right atrium. Dilated inferior vena cava without inspiratory collapse. Left Atrium Moderately dilated left atrium. Left atrial volume index is 44 mL/sq m. Mitral Valve Structurally normal mitral valve without significant stenosis. Severe mitral regurgitation. ERO by PISA method is 0.4 sq cm. Aortic Valve The aortic valve is not well visualized. Possible severe aortic stenosis in the setting of reduced cardiac output. Mild aortic insufficiency. Tricuspid Valve Structurally normal tricuspid valve without significant stenosis. Mild tricuspid regurgitation. Unable to estimate pulmonary artery pressure due to an inadequate tricuspid regurgitant jet. Pulmonic Valve The pulmonic valve is not well visualized. No pulmonic stenosis. Mild pulmonic insufficiency. Pericardium Normal pericardium without effusion. Aorta The aortic root is normal.  Ascending aorta diameter is 3.9 cm. Uzair Greer MD (Electronically Signed) Final Date:     23 March 2020                 09:16        ASSESSMENT:  Patient is a 87 y.o. 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 He has no support on DC and is the full time caregiver for his wife who is incontinent with dementia. I recommend SNF placement until the family can arrange for the parents to placed in an half-way.    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)  -Aspirin 325 mg daily  -Atorvastatin 80 mg at bedtime  -Plavix 75 mg daily  -Control blood pressure under 140/90  - unfortunately not a rehab candidate due to inadequate support on DC    Hypertension  -Control blood pressure under 140/90  -Losartan 50 mg daily    NSTEMI  -Nitroglycerin tablets 0.4 mg as needed    Pain  -Morphine 2 to 4 mg as needed  -Methocarbamol 500 mg nightly  -Tramadol 50 mg every 8 hours as needed    Neuropathic pain   - Gabapentin 300mg TID     GI prophylaxis  -Prilosec 20 mg twice daily    Urinary retention   - Ok to continue garcia for now   - Patient should work with OT to be able to straight cath himself which is his baseline   - DO NOT DC with garcia     DVT Prophylaxis:   -SCDs    Discussed with pt and family, summarized hospitalization and care, options for next step of care  Labs reviewed   Imaging personally reviewed and MR brain shows large left cerebellum infarct   Discussed with team about recommendations     Thank you for allowing us to participate in the care of this patient.     Jace Mcgraw, DO   Physical Medicine and Rehabilitation

## 2020-03-26 NOTE — DISCHARGE PLANNING
[facility] Transitional Care Coordination     Physiatry consult complete.  Dr. Mcgraw recommending skilled nursing for post acute care.  Please see Dr. Mcgraw's consult note for specifics.   update to T7 CM [phone].

## 2020-03-26 NOTE — THERAPY
"Occupational Therapy Treatment completed with focus on ADLs, ADL transfers, patient education, cognition and upper extremity function.  Functional Status: Min A supine>sit EOB, mod A w/txf to BSC, mod A w/LB dressing, mod A w/walking in room w/fww, increased difficulty w/functional use w/LUE, weakness and incoordination and impaired grasp on the walker, pt did required increased assist w/increased activity d/t fatigue and delayed physical responses. Very pleasant and motivated remained up in chair w/PT present for next session.   Plan of Care: Will benefit from Occupational Therapy 5 times per week  Discharge Recommendations:  Equipment Will Continue to Assess for Equipment Needs. Post-acute therapy Recommend post-acute placement for additional occupational therapy services prior to discharge home.      See \"Rehab Therapy-Acute\" Patient Summary Report for complete documentation.     Pt seen for OT tx pt demonstrating improved balance and postural control, remains w/L-side weakness and impaired coordination, impacting his ability to independently complete ADL's and txfs, at this time continue to recommend post acute placement prior to d/c home.   "

## 2020-03-26 NOTE — PROGRESS NOTES
Received report from NOC RN. Assumed care of patient at 0700. Patient A&Ox 4, speaking in full sentences, follows commands and responds appropriately to questions. On 2L NC, no signs of respiratory distress. Respirations are even and unlabored. Patient states 5/10 chronic leg pain at this time. POC discussed and agreed upon with patient. Call light and belongings within reach. Bed in lowest locked position. Upper side rails raised. Bed alarm on. Fall risk precautions in place. Hourly rounding. Will continue to monitor.

## 2020-03-27 ENCOUNTER — HOSPITAL ENCOUNTER (INPATIENT)
Facility: REHABILITATION | Age: 85
LOS: 3 days | DRG: 056 | End: 2020-03-30
Attending: PHYSICAL MEDICINE & REHABILITATION | Admitting: PHYSICAL MEDICINE & REHABILITATION
Payer: MEDICARE

## 2020-03-27 VITALS
DIASTOLIC BLOOD PRESSURE: 47 MMHG | BODY MASS INDEX: 20.74 KG/M2 | OXYGEN SATURATION: 96 % | SYSTOLIC BLOOD PRESSURE: 82 MMHG | HEART RATE: 72 BPM | WEIGHT: 148.15 LBS | RESPIRATION RATE: 18 BRPM | TEMPERATURE: 97.3 F | HEIGHT: 71 IN

## 2020-03-27 PROBLEM — N17.9 ACUTE RENAL FAILURE (ARF) (HCC): Status: RESOLVED | Noted: 2020-03-22 | Resolved: 2020-03-27

## 2020-03-27 PROBLEM — N17.9 ACUTE KIDNEY INJURY (HCC): Status: ACTIVE | Noted: 2020-03-27

## 2020-03-27 PROBLEM — R33.9 URINARY RETENTION: Status: ACTIVE | Noted: 2020-03-27

## 2020-03-27 LAB
ALBUMIN SERPL BCP-MCNC: 3.6 G/DL (ref 3.2–4.9)
ALBUMIN/GLOB SERPL: 1.3 G/DL
ALP SERPL-CCNC: 69 U/L (ref 30–99)
ALT SERPL-CCNC: 14 U/L (ref 2–50)
ANION GAP SERPL CALC-SCNC: 12 MMOL/L (ref 7–16)
AST SERPL-CCNC: 17 U/L (ref 12–45)
BASOPHILS # BLD AUTO: 0.4 % (ref 0–1.8)
BASOPHILS # BLD: 0.04 K/UL (ref 0–0.12)
BILIRUB SERPL-MCNC: 0.7 MG/DL (ref 0.1–1.5)
BUN SERPL-MCNC: 24 MG/DL (ref 8–22)
CALCIUM SERPL-MCNC: 9.4 MG/DL (ref 8.5–10.5)
CHLORIDE SERPL-SCNC: 106 MMOL/L (ref 96–112)
CO2 SERPL-SCNC: 20 MMOL/L (ref 20–33)
CREAT SERPL-MCNC: 1.31 MG/DL (ref 0.5–1.4)
EOSINOPHIL # BLD AUTO: 0.21 K/UL (ref 0–0.51)
EOSINOPHIL NFR BLD: 2.2 % (ref 0–6.9)
ERYTHROCYTE [DISTWIDTH] IN BLOOD BY AUTOMATED COUNT: 50.5 FL (ref 35.9–50)
GLOBULIN SER CALC-MCNC: 2.8 G/DL (ref 1.9–3.5)
GLUCOSE SERPL-MCNC: 99 MG/DL (ref 65–99)
HCT VFR BLD AUTO: 33 % (ref 42–52)
HGB BLD-MCNC: 10.3 G/DL (ref 14–18)
IMM GRANULOCYTES # BLD AUTO: 0.04 K/UL (ref 0–0.11)
IMM GRANULOCYTES NFR BLD AUTO: 0.4 % (ref 0–0.9)
LYMPHOCYTES # BLD AUTO: 1.4 K/UL (ref 1–4.8)
LYMPHOCYTES NFR BLD: 14.5 % (ref 22–41)
MCH RBC QN AUTO: 32.6 PG (ref 27–33)
MCHC RBC AUTO-ENTMCNC: 31.2 G/DL (ref 33.7–35.3)
MCV RBC AUTO: 104.4 FL (ref 81.4–97.8)
MONOCYTES # BLD AUTO: 1.06 K/UL (ref 0–0.85)
MONOCYTES NFR BLD AUTO: 11 % (ref 0–13.4)
NEUTROPHILS # BLD AUTO: 6.91 K/UL (ref 1.82–7.42)
NEUTROPHILS NFR BLD: 71.5 % (ref 44–72)
NRBC # BLD AUTO: 0 K/UL
NRBC BLD-RTO: 0 /100 WBC
PLATELET # BLD AUTO: 183 K/UL (ref 164–446)
PMV BLD AUTO: 10.7 FL (ref 9–12.9)
POTASSIUM SERPL-SCNC: 4.4 MMOL/L (ref 3.6–5.5)
PROT SERPL-MCNC: 6.4 G/DL (ref 6–8.2)
RBC # BLD AUTO: 3.16 M/UL (ref 4.7–6.1)
SODIUM SERPL-SCNC: 138 MMOL/L (ref 135–145)
WBC # BLD AUTO: 9.7 K/UL (ref 4.8–10.8)

## 2020-03-27 PROCEDURE — 700102 HCHG RX REV CODE 250 W/ 637 OVERRIDE(OP): Performed by: INTERNAL MEDICINE

## 2020-03-27 PROCEDURE — 700111 HCHG RX REV CODE 636 W/ 250 OVERRIDE (IP): Performed by: INTERNAL MEDICINE

## 2020-03-27 PROCEDURE — 90471 IMMUNIZATION ADMIN: CPT

## 2020-03-27 PROCEDURE — A9270 NON-COVERED ITEM OR SERVICE: HCPCS | Performed by: HOSPITALIST

## 2020-03-27 PROCEDURE — 700102 HCHG RX REV CODE 250 W/ 637 OVERRIDE(OP): Performed by: PHYSICAL MEDICINE & REHABILITATION

## 2020-03-27 PROCEDURE — 97112 NEUROMUSCULAR REEDUCATION: CPT

## 2020-03-27 PROCEDURE — A9270 NON-COVERED ITEM OR SERVICE: HCPCS | Performed by: INTERNAL MEDICINE

## 2020-03-27 PROCEDURE — 90662 IIV NO PRSV INCREASED AG IM: CPT | Performed by: INTERNAL MEDICINE

## 2020-03-27 PROCEDURE — 94760 N-INVAS EAR/PLS OXIMETRY 1: CPT

## 2020-03-27 PROCEDURE — 85025 COMPLETE CBC W/AUTO DIFF WBC: CPT

## 2020-03-27 PROCEDURE — 97530 THERAPEUTIC ACTIVITIES: CPT

## 2020-03-27 PROCEDURE — 700102 HCHG RX REV CODE 250 W/ 637 OVERRIDE(OP): Performed by: HOSPITALIST

## 2020-03-27 PROCEDURE — 700105 HCHG RX REV CODE 258: Performed by: INTERNAL MEDICINE

## 2020-03-27 PROCEDURE — 3E02340 INTRODUCTION OF INFLUENZA VACCINE INTO MUSCLE, PERCUTANEOUS APPROACH: ICD-10-PCS | Performed by: INTERNAL MEDICINE

## 2020-03-27 PROCEDURE — A9270 NON-COVERED ITEM OR SERVICE: HCPCS | Performed by: PHYSICAL MEDICINE & REHABILITATION

## 2020-03-27 PROCEDURE — 700111 HCHG RX REV CODE 636 W/ 250 OVERRIDE (IP): Performed by: PHYSICAL MEDICINE & REHABILITATION

## 2020-03-27 PROCEDURE — 99232 SBSQ HOSP IP/OBS MODERATE 35: CPT | Performed by: NURSE PRACTITIONER

## 2020-03-27 PROCEDURE — 99223 1ST HOSP IP/OBS HIGH 75: CPT | Mod: AI | Performed by: PHYSICAL MEDICINE & REHABILITATION

## 2020-03-27 PROCEDURE — 97116 GAIT TRAINING THERAPY: CPT

## 2020-03-27 PROCEDURE — 770010 HCHG ROOM/CARE - REHAB SEMI PRIVAT*

## 2020-03-27 PROCEDURE — 80053 COMPREHEN METABOLIC PANEL: CPT

## 2020-03-27 PROCEDURE — 36415 COLL VENOUS BLD VENIPUNCTURE: CPT

## 2020-03-27 PROCEDURE — 99239 HOSP IP/OBS DSCHRG MGMT >30: CPT | Performed by: INTERNAL MEDICINE

## 2020-03-27 RX ORDER — METOPROLOL SUCCINATE 50 MG/1
50 TABLET, EXTENDED RELEASE ORAL
Status: DISCONTINUED | OUTPATIENT
Start: 2020-03-27 | End: 2020-03-27

## 2020-03-27 RX ORDER — TRAZODONE HYDROCHLORIDE 50 MG/1
50 TABLET ORAL
Status: DISCONTINUED | OUTPATIENT
Start: 2020-03-27 | End: 2020-03-30 | Stop reason: HOSPADM

## 2020-03-27 RX ORDER — ONDANSETRON 4 MG/1
4 TABLET, ORALLY DISINTEGRATING ORAL 4 TIMES DAILY PRN
Status: DISCONTINUED | OUTPATIENT
Start: 2020-03-27 | End: 2020-03-27

## 2020-03-27 RX ORDER — ASPIRIN 300 MG/1
300 SUPPOSITORY RECTAL DAILY
Status: CANCELLED | OUTPATIENT
Start: 2020-03-28

## 2020-03-27 RX ORDER — ONDANSETRON 2 MG/ML
4 INJECTION INTRAMUSCULAR; INTRAVENOUS 4 TIMES DAILY PRN
Status: DISCONTINUED | OUTPATIENT
Start: 2020-03-27 | End: 2020-03-27

## 2020-03-27 RX ORDER — GABAPENTIN 300 MG/1
300 CAPSULE ORAL 3 TIMES DAILY
Qty: 90 CAP
Start: 2020-03-27 | End: 2020-03-30

## 2020-03-27 RX ORDER — ASPIRIN 81 MG/1
324 TABLET, CHEWABLE ORAL DAILY
Status: CANCELLED | OUTPATIENT
Start: 2020-03-28

## 2020-03-27 RX ORDER — AMOXICILLIN 250 MG
2 CAPSULE ORAL 2 TIMES DAILY
Status: DISCONTINUED | OUTPATIENT
Start: 2020-03-27 | End: 2020-03-30 | Stop reason: HOSPADM

## 2020-03-27 RX ORDER — GABAPENTIN 300 MG/1
300 CAPSULE ORAL 3 TIMES DAILY
Status: DISCONTINUED | OUTPATIENT
Start: 2020-03-27 | End: 2020-03-30

## 2020-03-27 RX ORDER — LIDOCAINE HYDROCHLORIDE 20 MG/ML
JELLY TOPICAL PRN
Status: DISCONTINUED | OUTPATIENT
Start: 2020-03-27 | End: 2020-03-30 | Stop reason: HOSPADM

## 2020-03-27 RX ORDER — POLYVINYL ALCOHOL 14 MG/ML
1 SOLUTION/ DROPS OPHTHALMIC PRN
Status: DISCONTINUED | OUTPATIENT
Start: 2020-03-27 | End: 2020-03-30 | Stop reason: HOSPADM

## 2020-03-27 RX ORDER — CLOPIDOGREL BISULFATE 75 MG/1
75 TABLET ORAL DAILY
Status: CANCELLED | OUTPATIENT
Start: 2020-03-28

## 2020-03-27 RX ORDER — METOPROLOL SUCCINATE 50 MG/1
50 TABLET, EXTENDED RELEASE ORAL DAILY
Qty: 30 TAB
Start: 2020-03-28 | End: 2020-03-30

## 2020-03-27 RX ORDER — BISACODYL 10 MG
10 SUPPOSITORY, RECTAL RECTAL
Status: DISCONTINUED | OUTPATIENT
Start: 2020-03-27 | End: 2020-03-30 | Stop reason: HOSPADM

## 2020-03-27 RX ORDER — ASPIRIN 81 MG/1
324 TABLET, CHEWABLE ORAL DAILY
Status: DISCONTINUED | OUTPATIENT
Start: 2020-03-28 | End: 2020-03-30 | Stop reason: HOSPADM

## 2020-03-27 RX ORDER — LANOLIN ALCOHOL/MO/W.PET/CERES
420 CREAM (GRAM) TOPICAL DAILY
Qty: 30 TAB | Status: ON HOLD
Start: 2020-03-28 | End: 2020-04-13

## 2020-03-27 RX ORDER — ASPIRIN 300 MG/1
300 SUPPOSITORY RECTAL DAILY
Status: DISCONTINUED | OUTPATIENT
Start: 2020-03-28 | End: 2020-03-27

## 2020-03-27 RX ORDER — VITAMIN B COMPLEX
1000 TABLET ORAL DAILY
Status: CANCELLED | OUTPATIENT
Start: 2020-03-28

## 2020-03-27 RX ORDER — OMEPRAZOLE 20 MG/1
20 CAPSULE, DELAYED RELEASE ORAL 2 TIMES DAILY
Qty: 30 CAP | Status: ON HOLD
Start: 2020-03-27 | End: 2020-04-13

## 2020-03-27 RX ORDER — OMEPRAZOLE 20 MG/1
20 CAPSULE, DELAYED RELEASE ORAL 2 TIMES DAILY
Status: CANCELLED | OUTPATIENT
Start: 2020-03-27

## 2020-03-27 RX ORDER — ATORVASTATIN CALCIUM 80 MG/1
80 TABLET, FILM COATED ORAL EVERY EVENING
Qty: 30 TAB | Refills: 11 | Status: ON HOLD
Start: 2020-03-27 | End: 2020-04-13 | Stop reason: SDUPTHER

## 2020-03-27 RX ORDER — POLYETHYLENE GLYCOL 3350 17 G/17G
1 POWDER, FOR SOLUTION ORAL
Status: DISCONTINUED | OUTPATIENT
Start: 2020-03-27 | End: 2020-03-30 | Stop reason: HOSPADM

## 2020-03-27 RX ORDER — ACETAMINOPHEN 325 MG/1
650 TABLET ORAL EVERY 6 HOURS PRN
Qty: 30 TAB | Refills: 0
Start: 2020-03-27 | End: 2020-03-30

## 2020-03-27 RX ORDER — BISACODYL 10 MG
10 SUPPOSITORY, RECTAL RECTAL
Status: CANCELLED | OUTPATIENT
Start: 2020-03-27

## 2020-03-27 RX ORDER — PROCHLORPERAZINE MALEATE 10 MG
10 TABLET ORAL EVERY 6 HOURS PRN
Status: DISCONTINUED | OUTPATIENT
Start: 2020-03-27 | End: 2020-03-30 | Stop reason: HOSPADM

## 2020-03-27 RX ORDER — POLYETHYLENE GLYCOL 3350 17 G/17G
1 POWDER, FOR SOLUTION ORAL
Status: CANCELLED | OUTPATIENT
Start: 2020-03-27

## 2020-03-27 RX ORDER — OMEPRAZOLE 20 MG/1
20 CAPSULE, DELAYED RELEASE ORAL 2 TIMES DAILY
Status: DISCONTINUED | OUTPATIENT
Start: 2020-03-27 | End: 2020-03-30 | Stop reason: HOSPADM

## 2020-03-27 RX ORDER — LACTULOSE 20 G/30ML
30 SOLUTION ORAL
Status: DISCONTINUED | OUTPATIENT
Start: 2020-03-27 | End: 2020-03-30 | Stop reason: HOSPADM

## 2020-03-27 RX ORDER — ATORVASTATIN CALCIUM 40 MG/1
80 TABLET, FILM COATED ORAL EVERY EVENING
Status: DISCONTINUED | OUTPATIENT
Start: 2020-03-27 | End: 2020-03-30 | Stop reason: HOSPADM

## 2020-03-27 RX ORDER — CLOPIDOGREL BISULFATE 75 MG/1
75 TABLET ORAL DAILY
Status: DISCONTINUED | OUTPATIENT
Start: 2020-03-28 | End: 2020-03-30 | Stop reason: HOSPADM

## 2020-03-27 RX ORDER — HEPARIN SODIUM 5000 [USP'U]/ML
5000 INJECTION, SOLUTION INTRAVENOUS; SUBCUTANEOUS EVERY 8 HOURS
Status: DISCONTINUED | OUTPATIENT
Start: 2020-03-27 | End: 2020-03-30 | Stop reason: HOSPADM

## 2020-03-27 RX ORDER — SODIUM CHLORIDE 9 MG/ML
500 INJECTION, SOLUTION INTRAVENOUS ONCE
Status: COMPLETED | OUTPATIENT
Start: 2020-03-27 | End: 2020-03-27

## 2020-03-27 RX ORDER — CLOPIDOGREL BISULFATE 75 MG/1
75 TABLET ORAL DAILY
Qty: 30 TAB | Refills: 11 | Status: ON HOLD
Start: 2020-03-27 | End: 2020-04-13 | Stop reason: SDUPTHER

## 2020-03-27 RX ORDER — AMOXICILLIN 250 MG
2 CAPSULE ORAL 2 TIMES DAILY
Status: CANCELLED | OUTPATIENT
Start: 2020-03-27

## 2020-03-27 RX ORDER — ASPIRIN 325 MG
325 TABLET ORAL DAILY
Status: DISCONTINUED | OUTPATIENT
Start: 2020-03-28 | End: 2020-03-30 | Stop reason: HOSPADM

## 2020-03-27 RX ORDER — GABAPENTIN 300 MG/1
300 CAPSULE ORAL 3 TIMES DAILY
Status: CANCELLED | OUTPATIENT
Start: 2020-03-27

## 2020-03-27 RX ORDER — ALUMINA, MAGNESIA, AND SIMETHICONE 2400; 2400; 240 MG/30ML; MG/30ML; MG/30ML
20 SUSPENSION ORAL
Status: DISCONTINUED | OUTPATIENT
Start: 2020-03-27 | End: 2020-03-30 | Stop reason: HOSPADM

## 2020-03-27 RX ORDER — ASPIRIN 325 MG
325 TABLET ORAL DAILY
Status: CANCELLED | OUTPATIENT
Start: 2020-03-28

## 2020-03-27 RX ORDER — ATORVASTATIN CALCIUM 80 MG/1
80 TABLET, FILM COATED ORAL EVERY EVENING
Status: CANCELLED | OUTPATIENT
Start: 2020-03-27

## 2020-03-27 RX ORDER — VITAMIN B COMPLEX
1000 TABLET ORAL DAILY
Status: DISCONTINUED | OUTPATIENT
Start: 2020-03-28 | End: 2020-03-30 | Stop reason: HOSPADM

## 2020-03-27 RX ORDER — HYDROXYZINE HYDROCHLORIDE 25 MG/1
50 TABLET, FILM COATED ORAL EVERY 6 HOURS PRN
Status: DISCONTINUED | OUTPATIENT
Start: 2020-03-27 | End: 2020-03-30 | Stop reason: HOSPADM

## 2020-03-27 RX ORDER — LANOLIN ALCOHOL/MO/W.PET/CERES
3 CREAM (GRAM) TOPICAL NIGHTLY PRN
Status: DISCONTINUED | OUTPATIENT
Start: 2020-03-27 | End: 2020-03-30 | Stop reason: HOSPADM

## 2020-03-27 RX ORDER — ECHINACEA PURPUREA EXTRACT 125 MG
2 TABLET ORAL PRN
Status: DISCONTINUED | OUTPATIENT
Start: 2020-03-27 | End: 2020-03-30 | Stop reason: HOSPADM

## 2020-03-27 RX ORDER — ACETAMINOPHEN 325 MG/1
650 TABLET ORAL EVERY 4 HOURS PRN
Status: DISCONTINUED | OUTPATIENT
Start: 2020-03-27 | End: 2020-03-30 | Stop reason: HOSPADM

## 2020-03-27 RX ADMIN — LOSARTAN POTASSIUM 50 MG: 50 TABLET, FILM COATED ORAL at 04:45

## 2020-03-27 RX ADMIN — POLYVINYL ALCOHOL 1 DROP: 14 SOLUTION/ DROPS OPHTHALMIC at 21:19

## 2020-03-27 RX ADMIN — MELATONIN 3 MG: at 22:51

## 2020-03-27 RX ADMIN — METOPROLOL SUCCINATE 50 MG: 50 TABLET, EXTENDED RELEASE ORAL at 09:14

## 2020-03-27 RX ADMIN — MELATONIN 1000 UNITS: at 04:45

## 2020-03-27 RX ADMIN — ASPIRIN 325 MG: 325 TABLET, FILM COATED ORAL at 04:46

## 2020-03-27 RX ADMIN — OMEPRAZOLE 20 MG: 20 CAPSULE, DELAYED RELEASE ORAL at 04:45

## 2020-03-27 RX ADMIN — GABAPENTIN 300 MG: 300 CAPSULE ORAL at 04:46

## 2020-03-27 RX ADMIN — SALINE NASAL SPRAY 2 SPRAY: 1.5 SOLUTION NASAL at 22:57

## 2020-03-27 RX ADMIN — INFLUENZA A VIRUS A/MICHIGAN/45/2015 X-275 (H1N1) ANTIGEN (FORMALDEHYDE INACTIVATED), INFLUENZA A VIRUS A/SINGAPORE/INFIMH-16-0019/2016 IVR-186 (H3N2) ANTIGEN (FORMALDEHYDE INACTIVATED), AND INFLUENZA B VIRUS B/MARYLAND/15/2016 BX-69A (A B/COLORADO/6/2017-LIKE VIRUS) ANTIGEN (FORMALDEHYDE INACTIVATED) 0.5 ML: 60; 60; 60 INJECTION, SUSPENSION INTRAMUSCULAR at 13:03

## 2020-03-27 RX ADMIN — ATORVASTATIN CALCIUM 80 MG: 40 TABLET, FILM COATED ORAL at 20:57

## 2020-03-27 RX ADMIN — ACETAMINOPHEN 650 MG: 325 TABLET, FILM COATED ORAL at 21:10

## 2020-03-27 RX ADMIN — GABAPENTIN 300 MG: 300 CAPSULE ORAL at 11:18

## 2020-03-27 RX ADMIN — GABAPENTIN 300 MG: 300 CAPSULE ORAL at 20:58

## 2020-03-27 RX ADMIN — HEPARIN SODIUM 5000 UNITS: 5000 INJECTION, SOLUTION INTRAVENOUS; SUBCUTANEOUS at 21:12

## 2020-03-27 RX ADMIN — SODIUM CHLORIDE 500 ML: 9 INJECTION, SOLUTION INTRAVENOUS at 12:50

## 2020-03-27 RX ADMIN — OMEPRAZOLE 20 MG: 20 CAPSULE, DELAYED RELEASE ORAL at 20:58

## 2020-03-27 RX ADMIN — CLOPIDOGREL BISULFATE 75 MG: 75 TABLET ORAL at 04:46

## 2020-03-27 RX ADMIN — Medication 400 MG: at 04:46

## 2020-03-27 RX ADMIN — TRAZODONE HYDROCHLORIDE 50 MG: 50 TABLET ORAL at 21:10

## 2020-03-27 ASSESSMENT — COGNITIVE AND FUNCTIONAL STATUS - GENERAL
STANDING UP FROM CHAIR USING ARMS: A LOT
TURNING FROM BACK TO SIDE WHILE IN FLAT BAD: A LITTLE
CLIMB 3 TO 5 STEPS WITH RAILING: TOTAL
MOVING FROM LYING ON BACK TO SITTING ON SIDE OF FLAT BED: UNABLE
MOVING TO AND FROM BED TO CHAIR: UNABLE
WALKING IN HOSPITAL ROOM: A LOT
MOBILITY SCORE: 10
SUGGESTED CMS G CODE MODIFIER MOBILITY: CL

## 2020-03-27 ASSESSMENT — LIFESTYLE VARIABLES
AVERAGE NUMBER OF DAYS PER WEEK YOU HAVE A DRINK CONTAINING ALCOHOL: 7
HAVE YOU EVER FELT YOU SHOULD CUT DOWN ON YOUR DRINKING: NO
HOW MANY TIMES IN THE PAST YEAR HAVE YOU HAD 5 OR MORE DRINKS IN A DAY: 0
HAVE PEOPLE ANNOYED YOU BY CRITICIZING YOUR DRINKING: NO
EVER HAD A DRINK FIRST THING IN THE MORNING TO STEADY YOUR NERVES TO GET RID OF A HANGOVER: NO
TOTAL SCORE: 0
EVER FELT BAD OR GUILTY ABOUT YOUR DRINKING: NO
TOTAL SCORE: 0
ON A TYPICAL DAY WHEN YOU DRINK ALCOHOL HOW MANY DRINKS DO YOU HAVE: 1
TOTAL SCORE: 0
CONSUMPTION TOTAL: NEGATIVE
EVER_SMOKED: YES
ALCOHOL_USE: YES

## 2020-03-27 ASSESSMENT — FIBROSIS 4 INDEX: FIB4 SCORE: 2.16

## 2020-03-27 ASSESSMENT — GAIT ASSESSMENTS
GAIT LEVEL OF ASSIST: MAXIMAL ASSIST
DISTANCE (FEET): 50
ASSISTIVE DEVICE: FRONT WHEEL WALKER
DEVIATION: ATAXIC;OTHER (COMMENT)

## 2020-03-27 ASSESSMENT — COPD QUESTIONNAIRES
DO YOU EVER COUGH UP ANY MUCUS OR PHLEGM?: NO/ONLY WITH OCCASIONAL COLDS OR INFECTIONS
DURING THE PAST 4 WEEKS HOW MUCH DID YOU FEEL SHORT OF BREATH: NONE/LITTLE OF THE TIME
HAVE YOU SMOKED AT LEAST 100 CIGARETTES IN YOUR ENTIRE LIFE: YES
COPD SCREENING SCORE: 4

## 2020-03-27 NOTE — DISCHARGE PLANNING
[facility]/ Transitional Care Coordination  Tc to pt's dtr Georgie Luke  -not available.  Left message to return my call.      TC to  and spoke w/ pt's  spouse, Lia who has a has a dx of dementia per chart review.   She indicates Georgie is on her way to her home  to pick her up as they were going to go look at group homes.   I informed Lia I would call back or Georgie can call me back too as I need to confirm dc disposition.      Plan:  Will updated shantell barcenas cm / [phone] after I speak and dtr.

## 2020-03-27 NOTE — DISCHARGE SUMMARY
Discharge Summary    CHIEF COMPLAINT ON ADMISSION  Chief Complaint   Patient presents with   • Chest Pain     Starting last night   • Shortness of Breath     Starting last  night   • Weakness     Generalized weakness       Reason for Admission  EMS     CODE STATUS  DNAR/DNI    HPI & HOSPITAL COURSE     87-year-old male with past medical history of coronary disease status post large anterior MI and stent in past presented to the hospital on March 22, 2020 with complaint of chest pain.  He found a diagnosis of an STEMI and he was started on heparin drip and cardiology was consulted. He did have another stent 3/23 mid lx.  Ejection fraction 25% due to ischemic cardiomyopathy. he was continued on medication for cardiomyopathy including beta blocker /losartan and spironolactone. 3/24- he had new acute neurological changes. CT head new evolving stroke. Neurology/stroke code was initiated. 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 No intervention. He was not a candidate for t-pA. Continued dual platelets therapy. Family was updated that pt condition might worse, however he actually did improve some following day. He continued to have right nystagmus, coordination problem and weakness on the left.  Pt DNAR/DNI. BP meds discontinued for permissive hypertension inially. BP still on low side, after losartan was restarted. He is not able to tolerated spironolactone/losartan and BB due to low blood pressure. Please restart ACe/BB first if blood pressure improved.  Also acute on chronic kidney disease was improving. He has urinary retention, chronic and he does straight cath at home. After stroke it was pt best interest to have garcia which was succesfuly placed and continued. Patient needs to follow up with urology as outpatient. Patient is very motivated to get better. He tries exercises on his own, on his bed. His wife is bed bounded and he is main caregiver. However patient and his wife has great support of his  daughters. Patient is great candidate for rehab and he was accepted.     Therefore, he is discharged in guarded and stable condition to an inpatient rehabilitation hospital.    The patient met 2-midnight criteria for an inpatient stay at the time of discharge.      FOLLOW UP ITEMS POST DISCHARGE  None     DISCHARGE DIAGNOSES  Principal Problem:    NSTEMI (non-ST elevated myocardial infarction) ([facility]) POA: Yes  Active Problems:    Acute on chronic systolic heart failure ([facility]) POA: Yes    COPD (chronic obstructive pulmonary disease) ([facility]) POA: Yes    Anemia POA: Yes    Acute respiratory failure with hypoxia ([facility]) POA: Yes    CVA (cerebral vascular accident) ([facility]) POA: No    Essential hypertension POA: Yes  Resolved Problems:    Acute renal failure (ARF) ([facility]) POA: Yes      FOLLOW UP  Future Appointments   Date Time Provider Department Center   3/27/2020  1:40 PM Carlos Mccormack M.D. [facility] None   4/2/2020 10:15 AM [place] [facility] None     No follow-up provider specified.    MEDICATIONS ON DISCHARGE     Medication List      START taking these medications      Instructions   acetaminophen 325 MG Tabs  Commonly known as:  TYLENOL   Take 2 Tabs by mouth every 6 hours as needed (Mild Pain; (Pain scale 1-3); Temp greater than 100.5 F).  Dose:  650 mg     aspirin EC 81 MG Tbec  Commonly known as:  ECOTRIN   Take 1 Tab by mouth every day.  Dose:  81 mg     atorvastatin 80 MG tablet  Commonly known as:  LIPITOR   Take 1 Tab by mouth every evening.  Dose:  80 mg     Cholecalciferol 1000 UNIT Tabs  Commonly known as:  VITAMIND D3  Replaces:  Vitamin D3 25 MCG (1000 UT) Caps   Take 1 Tab by mouth 2 Times a Day.  Dose:  1,000 Units     clopidogrel 75 MG Tabs  Commonly known as:  PLAVIX   Take 1 Tab by mouth every day.  Dose:  75 mg     gabapentin 300 MG Caps  Commonly known as:  NEURONTIN   Take 1 Cap by mouth 3 times a day.  Dose:  300 mg     Magnesium Oxide 420 (252 Mg) MG Tabs  Start taking on:  March 28,  2020  Replaces:  magnesium oxide 400 MG Tabs tablet   Take 420 mg by mouth every day.  Dose:  420 mg        CHANGE how you take these medications      Instructions   metoprolol SR 50 MG Tb24  Start taking on:  March 28, 2020  What changed:    · medication strength  · how much to take  Commonly known as:  TOPROL XL   Take 1 Tab by mouth every day.  Dose:  50 mg     omeprazole 20 MG delayed-release capsule  What changed:  when to take this  Commonly known as:  PRILOSEC   Take 1 Cap by mouth 2 Times a Day.  Dose:  20 mg        STOP taking these medications    losartan 50 MG Tabs  Commonly known as:  COZAAR     magnesium oxide 400 MG Tabs tablet  Commonly known as:  MAG-OX  Replaced by:  Magnesium Oxide 420 (252 Mg) MG Tabs     methocarbamol 500 MG Tabs  Commonly known as:  ROBAXIN     nitroglycerin 0.4 MG Subl  Commonly known as:  NITROSTAT     pravastatin 80 MG tablet  Commonly known as:  PRAVACHOL     spironolactone 25 MG Tabs  Commonly known as:  ALDACTONE     tramadol 50 MG Tabs  Commonly known as:  ULTRAM     Vitamin B-12 5000 MCG Subl     Vitamin D3 25 MCG (1000 UT) Caps  Replaced by:  Cholecalciferol 1000 UNIT Tabs            Allergies  No Known Allergies    DIET  Orders Placed This Encounter   Procedures   • Diet Order Cardiac (Monitor with meals.)     Standing Status:   Standing     Number of Occurrences:   1     Order Specific Question:   Diet:     Answer:   Cardiac [6]     Comments:   Monitor with meals.     Order Specific Question:   Texture Modifier     Answer:   Level 6 - Soft & Bite Sized (Dysphagia 3)     Order Specific Question:   Liquid level     Answer:   Level 0 - Thin       ACTIVITY  As tolerated and directed by rehab.  Weight bearing as tolerated    LINES, DRAINS, AND WOUNDS  This is an automated list. Peripheral IVs will be removed prior to discharge.  Peripheral IV 03/22/20 20 G Left Forearm (Active)   Site Assessment Clean;Dry;Intact 3/27/2020  8:00 AM   Dressing Type Occlusive;Transparent  3/27/2020  8:00 AM   Line Status Scrubbed the hub prior to access;Flushed;Saline locked 3/27/2020  8:00 AM   Dressing Status Clean;Dry;Intact 3/27/2020  8:00 AM   Dressing Intervention N/A 3/27/2020  8:00 AM   Infiltration Grading ([facility]) 0 3/27/2020  8:00 AM   Phlebitis Scale ([facility] Only) 0 3/27/2020  8:00 AM     Urethral Catheter 16 Fr. (Active)   Site Assessment Clean;Skin intact 3/26/2020  8:00 PM   Collection Container Standard drainage bag 3/26/2020  8:00 PM   Urinary Catheter Care Drainage Bag Not Overfilled;Drainage Tubing Properly Secured;Drainage Bag Below Bladder Level and Not on Floor 3/26/2020  8:00 PM   Securement Method Securing device (Describe) 3/26/2020  8:00 PM   Output (mL) 350 mL 3/26/2020  3:00 PM         Peripheral IV 03/22/20 20 G Left Forearm (Active)   Site Assessment Clean;Dry;Intact 3/27/2020  8:00 AM   Dressing Type Occlusive;Transparent 3/27/2020  8:00 AM   Line Status Scrubbed the hub prior to access;Flushed;Saline locked 3/27/2020  8:00 AM   Dressing Status Clean;Dry;Intact 3/27/2020  8:00 AM   Dressing Intervention N/A 3/27/2020  8:00 AM   Infiltration Grading ([facility]) 0 3/27/2020  8:00 AM   Phlebitis Scale ([facility] Only) 0 3/27/2020  8:00 AM           Urethral Catheter 16 Fr. (Active)   Site Assessment Clean;Skin intact 3/26/2020  8:00 PM   Collection Container Standard drainage bag 3/26/2020  8:00 PM   Urinary Catheter Care Drainage Bag Not Overfilled;Drainage Tubing Properly Secured;Drainage Bag Below Bladder Level and Not on Floor 3/26/2020  8:00 PM   Securement Method Securing device (Describe) 3/26/2020  8:00 PM   Output (mL) 350 mL 3/26/2020  3:00 PM        MENTAL STATUS ON TRANSFER  Level of Consciousness: Alert  Orientation : Oriented x 4  Speech: Speech Clear    CONSULTATIONS  Neurology   Cardiology     PROCEDURES  Stent placement     LABORATORY  Lab Results   Component Value Date    SODIUM 138 03/27/2020    POTASSIUM 4.4 03/27/2020    CHLORIDE 106 03/27/2020     CO2 20 03/27/2020    GLUCOSE 99 03/27/2020    BUN 24 (H) 03/27/2020    CREATININE 1.31 03/27/2020        Lab Results   Component Value Date    WBC 9.7 03/27/2020    HEMOGLOBIN 10.3 (L) 03/27/2020    HEMATOCRIT 33.0 (L) 03/27/2020    PLATELETCT 183 03/27/2020        Total time of the discharge process exceeds 40 minutes.

## 2020-03-27 NOTE — DISCHARGE PLANNING
[facility]/ Transitional Care Coordination  I spoke with pt's dtr Georgie.   She confirms plan for pt' spouse is to transition to a group home.   They are touring [facility] today, and they do have a bed available.   Georgie reports pt's spouse is wheelchair bound, bed bound, has a Sun along with dementia.    Pt had been primary care giver for his wife.     Georgie who is retired works for [employer].   She plans on resigning from her position, and is available to provide 24 hour care for her dad if needed.     Discussed with Dr. Mcgraw.   With new information on discharge plan, recommendations are made for in patient rehab.

## 2020-03-27 NOTE — THERAPY
"Physical Therapy Treatment completed.   Bed Mobility:  Supine to Sit: Minimal Assist  Transfers: Sit to Stand: Moderate Assist  Gait: Level Of Assist: Maximal Assist with Front-Wheel Walker       Plan of Care: Will benefit from Physical Therapy 4 times per week  Discharge Recommendations: Equipment: Will Continue to Assess for Equipment Needs. Post-acute therapy Recommend post-acute placement for continued physical therapy services prior to discharge home.       Pt very pleasant and eager to mobilize. Began session with target tapping focusing on increased awareness of weight shift and control of limb placement. Pt initially with uncontrolled weight shift when preparing to lift extremities. Once manual and verbal cues provided demonstrated initiation of postural control. Progressed to gait. Pt much more aware of his lean to L today and actively attempting to correct 50% of time. As pt fatigues lean L becomes more prononced. Continues to have poor control of LE placement when ambulating. Very motivated and eager to progress with therapies. Acute PT to continue to follow while in house. Recommend post acute placement.     See \"Rehab Therapy-Acute\" Patient Summary Report for complete documentation.       "

## 2020-03-27 NOTE — DISCHARGE PLANNING
Acute Rehab Hospital/ Transitional Care Coordination  Per Shana DODD, medical clearance obtained from Dr. Pepper for transfer to [facility].   Dr. Hernandez is accepting MD>   Transport set up for 1:30pm.   TC to pt's dtr Georgie @ [phone]; not available.  Left message informing her of acceptance @ [facility] and  time of 1:30pm.   Shana DODD has been updated also via tiger text.     Thank you for referral

## 2020-03-27 NOTE — PROGRESS NOTES
Monitor summary: SR 76-87, PA 0.20, QRS 0.12, QT 0.44, with rare PVCs and PACs per strip from monitor room.

## 2020-03-27 NOTE — PROGRESS NOTES
Neurology Progress Note  Neurohospitalist Service, [facility]    Referring Physician: Eliz Hernandez M.D.    No chief complaint on file.      HPI: Refer to initial documented Neurology H&P, as detailed in the patient's chart.    Interval History 3/27/2020: Todd Donato Jr remains admitted to telemetry 7 unit after presenting to the emergency department on 3/22/2020 for STEMI status post heart cath for which neurology was consulted via rapid response for neurological changes on 3/24/2020.  His neurological deficits at that time included headache, left-sided weakness, left-sided ataxia, and nystagmus.  As there was evidence of hypodensities on CT scan which were in the acute subacute phase and given that there were large territory for cerebellum, TPA was deferred.  Patient's only medical complaints today include double vision and left shoulder and neck pain and weakness.    Past Medical History:   Past Medical History:   Diagnosis Date   • Acute anterolateral myocardial infarction (HCC) 1995   • CAD (coronary artery disease) 1995    PCI/stent to the LAD. April 2017: MPI with scar in anterior/inferior wall, no ischemia, LVEF 30%. Echocardiogram with fixed defects in anterior wall and septal inferior wall, no ischemia, LVEF 40%. July 2017: Coronary angiogram with patent LAD stent, diffuse nonobstructive lesions in RCA, 40% mid RCA, 50% mid circumflex.   • Hyperlipidemia    • Hypertension    • PSVT (paroxysmal supraventricular tachycardia) ([facility])    • Shoulder pain         FHx:  Family History   Problem Relation Age of Onset   • Heart Disease Father    • Arthritis Mother    • Breast Cancer Sister    • Prostate cancer Brother         SHx:  Social History     Socioeconomic History   • Marital status:      Spouse name: Not on file   • Number of children: Not on file   • Years of education: Not on file   • Highest education level: Not on file   Occupational History   • Not on file   Social  Needs   • Financial resource strain: Not on file   • Food insecurity     Worry: Not on file     Inability: Not on file   • Transportation needs     Medical: Not on file     Non-medical: Not on file   Tobacco Use   • Smoking status: Former Smoker     Packs/day: 1.00     Years: 50.00     Pack years: 50.00     Last attempt to quit: 1995     Years since quittin.5   • Smokeless tobacco: Never Used   • Tobacco comment: up to 3 PPD per day at times   Substance and Sexual Activity   • Alcohol use: Yes     Alcohol/week: 4.2 oz     Types: 7 Cans of beer per week   • Drug use: No   • Sexual activity: Not on file   Lifestyle   • Physical activity     Days per week: Not on file     Minutes per session: Not on file   • Stress: Not on file   Relationships   • Social connections     Talks on phone: Not on file     Gets together: Not on file     Attends Congregation service: Not on file     Active member of club or organization: Not on file     Attends meetings of clubs or organizations: Not on file     Relationship status: Not on file   • Intimate partner violence     Fear of current or ex partner: Not on file     Emotionally abused: Not on file     Physically abused: Not on file     Forced sexual activity: Not on file   Other Topics Concern   • Not on file   Social History Narrative   • Not on file        Medications:    Current Facility-Administered Medications:   •  Respiratory Therapy Consult, , Nebulization, Continuous RT, Eliz Hernandez M.D.  •  Pharmacy Consult Request ...Pain Management Review 1 Each, 1 Each, Other, PHARMACY TO DOSE, Eliz Hernandez M.D.  •  acetaminophen (TYLENOL) tablet 650 mg, 650 mg, Oral, Q4HRS PRN, Eliz Hernandez M.D.  •  artificial tears ophthalmic solution 1 Drop, 1 Drop, Both Eyes, PRN, Eliz Hernandez M.D.  •  benzocaine-menthol (CEPACOL) lozenge 1 Lozenge, 1 Lozenge, Mouth/Throat, Q2HRS PRN, Eliz Hernandez M.D.  •  mag hydrox-al hydrox-simeth (MAALOX PLUS ES or MYLANTA  DS) suspension 20 mL, 20 mL, Oral, Q2HRS PRN, Eliz Hernandez M.D.  •  ondansetron (ZOFRAN ODT) dispertab 4 mg, 4 mg, Oral, 4X/DAY PRN **OR** ondansetron (ZOFRAN) syringe/vial injection 4 mg, 4 mg, Intramuscular, 4X/DAY PRN, Eliz Hernandez M.D.  •  traZODone (DESYREL) tablet 50 mg, 50 mg, Oral, QHS PRN, Eliz Hernandez M.D.  •  sodium chloride (OCEAN) 0.65 % nasal spray 2 Spray, 2 Spray, Nasal, PRN, Eliz Hernandez M.D.  •  hydrOXYzine HCl (ATARAX) tablet 50 mg, 50 mg, Oral, Q6HRS PRN, Eliz Hernandez M.D.  •  melatonin tablet 3 mg, 3 mg, Oral, HS PRN, Eliz Hernandez M.D.  •  lactulose 20 GM/30ML solution 30 mL, 30 mL, Oral, QDAY PRN, Eliz Hernandez M.D.  •  docusate sodium (ENEMEEZ) enema 283 mg, 283 mg, Rectal, QDAY PRN, Eliz Hernandez M.D.  •  senna-docusate (PERICOLACE or SENOKOT S) 8.6-50 MG per tablet 2 Tab, 2 Tab, Oral, BID **AND** polyethylene glycol/lytes (MIRALAX) PACKET 1 Packet, 1 Packet, Oral, QDAY PRN **AND** magnesium hydroxide (MILK OF MAGNESIA) suspension 30 mL, 30 mL, Oral, QDAY PRN **AND** bisacodyl (DULCOLAX) suppository 10 mg, 10 mg, Rectal, QDAY PRN, Eliz Hernandez M.D.  •  [START ON 3/28/2020] aspirin (ASA) tablet 325 mg, 325 mg, Oral, DAILY **OR** [START ON 3/28/2020] aspirin (ASA) chewable tab 324 mg, 324 mg, Oral, DAILY **OR** [DISCONTINUED] aspirin (ASA) suppository 300 mg, 300 mg, Rectal, DAILY, Eliz Hernandez M.D.  •  atorvastatin (LIPITOR) tablet 80 mg, 80 mg, Oral, Q EVENING, Eliz Hernandez M.D.  •  [START ON 3/28/2020] clopidogrel (PLAVIX) tablet 75 mg, 75 mg, Oral, DAILY, Eliz Hernandez M.D.  •  gabapentin (NEURONTIN) capsule 300 mg, 300 mg, Oral, TID, Eliz Hernandez M.D.  •  [START ON 3/28/2020] magnesium oxide (MAG-OX) tablet 400 mg, 400 mg, Oral, DAILY, Eliz Hernandez M.D.  •  omeprazole (PRILOSEC) capsule 20 mg, 20 mg, Oral, BID, Eliz Hernandez M.D.  •  [START ON 3/28/2020] vitamin D (cholecalciferol) tablet 1,000 Units,  "1,000 Units, Oral, DAILY, Eliz Hernandez M.D.    Allergies:  No Known Allergies     Review of systems:   Constitutional: denies fever, night sweats, weight loss.   Eyes: denies acute vision change, eye pain or secretion.   Ears, Nose, Mouth, Throat: denies nasal secretion, nasal bleeding, difficulty swallowing, hearing loss, tinnitus, vertigo, ear pain, acute dental problems, oral ulcers or lesions.   Cardiovascular: denies new onset of chest pain, palpitations, syncope, or dyspnea of exertion.  Pulmonary: denies shortness of breath, new onset of cough, hemoptysis, wheezing, chest pain or flu-like symptoms.   GI: denies nausea, vomiting, diarrhea, GI bleeding, change in appetite, abdominal pain, and change in bowel habits.  : denies dysuria, urinary incontinence, hematuria.  Heme/oncology: denies history of easy bruising or bleeding. No history of cancer, DVTor PE.  Allergy/immunology: denies hives/urticaria, or itching.   Dermatologic: denies new rash, or new skin lesions.  Musculoskeletal: See interval history; denies joint swelling or pain and back pain.   Neurologic: See interval history; denies headaches, facial droopiness, paresthesias, anesthesia, ataxia, change in speech or language, memory loss, abnormal movements, seizures, loss of consciousness, or episodes of confusion.   Psychiatric: denies symptoms of depression, anxiety, hallucinations, mood swings or changes, suicidal or homicidal thoughts.     Physical Examination:   Vitals:    03/27/20 1400   BP: (!) 99/64   Pulse: 73   Resp: 16   Temp: 36.2 °C (97.1 °F)   TempSrc: Temporal   Weight: 64.9 kg (143 lb)   Height: 1.803 m (5' 11\")     General: Patient in no acute distress, pleasant and cooperative.  HEENT: Normocephalic, no signs of acute trauma.   Neck: supple, no meningeal signs or carotid bruits. There is normal range of motion. No tenderness on exam.   Chest: clear to auscultation. No cough.   CV: RRR, no murmurs.   Skin: no signs of acute " rashes or trauma.   Musculoskeletal: joints exhibit full range of motion, without any pain to palpation. There are no signs of joint or muscle swelling. There is no tenderness to deep palpation of muscles.   Psychiatric: No hallucinatory behavior. Denies symptoms of depression or suicidal ideation. Mood and affect appear normal on exam. Patient states feeling sad regarding need to place his wife in nursing home.     NEUROLOGICAL EXAM:   Mental status, orientation: Awake, alert and fully oriented.   Speech and language: speech is clear and fluent. The patient is able to name, repeat and comprehend.   Memory: There is intact recollection of recent and remote events.   Cranial nerve exam: Pupils are 3-4 mm bilaterally and equally reactive to light and accommodation. Visual fields are intact by confrontation. There is no nystagmus on primary or secondary gaze. Diplopia when objects viewed up close. Intact full EOM in all directions of gaze. Mild left facial droop. Sensation in the face is intact to light touch. Uvula is midline. Palate elevates symmetrically. Tongue is midline and without any signs of tongue biting or fasciculations. Sternocleidomastoid muscles exhibit is normal strength bilaterally. Shoulder shrug is intact bilaterally.   Motor exam: Strength is 2+/5 LUE, 3/5 LLE, and 4/5 RUE and RLE. Tone is normal. No abnormal movements were seen on exam.   Sensory exam reveals normal sense of light touch in all extremities.   Deep tendon reflexes:  1+ throughout. Plantar responses are mute. There is no clonus.   Coordination: shows left arm ataxia with normal finger-nose-finger. Normal rapidly alternating movements.   Gait: Deferred as patient is high fall risk and in pain.     Ancillary Data Reviewed:    Labs:  Lab Results   Component Value Date/Time    PROTHROMBTM 15.5 (H) 03/22/2020 03:20 PM    INR 1.20 (H) 03/22/2020 03:20 PM      Lab Results   Component Value Date/Time    WBC 9.7 03/27/2020 03:29 AM    RBC 3.16  (L) 03/27/2020 03:29 AM    HEMOGLOBIN 10.3 (L) 03/27/2020 03:29 AM    HEMATOCRIT 33.0 (L) 03/27/2020 03:29 AM    .4 (H) 03/27/2020 03:29 AM    MCH 32.6 03/27/2020 03:29 AM    MCHC 31.2 (L) 03/27/2020 03:29 AM    MPV 10.7 03/27/2020 03:29 AM    NEUTSPOLYS 71.50 03/27/2020 03:29 AM    LYMPHOCYTES 14.50 (L) 03/27/2020 03:29 AM    MONOCYTES 11.00 03/27/2020 03:29 AM    EOSINOPHILS 2.20 03/27/2020 03:29 AM    BASOPHILS 0.40 03/27/2020 03:29 AM      Lab Results   Component Value Date/Time    SODIUM 138 03/27/2020 03:29 AM    POTASSIUM 4.4 03/27/2020 03:29 AM    CHLORIDE 106 03/27/2020 03:29 AM    CO2 20 03/27/2020 03:29 AM    GLUCOSE 99 03/27/2020 03:29 AM    BUN 24 (H) 03/27/2020 03:29 AM    CREATININE 1.31 03/27/2020 03:29 AM      Lab Results   Component Value Date/Time    CHOLSTRLTOT 91 (L) 03/25/2020 02:19 AM    LDL 30 03/25/2020 02:19 AM    HDL 47 03/25/2020 02:19 AM    TRIGLYCERIDE 69 03/25/2020 02:19 AM       Lab Results   Component Value Date/Time    ALKPHOSPHAT 69 03/27/2020 03:29 AM    ASTSGOT 17 03/27/2020 03:29 AM    ALTSGPT 14 03/27/2020 03:29 AM    TBILIRUBIN 0.7 03/27/2020 03:29 AM        Imaging/Testing:    I interpreted and/or reviewed the patient's neuroimaging    MRI Brain W/O  IMPRESSION:     1.  Advanced cerebral atrophy.  2.  Mild supratentorial white matter disease most consistent with microvascular ischemic change.  3.  12 mm curvilinear focus of acute infarction at the right occipital pole. Right posterior cerebral artery parieto-occipital branch territory.  4.  Large area of acute infarction in the left PICA territory including the left dorsal lateral medulla. This is concordant with the findings of left vertebral artery occlusion. No hemorrhagic transformation. 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  5.  Left distal vertebral artery occlusion concordant with CTA findings.       Assessment and Plan:    Kyler Donato Jr. is a 87 y.o.  male with relevant history of acute end STEMI status post heart cath, coronary artery disease, hypertension, hyperlipidemia, paroxysmal supraventricular tachycardia presenting for STEMI whom neurology was consulted to address stroke.  During RRT on 3/24/2020 patient showed evidence of left cerebellar infarction as evidenced by ataxia on the left as well as unilateral nystagmus as well as left-sided weakness and double vision which could be attributed to brainstem infarction.  As the last known well was unknown and large territory hypodensities on CT involving the left cerebellum were acute on subacute phase, patient was not a candidate for TPA.    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    Plan:  -q4h and PRN neuro assessment. VS per nursing/unit protocol. BP goal < 140/90. Antihypertensives per primary team.   -MRI Brain wo contrast obtained.   -Telemetry; currently SR. Screen for Afib/arrhythmia. TTE with bubble study revealed EF 25%, severe mitral regurgitation.   -Continue  mg PO q day   -Atorvastatin 80 mg PO q HS. Note lipid panel: Total cholesterol 91, triglycerides 69, HDL 47, LDL 30.   -Recommend aggressive BG management per primary team. Avoid IVF with Dextrose. BG goal 140-180.  Note hemoglobin A1c 5.5.   -PT/OT/SLP eval and treat.   -Counseled patient at length regarding life style and risk factor modification for secondary stroke prevention.   -All other medical management per primary team.   -DVT PPX: SCDs.    -Patient to [facility].    The evaluation of the patient, and recommended management, was discussed with Dr Reanna Ordonez. I have performed a physical exam and reviewed and updated ROS and Plan today (3/27/2020). In review of yesterday's note (3/26/2020), there are no changes except as documented above.  Neurology will sign off at this time.  Please reconsult if any concerns, changes  in patient's medical condition, or questions.    STONE Jiménez.   Nurse Practitioner, Neurohospitalist  [facility]  t) [phone] (f) [phone]

## 2020-03-27 NOTE — PROGRESS NOTES
Bedside report received. Assumed care of pt. Pt resting in bed. No signs of distress.Tele box on.Call light and belongings within reach. Bed alarm on, bed locked and in lowest position.

## 2020-03-27 NOTE — DISCHARGE PLANNING
Spoke with patient's daughter Georgie Luke this morning via telephone. Georgie stated that she spoke to her father and he has agreed to make her POA since his wife has the early stages of dementia. Plan made with Charge RN and bedside RN to allow Georgie to visit the patient from 1-3 pm to be present when the notary arrives.     Also discussed with Georgie at great length about plan for the next steps for her father (patient) and her step mother Lia. Georgie and her step sister, Yesica (Lia's daughter) have agreed to place Lia in a memory care group home. Georgie has resigned from her job to be the patient's primary caregiver. She will also be the person taking Lia to and from her doctor's appointments. Discussed Rehab vs SNF placement after discharge. Georgie would prefer rehab if her father is a candidate. She will be his support when he gets home from rehab.     Reached out to the admissions  at [facility].     [facility]. Notified Georgie Luke. Notified Inna CHATTERJEE. KRISHNA completed and signed by MD. KELLER given to Inna Cline at bedside with patient and patient's daughter Georgie Luke. New POA completed and notarized and scanned into Epic. Georgie Luke now POA.

## 2020-03-27 NOTE — PREADMISSION SCREENING NOTE
Pre-Admission Screening Form    Patient Information:   Name: Kyler Donato     MRN: [id number]       : 1932      Age: 87 y.o.   Gender: male      Race: White [7]       Marital Status:  [2]  Family Contact: Lia Donato Cathy        Relationship: Spouse [17]  Daughter [2]  Home Phone: [phone]             Cell Phone:   [phone]  Advanced Directives: Health Care Proxy and Copy in Chart  Code Status:  DNAR/DNI  Current Attending Provider: Pauly Lennon M.D.  Referring Physician: Dr. Armstrong      Physiatrist Consult: Dr. Mcgraw       Referral Date: 3/25/2020   Primary Payor Source:  MEDICARE  Secondary Payor Source:  [employer]    Medical Information:   Date of Admission to Acute Care Setting:3/22/2020  Room Number: T725/00  Rehabilitation Diagnosis: 01.2 (R) Body Involvement (L) Brain   NSTEMI. 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 2017: MPI with scar in anterior/inferior wall, no ischemia, LVEF 30%. Echocardiogram with fixed defects in anterior wall and septal inferior wall, no ischemia, LVEF 40%. 2017: Coronary angiogram with patent LAD stent, diffuse nonobstructive lesions in RCA, 40% mid RCA, 50% mid circumflex.   • Hyperlipidemia    • Hypertension    • PSVT (paroxysmal supraventricular tachycardia) ([facility])    • Shoulder pain      Past Surgical History:   Procedure Laterality Date   • ANGIOPLASTY      stents   • ELBOWPLASTY      ulnar nerve transposition   • HAND SURGERY     • SHOULDER ORIF         History Leading to Admission, Conditions that Caused the Need for Rehab (CMS):   Tierra Atkinson M.D.   Physician    [facility] Medicine History & Physical Note     Date of Service  3/22/2020        History of Presenting Illness  87 y.o. male who presented 3/22/2020 with past medical history of coronary artery disease, ischemic cardiomyopathy EF 25%, hypertension, SVT comes in for chest pain started this morning at 9 AM.  The pain started suddenly in the center of his chest radiating to the neck and the back.  He describes the pain as a burning sensation.  Patient was unable to stand properly and felt dizzy.  It is associated with nausea and vomiting.  He took a nitroglycerin which helped him relieve his chest pain.  Patient does not know if the chest pain was exertional.  The pain is non-positional, nonreproducible and does not increase with taking a deep breath.  Patient denies any fever, cough, nasal congestion, headaches, abdominal pain, changes in stool and changes in urine.  Patient arrived to the hospital with normal vital signs.  EKG interpreted by me found normal sinus rhythm with left anterior fascicular block, first-degree AV block, Q waves in anterior precordial leads without ST segment changes.  Chest x-ray interpreted by me found to increase intravascular congestion, peribronchial cuffing indicating pulmonary edema with a possible right lower lobe opacity  CT scan of the head found no acute intracranial process       Assessment/Plan:  I anticipate this patient will require at least two midnights for appropriate medical management, necessitating inpatient admission.     * NSTEMI (non-ST elevated myocardial infarction) (HCC)  Assessment & Plan  Patient will be admitted to the telemetry unit with close cardiac monitoring, serial EKG and troponin  Patient has been given full dose of aspirin and is started on IV heparin, monitor APTT  I will start the patient on metoprolol 25 mg and atorvastatin 40 mg daily  Check 2D echo, lipid panel, TSH and hemoglobin A1c  Nitro and morphine when necessary for  chest pain  Cardiology has been consulted-recommend to load with Plavix  Keep n.p.o. after midnight for possible angiogram        Acute on chronic systolic heart failure (HCC)  Assessment & Plan  Decompensated  OMT medications including spironolactone, Demadex, metoprolol, losartan     COPD (chronic obstructive pulmonary disease) ([facility])- (present on admission)  Assessment & Plan  Not in exacerbation  Continue home inhalers     Acute respiratory failure with hypoxia ([facility])  Assessment & Plan  On 2 L of O2 above baseline  Likely secondary to acute pulmonary edema  ISS and ambulation     Anemia  Assessment & Plan  Unknown etiology   check B12, folate, iron panel     Acute renal failure (ARF) ([facility])  Assessment & Plan  Unknown etiology at this point  Monitor BMP and assess response  Avoid IV contrast/nephrotoxins/NSAIDs  Dose adjust meds for decreased GFR  Ordered urine electrolytes  Hold Demadex, losartan and spironolactone        Essential hypertension- (present on admission)  Assessment & Plan  Uncontrolled  Continue current home medications  IV as needed medications have been ordered           VTE prophylaxis: heparin     The patient is critically ill, with high chance of deterioration into cardiogenic shock and eventually death if left untreated with NSTEMI. The care that has been undertaken is medically  complex starting heparin, troponin greater than thousand, consulting cardiology for cardiac catheterization. I spent 55 minutes of critical care time, including managing medical   issues, coordination of care, not including doing procedures, with no overlap in critical  care time.        Antwon Deshpande M.D.   Physician   Cardiology     Cardiology Initial Consult Note     Date of note:    3/22/2020       Consulting Physician: Jarrod Ruiz M.D.     Patient ID:     Name:                          Kyler Donato     YOB: 1932  Age:                             87 y.o.  male     MRN:                            [id number]        Reason for Consultation: chest pain     HPI:  Kyler Donato is a 87 y.o.-year-old male with a history of CAD, chronic systolic heart failure secondary to ischemic cardiomyopathy, hypertension, dyslipidemia, and PSVT who presents with chest pain.      He was last seen in our cardiology clinic by my colleague Dr. Mccormack on 9/3/2019 and was stable at that time.      Yesterday evening while sleeping he had acute onset of severe substernal chest pressure associated with nausea and shortness of breath. This improved after two nitroglycerin taken 7 minutes apart but did not completely resolve. He was going to take a third nitro but checked his blood pressure and said it was 60/40 so he just laid down and eventually went back to sleep. This morning he work up without chest pain, but did notice sudden acute onset of dizziness, pre-syncope, headache and neck pain. He was so diffusely weak that he had his wife call 911.      In the ED his troponin was 1193, and thus cardiology was consulted.      He continues to have neck pain. Of note, he has not been on aspirin for the last year due to bruising. He also has been taken his prn torsemide recently, two doses 2 days ago and another dose yesterday.      At baseline he can walk around 1 block without stopping, limited primarily by MSK complaints.      Echocardiography Laboratory     CONCLUSIONS  Severely reduced left ventricular systolic function.  Left ventricular ejection fraction is visually estimated to be 25-30%.  Global hypokinesis with regional variation.  Aortic sclerosis without stenosis.  Unable to estimate pulmonary artery pressure due to an inadequate   tricuspid regurgitant jet.  Mild mitral regurgitation.  Compared to the report of the study done 4/7/2017  there has been   worsening of left ventricular ejection fraction.      Nuclear Perfusion Imaging (4/2017):   Myocardial Perfusion   Report   NUCLEAR IMAGING  INTERPRETATION   Large sized, nonreversible, decreased uptake of severe severity in the apex    (LAD), anterior (LAD), apical septal (LAD), apical inferior and mid    anteroseptal (LAD) segments during post stress images suggestive of scar.  No  evidence of ischemia.   Enlarged ventricle with global hypokinesis and likely akinesis of the    anteroseptal and anterior walls (difficult to determine with severely    decrease radiotracer uptake). Measured ejection fraction is 30%.   ECG INTERPRETATION   Negative stress ECG for ischemia.     [facility] 7/21/2017:  Impression:  Pain stent in proximal LAD without significant in-stent stenosis.  Nonobstructive stenosis mid circumflex.  Diffuse plaquing in proximal to mid RCA with 40% stenosis at worst point.  Global hypokinesis of the left ventricle EF 36%  Significant coronary calcification.        Radiology test Review:  CXR:   IMPRESSION:     Cardiomegaly.     Pulmonary interstitial prominence likely represents interstitial edema. Pneumonitis not excluded.     Atherosclerotic plaque.      CT head:  IMPRESSION:     No acute intracranial abnormality is identified.     Atrophy     There are periventricular and subcortical white matter changes present.  This finding is nonspecific and could be from previous small vessel ischemia, demyelination, or gliosis.        Impression and Medical Decision Making:  # NSTEMI while off aspirin, happened last night and might already be completed infarct, I did discuss with him and he would like procedures and aggressive measures to prolong his life.   # Pre-syncope, concern for ventricular arrhythmia given ischemic cardiomyopathy and acute infarction.   # CAD s/p large anterior MI in 1995 with LAD stent placed  # Chronic systolic heart failure secondary to ischemic cardiomyopathy. NYHA class II, stage C at baseline with stable chronic angina previously.   # Hypertension  # Dyslipidemia  # PSVT  # [facility]  # Acute on chronic renal  insufficiency.   # DNR/DNI noted.      Recommendations:  # echo  # start aspirin, plavix, heparin gtt, head CT reviewed and is negative, but watch neuro status closely.   # NPO for cath potentially tomorrow depending on echo findings, extent of infarction. Per my conversation with patient today he is very interested in procedures to prolong his life.   # statin, will intensify to lipitor  # restart home meds, uptitrate as tolerated        Thank you for allowing me to participate in the care of this patient, Cardiology will continue to follow.  Please contact me with any questions.        Antwon Deshpande MD  Cardiologist, [facility]   [phone]              Thai Ordonez M.D.   Physician   Neurology       Hospital Neurology Stroke Consult:     Referring Physician: Pauly Lennon M.D.     Reason for consultation: Stroke     Last Known Well: Unknown  TPA Decision: No TPA due to unknown last known well, as well as visible new cerebellar hypodensities on CT     HPI: Kyler Donato is a 87 y.o. male with history of acute end STEMI status post heart cath, coronary artery disease, hypertension, hyperlipidemia, paroxysmal supraventricular tachycardia presenting to the hospital for an STEMI and consulted for stroke.  Patient's last known well is unknown.  Originally stated to be 12:45 PM.  At that time, the patient's nurse called rapid response for neurologic changes which were noted to be headache, left-sided weakness, left-sided ataxia, and nystagmus.  CT head without contrast was performed which showed cerebellar hypodensities which were new from CT head without contrast performed on 3/22/2020.  CTA of the head and neck demonstrated left vertebral artery occlusion and CT perfusion did not demonstrate mismatch.  Upon further investigation, it is difficult to determine when the patient started complaining of his headache as well as when the neurologic symptoms truly began.  Given that there  was also evidence of hypodensities on CT scan which were in the acute to subacute phase and given that they were large territory (for cerebellum) TPA was deferred.       General: 87-year-old male in bed in no acute distress  Cardio: Normal S1/S2. No peripheral edema.   Pulm: CTAX2. No respiratory distress.   Skin: Warm, dry, no rashes or lesions   Psychiatric: Appropriate affect. No active psychosis.  HEENT: Atraumatic head, normal sclera and conjunctiva, moist oral mucosa. No lid lag.  Abdomen: Soft, non tender. No masses or hepatosplenomegaly.     Physical Exam:     NIH Stroke Scale:     1a. Level of Consciousness (Alert, drowsy, etc): 0= Alert     1b. LOC Questions (Month, age): 0= Answers both correctly     1c. LOC Commands (Open/close eyes make fist/let go): 0= Obeys both correctly     2.   Best Gaze (Eyes open - patient follows examiner's finger on face): 0= Normal     3.   Visual Fields (introduce visual stimulus/threat to patient's field quadrants): 1= Partial Hemiania  4.   Facial Paresis (Show teeth, raise eyebrows and squeeze eyes shut): 1= Minor               5a. Motor Arm - Left (Elevate arm to 90 degrees if patient is sitting, 45 degrees if  supine): 2= Can't resist gravity     5b. Motor Arm - Right (Elevate arm to 90 degrees if patient is sitting, 45 degrees if supine): 0= No drift     6a. Motor Leg - Left (Elevate leg 30 degrees with patient supine): 1= Drift     6b. Motor Leg - Right  (Elevate leg 30 degrees with patient supine): 0= No drift     7.   Limb Ataxia (Finger-nose, heel down shin): 1= Present in 1 limb     8.   Sensory (Pin prick to face, arm, trunk and leg - compare side to side): 0= Normal     9.  Best Language (Name item, describe a picture and read sentences): 0= No aphasia     10. Dysarthria (Evaluate speech clarity by patient repeating listed words): 1= Mild to moderate slurring     11. Extinction and Inattention (Use information from prior testing to identify neglect or  double  simultaneous stimuli testing): 0= No neglect     Total NIH Score: 7     Assessment/Plan:     Kyler Donato is a 87 y.o. male with history of acute end STEMI status post heart cath, coronary artery disease, hypertension, hyperlipidemia, paroxysmal supraventricular tachycardia presenting to the hospital for an STEMI and consulted for stroke.  On exam patient has evidence of left cerebellar infarction as evidenced by ataxia on the left as well as unilateral nystagmus however furthermore he has evidence of weakness involving the left side as well as double vision which point to further infarction of the brainstem.  At this time, unfortunately due to a unknown last known well and prominent evidence of hypodensities on CT involving the left cerebellum the patient is not a candidate for TPA.  Etiology of stroke likely secondary to cardioembolic phenomenon given ejection fraction of less than 30%.     Plan:   -Neuro checks/vital signs per protocol.  -Permissive hypertension for 24 hours  -MRI brain without contrast  -Obtain CBC/CMP/TSH/LDL/Hgb A1C  -Initiate smoking cessation/stroke education.  -Schedule evaluation with PT/OT/ST  -Continue antiplatelet therapy for now.  -If neurologic worsening occurs obtain a stat CT head without contrast and consider transfer to ICU pending on findings.  -Plan discussed with consulting physician and patient's nurse        Thai Ordonez M.D., Diplomat of the American Board of Psychiatry and Neurology  Diplomat of [facility]   Assistant Clinical Professor, [facility]  Acute Neurology Consultant              Jace Mcgraw D.O.   Physician   Physical Medicine & Rehab     Physical Medicine and Rehabilitation Consultation                                                                                      Initial Consult        Date of Consultation: 3/26/2020  Consulting provider: Adolfo Lennon MD  Reason for consultation: assess for acute inpatient rehab  appropriateness  LOS: 4 Day(s)     Chief complaint: NSTEMI. Stroke     HPI: The patient is a 87 y.o. right hand dominant male with a past medical history of coronary artery disease, ischemic cardiomyopathy with ejection fraction 25%, hypertension, SVT, chronic urinary retention requiring straight cath at home;  who presented on 3/22/2020  3:13 PM with burning chest pain radiating to neck and back with dizziness, nausea and vomiting.  Patient found to have an end STEMI and was admitted for close cardiac monitoring, serial EKGs and troponins.  Patient was initially treated with aspirin and started on IV heparin.  Patient underwent cardiac catheterization and PCI with stent placement in mid circumflex artery on 3/23/2020 by Luis Mccann MD. then on 3/24/2020 patient's nurse called rapid response for neurologic changes with headache, left-sided weakness, left-sided ataxia and nystagmus.  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  Patient did not receive TPA.  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      The patient currently reports painful paresthesias on left side of body, headache, nausea/vommiting, paresthesias, double vision, urinary retention requiring garcia. Patient is worried about his wife who has dementia and he is her caregiver. He has two daughters who are taking care of her at this time.      ROS:  Pertinent positives are listed in HPI, all other systems reviewed and are negative    Social Hx:  Pre-morbidly, this patient lived in a single level home with ramped steps to enter, with spouse.   Employment: not working   Tobacco: denies   Alcohol: denies   Drugs: denies      Current level of function:  The patient was evaluated by acute care  "Physical Therapy, Occupational Therapy and Speech Language Pathology; currently requiring minimal assistance for mobility and minimal assistance for ADLs, also with ongoing swallowing deficits.       Physical Exam:  Vitals: /59   Pulse 65   Temp 36.8 °C (98.2 °F) (Temporal)   Resp 17   Ht 1.803 m (5' 11\")   Wt 67.9 kg (149 lb 11.1 oz)   SpO2 94%   Gen: NAD  Head: NC/AT  Eyes/ Nose/ Mouth: PERRLA, moist mucous membranes  Cardio: RRR, no mumurs  Pulm: CTAB, with normal respiratory effort  Abd: Soft NTND, active bowel sounds,   Ext: No peripheral edema. No calf tenderness. No clubbing/cyanosis.      Mental status: answers questions appropriately follows commands  Speech: fluent, no aphasia or dysarthria     CRANIAL NERVES:  2,3: visual acuity grossly intact, PERRL  3,4,6: EOMI bilaterally, no nystagmus or diplopia  5: sensation intact to light touch bilaterally and symmetric  7: no facial asymmetry  8: hearing grossly intact  9,10: symmetric palate elevation  11: SCM/Trapezius strength 5/5 bilaterally  12: tongue protrudes midline     Motor:                            Upper Extremity  Myotome R L   Shoulder flexion C5 5 5   Elbow flexion C5 4/5 4/5   Wrist extension C6 4/5 4/5   Elbow extension C7 4/5 4/5   Finger flexion C8 4/5 3/5   Finger abduction T1 4/5 3/5      Lower Extremity Myotome R L   Hip flexion L2 45 3/5   Knee extension L3 4/5 3/5   Ankle dorsiflexion L4 4/5 3/5   Toe extension L5 4/5 3/5   Ankle plantarflexion S1 4/5 3/5      Sensory:   Altered sensation to light touch on the left hemibody, face to foot     DTRs: 2+ in bilateral biceps, triceps, brachioradialis, 2+ in bilateral patellar and achilles tendons  No clonus at bilateral ankles  Negative babinski b/l  Negative Frye b/l      Tone: no spasticity noted, no cogwheeling noted     Labs: Reviewed and significant for          Recent Labs     03/23/20  1612 03/24/20  0346 03/25/20  0219 03/26/20  0227   RBC  --  2.84* 2.88* 2.94* "   HEMOGLOBIN  --  9.5* 9.4* 9.6*   HEMATOCRIT  --  29.3* 29.5* 30.4*   PLATELETCT  --  163* 166 156*   APTT 80.8*  --   --   --             Recent Labs     03/24/20  0346 03/25/20  0219 03/26/20  0227   SODIUM 141 142 138   POTASSIUM 4.1 4.2 4.2   CHLORIDE 108 108 106   CO2 23 23 23   GLUCOSE 122* 116* 113*   BUN 36* 30* 28*   CREATININE 1.53* 1.52* 1.38   CALCIUM 9.2 9.3 9.1      Recent Results            ASSESSMENT:  Patient is a 87 y.o. 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 He has no support on DC and is the full time caregiver for his wife who is incontinent with dementia. I recommend SNF placement until the family can arrange for the parents to placed in an [facility].     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)  -Aspirin 325 mg daily  -Atorvastatin 80 mg at bedtime  -Plavix 75 mg daily  -Control blood pressure under 140/90  - unfortunately not a rehab candidate due to inadequate support on DC     Hypertension  -Control blood pressure under 140/90  -Losartan 50 mg daily     NSTEMI  -Nitroglycerin tablets 0.4 mg as needed     Pain  -Morphine 2 to 4 mg as needed  -Methocarbamol 500 mg nightly  -Tramadol 50 mg every 8 hours as needed     Neuropathic pain   - Gabapentin 300mg TID      GI prophylaxis  -Prilosec 20 mg twice daily     Urinary retention   - Ok to continue garcia for now   - Patient should work with OT to be able to straight cath himself which is his baseline   - DO NOT DC with garcia      DVT Prophylaxis:   -SCDs     Discussed with pt and family, summarized hospitalization and care, options for next step of care  Labs reviewed   Imaging personally reviewed and MR brain shows large left cerebellum infarct   Discussed with team about recommendations      Thank you for  allowing us to participate in the care of this patient.      Jace Mcgraw DO   Physical Medicine and Rehabilitation                            DATE OF SERVICE:  03/23/2020     PROCEDURE:  Cardiac catheterization and percutaneous coronary intervention.  A.  Left heart catheterization.  B.  Selective coronary angiography.  C.  Coronary stent implantation, mid circumflex artery 2.5x12 mm Gabe   drug-eluting stent.  D.  Intravascular ultrasound  (IVUS) left main artery.  E.  Right femoral artery approach.  F.  Conscious sedation supervision.     PREPROCEDURE DIAGNOSES:  1.  Prior anterior myocardial infarction, 1995, [facility]  2.  Prior left anterior descending artery stent, 1995, [facility]  3.  Ischemic cardiomyopathy, ejection fraction of 25%.  4.  Non-ST elevation myocardial infarction.  5.  Chronic kidney disease.     POSTPROCEDURE DIAGNOSES:  1.  Mid circumflex artery eccentric 75-90% stenosis, mid 95% stenosis medial   branch of the posterior descending artery.  2.  Patent left anterior descending artery stent.  3.  Mildly elevated left ventricular end-diastolic pressure 18 mmHg.  4.  Noncritical ostial left main artery stenosis.     FINDINGS:  HEMODYNAMICS:  LEFT HEART PRESSURES:  1.  LVEDP 18 mmHg.  2.  Left ventricular systolic pressure 104 mmHg.  3.  Central aortic pressure systolic 105, diastolic 55, mean of 72 mmHg.     CORONARY ARTERIOGRAPHY:  1.  LEFT MAIN ARTERY:  Left main artery is a moderately large caliber vessel   with eccentric ostial stenosis of approximately 30%.  The left main artery   bifurcates to left anterior descending artery and circumflex artery.  2.  LEFT ANTERIOR DESCENDING ARTERY:  Left anterior descending artery gives   rise to normal complement of septal and diagonal branches and extends around   the apex.  The proximal left anterior descending artery has an estimated   relative 40% stenosis followed by what appears to be a stent that is patent.    The remainder  of the vessel is patent with mild diffuse intimal atheromatous   disease.  3.  CIRCUMFLEX ARTERY:  The circumflex gives rise to 2 major marginal   branches.  The mid circumflex has an eccentric 75-90% hazy stenosis, which is   new compared to the 2017 study.  4.  RIGHT CORONARY ARTERY:  The right coronary artery gives rise to a   bifurcating posterior descending artery and a posterolateral system.  The   right coronary artery has a midvessel eccentric 30% stenosis.  The medial   branch of the posterior descending artery has a midvessel 95% stenosis.  This   vessel is small caliber.     INTRAVASCULAR ULTRASOUND (IVUS), left main artery demonstrates an   ostial minimal luminal area (MLA) of 8.0-9.3 square millimeters indicating a   nonsignificant obstructive stenosis..     POST-STENT IMPLANTATION, mid circumflex artery with a 2.5x12 mm Gabe   drug-eluting stent demonstrates 0% residual stenosis, no evidence of   dissection or thrombus and ROBBIE 3 antegrade flow.     PLAN:  Maximize optimal medical therapy for the patient's coronary artery   disease and systolic congestive heart failure, ischemic cardiomyopathy.        ____________________________________     ROLANDO GILLIS MD     RDS / NTS       Imaging:   Ct-cta Head With & W/o-post Process     Result Date: 3/24/2020  3/24/2020 2:00 PM HISTORY/REASON FOR EXAM:  Stroke TECHNIQUE/EXAM DESCRIPTION: CT angiogram of the [place] without and with contrast.  Initial precontrast images were obtained of the head from the skull base through the vertex.  Postcontrast images were obtained of the [place] following the power injection of nonionic contrast at 5.0 mL/sec. Thin-section helical images were obtained with overlapping reconstruction interval. Coronal and sagittal multiplanar volume reformats were generated.  3D angiographic images were reviewed on PACS.  Maximum intensity projection (MIP) images were generated and reviewed. 100 mL of Omnipaque 350  nonionic contrast was injected intravenously. Low dose optimization technique was utilized for this CT exam including automated exposure control and adjustment of the mA and/or kV according to patient size. COMPARISON:  None. FINDINGS: Distal left internal carotid artery is patent. There is atherosclerotic plaque of the cavernous and supraclinoid ICA without significant stenosis. Left middle and anterior cerebral artery is patent. Anterior communicating artery is seen. Distal right internal carotid artery is patent. Atherosclerotic plaque is seen of the cavernous and supraclinoid ICA. Right middle and anterior cerebral artery is patent. Right vertebral artery is patent. The left vertebral artery is occluded. Basilar artery, superior cerebellar and posterior cerebral arteries are patent. P1 segment on the left is diminutive. There is fetal origin of the left posterior cerebral artery. No  aneurysm is identified. Superior sagittal, transverse and sigmoid sinuses are patent. 3D angiographic/MIP images of the vasculature confirm the vascular findings as described above.      Occluded left vertebral artery.      Ct-cta Neck With & W/o-post Processing     Result Date: 3/24/2020  3/24/2020 2:01 PM HISTORY/REASON FOR EXAM:  Left-sided weakness. TECHNIQUE/EXAM DESCRIPTION: CT angiogram of the neck with contrast. Postcontrast images were obtained of the neck from the great vessels through the skull base following the power injection of nonionic contrast at 5.0 mL/sec. Thin-section helical images were obtained with overlapping reconstruction interval. Coronal and oblique multiplanar volume reformats were generated. Cervical internal carotid artery percent stenosis is calculated using the standard method according to the NASCET criteria wherein a segment of uniform caliber mid or distal cervical internal carotid is used as the reference denominator. 3D angiographic images were reviewed on PACS.  Maximum intensity projection  (MIP) images were generated and reviewed 80 mL of Omnipaque 350 nonionic contrast was injected intravenously. Low dose optimization technique was utilized for this CT exam including automated exposure control and adjustment of the mA and/or kV according to patient size. COMPARISON:  None. FINDINGS: There are bilateral pleural effusions, right greater than left. There are patchy groundglass opacities within the lungs. There is extensive atherosclerotic change of aorta. 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 There is atherosclerotic change of the right common carotid artery which extends into the bifurcation and internal carotid artery. This results in less than 50% diameter narrowing. There is atherosclerotic change of the left common carotid artery which extends into the bifurcation and the left internal carotid artery. This results in less than 50% diameter narrowing. The left vertebral artery is occluded at the C2 level. Right vertebral artery is patent. Basilar artery is patent. The neck soft tissues and lung apices in the field of view are unremarkable. 3D angiographic/MIP images of the vasculature confirm the vascular findings as described above.      1.  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 Right vertebral artery is patent. 2.  Atherosclerotic change of the carotid arteries bilaterally without significant flow limitation. This was discussed with ADALBERTO MEZA at 2:57 PM on 3/24/2020.     Ct-head W/o     Result Date: 3/24/2020  3/24/2020 1:54 PM HISTORY/REASON FOR EXAM:  Altered mental status; nystagmus, confusion. TECHNIQUE/EXAM DESCRIPTION AND NUMBER OF VIEWS: CT of the head without contrast. Contiguous axial sections were obtained from the skull base through the vertex. Up to date radiation dose reduction adjustments have been utilized to meet ALARA standards for radiation dose reduction. COMPARISON:  3/22/2020 FINDINGS: The is no evidence of  intraparenchymal, intraventricular and extra-axial hemorrhage.  The ventricles and cortical sulci are prominent compatible with age related change.  There is no midline shift. There are periventricular and subcortical white matter changes present.  There is hypodensity in the left cerebellum which is new compared to prior and compatible with evolving infarction. There is hyperdensity within the left vertebral artery intracranially which may be related to thrombosis. Visualized paranasal sinuses and mastoid air cells are clear. The calvarium is intact.      Evolving left cerebellar infarction. Hyperdensity within the left vertebral artery may represent a thrombus. Atrophy There are periventricular and subcortical white matter changes present.  This finding is nonspecific and could be from previous small vessel ischemia, demyelination, or gliosis.      Ct-head W/o     Result Date: 3/22/2020  3/22/2020 3:32 PM HISTORY/REASON FOR EXAM:  Ataxia, stroke suspected. TECHNIQUE/EXAM DESCRIPTION AND NUMBER OF VIEWS: CT of the head without contrast. Contiguous axial sections were obtained from the skull base through the vertex. Up to date radiation dose reduction adjustments have been utilized to meet ALARA standards for radiation dose reduction. COMPARISON:  None available FINDINGS: The is no evidence of intraparenchymal, intraventricular and extra-axial hemorrhage.  The ventricles and cortical sulci are prominent compatible with age related change.  There are periventricular and subcortical white matter changes present. There is no  midline shift. There is mild mucosal thickening in the inferior frontal sinuses. There is mucosal thickening in the ethmoid air cells on the right. Remaining visualized paranasal sinuses are clear. Visualized mastoid air cells are clear. The calvarium is intact.      No acute intracranial abnormality is identified. Atrophy There are periventricular and subcortical white matter changes present.   This finding is nonspecific and could be from previous small vessel ischemia, demyelination, or gliosis.      Dx-chest-portable (1 View)     Result Date: 3/22/2020  3/22/2020 3:24 PM HISTORY/REASON FOR EXAM:  Chest Pain. TECHNIQUE/EXAM DESCRIPTION AND NUMBER OF VIEWS: Single portable view of the chest. COMPARISON: 11/19/2018 FINDINGS: The cardiomediastinal silhouette is enlarged. There are interstitial opacities bilaterally. No pleural effusion or pneumothorax is seen. Atherosclerotic calcification is seen. Rotator cuff anchors are seen on the left. Widened left acromioclavicular joint space is unchanged with postsurgical changes of the distal left clavicle.      Cardiomegaly. Pulmonary interstitial prominence likely represents interstitial edema. Pneumonitis not excluded. Atherosclerotic plaque.      Mr-brain-w/o     Result Date: 3/25/2020  3/25/2020 12:47 PM HISTORY/REASON FOR EXAM:  Stroke, follow up. Altered mental status.  Nystagmus. Weakness. Left vertebral artery occlusion seen on CTA. TECHNIQUE/EXAM DESCRIPTION: MRI of the brain without contrast. T1 sagittal, T2 fast spin-echo axial, T1 coronal, FLAIR coronal, Diffusion weighted and Apparent Diffusion Coefficient (ADC map) axial images were obtained of the whole brain. Additional FLAIR axial images were obtained. The study was performed on a [employer] Signa 1.5 Mehnaz MRI scanner. COMPARISON:  Head CT 3/24/2020, CTA of the head 3/24/2020 FINDINGS: The calvariae are unremarkable.  There are no extra-axial fluid collections. There is pattern of advanced cerebral atrophy manifest as enlargement of sulcal markings and subarachnoid spaces as well as ventriculomegaly. There is a pattern of mild supratentorial white matter disease with patchy and focal areas of bright T2 and FLAIR signal in the subcortical and deep white matter of both hemispheres consistent with small vessel ischemic change versus demyelination or gliosis. There is a small approximately 12 mm  curvilinear focus of bright diffusion signal at the right occipital pole with corresponding abnormal FLAIR hyperintensity. (Diffusion-weighted axial images 9, 10, FLAIR axial image 11, series 7, FLAIR coronal image 7,  series 8). This is consistent with an acute infarct in the right PCA territory. No hemorrhagic transformation. There are no mass effects or shift of midline structures.  There are no hemorrhagic lesions. The brainstem and posterior fossa structures are remarkable for a large area of confluent T2 and FLAIR hyperintensity in the left inferior-medial cerebellar hemisphere, left paramedian inferior vermis and cerebellar tonsil, and left dorso-lateral aspect of the medulla with corresponding restricted diffusion consistent with acute infarction in the left PICA territory. No hemorrhagic transformation. Vascular flow voids show abnormal intermediate and increased T2 signal in the distal left vertebral artery consistent with occlusion as seen on CTA. The right distal vertebral artery flow void and basilar flow void are intact. There is carotid origin of the left posterior cerebral artery concordant with CTA findings, normal variation (T2 axial image 15, series 5). The carotid flow voids are intact. The dural venous sinuses are intact. The right transverse -- sigmoid sinus and right internal jugular vein  are dominant. The paranasal sinuses in the field of view are unremarkable. The mastoids are clear. There has been cataract surgery.      1.  Advanced cerebral atrophy. 2.  Mild supratentorial white matter disease most consistent with microvascular ischemic change. 3.  12 mm curvilinear focus of acute infarction at the right occipital pole. Right posterior cerebral artery parieto-occipital branch territory. 4.  Large area of acute infarction in the left PICA territory including the left dorsal lateral medulla. This is concordant with the findings of left vertebral artery occlusion. No hemorrhagic  transformation. 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 5.  Left distal vertebral artery occlusion concordant with CTA findings.     Ct-cerebral Perfusion Analysis     Result Date: 3/24/2020  3/24/2020 2:00 PM HISTORY/REASON FOR EXAM:  Left-sided weakness. TECHNIQUE/EXAM DESCRIPTION AND NUMBER OF VIEWS: CT Cerebral Perfusion Analysis. The study was performed on a 128 slice [employer]. Lightspeed Multidetector CT scanner. Perfusion data and corresponding time-activity curves are processed and displayed as color-coded maps in the axial plane for Cerebral Blood Flow (CBF), Cerebral Blood Volume  (CBV),T Max and Mean Transit Time (MTT) and are post processed on the Ischemia view-RAPID virtual . 40 mL of Omnipaque 350 nonionic contrast was injected intravenously. Low dose optimization technique was utilized for this CT exam including automated exposure control and adjustment of the mA and/or kV according to patient size. COMPARISON:  None. FINDINGS: Cerebral blood flow less than 30% = 0 T Max more than 6 seconds = 0 Mismatch volume = None Mismatch ratio = None      1.  Cerebral blood flow less than 30% likely representing completed infarct = 0 mL. 2.  T Max more than 6 seconds likely representing combination of completed infarct and ischemia = 0 mL. 3.  Mismatched volume likely representing ischemic brain/penumbra = None 4.  Please note that the cerebral perfusion was performed on the limited brain tissue around the basal ganglia region. Infarct/ischemia outside the CT perfusion sections can be missed in this study.     Ec-echocardiogram Complete W/ Cont     Result Date: 3/23/2020  Transthoracic Echo Report Echocardiography Laboratory CONCLUSIONS Compared to the images of the prior echocardiogram dated 11/20/18 - there is now severe mitral regurgitation. Severely reduced left ventricular systolic function. Global hypokinesis,  function is best preserved in the basal anterior and  inferolateral wall. Grade III diastolic dysfunction. Reduced right ventricular systolic function. Severe mitral regurgitation. Possible severe aortic stenosis in the setting of reduced cardiac output. Mild aortic insufficiency. ELIZABETH SOMMER Exam Date:         2020                    06:40 Exam Location:     Inpatient Priority:          Routine Ordering Physician:        MARIELA CURRIE Referring Physician: Sonographer:               BENITA Wen Age:    87     Gender:    M MRN:    [id number] :    1932 BSA:    1.81   Ht (in):    71     Wt (lb):    140 Exam Type:     Complete, Contrast Indications:     Acute MI ICD Codes:       410.9 CPT Codes:       86758,  BP:   117    /   56     HR:   60 Technical Quality:       Fair MEASUREMENTS  (Male / Female) Normal Values 2D ECHO LV Diastolic Diameter PLAX        5 cm                  4.2 - 5.9 / 3.9 - 5.3 cm LV Systolic Diameter PLAX         4.2 cm                2.1 - 4.0 cm IVS Diastolic Thickness           0.94 cm               LVPW Diastolic Thickness          1.1 cm                LVOT Diameter                     2 cm                  Estimated LV Ejection Fraction    25 %                  LV Ejection Fraction MOD BP       33.5 %                >= 55  % LV Ejection Fraction MOD 4C       30.3 %                LV Ejection Fraction MOD 2C       38.2 %                IVC Diameter                      2.1 cm                DOPPLER AV Peak Velocity                  1.5 m/s               AV Peak Gradient                  9.2 mmHg              AV Mean Gradient                  5 mmHg                LVOT Peak Velocity                0.76 m/s              AV Area Cont Eq vti               1.7 cm2               Mitral E Point Velocity           1 m/s                 Mitral E to A Ratio               3.6                   MV Pressure Half Time             41.7 ms               MV Area PHT                       5.3  cm2               MV Deceleration Time              144 ms                MR ERO PISA                       0.39 cm2              MR Regurgitant Volume PISA        61.6 cm3              TR Peak Velocity                  266 cm/s              PV Peak Velocity                  0.8 m/s               PV Peak Gradient                  2.5 mmHg              RVOT Peak Velocity                0.61 m/s              * Indicates values subject to auto-interpretation LV EF:  25    % FINDINGS Left Ventricle Normal left ventricular chamber size. Normal left ventricular wall thickness. Severely reduced left ventricular systolic function. Left ventricular ejection fraction is visually estimated to be 25%. Global hypokinesis,  function is best preserved in the basal anterior and inferolateral wall.  Grade III diastolic dysfunction. Contrast was used to enhance visualization of the endocardial border and to evaluate for thrombus in the left ventricular apex. Thrombus is not observed in the left ventricular apex. 1 mL of contrast was administered. Existing IV was used. Located at the right antecubital. Right Ventricle The right ventricle was normal in size. Reduced right ventricular systolic function. Right Atrium Enlarged right atrium. Dilated inferior vena cava without inspiratory collapse. Left Atrium Moderately dilated left atrium. Left atrial volume index is 44 mL/sq m. Mitral Valve Structurally normal mitral valve without significant stenosis. Severe mitral regurgitation. ERO by PISA method is 0.4 sq cm. Aortic Valve The aortic valve is not well visualized. Possible severe aortic stenosis in the setting of reduced cardiac output. Mild aortic insufficiency. Tricuspid Valve Structurally normal tricuspid valve without significant stenosis. Mild tricuspid regurgitation. Unable to estimate pulmonary artery pressure due to an inadequate tricuspid regurgitant jet. Pulmonic Valve The pulmonic valve is not well visualized. No pulmonic  "stenosis. Mild pulmonic insufficiency. Pericardium Normal pericardium without effusion. Aorta The aortic root is normal.  Ascending aorta diameter is 3.9 cm. Uzair Greer MD (Electronically Signed) Final Date:     23 March 2020                 09:16     Co-morbidities: please see below.   Potential Risk - Complications: Cognitive Impairment, Deep Vein Thrombosis, Malnutrition, Perceptual Impairment, Pneumonia, Pressure Ulcer and Urinary Tract Infection  Level of Risk: High    Ongoing Medical Management Needed (Medical/Nursing Needs):   Patient Active Problem List    Diagnosis Date Noted   • NSTEMI (non-ST elevated myocardial infarction) ([facility]) 03/22/2020     Priority: High   • Acute on chronic systolic heart failure ([facility]) 11/19/2018     Priority: High   • Ischemic cardiomyopathy 07/31/2017     Priority: High   • SVT (supraventricular tachycardia) ([facility]) 09/24/2015     Priority: High   • Coronary artery disease involving native coronary artery 09/24/2015     Priority: High   • Hypercholesterolemia 09/24/2015     Priority: High   • COPD (chronic obstructive pulmonary disease) ([facility]) 04/05/2017     Priority: Medium   • Right shoulder pain 09/24/2015     Priority: Medium   • GERD (gastroesophageal reflux disease) 09/24/2015     Priority: Medium   • Essential hypertension 07/06/2016     Priority: Low   • CVA (cerebral vascular accident) ([facility]) 03/24/2020   • Acute renal failure (ARF) ([facility]) 03/22/2020   • Anemia 03/22/2020   • Acute respiratory failure with hypoxia ([facility]) 03/22/2020     Current Vital Signs:   Temperature: 36.7 °C (98.1 °F) Pulse: 67 Respiration: 18 Blood Pressure : 129/67  Weight: 67.2 kg (148 lb 2.4 oz) Height: 180.3 cm (5' 11\")  Pulse Oximetry: 95 % O2 (LPM): 1.5      Completed Laboratory Reports:  Recent Labs     03/25/20  0219 03/26/20  0227 03/27/20  0329   WBC 9.2 9.8 9.7   HEMOGLOBIN 9.4* 9.6* 10.3*   HEMATOCRIT 29.5* 30.4* 33.0*   PLATELETCT 166 156* 183   SODIUM 142 138 138   POTASSIUM 4.2 " 4.2 4.4   BUN 30* 28* 24*   CREATININE 1.52* 1.38 1.31   ALBUMIN 3.9 3.8 3.6   GLUCOSE 116* 113* 99     Additional Labs: none    Prior Living Situation:   Housing / Facility: 1 Story House  Steps Into Home: 0  Steps In Home: 0  Lives with - Patient's Self Care Capacity: Spouse    Prior Level of Function / Living Situation:   Physical Therapy: Prior Services: None  Housing / Facility: 1 Story House  Steps Into Home: 0  Steps In Home: 0  Lives with - Patient's Self Care Capacity: Spouse  Bed Mobility: Independent  Transfer Status: Independent  Ambulation: Independent  Distance Ambulation (Feet): (to tolerance)  Assistive Devices Used: Single Point Cane  Current Level of Function:   Level Of Assist: Maximal Assist  Assistive Device: Front Wheel Walker  Distance (Feet): 75  Deviation: Ataxic, Other (Comment)(LOB L)  Weight Bearing Status: FWB  Supine to Sit: (received up )  Sit to Supine: (chair )  Skilled Intervention: Verbal Cuing, Tactile Cuing, Sequencing  Sit to Stand: Moderate Assist  Bed, Chair, Wheelchair Transfer: Maximal Assist  Toilet Transfers: Moderate Assist  Skilled Intervention: Verbal Cuing, Tactile Cuing, Sequencing  Sitting in Chair: 18 min on BSC 10 min in chair   Sitting Edge of Bed: 5 min   Standin min   Occupational Therapy:   Self Feeding: Independent  Grooming / Hygiene: Independent  Bathing: Independent  Dressing: Independent  Toileting: Independent  Medication Management: Independent  Laundry: Independent  Kitchen Mobility: Independent  Finances: Independent  Home Management: Independent  Shopping: Independent  Prior Level Of Mobility: Independent With Device in Community  Prior Services: None  Housing / Facility: 1 Story House  Current Level of Function:   Upper Body Dressing: Minimal Assist  Lower Body Dressing: Moderate Assist  Toileting: Moderate Assist  Skilled Intervention: Verbal Cuing, Facilitation, Compensatory Strategies  Speech Language Pathology:   Assessment : Pt was seen this  afternoon for cognitive-linguistic evaluation. Pt was AAOx3 with confusion to month (April). Pt reported that his glasses are not in hospital, and per chart review, Pt with improving double vision. Pt was administered the standardized assessment, the Cognitive-Linguistic Quick Test (CLQT) which evaluates various cognitive domains. Pt's scores are compared against same-aged peers and a composite severity rating is assigned. Pt scored WFL for measures of Executive Functions (20) and Language (29). Pt scored within the Mild range for measures of Memory (117). Pt scored within the MODERATE range for measures of Attention (53) and Visuospatial Skills (35). Pt received an overall composite severity rating of 3.0 which is considered Mild. Pt was also administered various informal assessments of reading comprehension (88% accuracy) and following written directions (80% accuracy). Pt's demonstrated good effort throughout testing; however, was limited by distractability and visual deficits. Recommend cognitive-linguistic tx targeting attention, memory and visuospatial skills. RN notified and aware. Pt agreeable to tx plan. Thank you for the consult.  Problem List: Cognitive-Linguistic Deficits  Diet / Liquid Recommendation: Soft & Bite-Sized (6) - (Dysphagia III), Thin (0)  Comments: Pt was seen today for f/u dysphagia tx and therapeutic feeding session. Per RN, Pt has been tolerating current diet of Soft/bite-sized solids and thin liquids and pills with liquid wash with no observed difficulties. Pt's lunch tray consisted of: ground swedish meatballs, green beans, rice, apple juice, water (and pablito crackers, provided by SLP). Pt demonstrated appropriate feeding rate and bolus size independently; however, attempted to talk with food in his mouth. Pt was receptive to education re: chew and swallow prior to talking. Additionally, Pt endorsed need to eat slowly now rather than eat quickly. Pt had no coughing/choking or other  overt clinical s/sx of aspiration/penetration with meal; however, had slight residue in cheeks (which he was able to clear with lingual sweep and liquid wash). Pt reported that he con't to see bugs/spiders in room; however, is told he is hallucinating. He denies this to be the case. At this time, recommend to con't strict adherence to safe swallow precautions and current diet of Soft, bite-sized solids, thin liquids and meds with liquid wash. Please monitor x3 with meals. Will complete cognitive-linguistic evaluation during subsequent visit. RN notified and aware. Thank you.  Rehabilitation Prognosis/Potential: Good  Estimated Length of Stay: 14-21  days    Nursing:   Orientation : Oriented x 4  Sun in Place    Scope/Intensity of Services Recommended:  Physical Therapy: 1 hr / day  5 days / week. Therapeutic Interventions Required: Maximize Endurance, Mobility, Strength, Safety and family training.   Occupational Therapy: 1 hr / day 5 days / week. Therapeutic Interventions Required: Maximize Self Care, ADLs, IADLs, Energy Conservation and family training.   Speech & Language Pathology: 1 hr / day 5 days / week. Therapeutic Interventions Required: Maximize Cognition, Swallowing, Safety and family training.   Rehabilitation Nursin/. Therapeutic Interventions Required: Monitor Pain, Skin, Wound(s), Vital Signs, Intake and Output, Labs, Safety and Family Training  Rehabilitation Physician: 3 - 5 days / week. Therapeutic Interventions Required: Medical Management  Respiratory Care: Eval and treat. . Therapeutic Interventions Required: Per protocol  Dietician: Eval and treat. Therapeutic Interventions Required: per protocol    Rehabilitation Goals and Plan (Expected frequency & duration of treatment in the IRF):   Return to the Community and Supervised Level of Care  Anticipated Date of Rehabilitation Admission: 3/27/2020  Patient/Family oriented IRF level of care/facility/plan: Yes  Patient/Family willing to  participate in IRF care/facility/plan: Yes  Patient able to tolerate IRF level of care proposed: Yes  Patient has potential to benefit IRF level of care proposed: Yes  Comments: Not Applicable    Special Needs or Precautions - Medical Necessity:  None identified at this time.   Diet:   DIET ORDERS (From admission to next 24h)     Start     Ordered    03/25/20 1218  Diet Order Cardiac (Monitor with meals.)  ALL MEALS     Question Answer Comment   Diet: Cardiac Monitor with meals.   Texture Modifier Level 6 - Soft & Bite Sized (Dysphagia 3)    Liquid level Level 0 - Thin        03/25/20 1218                Anticipated Discharge Destination / Patient/Family Goal:  Destination: Home with Assistance Support System: Spouse and Family   Anticipated home health services: OT, PT, SLP, Social Work and Aide  Previously used HH service/ provider: Not Applicable  Anticipated DME Needs: Walker and ; will be monitored for additonal dme needs.   Outpatient Services: OT and PT  Alternative resources to address additional identified needs:    PT has been primary caregiver for his spouse, Lia who is wc/bed bound; +garcia; +early on set of dementia.  He also managed their home, drove, completed shopping, meal prep, and medication management.   Georgie is making arrangements for pt to go to a group home. They are touring [facility] today, and they do have a bed available.   Georgie who is retired works for [employer].   She plans on resigning from her position, and is available to provide 24 hour care for her dad if needed.      Follow up with the following:   Future Appointments   Date Time Provider Department Center   3/27/2020  1:40 PM Carlos Mccormack M.D. [facility] None   4/2/2020 10:15 AM [place] Ugalde None          Pre-Screen Completed: 3/27/2020 11:10 AM JIM Weir, [facility]

## 2020-03-27 NOTE — DOCUMENTATION QUERY
[facility]                                                                       Query Response Note      PATIENT:               ELIZABETH SOMMER  ACCT #:                  [id number]  MRN:                     [id number]  :                      [id number]  ADMIT DATE:       3/22/2020 3:13 PM  DISCH DATE:          RESPONDING  PROVIDER #:        [id number]           QUERY TEXT:    Chronic Kidney Disease (CKD) is documented in the Cardiology Notes.  Please specify the disease stage (includes probable or suspected) or if this condition is ruled out.    Stages are defined by the National Kid[employer] Foundation as follows:  CKD Stage I  GFR >= 90 ml / min per 1.73 m2 and persistent albuminuria  CKD Stage 2 GFR between 60 and 89 with persistent albuminuria  CKD Stage 3 GFR between 30 and 59   CKD Stage 4 GFR between 15 and 29   CKD Stage 5 GFR between <15 or End Stage Renal Disease    The patient's Clinical Indicators include:  3/22 BUN: 58; Creatinine: 2.08; GFR: 30  3/24 BUN: 36; Creatinine: 1.53; GFR: 43  3/24 Cardiology Note: Acute on chronic renal failure. 2.08 on presentation, 1.5 today, approx baseline.   3/26 BUN: 28; Creatinine: 1.38; GFR: 49  Treatment: IVF; avoid nephrotoxins; lab testing  Risk Factors: Advanced age; HTN; systolic heart failure  Options provided:   -- Chronic kidney disease Stage 1   -- Chronic kidney disease Stage 2   -- Chronic kidney disease Stage 3   -- Chronic kidney disease Stage 4   -- Chronic kidney disease Stage 5   -- Chronic kidney disease is ruled out   -- Unable to determine      Query created by: Radha Chirinos on 3/26/2020 1:35 PM    RESPONSE TEXT:    Chronic kidney disease Stage 3          Electronically signed by:  ADALBERTO MEZA MD 3/27/2020 9:07 AM

## 2020-03-27 NOTE — DISCHARGE PLANNING
Acute Rehab Hospital/ Transitional Care Coordination  Tc to Mariia ROWAN  @ [facility] -voice mail box full; tc to her on her cell number  and left message.

## 2020-03-27 NOTE — DISCHARGE INSTRUCTIONS
Discharge Instructions    Discharged to other by medical transportation with relative. Discharged via wheelchair, hospital escort: Refused.  Special equipment needed: Wheelchair    Be sure to schedule a follow-up appointment with your primary care doctor or any specialists as instructed.     Discharge Plan:   Diet Plan: Discussed  Activity Level: Discussed  Confirmed Follow up Appointment: Appointment Scheduled  Confirmed Symptoms Management: Discussed  Medication Reconciliation Updated: Yes  Influenza Vaccine Indication: Indicated: 65 years and older    I understand that a diet low in cholesterol, fat, and sodium is recommended for good health. Unless I have been given specific instructions below for another diet, I accept this instruction as my diet prescription.   Other diet: Cardiac    Special Instructions: Diagnosis:  Acute Coronary Syndrome (ACS) is a diagnosis that encompasses cardiac-related chest pain and heart attack. ACS occurs when the blood flow to the heart muscle is severely reduced or cut off completely due to a slow process called atherosclerosis.  Atherosclerosis is a disease in which the coronary arteries become narrow from a buildup of fat, cholesterol, and other substances that combine to form plaque. If the plaque breaks, a blood clot will form and block the blood flow to the heart muscle. This lack of blood flow can cause damage or death to the heart muscle which is called a heart attack or Myocardial Infarction (MI). There are two different types of MIs:  ST Elevation Myocardial Infarction or STEMI (the most severe type of heart attack) and Non-ST Elevation Myocardial Infarction or NSTEMI.    Treatment Plan:  · Cardiac Diet  - Low fat, low salt, low cholesterol   · Cardiac Rehab  - Your doctor has ordered you a referral to [facility].  Call [phone] to schedule an appointment.  · Attend my follow-up appointment with my Cardiologist.  · Take my medications as prescribed by my  doctor  · Exercise daily  · Lower my bad cholesterol and raise my good cholesterol, lower my blood pressure and Reduce stress    Medications:  Certain medications are used to treat ACS.  Remember to always take medications as prescribed and never stop talking medications unless told by your doctor.    You have been prescribed the following medicatons:    Aspirin - Aspirin is used as a blood thinning medication and you will require this medication indefinitely.  Anti-platelet/blood thinner - Your Anti-platelet/Blood thinning medication is called Plavix, and is used in combination with aspirin to prevent clots from forming in your heart and/or around your stent.  Your doctor will determine how long you need to be on this medicine.  Beta-Blocker - Beta-Blocker metoprolol is used to lower blood pressure and heart rate, and/or helps your heart heal after a heart attack.  Statin - Statin Atorvastatin  is used to lower cholesterol.    · Is patient discharged on Warfarin / Coumadin?   No       HF Patient Discharge Instructions  · Monitor your weight daily, and maintain a weight chart, to track your weight changes.   · Activity as tolerated, unless your Doctor has ordered otherwise. Other activity order: as tolerated.  · Follow a low fat, low cholesterol, low salt diet unless instructed otherwise by your Doctor. Read the labels on the back of food products and track your intake of fat, cholesterol and salt.   · Fluid Restriction No. If a Fluid Restriction has been ordered by your Doctor, measure fluids with a measuring cup to ensure that you are not exceeding the restriction.   · No smoking.  · Oxygen No. If your Doctor has ordered that you wear Oxygen at home, it is important to wear it as ordered.  · Did you receive an explanation from staff on the importance of taking each of your medications and why it is necessary to keep taking them unless your doctor says to stop? Yes  · Were all of your questions answered about how  to manage your heart failure and what to do if you have increased signs and symptoms after you go home? Yes  · Do you feel like your heart failure care team involved you in the care treatment plan and allowed you to make decisions regarding your care while in the hospital and addressed any discharge needs you might have? Yes    See the educational handout provided at discharge for more information on monitoring your daily weight, activity and diet. This also explains more about Heart Failure, symptoms of a flare-up and some of the tests that you have undergone.     Warning Signs of a Flare-Up include:  · Swelling in the ankles or lower legs.  · Shortness of breath, while at rest, or while doing normal activities.   · Shortness of breath at night when in bed, or coughing in bed.   · Requiring more pillows to sleep at night, or needing to sit up at night to sleep.  · Feeling weak, dizzy or fatigued.     When to call your Doctor:  · Call [facility] seven days a week from 8:00 a.m. to 8:00 p.m. for medical questions [phone].  · Call your Primary Care Physician or Cardiologist if:   1. You experience any pain radiating to your jaw or neck.  2. You have any difficulty breathing.  3. You experience weight gain of 3 lbs in a day or 5 lbs in a week.   4. You feel any palpitations or irregular heartbeats.  5. You become dizzy or lose consciousness.   If you have had an angiogram or had a pacemaker or AICD placed, and experience:  1. Bleeding, drainage or swelling at the surgical / puncture site.  2. Fever greater than 100.0 F  3. Shock from internal defibrillator.  4. Cool and / or numb extremities.      Depression / Suicide Risk    As you are discharged from this [facility], it is important to learn how to keep safe from harming yourself.    Recognize the warning signs:  · Abrupt changes in personality, positive or negative- including increase in energy   · Giving away possessions  · Change in  eating patterns- significant weight changes-  positive or negative  · Change in sleeping patterns- unable to sleep or sleeping all the time   · Unwillingness or inability to communicate  · Depression  · Unusual sadness, discouragement and loneliness  · Talk of wanting to die  · Neglect of personal appearance   · Rebelliousness- reckless behavior  · Withdrawal from people/activities they love  · Confusion- inability to concentrate     If you or a loved one observes any of these behaviors or has concerns about self-harm, here's what you can do:  · Talk about it- your feelings and reasons for harming yourself  · Remove any means that you might use to hurt yourself (examples: pills, rope, extension cords, firearm)  · Get professional help from the community (Mental Health, Substance Abuse, psychological counseling)  · Do not be alone:Call your Safe Contact- someone whom you trust who will be there for you.  · Call your local CRISIS HOTLINE 462-0361 or 785-627-6799  · Call your local Children's Mobile Crisis Response Team [place] (569) 469-5641 or www.ADP  · Call the toll free National Suicide Prevention Hotlines   · National Suicide Prevention Lifeline 824-256-FARO (2106)  · National Hope Line Network 800-SUICIDE (067-9599)

## 2020-03-27 NOTE — PROGRESS NOTES
2 RN skin check done with admitting RN Liv and RN Liv from specials. Face photo and skin photos documented in media. Appropriate LDAs opened.   Pt with Erwin score of 18, RN wound protocol ordered on 3/27/20, orders current. Prevention measures in place.

## 2020-03-27 NOTE — H&P
"REHABILITATION HISTORY AND PHYSICAL/POST ADMISSION EVALUATION    3/27/2020  4:34 PM  Kyler Donato Jr.  RH01/01  Admission  3/27/2020  2:20 PM  [facility] Code/Reason for admission: 01.2  (L) Brain    Etiologic diagnosis/problem: CVA (cerebral vascular accident) (HCC)  Chief Complaint: left sided weakness, facial numbness, double vision    HPI:  Per Dr. Mcgraw's H&P \"The patient is a 87 y.o. right hand dominant male with a past medical history of coronary artery disease, ischemic cardiomyopathy with ejection fraction 25%, hypertension, SVT, chronic urinary retention requiring straight cath at home;  who presented on 3/22/2020  3:13 PM with burning chest pain radiating to neck and back with dizziness, nausea and vomiting.  Patient found to have an NSTEMI and was admitted for close cardiac monitoring, serial EKGs and troponins. Patient was initially treated with aspirin and started on IV heparin.  Patient underwent cardiac catheterization and PCI with stent placement in mid circumflex artery on 3/23/2020 by Luis Mccann MD. then on 3/24/2020 patient's nurse called rapid response for neurologic changes with headache, left-sided weakness, left-sided ataxia and nystagmus.  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 Patient did not receive TPA. 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 EKG with prolonged qTC of 503.     Patient current reports numbness on the left side of his face, double vision, as well as weakness on the left side of his body. He has been doing intermittent caths 4 times a day for the last 11 years.  He reports he was told that his bladder muscle just stopped working.  " He is moved his bowels recently.  He denies any chest pain or shortness of breath.  He wears glasses and is hard of hearing.  Family to bring in his hearing aids.    Patient was evaluated by Rehab Medicine physician and Physical Therapy, Occupational Therapy and Speech Therapy and determined to be appropriate for acute inpatient rehab and was transferred to [facility] on 3/27/2020  2:20 PM.      With this acute therapeutic intervention, this patient hopes to improve his functional status, and return to independent living with the supportive care of family.    REVIEW OF SYSTEMS:     A complete review of systems was performed and was negative in detail with the exception of items mentioned elsewhere in this document.    PMH:  Past Medical History:   Diagnosis Date   • Acute anterolateral myocardial infarction (HCC) 1995   • CAD (coronary artery disease) 1995    PCI/stent to the LAD. April 2017: MPI with scar in anterior/inferior wall, no ischemia, LVEF 30%. Echocardiogram with fixed defects in anterior wall and septal inferior wall, no ischemia, LVEF 40%. July 2017: Coronary angiogram with patent LAD stent, diffuse nonobstructive lesions in RCA, 40% mid RCA, 50% mid circumflex.   • Hyperlipidemia    • Hypertension    • PSVT (paroxysmal supraventricular tachycardia) ([facility])    • Shoulder pain        PSH:  Past Surgical History:   Procedure Laterality Date   • ANGIOPLASTY  1995    stents   • ELBOWPLASTY      ulnar nerve transposition   • HAND SURGERY     • SHOULDER ORIF         Family History   Problem Relation Age of Onset   • Heart Disease Father    • Arthritis Mother    • Breast Cancer Sister    • Prostate cancer Brother         MEDICATIONS:  Current Facility-Administered Medications   Medication Dose   • Respiratory Therapy Consult     • Pharmacy Consult Request ...Pain Management Review 1 Each  1 Each   • acetaminophen (TYLENOL) tablet 650 mg  650 mg   • artificial tears ophthalmic solution 1 Drop   1 Drop   • benzocaine-menthol (CEPACOL) lozenge 1 Lozenge  1 Lozenge   • mag hydrox-al hydrox-simeth (MAALOX PLUS ES or MYLANTA DS) suspension 20 mL  20 mL   • ondansetron (ZOFRAN ODT) dispertab 4 mg  4 mg    Or   • ondansetron (ZOFRAN) syringe/vial injection 4 mg  4 mg   • traZODone (DESYREL) tablet 50 mg  50 mg   • sodium chloride (OCEAN) 0.65 % nasal spray 2 Spray  2 Spray   • hydrOXYzine HCl (ATARAX) tablet 50 mg  50 mg   • melatonin tablet 3 mg  3 mg   • lactulose 20 GM/30ML solution 30 mL  30 mL   • docusate sodium (ENEMEEZ) enema 283 mg  283 mg   • senna-docusate (PERICOLACE or SENOKOT S) 8.6-50 MG per tablet 2 Tab  2 Tab    And   • polyethylene glycol/lytes (MIRALAX) PACKET 1 Packet  1 Packet    And   • magnesium hydroxide (MILK OF MAGNESIA) suspension 30 mL  30 mL    And   • bisacodyl (DULCOLAX) suppository 10 mg  10 mg   • [START ON 3/28/2020] aspirin (ASA) tablet 325 mg  325 mg    Or   • [START ON 3/28/2020] aspirin (ASA) chewable tab 324 mg  324 mg   • atorvastatin (LIPITOR) tablet 80 mg  80 mg   • [START ON 3/28/2020] clopidogrel (PLAVIX) tablet 75 mg  75 mg   • gabapentin (NEURONTIN) capsule 300 mg  300 mg   • [START ON 3/28/2020] magnesium oxide (MAG-OX) tablet 400 mg  400 mg   • omeprazole (PRILOSEC) capsule 20 mg  20 mg   • [START ON 3/28/2020] vitamin D (cholecalciferol) tablet 1,000 Units  1,000 Units       ALLERGIES:  Patient has no known allergies.    PSYCHOSOCIAL HISTORY:  Pre-mobidly, the patient lived in a single level home with ramp to enter, in [place] with spouse.  He was taking care of his spouse who has dementia but she now is going into a group home and his daughter Mariia will provide  care.  This was his second marriage.  He reports his first wife  of cancer after they have been  for 55 years.  She was in the [employer] for 4 years and saw combat in [place].  Is 10% service-connected and [facility].  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  Smoked tobacco in the [employer], drinks 3 to 4 cans of beer per week or more in the summer.    LEVEL OF FUNCTION PRIOR TO DISABILTY:  Independent,     LEVEL OF FUNCTION PRIOR TO ADMISSION to [facility]:  Min assist for mobility and ADLs  Dysphagia    CURRENT LEVEL OF FUNCTION:   Same as level of function prior to admission to [facility]    PHYSICAL EXAM:     VITAL SIGNS:   height is 1.803 m (5' 11\") and weight is 64.9 kg (143 lb). His temporal temperature is 36.2 °C (97.1 °F). His blood pressure is 99/64 (abnormal) and his pulse is 73. His respiration is 16 and oxygen saturation is 94%.     GENERAL: No apparent distress, thin  HEENT: Normocephalic/atraumatic, EOMI, PERRL and No nystagmus  CARDIAC: Regular rate and rhythm, normal S1, S2, no murmurs, no peripheral edema   LUNGS: Clear to auscultation, normal respiratory effort, on room air   ABDOMINAL: bowel sounds present, soft, nontender and nondistended    EXTREMITIES: no edema  MSK: enlarged bilateral knee joints, no effusion    NEURO:    Mental status:  A&Ox4 (person, place, date, situation) answers questions appropriately follows commands  Speech: fluent, no aphasia or dysarthria    CRANIAL NERVES:  2,3: visual acuity grossly intact, PERRL  3,4,6: EOMI bilaterally, no nystagmus or diplopia  5: decreased sensation on left side of face  7: no facial asymmetry  8: hard of hearing  9,10: symmetric palate elevation  11: SCM/Trapezius strength 5/5 bilaterally  12: tongue protrudes midline    Motor:  Shoulder flexors:  Right -  5/5, Left -  2-3/5  Elbow flexors:  Right -  5/5, Left -  2-3/5  Elbow extensors:  Right -  5/5, Left -  2-3/5  Weaker  on left  Hip flexors:  Right -  5/5, Left -  2-3/5  Dorsiflexors:  Right -  5/5, Left -  2-3/5  Plantar flexors:  Right -  5/5, Left -  4/5     Sensory:   decreased to light touch on left face, bilateral toes    DTRs: 1+ in bilateral biceps, triceps, " brachioradialis, 0+ in bilateral patellar and achilles tendons  No clonus at bilateral ankles  Negative babinski b/l  Negative Frye b/l     RADIOLOGY:              Results for orders placed during the hospital encounter of 03/22/20   MR-BRAIN-W/O    Impression 1.  Advanced cerebral atrophy.  2.  Mild supratentorial white matter disease most consistent with microvascular ischemic change.  3.  12 mm curvilinear focus of acute infarction at the right occipital pole. Right posterior cerebral artery parieto-occipital branch territory.  4.  Large area of acute infarction in the left PICA territory including the left dorsal lateral medulla. This is concordant with the findings of left vertebral artery occlusion. No hemorrhagic transformation. 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  5.  Left distal vertebral artery occlusion concordant with CTA findings.                                                                                                               Results for orders placed during the hospital encounter of 03/22/20   CT-CTA NECK WITH & W/O-POST PROCESSING    Impression 1.  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 Right vertebral artery is patent.    2.  Atherosclerotic change of the carotid arteries bilaterally without significant flow limitation.    This was discussed with ADALBERTO MEZA at 2:57 PM on 3/24/2020.                                                     LABS:  Recent Labs     03/25/20 0219 03/26/20 0227 03/27/20  0329   SODIUM 142 138 138   POTASSIUM 4.2 4.2 4.4   CHLORIDE 108 106 106   CO2 23 23 20   GLUCOSE 116* 113* 99   BUN 30* 28* 24*   CREATININE 1.52* 1.38 1.31   CALCIUM 9.3 9.1 9.4     Recent Labs     03/25/20 0219 03/26/20 0227 03/27/20  0329   WBC 9.2 9.8 9.7   RBC 2.88* 2.94* 3.16*   HEMOGLOBIN 9.4* 9.6* 10.3*   HEMATOCRIT 29.5* 30.4* 33.0*   .4* 103.4* 104.4*   MCH 32.6 32.7 32.6   MCHC 31.9* 31.6* 31.2*   RDW 50.4* 50.4* 50.5*    PLATELETCT 166 156* 183   MPV 10.8 11.0 10.7         PRIMARY REHAB DIAGNOSIS:    This patient is a 87 y.o. male admitted for acute inpatient rehabilitation with CVA (cerebral vascular accident) (HCC).    IMPAIRMENTS:   Cognitive  ADLs/IADLs  Mobility  Swallow    SECONDARY DIAGNOSIS/MEDICAL CO-MORBIDITIES AFFECTING FUNCTION:    Chronic urinary retention   Peripheral neuropathy  Coronary artery disease  Status post and STEMI  Cardiomyopathy  Systolic CHF  Hypertension  Macrocytic anemia  Acute versus chronic kidney injury      RELEVANT CHANGES SINCE PREADMISSION EVALUATION:    Status unchanged    The patient's rehabilitation potential is Excellent  The patient's medical prognosis is good    PLAN:   Discussion and Recommendations, discussed with the patient and/or family:   1. The patient requires an acute inpatient rehabilitation program with a coordinated program of care at an intensity and frequency not available at a lower level of care. This recommendation is substantiated by the patient's medical physicians who recommend that the patient's intervention and assessment of medical issues needs to be done at an acute level of care for patient's safety and maximum outcome.     2. A coordinated program of care will be supplied by an interdisciplinary team of physical therapy, occupational therapy, rehab physician, rehab nursing, and, if needed, speech therapy and rehab psychology. Rehab team presents a patient-specific rehabilitation and education program concentrating on prevention of future problems related to accessibility, mobility, skin, bowel, bladder, sexuality, and psychosocial and medical/surgical problems.     3. Need for Rehabilitation Physician: The rehab physician will be evaluating the patient on a multi-weekly basis to help coordinate the program of care. The rehab physician communicates between medical physicians, therapists, and nurses to maximize the patient's potential outcome. Specific areas in  which the rehab physician will be providing daily assessment include the following:   A. Assessing the patient's heart rate and blood pressure response (vitals monitoring) to activity and making adjustments in medications or conservative measures as needed.   B. The rehab physician will be assessing the frequency at which the program can be increased to allow the patient to reach optimal functional outcome.   C. The rehab physician will also provide assessments in daily skin care, especially in light of patient's impairments in mobility.   D. The rehab physician will provide special expertise in understanding how to work with functional impairment and recommend appropriate interventions, compensatory techniques, and education that will facilitate the patient's outcome.     4. Rehab R.N.   The rehab RN will be working with patient to carry over in room mobility and activities of daily living when the patient is not in 3 hours of skilled therapy. Rehab nursing will be working in conjunction with rehab physician to address all the medical issues above and continue to assess laboratory work and discuss abnormalities with the treating physicians, assess vitals, and response to activity, and discuss and report abnormalities with the rehab physician. Rehab RN will also continue daily skin care, supervise bladder/bowel program, instruct in medication administration, and ensure patient safety.     5. Therapies to treat at intensity and frequency of (may change after completion of evaluation by all therapeutic disciplines):       PT:  Physical therapy to address mobility, transfer, gait training and evaluation for adaptive equipment needs 1hour/day at least 5 days/week for the duration of the ELOS (see below)       OT:  Occupational therapy to address ADLs, self-care, home management training, functional mobility/transfers and assistive device evaluation, and community re-integration 1hour/day at least 5 days/week for the  duration of the ELOS (see below).        ST/Dysphagia:  Speech therapy to address speech, language, and cognitive deficits as well as swallowing difficulties with retraining/dysphagia management and community re-integration with comprehension, expression, cognitive training 1hour/day at least 5 days/week for the duration of the ELOS (see below).     6. Medical management / Rehabilitation Issues/Adverse Potential affecting function as part of rehabilitation plan.    Chronic urinary retention   For last 11 years, does IC at home  Remove Sun in am  Start IC Q4-6 hours for volumes less than 500    Peripheral neuropathy  Continue gabapentin    Appreciate the assistance of the hospitalist with his medical co-morbidites:    Coronary artery disease  Status post NSTEMI/PCI  Cardiomyopathy  Systolic CHF, EF 25%  Hypertension. BP meds on hold due to hypotension  Macrocytic anemia  Acute versus chronic kidney injury    I performed a complete drug regimen review and did not identify any potential clinically significant medication issues.    The patient's CODE STATUS was confirmed as DNR/DNI on admission, with the patient and/or family at bedside.    REHABILITATION ISSUES/ADVERSE POTENTIAL:  1.  CVA (Cerebrovascular Accident): Cont aspirin and Plavix for secondary prophylaxis as well as lipid and blood pressure management. Patient demonstrates functional deficits in strength, balance, coordination, and ADL's. Patient is admitted to [facility] for comprehensive rehabilitation therapy as described below.   Rehabilitation nursing monitors bowel and bladder control, educates on medication administration, co-morbidities and monitors patient safety.    2.  DVT prophylaxis:  Patient is on nothing for anticoagulation upon transfer. Start heparin due to kidney function. Encourage OOB. Monitor daily for signs and symptoms of DVT including but not limited to swelling and pain to prevent the development of DVT that may  interfere with therapies.    3.  Pain: No issues with pain currently / Controlled with as needed oral analgesics.    4.  Nutrition/Dysphagia: Dietician monitors nutrient intake, recommend supplements prn and provide nutrition education to pt/family to promote optimal nutrition for wound healing/recovery.     5.  Bladder/bowel:  Start bowel and bladder program, to prevent constipation, urinary retention (which may lead to UTI), and urinary incontinence (which will impact upon pt's functional independence).   - TV Q3h while awake with post void bladder scans, I&O cath for PVRs >400  - up to commode after meal     6.  Skin/dermal ulcer prophylaxis: Monitor for new skin conditions with q.2 h. turns as required to prevent the development of skin breakdown.     7. Respiratory therapy: RT performs O2 management prn, breathing retraining, pulmonary hygiene and bronchospasm management prn to optimize participation in therapies.    Pt was seen today for 74 min, and entire time spent in face-to-face contact was >50% in counseling and coordination of care as detailed in A/P above.        GOALS/EXPECTED LEVEL OF FUNCTION BASED ON CURRENT MEDICAL AND FUNCTIONAL STATUS (may change based on patient's medical status and rate of impairment recovery):  Transfers:   Modified Independent to supervision  Mobility/Gait:   Modified Independent to supervision  ADL's:   Modified Independent to supervision  Cognition:  Supervision  Swallowing:  Regular with thins    DISPOSITION: Discharge to pre-morbid independent living setting with the supportive care of patient's family.      ELOS: 2-3 weeks    Eliz Hernandez M.D.  Physical Medicine and Rehabilitation

## 2020-03-27 NOTE — PROGRESS NOTES
Patient admitted to facility at 1435 via w/c; accompanied by hospital transport.  Patient assisted to room and positioned in bed for comfort and safety; call light within reach.  Patient assisted with stowing belongings and oriented to room and facility.  Admission assessment performed and documented in computer. Patient received and reviewed education binder. Admission paperwork completed; signed copies placed in chart.  Will continue to monitor.

## 2020-03-27 NOTE — CARE PLAN
Problem: Communication  Goal: The ability to communicate needs accurately and effectively will improve  Intervention: Educate patient and significant other/support system about the plan of care, procedures, treatments, medications and allow for questions  Note: Educated pt on plan of care and treatments for the day.      Problem: Safety  Goal: Will remain free from falls  Intervention: Implement fall precautions  Flowsheets (Taken 3/26/2020 2000)  Environmental Precautions:   Personal Belongings, Wastebasket, Call Bell etc. in Easy Reach   Treaded Slipper Socks on Patient   Bed in Low Position

## 2020-03-28 PROBLEM — R79.89 AZOTEMIA: Status: ACTIVE | Noted: 2020-03-27

## 2020-03-28 PROBLEM — I50.9 CHF (CONGESTIVE HEART FAILURE) (HCC): Status: ACTIVE | Noted: 2018-11-19

## 2020-03-28 LAB
25(OH)D3 SERPL-MCNC: 47 NG/ML (ref 30–100)
ALBUMIN SERPL BCP-MCNC: 3.1 G/DL (ref 3.2–4.9)
ALBUMIN/GLOB SERPL: 1.2 G/DL
ALP SERPL-CCNC: 64 U/L (ref 30–99)
ALT SERPL-CCNC: 11 U/L (ref 2–50)
ANION GAP SERPL CALC-SCNC: 9 MMOL/L (ref 7–16)
AST SERPL-CCNC: 15 U/L (ref 12–45)
BASOPHILS # BLD AUTO: 0.6 % (ref 0–1.8)
BASOPHILS # BLD: 0.04 K/UL (ref 0–0.12)
BILIRUB SERPL-MCNC: 0.5 MG/DL (ref 0.1–1.5)
BUN SERPL-MCNC: 26 MG/DL (ref 8–22)
CALCIUM SERPL-MCNC: 8.8 MG/DL (ref 8.5–10.5)
CHLORIDE SERPL-SCNC: 106 MMOL/L (ref 96–112)
CO2 SERPL-SCNC: 23 MMOL/L (ref 20–33)
CREAT SERPL-MCNC: 1.26 MG/DL (ref 0.5–1.4)
EOSINOPHIL # BLD AUTO: 0.26 K/UL (ref 0–0.51)
EOSINOPHIL NFR BLD: 4.1 % (ref 0–6.9)
ERYTHROCYTE [DISTWIDTH] IN BLOOD BY AUTOMATED COUNT: 51.8 FL (ref 35.9–50)
GLOBULIN SER CALC-MCNC: 2.6 G/DL (ref 1.9–3.5)
GLUCOSE SERPL-MCNC: 94 MG/DL (ref 65–99)
HCT VFR BLD AUTO: 29.9 % (ref 42–52)
HGB BLD-MCNC: 9.3 G/DL (ref 14–18)
IMM GRANULOCYTES # BLD AUTO: 0.03 K/UL (ref 0–0.11)
IMM GRANULOCYTES NFR BLD AUTO: 0.5 % (ref 0–0.9)
LYMPHOCYTES # BLD AUTO: 1.09 K/UL (ref 1–4.8)
LYMPHOCYTES NFR BLD: 17.2 % (ref 22–41)
MAGNESIUM SERPL-MCNC: 1.7 MG/DL (ref 1.5–2.5)
MCH RBC QN AUTO: 33.1 PG (ref 27–33)
MCHC RBC AUTO-ENTMCNC: 31.1 G/DL (ref 33.7–35.3)
MCV RBC AUTO: 106.4 FL (ref 81.4–97.8)
MONOCYTES # BLD AUTO: 0.67 K/UL (ref 0–0.85)
MONOCYTES NFR BLD AUTO: 10.6 % (ref 0–13.4)
NEUTROPHILS # BLD AUTO: 4.26 K/UL (ref 1.82–7.42)
NEUTROPHILS NFR BLD: 67 % (ref 44–72)
NRBC # BLD AUTO: 0 K/UL
NRBC BLD-RTO: 0 /100 WBC
NT-PROBNP SERPL IA-MCNC: ABNORMAL PG/ML (ref 0–125)
PLATELET # BLD AUTO: 181 K/UL (ref 164–446)
PMV BLD AUTO: 10.8 FL (ref 9–12.9)
POTASSIUM SERPL-SCNC: 4.2 MMOL/L (ref 3.6–5.5)
PROT SERPL-MCNC: 5.7 G/DL (ref 6–8.2)
RBC # BLD AUTO: 2.81 M/UL (ref 4.7–6.1)
SODIUM SERPL-SCNC: 138 MMOL/L (ref 135–145)
WBC # BLD AUTO: 6.4 K/UL (ref 4.8–10.8)

## 2020-03-28 PROCEDURE — 36415 COLL VENOUS BLD VENIPUNCTURE: CPT

## 2020-03-28 PROCEDURE — 700102 HCHG RX REV CODE 250 W/ 637 OVERRIDE(OP): Performed by: PHYSICAL MEDICINE & REHABILITATION

## 2020-03-28 PROCEDURE — 97166 OT EVAL MOD COMPLEX 45 MIN: CPT

## 2020-03-28 PROCEDURE — 99223 1ST HOSP IP/OBS HIGH 75: CPT | Performed by: HOSPITALIST

## 2020-03-28 PROCEDURE — 83880 ASSAY OF NATRIURETIC PEPTIDE: CPT

## 2020-03-28 PROCEDURE — 700111 HCHG RX REV CODE 636 W/ 250 OVERRIDE (IP): Performed by: PHYSICAL MEDICINE & REHABILITATION

## 2020-03-28 PROCEDURE — 99232 SBSQ HOSP IP/OBS MODERATE 35: CPT | Performed by: PHYSICAL MEDICINE & REHABILITATION

## 2020-03-28 PROCEDURE — 85025 COMPLETE CBC W/AUTO DIFF WBC: CPT

## 2020-03-28 PROCEDURE — 97112 NEUROMUSCULAR REEDUCATION: CPT

## 2020-03-28 PROCEDURE — 82306 VITAMIN D 25 HYDROXY: CPT

## 2020-03-28 PROCEDURE — 83735 ASSAY OF MAGNESIUM: CPT

## 2020-03-28 PROCEDURE — 92610 EVALUATE SWALLOWING FUNCTION: CPT

## 2020-03-28 PROCEDURE — 97162 PT EVAL MOD COMPLEX 30 MIN: CPT

## 2020-03-28 PROCEDURE — 97535 SELF CARE MNGMENT TRAINING: CPT

## 2020-03-28 PROCEDURE — A9270 NON-COVERED ITEM OR SERVICE: HCPCS | Performed by: PHYSICAL MEDICINE & REHABILITATION

## 2020-03-28 PROCEDURE — 770010 HCHG ROOM/CARE - REHAB SEMI PRIVAT*

## 2020-03-28 PROCEDURE — 80053 COMPREHEN METABOLIC PANEL: CPT

## 2020-03-28 PROCEDURE — 92523 SPEECH SOUND LANG COMPREHEN: CPT

## 2020-03-28 RX ORDER — TRAMADOL HYDROCHLORIDE 50 MG/1
50 TABLET ORAL EVERY 6 HOURS PRN
Status: DISCONTINUED | OUTPATIENT
Start: 2020-03-28 | End: 2020-03-30 | Stop reason: HOSPADM

## 2020-03-28 RX ADMIN — HEPARIN SODIUM 5000 UNITS: 5000 INJECTION, SOLUTION INTRAVENOUS; SUBCUTANEOUS at 21:49

## 2020-03-28 RX ADMIN — GABAPENTIN 300 MG: 300 CAPSULE ORAL at 21:50

## 2020-03-28 RX ADMIN — OMEPRAZOLE 20 MG: 20 CAPSULE, DELAYED RELEASE ORAL at 08:18

## 2020-03-28 RX ADMIN — ACETAMINOPHEN 650 MG: 325 TABLET, FILM COATED ORAL at 08:16

## 2020-03-28 RX ADMIN — MELATONIN 3 MG: at 22:48

## 2020-03-28 RX ADMIN — SENNOSIDES AND DOCUSATE SODIUM 2 TABLET: 8.6; 5 TABLET ORAL at 08:17

## 2020-03-28 RX ADMIN — TRAMADOL HYDROCHLORIDE 50 MG: 50 TABLET, COATED ORAL at 17:58

## 2020-03-28 RX ADMIN — HEPARIN SODIUM 5000 UNITS: 5000 INJECTION, SOLUTION INTRAVENOUS; SUBCUTANEOUS at 05:07

## 2020-03-28 RX ADMIN — ASPIRIN 325 MG ORAL TABLET 325 MG: 325 PILL ORAL at 08:16

## 2020-03-28 RX ADMIN — ATORVASTATIN CALCIUM 80 MG: 40 TABLET, FILM COATED ORAL at 21:50

## 2020-03-28 RX ADMIN — CLOPIDOGREL BISULFATE 75 MG: 75 TABLET ORAL at 08:18

## 2020-03-28 RX ADMIN — Medication 400 MG: at 08:18

## 2020-03-28 RX ADMIN — ACETAMINOPHEN 650 MG: 325 TABLET, FILM COATED ORAL at 16:32

## 2020-03-28 RX ADMIN — GABAPENTIN 300 MG: 300 CAPSULE ORAL at 14:54

## 2020-03-28 RX ADMIN — TRAZODONE HYDROCHLORIDE 50 MG: 50 TABLET ORAL at 21:50

## 2020-03-28 RX ADMIN — GABAPENTIN 300 MG: 300 CAPSULE ORAL at 08:18

## 2020-03-28 RX ADMIN — OMEPRAZOLE 20 MG: 20 CAPSULE, DELAYED RELEASE ORAL at 21:50

## 2020-03-28 RX ADMIN — MELATONIN 1000 UNITS: at 08:18

## 2020-03-28 RX ADMIN — HEPARIN SODIUM 5000 UNITS: 5000 INJECTION, SOLUTION INTRAVENOUS; SUBCUTANEOUS at 14:54

## 2020-03-28 ASSESSMENT — BRIEF INTERVIEW FOR MENTAL STATUS (BIMS)
INITIAL REPETITION OF BED BLUE SOCK - FIRST ATTEMPT: 3
WHAT MONTH IS IT: ACCURATE WITHIN 5 DAYS
BIMS SUMMARY SCORE: 13
ASKED TO RECALL BLUE: YES, NO CUE REQUIRED
WHAT DAY OF THE WEEK IS IT: CORRECT
ASKED TO RECALL SOCK: YES, NO CUE REQUIRED
ASKED TO RECALL BED: NO, COULD NOT RECALL
WHAT YEAR IS IT: CORRECT

## 2020-03-28 ASSESSMENT — ACTIVITIES OF DAILY LIVING (ADL): TOILETING: INDEPENDENT

## 2020-03-28 NOTE — ASSESSMENT & PLAN NOTE
Hb 9.6 --> 10.3 --> 9.3 (3/28) --> 10.3 (3/30)  Fe: 40, sats 16% (3/23)  B12: 1506 (3/23)  Folate: 15.9 (3/23)  Monitor

## 2020-03-28 NOTE — THERAPY
Speech Language Pathology   Initial Assessment     Patient Name: Kyler Donato Jr.  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      Objective       03/28/20 0801   Receptive Language / Auditory Comprehension   Receptive Language / Auditory Comprehension WDL   Expressive Language   Expressive Language (WDL) WDL   Cognition   Cognitive-Linguistic (WDL) X   Attention to Task Supervision (5)   Simple Attention Supervision (5)   Simple Information Processing Supervision (5)   Verbal Short Term Memory 30 Minutes   Simple Reasoning / Problem Solving Supervision (5)   Complex Reasoning  / Problem Solving Minimal (4)   Insight into Deficits Supervision (5)   Social / Pragmatic Communication   Social / Pragmatic Communication WDL   Dysphagia    Diet / Liquid Recommendation Thin (0);Soft & Bite-Sized (6) - (Dysphagia III)   Tracheostomy   Tracheostomy No   Outcome Measures   Outcome Measures Utilized SCCAN;MASA   MASA ([place] Assessment of Swallowing Ability)   Alertness 10   Cooperation 10   Auditory Comprehension 10   Respiration 10   Respiratory Rate for Swallow 5   Dysphasia 5   Dyspraxia 5   Dysarthria 5   Saliva 3   Lip Seal 4   Tongue Movement 10   Tongue Strength 10   Tongue Coordination 10   Oral Preparation 8   Gag 1   Palate 10   Bolus Clearance 10   Oral Transit 10   Cough Reflex 5   Voluntary Cough 10   Voice 4   Trache 10   Pharygneal Phase 10   Pharyngeal Response 10   Diet Recommendations Mechanical soft with chopped meat   Fluid Recommendations Regular   Swallow Integrity Possible dysphagia/aspiration   Total Score 185   Dysphagia Severity No abnormality detected   Aspiration  Severity No abnormality detected   SCCAN (Scales of Cognitive and Communicative Ability for Neurorehabilitation)   Orientation - Raw Score 12   Orientation - Scale Performance Score 100   Memory - Raw Score 8   Memory - Scale Performance Score 42   Speech Comprehension - Raw Score 10   Speech Comprehension - Scale Performance Score 77   Interdisciplinary Plan of Care Collaboration   IDT Collaboration with  Nursing   Patient Position at End of Therapy Seated;Call Light within Reach;Tray Table within Reach;Self Releasing Lap Belt Applied   Collaboration Comments Results of evaluation, medication aministration   Speech Language Pathologist Assigned   Assigned SLP / Pager # cog/sw [phone]   SLP Total Time Spent   SLP Individual Total Time Spent (Mins) 90   Charge Group   SLP Speech Language Evaluation Speech Sound Language Comprehension   SLP Oral Pharyngeal Evaluation Oral Pharyngeal Evaluation       FIM Eating Score:  6 - Modified Independent  Eating Description:  Increased time, Modified diet, Supervision for safety    FIM Comprehension Score:  5 - Stand-by Prompting/Supervision or Set-up  Comprehension Description:       FIM Expression Score:  5 - Stand-by Prompting/Supervision or Set-up  Expression Description:       FIM Social Interaction Score:  7 - Independent  Social Interaction Description:       FIM Problem Solving Score:  5 - Standby Prompting/Supervision or Set-up  Problem Solving Description:  Therapy schedule, Increased time, Bed/chair alarm    FIM Memory Score:  4 - Minimal Assistance  Memory Description:  Verbal cueing, Therapy schedule    Assessment    Patient is 87 y.o. male with a diagnosis of Wallenburg Syndrome.  Additional factors influencing patient status/progress (ie: cognitive factors, co-morbidities, social support, etc): dysphagia and memory deficits.   Oral pharyngeal evaluation: Oral mech examination umremarkable for strength, ROM, and speed of oral structures. Pt tolerating level 6, small and  bite sized textures and level 0 thin liquids w/ frequent wet vocal quality. Pt successfully cleared wet vocal quality with throat clear when provided verbal cues. Pt additionally presenting with anterior spillage of saliva secretions when chewing. Pt reports this is new and really bothering him. No other clinical s/sx of aspiration noted during meal. REC: Dysphagia tx, enrollment in therapeutic dining program to train IND use of safe swallow strategies.   Cognitive linguistic evaluation: Scales of Cognitive and Communicative Ability for Neurorehabilitation initiated. Pt achieved 100% on Orientation, 42% on Memory, and 77% on Speech Comprehension. Pt's memory is of concern at this time. Speech comprehension score reduced d/t difficulty following two step directions. REC: Speech language therapy addressing memory and cognition.      Plan  Recommend Speech Therapy 30-60 minutes per day 5-7 days per week for 2 weeks for the following treatments:  SLP Swallowing Dysfunction Treatment, SLP Self Care / ADL Training , SLP Cognitive Skill Development and SLP Group Treatment.    Goals:  Long term and short term goals have been discussed with patient and they are in agreement.    Speech Therapy Problems     Problem: Memory STGs     Dates: Start: 03/28/20       Description:     Goal: STG-Within one week, patient will remember     Dates: Start: 03/28/20       Description: 1) Individualized goal:  Novel health and safety information using external memory aids with 80% accuracy and MIN A.   2) Interventions:  SLP Self Care / ADL Training , SLP Cognitive Skill Development and SLP Group Treatment                   Problem: Problem Solving STGs     Dates: Start: 03/28/20       Description:     Goal: STG-Within one week, patient will     Dates: Start: 03/28/20       Description: 1) Individualized goal:  Complete the Scales of Cognitive and Communicative Ability for Neurorehabilitation with goals developed as necessary.   2)  Interventions:  SLP Cognitive Skill Development                   Problem: Speech/Swallowing LTGs     Dates: Start: 03/28/20       Description:     Goal: LTG-By discharge, patient will safely swallow     Dates: Start: 03/28/20       Description: 1) Individualized goal:  The least restrictive diet implementing safe swallow strategies with MOD I in >95% of opportunities.   2) Interventions:  SLP Swallowing Dysfunction Treatment, SLP Self Care / ADL Training  and SLP Group Treatment             Goal: LTG-By discharge, patient will solve complex problem     Dates: Start: 03/28/20       Description: 1) Individualized goal:  Related to health and safety for a safe D/C to PLOF.   2) Interventions:  SLP Self Care / ADL Training , SLP Cognitive Skill Development and SLP Group Treatment                   Problem: Swallowing STGs     Dates: Start: 03/28/20       Description:     Goal: STG-Within one week, patient will safely swallow     Dates: Start: 03/28/20       Description: 1) Individualized goal:  Will tolerate level 6 textures (small and bite size) and level 0 thin liquids while managing saliva by implementing safe swallow strategies in >90% opportunities with MIN A.   2) Interventions:  SLP Swallowing Dysfunction Treatment, SLP Self Care / ADL Training  and SLP Group Treatment

## 2020-03-28 NOTE — ASSESSMENT & PLAN NOTE
S/P cardiac stent to the mid circumflex  Hx MI in 1995 with stent to the LAD  BP low and can't tolerate a BB or ACE/ARB at this time -- will start when BP better

## 2020-03-28 NOTE — ASSESSMENT & PLAN NOTE
Has hx of CKD S3 (per chart review)  Bun mildly elevated -- has hx: 26 (3/28) --> 22 (3/30)  Encouraging fluid intake  Monitor

## 2020-03-28 NOTE — CARE PLAN
Problem: Safety  Goal: Will remain free from injury  Outcome: PROGRESSING AS EXPECTED  Note: Patient uses call light consistently and appropriately this shift.  Waits for assistance when needed and does not attempt self transfer.  Able to verbalize needs.       Problem: Bowel/Gastric:  Goal: Will not experience complications related to bowel motility  Outcome: PROGRESSING AS EXPECTED  Note: Patient having regular bowel movements; last BM 3/26/20  Denies s/s constipation; bowel meds available if needed.

## 2020-03-28 NOTE — CARE PLAN
Problem: Venous Thromboembolism (VTW)/Deep Vein Thrombosis (DVT) Prevention:  Goal: Patient will participate in Venous Thrombosis (VTE)/Deep Vein Thrombosis (DVT)Prevention Measures  Outcome: PROGRESSING AS EXPECTED  Flowsheets (Taken 3/28/2020 1535)  Pharmacologic Prophylaxis Used: Unfractionated Heparin  Note: Pt is up and walking, participates in therapies, and up to dinning room for all meals.      Problem: Urinary Elimination:  Goal: Ability to reestablish a normal urinary elimination pattern will improve  Outcome: PROGRESSING AS EXPECTED  Note: Pt garcia d/c this am, and pt is able to ICP with help of staff to open packaging d/t left sided finger dexterity.

## 2020-03-28 NOTE — THERAPY
"Occupational Therapy   Initial Evaluation     Patient Name: Kyler Donato Jr.  Age:  87 y.o., Sex:  male  Medical Record #: [id number]  Today's Date: 3/28/2020     Subjective    \"My daughter quit her job so she can help us.\"    \"I had to put my wife in a home from all of this. I feel so bad that she is there.\"    Pt told several stories of his time in [place] with the [employer].      Objective       03/28/20 0701   Prior Living Situation   Prior Services None   Housing / Facility 1 Story House   Steps Into Home 2  (2 in front; ramp through garage)   Steps In Home 0   Rail Both Rail (Steps into Home)   Elevator No   Bathroom Set up Bathtub / Shower Combination;Tub Transfer Bench;Grab Bars   Equipment Owned Grab Bar(s) In Tub / Shower;Grab Bar(s) By Toilet   Lives with - Patient's Self Care Capacity Spouse  (Pt is caregiver for his wife; daughter is helping them )   Comments Pt has hearing aids, but does not have them at the hospital, impacting evaluation.   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)   Leisure Interests Other (Comments)  (working in his yard, listening to music)   IRF-PEDRO:  Prior Functioning: Everyday Activities   Self Care Independent   Indoor Mobility (Ambulation) Independent   Stairs Independent   Functional Cognition Independent   Prior Device Use None of the given options   Vitals   O2 Delivery Device None - Room Air   Pain 0 - 10 Group   Location Head;Leg;Arm   Pain " Rating Scale (NPRS) 7   Description Aching;Cramping   Comfort Goal Comfort with Movement;Comfort at Rest;Perform Activity   Cognition    Level of Consciousness Alert   Vision Screen   Vision Tested   Visual Screen Results Diplopia   Active ROM Upper Body   Active ROM Upper Body  WDL   Strength Upper Body   Comments MMT not tested formally. Bilateral weakness observed with functional use. L worse than R.   Balance Assessment   Sitting Balance (Static) Poor +   Sitting Balance (Dynamic) Poor +   Standing Balance (Static) Poor +   Standing Balance (Dynamic) Poor +   Comments Sitting balance improved to Fair - throughout session with cues and increased awarenss of L lateral lean.   Bed Mobility    Supine to Sit Contact Guard Assist   Coordination Upper Body   Comments GMC and FMC impaired in LUE.   IRF-PEDRO:  Eating   Assistance Needed Set-up / clean-up;Supervision   [place] Physical Assistance Level No physical assistance or only touching/steadying assist   CARE Score 4   Discharge Goal:  Assistance Needed Independent   Discharge Goal:  Physical Assistance Level No physical assistance or only touching/steadying assist   Discharge Goal:  Score 6   IRF-PEDRO:  Oral Hygiene   Assistance Needed Supervision;Set-up / clean-up;Verbal cue   Physical Assistance Level No physical assistance   CARE Score 4   Discharge Goal:  Assistance Needed Independent   Discharge Goal:  Physical Assistance Level No physical assistance or only touching/steadying assist   Discharge Goal:  Score 6   IRF-PEDRO:  Shower/Bathe Self   Assistance Needed Physical assistance;Verbal cues;Supervision;Set-up / clean-up   Physical Assistance Level 25%-49%   CARE Score 3   Discharge Goal:  Assistance Needed Independent;Adaptive equipment   Discharge Goal:  Physical Assistance Level No physical assistance or only touching/steadying assist   Discharge Goal Score 6   IRF-PEDRO:  Upper Body Dressing   Assistance Needed Supervision;Set-up / clean-up   Physical Assistance  Level No physical assistance or only touching/steadying assist   CARE Score 4   Discharge Goal:  Assistance Needed Independent   Discharge Goal:  Physical Assistance Level No physical assistance or only touching/steadying assist   Dischage Goal:  Score 6   IRF-PEDRO:  Lower Body Dressing   Assistance Needed Physical assistance;Supervision;Set-up / clean-up   Physical Assistance Level 50%-74%   CARE Score 2   Discharge Goal:  Assistance Needed Independent   Discharge Goal:  Physical Assistance Level No physical assistance or only touching/steadying assist   Discharge Goal:  Score 6   IRF PEDRO:  Putting On/Taking Off Footwear   Assistance Needed Physical assistance   Physical Assistance Level 75% or more   CARE Score 2   Discharge Goal:  Assistance Needed Adaptive equipment;Supervision   Discharge Goal:  Physical Assistance Level No physical assistance or only touching/steadying assist   Discharge Goal:  Score 4   IRF-PEDRO:  Toileting Hygiene   Assistance Needed Physical assistance   Physical Assistance Level 25%-49%   CARE Score 3   Discharge Goal:  Assistance Needed Independent   Discharge Goal:  Physical Assistance Level No physical assistance or only touching/steadying assist   Discharge Goal:  Score 6   IRF-PEDRO:  Toilet Transfer   Assistance Needed Physical assistance   Physical Assistance Level Less than 25%   CARE Score 3   Discharge Goal:  Assistance Needed Independent   Discharge Goal:  Physical Assistance Level No physical assistance or only touching/steadying assist   Discahrge Goal:  Score 6   IRF-PEDRO:  Hearing, Speech, and Vision   Expression of Ideas and Wants 3   Understanding Verbal and Non-Verbal Content 3   Problem List   Problem List Decreased Active Daily Living Skills;Decreased Homemaking Skills;Decreased Upper Extremity Strength Right;Decreased Upper Extremity Strength Left;Decreased Functional Mobility;Decreased Activity Tolerance;Safety Awareness Deficits / Cognition;Impaired Coordination Left  Upper Extremity;Impaired Postural Control / Balance;Impaired Vision   Precautions   Precautions Fall Risk   Comments Pt self caths at baseline    Current Discharge Plan   Current Discharge Plan Return to Prior Living Situation   Benefit    Therapy Benefit Patient Would Benefit from Inpatient Rehab Occupational Therapy to Maximize [place] with ADLs, IADLs and Functional Mobility.   Interdisciplinary Plan of Care Collaboration   IDT Collaboration with  Nursing;Speech Therapist   Patient Position at End of Therapy Seated;Self Releasing Lap Belt Applied;Other (Comments)  (with SLP)   Collaboration Comments RN: removed garcia during session; SLP hand off   Equipment Needs   Assistive Device / DME Front-Wheel Walker  (will continue to assess)   Adaptive Equipment Reacher;Sock Aide;Long Handled Sponge  (will continue to assess)   OT Total Time Spent   OT Individual Total Time Spent (Mins) 60   OT Charge Group   OT Self Care / ADL 1   OT Evaluation OT Evaluation Mod       FIM Grooming Score:  5 - Standby Prompting/Supervision or Set-up  Grooming Description:  Increased time, Supervision for safety(washing face; brushing hair)    FIM Bathing Score:  3 - Moderate Assistance  Bathing Description:  Grab bar, Hand held shower, Increased time, Supervision for safety, Verbal cueing(min to mod A for standing tasks; supervision to mod A for seated balance during shower; assist for buttocks and back. Assist for rinsing.)    FIM Upper Body Dressin - Standby Prompting/Supervision or Set-up  Upper Body Dressing Description:  Supervision for safety, Increased time    FIM Lower Body Dressing Score:  3 - Moderate Assistance  Lower Body Dressing Description:  (Assist for R sock, threding bilateral legs for pant and for underwear)    FIM Toileting Body Dressing:  3 - Moderate Assistance  Toileting Description:  Grab bar, Increased time, Supervision for safety, Verbal cueing    FIM Bed/Chair/Wheelchair Transfers Score: 4 - Minimal  Assistance  Bed/Chair/Wheelchair Transfers Description:  Supervision for safety, Verbal cueing, Set-up of equipment, Increased time(Min A for supine to sit; min A for SPT without AD)    FIM Toilet Transfer Score:  4 - Minimal Assistance  Toilet Transfer Description:  Grab bar, Increased time, Supervision for safety, Set-up of equipment, Verbal cueing(Min A due to L lateral lean and incoordination)    FIM Tub/Shower Transfers Score:  4 - Minimal Assistance  Tub/Shower Transfers Description:  Grab bar, Increased time, Supervision for safety, Verbal cueing(Min A for w/c to tub bench)    Assessment  Patient is 87 y.o. male with a diagnosis of CVA. Pt presented on 3- with NSTEMI. Pt underwent cardiac catheterization and PCI with stent placement. Pt had rapid response on 3- with L sided weakeness. Large infarction found in L PICA territory with L dorsal lateral medulla with L vertebral artery occlusion causing Wallenberg Syndrome.  Additional factors influencing patient status / progress (ie: cognitive factors, co-morbidities, social support, etc): [place] (hearing aids not with him in the hospital), coronary artery disease, ischemic cardiomyopathy with ejection fraction 25%, hypertension, SVT, chronic urinary retention requiring straight cath at home. Pt is the primary caregiver for his wife. Pt's wife is now in a nursing facility. Pt is   Level 6 Diet Soft & Bite Sized and Thin liquids per order. Pt with impairments to coordination, sitting and standing balance, safety awareness, strength, endurance, double vision, and safety awareness impacting safety and independence with I/ADLs and caregiving.      Plan  Recommend Occupational Therapy  minutes per day 5-7 days per week for 2-3 weeks for the following treatments:  OT Orthotics Training, OT E Stim Attended, OT Group Therapy, OT Self Care/ADL, OT Cognitive Skill Dev, OT Community Reintegration, OT Manual Ther Technique, OT Neuro Re-Ed/Balance, OT Sensory  Int Techniques, OT Therapeutic Activity, OT Ultrasound, OT Evaluation and OT Therapeutic Exercise.    Goals:  Long term and short term goals have been discussed with patient and they are in agreement.    Occupational Therapy Goals     Problem: Dressing     Dates: Start: 03/28/20       Description:     Goal: STG-Within one week, patient will dress UB     Dates: Start: 03/28/20   Expected End: 04/04/20       Description: 1) Individualized Goal:  With mod I  2) Interventions:  OT Orthotics Training, OT E Stim Attended, OT Group Therapy, OT Self Care/ADL, OT Cognitive Skill Dev, OT Community Reintegration, OT Manual Ther Technique, OT Neuro Re-Ed/Balance, OT Sensory Int Techniques, OT Therapeutic Activity, OT Ultrasound, OT Evaluation and OT Therapeutic Exercise             Goal: STG-Within one week, patient will dress LB     Dates: Start: 03/28/20   Expected End: 04/04/20       Description: 1) Individualized Goal:  With min A  2) Interventions:  OT Orthotics Training, OT E Stim Attended, OT Group Therapy, OT Self Care/ADL, OT Cognitive Skill Dev, OT Community Reintegration, OT Manual Ther Technique, OT Neuro Re-Ed/Balance, OT Sensory Int Techniques, OT Therapeutic Activity, OT Ultrasound, OT Evaluation and OT Therapeutic Exercise                   Problem: Functional Transfers     Dates: Start: 03/28/20       Description:     Goal: STG-Within one week, patient will transfer to toilet     Dates: Start: 03/28/20   Expected End: 04/04/20       Description: 1) Individualized Goal:  With CGA  2) Interventions:  OT Orthotics Training, OT E Stim Attended, OT Group Therapy, OT Self Care/ADL, OT Cognitive Skill Dev, OT Community Reintegration, OT Manual Ther Technique, OT Neuro Re-Ed/Balance, OT Sensory Int Techniques, OT Therapeutic Activity, OT Ultrasound, OT Evaluation and OT Therapeutic Exercise                   Problem: Grooming     Dates: Start: 03/28/20       Description:     Goal: STG-Within one week, patient will  complete grooming     Dates: Start: 03/28/20   Expected End: 04/04/20       Description: 1) Individualized Goal:  With mod I  2) Interventions:  OT Orthotics Training, OT E Stim Attended, OT Group Therapy, OT Self Care/ADL, OT Cognitive Skill Dev, OT Community Reintegration, OT Manual Ther Technique, OT Neuro Re-Ed/Balance, OT Sensory Int Techniques, OT Therapeutic Activity, OT Ultrasound, OT Evaluation and OT Therapeutic Exercise                   Problem: OT Long Term Goals     Dates: Start: 03/28/20       Description:     Goal: LTG-By discharge, patient will complete basic self care tasks     Dates: Start: 03/28/20   Expected End: 04/18/20       Description: 1) Individualized Goal:  With mod I  2) Interventions:  OT Orthotics Training, OT E Stim Attended, OT Group Therapy, OT Self Care/ADL, OT Cognitive Skill Dev, OT Community Reintegration, OT Manual Ther Technique, OT Neuro Re-Ed/Balance, OT Sensory Int Techniques, OT Therapeutic Activity, OT Ultrasound, OT Evaluation and OT Therapeutic Exercise             Goal: LTG-By discharge, patient will complete basic home management     Dates: Start: 03/28/20   Expected End: 04/18/20       Description: 1) Individualized Goal:  With supervision  2) Interventions:  OT Orthotics Training, OT E Stim Attended, OT Group Therapy, OT Self Care/ADL, OT Cognitive Skill Dev, OT Community Reintegration, OT Manual Ther Technique, OT Neuro Re-Ed/Balance, OT Sensory Int Techniques, OT Therapeutic Activity, OT Ultrasound, OT Evaluation and OT Therapeutic Exercise

## 2020-03-28 NOTE — PROGRESS NOTES
Received shift report and assumed care of patient.  Patient awake, calm and stable, currently positioned in bed for comfort and safety; call light within reach. 7/10 HA pain at this time, see mar for medication.  Will continue to monitor.

## 2020-03-28 NOTE — THERAPY
Physical Therapy   Initial Evaluation     Patient Name: Kyler Donato Jr.  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    Prior Level of Functional Mobility   Bed Mobility Independent   Transfer Status Independent   Ambulation Independent   Distance Ambulation (Feet)   (community)   Assistive Devices Used None   Stairs Independent   IRF-PEDRO:  Prior Functioning: Everyday Activities   Self Care Independent   Indoor Mobility (Ambulation) Independent   Stairs Independent   Functional Cognition Independent   Prior Device Use None of the given options   Pain 0 - 10 Group   Location Head;Leg   Location Orientation Left;Posterior   Therapist Pain Assessment During Activity  (pt reports LLE pain/ stiffness / headache)   Cognition    Cognition / Consciousness WDL   Passive ROM Lower Body   Passive ROM Lower Body WDL   Active ROM Lower Body    Active ROM Lower Body  X   Comments Limited LLE 2* weakness/ impaired motor planning   Strength Lower Body   Lower Body Strength  X   Lt Hip Flexion Strength 2+ (P+)   Lt Hip Abduction Strength 2+ (P+)   Lt Hip Adduction Strength 2+ (P+)   Lt Knee Flexion Strength 2+ (P+)   Lt Knee Extension Strength 3- (F-)   Lt Ankle Dorsiflexion Strength 2- (P-)   Lt Ankle Plantar Flexion Strength 2- (P-)   Comments Pt with impaired motor planning/ initiation of movement throughout LLE/ signifincant weakness as noted. RLE grossly WNL's for age.   Sensation Lower Body   Lower Extremity Sensation   X   Lt Lower Extremity Light Touch Impaired   Lt Lower  "Extremity Proprioception Absent   Paresthesia Present    Comments pt with 'numbness/ tingling\" throughout LLE.    Lower Body Muscle Tone   Lower Body Muscle Tone  X   Lt Lower Extremity Muscle Tone Functional;Hypertonic   Balance Assessment   Sitting Balance (Static) Fair   Sitting Balance (Dynamic) Fair -   Standing Balance (Static) Poor -   Standing Balance (Dynamic) Poor -   Weight Shift Sitting Fair   Weight Shift Standing Poor   Bed Mobility    Supine to Sit Contact Guard Assist   Sit to Supine Contact Guard Assist   Sit to Stand Minimal Assist   Rolling Supervised   Neurological Concerns   Neurological Concerns Yes   Standing Posture During ADL's Lateral Lean Left   Paresthesia Present  (LLE)   Comments impaired motor planning/ ataxia   Coordination Lower Body    Coordination Lower Body  X   Other Left Impaired   Comments LLE weakness/ impaired sensation/ ataxia   IRF-PEDRO:  Roll Left and Right   Assistance Needed Supervision   CARE Score 4   Discharge Goal:  Assistance Needed Independent   Discharge Goal:  Score 6   IRF-PEDRO:  Sit to Lying   Assistance Needed Incidental touching   CARE Score 4   Discharge Goal:  Assistance Needed Independent   Discharge Goal:  Score 6   IRF-PEDRO:  Lying to Sitting on Side of Bed   Assistance Needed Incidental touching   CARE Score 4   Discharge Goal:  Assistance Needed Independent   Discharge Goal:  Score 6   IRF-PEDRO:  Sit to Stand   Assistance Needed Physical assistance   Physical Assistance Level Less than 25%   CARE Score 3   Discharge Goal:  Assistance Needed Independent;Adaptive equipment   Discahrge Goal:  Score 6   IRF-PEDRO:  Chair/Bed-to-Chair Transfer   Assistance Needed Physical assistance   Physical Assistance Level 25%-49%   CARE Score 3   Discharge Goal:  Assistance Needed Independent;Adaptive equipment   Discharge Goal:  Score 6   IRF-PEDRO:  Car Transfer   Reason if not Attempted Safety concerns  (poor balance/ risk for falls)   CARE Score 88   Discharge Goal:  " Assistance Needed Adaptive equipment;Supervision   Discharge Goal:  Score 4   IRF PEDRO:  Walking   Does the Patient Walk? Yes   IRF PEDRO:  Walk 10 Feet   Assistance Needed Physical assistance   Physical Assistance Level 25%-49%   CARE Score 3   Discharge Goal:  Assistance Needed Adaptive equipment;Supervision   Discharge Goal:  Score 4   IRF-PEDRO:  Walk 50 Feet with Two Turns   Assistance Needed Adaptive equipment;Physical assistance   Physical Assistance Level 25%-49%   CARE Score 3   Discharge Goal:  Assistance Needed Adaptive equipment;Supervision   Discharge Goal:  Score 4   IRF-PEDRO:  Walk 150 Feet   Reason if not Attempted Safety concerns  (poor balance/ risk for falls)   CARE Score 88   Discharge Goal:  Assistance Needed Adaptive equipment;Supervision   Discharge Goal:  Score 4   IRF PEDRO:  Walking 10 Feet on Uneven Surfaces   Reason if not Attempted Safety concerns  (poor balance/ risk for falls)   CARE Score 88   Discharge Goal:  Assistance Needed Adaptive equipment;Incidental touching   Discharge Goal:  Score 4   IRF PEDRO:  1 Step (Curb)   Reason if not Attempted Safety concerns  (poor balance/ risk for falls)   CARE Score 88   Discharge Goal:  Assistance Needed Adaptive equipment;Incidental touching   Discharge Goal:  Score 4   IRF-PEDRO:  4 Steps   Reason if not Attempted Safety concerns  (poor balance/ risk for falls)   CARE Score 88   Discharge Goal:  Assistance Needed Adaptive equipment;Incidental touching   Discharge Goal:  Score 4   IRF PEDRO:  12 Steps   Reason if not Attempted Safety concerns  (poor balance/ risk for falls)   CARE Score 88   Discharge Goal:  Assistance Needed Adaptive equipment;Incidental touching   Discharge Goal:  Score 4   IRF PEDRO:  Picking Up Object   Reason if not Attempted Safety concerns  (poor balance/ risk for falls)   CARE Score 88   Discharge Goal:  Assistance Needed Adaptive equipment;Incidental touching   Discharge Goal:  Score 4   IRF-PEDRO:  Wheel 50 Feet with Two Turns    Reason if not Attempted Activity not applicable   CARE Score 9   IRF-PEDRO:  Wheel 150 Feet   Reason if not Attempted Activity not applicable   CARE Score 9   Problem List    Problems Pain;Impaired Bed Mobility;Impaired Transfers;Impaired Ambulation;Functional Strength Deficit;Impaired Balance;Impaired Coordination;Decreased Activity Tolerance;Motor Planning / Sequencing   Current Discharge Plan   Current Discharge Plan Return to Prior Living Situation  (with assist from daughter as needed)   Benefit   Therapy Benefit Patient Would Benefit from Inpatient Rehabilitation Physical Therapy to Maximize Functional [place] with ADLs, IADLs and Mobility.   PT Total Time Spent   PT Individual Total Time Spent (Mins) 60   PT Charge Group   PT Neuromuscular Re-Education / Balance 1   PT Evaluation PT Evaluation Mod       FIM Bed/Chair/Wheelchair Transfers Score: 3 - Moderate Assistance  Bed/Chair/Wheelchair Transfers Description:  Increased time, Verbal cueing, Set-up of equipment, Requires lift(min A to R/mod A to L for balance/ steadying sit<>stand-step transfer)    FIM Walking Score:  2 - Max Assistance  Walking Description:  (Mod A for left lean/balance with FWW x 60 ft)    Pt participated in neuro re-ed standing posture/ midline orientation and pre-gait stepping activity with UE support at FWW and mirror for visual feedback. Pt requires min A and verbal cues to maintain midline posture with movement/ heavy L lateral lean.  Sitting balance edge of mat for BUE integration x 5 minutes for weighted ball toss/ chest press.     Assessment  Patient is 87 y.o. male with a diagnosis of NSTEMI on 3/22 s/p cardiac cath with development of L weakness and acute infarct 3/24.  Additional factors influencing patient status / progress : include PMH for CAD, ischemic cardiomyopathy with EF 25%, HTN, urinary retention.  Pt currently require assist with all mobility, presents with limited activity tolerance, impaire L sensation, ataxia  "and poor motor planning.     Plan  Recommend Physical Therapy  minutes per day 5-6 days per week for 2-3 weeks for the following treatments:  PT Group Therapy, PT Gait Training, PT Self Care/Home Eval, PT Therapeutic Exercises, PT TENS Application, PT Neuro Re-Ed/Balance, PT Aquatic Therapy, PT Therapeutic Activity, PT Manual Therapy and PT Evaluation.    Goals:  Long term and short term goals have been discussed with patient and they are in agreement.    Physical Therapy Problems     Problem: Balance     Dates: Start: 03/28/20       Description:     Goal: STG-Within one week, patient will maintain static standing     Dates: Start: 03/28/20       Description: 1) Individualized goal:  With UE support and mirror for visual feedback/ midline posture 2.5 minutes x 2 for pre-gait/ balance training  2) Interventions:  PT Group Therapy, PT E Stim Attended, PT Gait Training, PT Self Care/Home Eval, PT Therapeutic Exercises, PT TENS Application, PT Neuro Re-Ed/Balance, PT Aquatic Therapy, PT Therapeutic Activity, PT Manual Therapy and PT Evaluation                 Problem: Mobility     Dates: Start: 03/28/20       Description:     Goal: STG-Within one week, patient will ambulate household distance     Dates: Start: 03/28/20       Description: 1) Individualized goal:  CGA with  ft x 2  2) Interventions:  PT Group Therapy, PT E Stim Attended, PT Gait Training, PT Self Care/Home Eval, PT Therapeutic Exercises, PT TENS Application, PT Neuro Re-Ed/Balance, PT Aquatic Therapy, PT Therapeutic Activity, PT Manual Therapy and PT Evaluation             Goal: STG-Within one week, patient will ambulate up/down a curb     Dates: Start: 03/28/20       Description: 1) Individualized goal:  Min A with // bars for 6\" step x 2  2) Interventions:  PT Group Therapy, PT E Stim Attended, PT Gait Training, PT Self Care/Home Eval, PT Therapeutic Exercises, PT TENS Application, PT Neuro Re-Ed/Balance, PT Aquatic Therapy, PT Therapeutic " Activity, PT Manual Therapy and PT Evaluation                   Problem: Mobility Transfers     Dates: Start: 03/28/20       Description:     Goal: STG-Within one week, patient will transfer bed to chair     Dates: Start: 03/28/20       Description: 1) Individualized goal:  CGA/ set-up with UE support as needed  2) Interventions:  PT Group Therapy, PT E Stim Attended, PT Gait Training, PT Self Care/Home Eval, PT Therapeutic Exercises, PT TENS Application, PT Neuro Re-Ed/Balance, PT Aquatic Therapy, PT Therapeutic Activity, PT Manual Therapy and PT Evaluation                 Problem: PT-Long Term Goals     Dates: Start: 03/28/20       Description:     Goal: LTG-By discharge, patient will tolerate standing     Dates: Start: 03/28/20       Description: 1) Individualized goal:  With UE support to complete standing exercise program 2 x 15  2) Interventions:  PT Group Therapy, PT E Stim Attended, PT Gait Training, PT Self Care/Home Eval, PT Therapeutic Exercises, PT TENS Application, PT Neuro Re-Ed/Balance, PT Aquatic Therapy, PT Therapeutic Activity, PT Manual Therapy and PT Evaluation             Goal: LTG-By discharge, patient will ambulate     Dates: Start: 03/28/20       Description: 1) Individualized goal:  SPV with  ft x 2  2) Interventions:  PT Group Therapy, PT E Stim Attended, PT Gait Training, PT Self Care/Home Eval, PT Therapeutic Exercises, PT TENS Application, PT Neuro Re-Ed/Balance, PT Aquatic Therapy, PT Therapeutic Activity, PT Manual Therapy and PT Evaluation             Goal: LTG-By discharge, patient will transfer one surface to another     Dates: Start: 03/28/20       Description: 1) Individualized goal:  Modified independent with FWW as needed  2) Interventions:  PT Group Therapy, PT E Stim Attended, PT Gait Training, PT Self Care/Home Eval, PT Therapeutic Exercises, PT TENS Application, PT Neuro Re-Ed/Balance, PT Aquatic Therapy, PT Therapeutic Activity, PT Manual Therapy and PT Evaluation              Goal: LTG-By discharge, patient will ambulate up/down 4-6 stairs     Dates: Start: 03/28/20       Description: 1) Individualized goal:  CGA with hand rails  2) Interventions:  PT Group Therapy, PT E Stim Attended, PT Gait Training, PT Self Care/Home Eval, PT Therapeutic Exercises, PT TENS Application, PT Neuro Re-Ed/Balance, PT Aquatic Therapy, PT Therapeutic Activity, PT Manual Therapy and PT Evaluation             Goal: LTG-By discharge, patient will transfer in/out of a car     Dates: Start: 03/28/20       Description: 1) Individualized goal:  SPV / set-up with FWW as needed  2) Interventions:  PT Group Therapy, PT E Stim Attended, PT Gait Training, PT Self Care/Home Eval, PT Therapeutic Exercises, PT TENS Application, PT Neuro Re-Ed/Balance, PT Aquatic Therapy, PT Therapeutic Activity, PT Manual Therapy and PT Evaluation

## 2020-03-28 NOTE — CARE PLAN
Problem: Communication  Goal: The ability to communicate needs accurately and effectively will improve  Outcome: PROGRESSING AS EXPECTED  Max is alert and oriented x 4.        Problem: Safety  Goal: Will remain free from injury  Outcome: PROGRESSING AS EXPECTED  Pt uses call light consistently and appropriately. Waits for assistance does not attempt self transfer this shift. Able to verbalize needs.

## 2020-03-28 NOTE — CONSULTS
DATE OF SERVICE:  3/28/2020    REQUESTING PHYSICIAN:  Eliz Hernandez MD    CHIEF COMPLAINT / REASON FOR CONSULTATION:   Hypertension  CHF with elevated BNP    HISTORY OF PRESENT ILLNESS:  This is an 86 y/o male with a PMH significant for hypertension, ischemic cardiomyopathy with ejection fraction 25%, PSVT, and occ LE swelling and shortness of breath who presented to [facility] on 3/22 with burning chest pain radiating to neck and back, dizziness, and nausea and vomiting.  Patient found to have an NSTEMI and was admitted for close cardiac monitoring, serial EKGs and troponins.  An echo showed an EF of 25% with a grade III diastolic dysfunction.  Patient was initially treated with aspirin and IV heparin.  Patient underwent cardiac catheterization and PCI with stent placement in mid circumflex artery on 3/23 by Luis Mccann.  The next day a rapid response was called for neurologic changes with headache, left-sided weakness, left-sided ataxia, and nystagmus.  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  He did not receive TPA.  He was treated with ASA, Plavix, and Lipitor.  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    Because of the patient's weakness and debility, Rehab was consulted, evaluated the patient, and was deemed a good Rehab candidate.  The patient was transferred over to the Rehab facility on 3/27/2020.      The patient denies fever, chills, nausea, vomiting, headaches, blurry vision, or chest pain.    REVIEW OF SYSTEMS: All review of systems are negative pre AMA and CMS criteria   except for that stated in the HPI.    PAST MEDICAL HISTORY:  Past Medical History:   Diagnosis Date   • Acute anterolateral myocardial infarction (HCC) 1995   • CAD  (coronary artery disease) 1995    PCI/stent to the LAD. April 2017: MPI with scar in anterior/inferior wall, no ischemia, LVEF 30%. Echocardiogram with fixed defects in anterior wall and septal inferior wall, no ischemia, LVEF 40%. July 2017: Coronary angiogram with patent LAD stent, diffuse nonobstructive lesions in RCA, 40% mid RCA, 50% mid circumflex.   • Hyperlipidemia    • Hypertension    • PSVT (paroxysmal supraventricular tachycardia) ([facility])    • Shoulder pain        PAST SURGICAL HISTORY:  Past Surgical History:   Procedure Laterality Date   • ANGIOPLASTY  1995    stents   • ELBOWPLASTY      ulnar nerve transposition   • HAND SURGERY     • SHOULDER ORIF         No Known Allergies    CURRENT MEDICATIONS:    Current Facility-Administered Medications:   •  Respiratory Therapy Consult  •  Pharmacy Consult Request  •  acetaminophen  •  artificial tears  •  benzocaine-menthol  •  mag hydrox-al hydrox-simeth  •  traZODone  •  sodium chloride  •  hydrOXYzine HCl  •  melatonin  •  lactulose  •  docusate sodium  •  senna-docusate **AND** polyethylene glycol/lytes **AND** magnesium hydroxide **AND** bisacodyl  •  aspirin **OR** aspirin **OR** [DISCONTINUED] aspirin  •  atorvastatin  •  clopidogrel  •  gabapentin  •  magnesium oxide  •  omeprazole  •  vitamin D  •  prochlorperazine  •  Initiate Scheduled Intermittent Catheterization Program for Spinal Cord Injuries **AND** Straight Cath **AND** Educate patient and caregiver on Bladder Program **AND** lidocaine  •  heparin    Social History     Socioeconomic History   • Marital status:      Spouse name: Not on file   • Number of children: Not on file   • Years of education: Not on file   • Highest education level: Not on file   Occupational History   • Not on file   Social Needs   • Financial resource strain: Not on file   • Food insecurity     Worry: Not on file     Inability: Not on file   • Transportation needs     Medical: Not on file     Non-medical: Not on  file   Tobacco Use   • Smoking status: Former Smoker     Packs/day: 1.00     Years: 50.00     Pack years: 50.00     Last attempt to quit: 1995     Years since quittin.5   • Smokeless tobacco: Never Used   • Tobacco comment: up to 3 PPD per day at times   Substance and Sexual Activity   • Alcohol use: Yes     Alcohol/week: 4.2 oz     Types: 7 Cans of beer per week   • Drug use: No   • Sexual activity: Not on file   Lifestyle   • Physical activity     Days per week: Not on file     Minutes per session: Not on file   • Stress: Not on file   Relationships   • Social connections     Talks on phone: Not on file     Gets together: Not on file     Attends Bahai service: Not on file     Active member of club or organization: Not on file     Attends meetings of clubs or organizations: Not on file     Relationship status: Not on file   • Intimate partner violence     Fear of current or ex partner: Not on file     Emotionally abused: Not on file     Physically abused: Not on file     Forced sexual activity: Not on file   Other Topics Concern   • Not on file   Social History Narrative   • Not on file       FAMILY HISTORY:  was reviewed and is not pertinent to this consultation.    PHYSICAL EXAMINATION:  VITAL SIGNS:  Temp is 97.8, blood pressure is 98//63, heart rate is 63, respiratory rate is 18.  GENERAL:  Patient was lying in bed in no distress.  HEENT:  Pupils were equal, round and reactive to light and accomodation.  Oral mucosa was pink and moist.  NECK:  Soft.  Supple.  No JVD.  HEART:  Regular rate and rhythm.  Normal S1 and S2.  No murmurs were appreciated.  LUNGS:  Are clear to auscultation bilaterally.  ABDOMEN:  Soft, non tender, non distended.  Bowels sound were positive in all four quadrants.  EXTREMITIES:  No clubbing, cyanosis.  There was no lower extremity edema.  NEUROLOGIC:  Cranial nerves two through twelve were grossly intact.    LABS:  Lab Results   Component Value Date/Time    SODIUM  138 03/28/2020 05:03 AM    POTASSIUM 4.2 03/28/2020 05:03 AM    CHLORIDE 106 03/28/2020 05:03 AM    CO2 23 03/28/2020 05:03 AM    GLUCOSE 94 03/28/2020 05:03 AM    BUN 26 (H) 03/28/2020 05:03 AM    CREATININE 1.26 03/28/2020 05:03 AM      Lab Results   Component Value Date/Time    WBC 6.4 03/28/2020 05:03 AM    RBC 2.81 (L) 03/28/2020 05:03 AM    HEMOGLOBIN 9.3 (L) 03/28/2020 05:03 AM    HEMATOCRIT 29.9 (L) 03/28/2020 05:03 AM    .4 (H) 03/28/2020 05:03 AM    MCH 33.1 (H) 03/28/2020 05:03 AM    MCHC 31.1 (L) 03/28/2020 05:03 AM    MPV 10.8 03/28/2020 05:03 AM    NEUTSPOLYS 67.00 03/28/2020 05:03 AM    LYMPHOCYTES 17.20 (L) 03/28/2020 05:03 AM    MONOCYTES 10.60 03/28/2020 05:03 AM    EOSINOPHILS 4.10 03/28/2020 05:03 AM    BASOPHILS 0.60 03/28/2020 05:03 AM      Lab Results   Component Value Date/Time    PROTHROMBTM 15.5 (H) 03/22/2020 03:20 PM    INR 1.20 (H) 03/22/2020 03:20 PM        CHF (congestive heart failure) (HCC)  Pt asymptomatic  No edema  No lung crackles  O2 sats good on RA  Echo (3/23): severe mitral regurgitation; EF 25%; Global hypokinesis; Grade III diastolic dysfunction; possible severe aortic stenosis   BNP: 6942 (3/22) --> 14, 253 (3/28)  BP low and can't tolerate BB, ACE/ARB, or diuretics at this time -- will start when BP better  Cont to monitor    Azotemia  Has hx of CKD S3 (per chart review)  Bun mildly elevated -- has hx: 26 (3/28)  Encouraging fluid intake  Monitor    Anemia  Hb 9.6 --> 10.3 --> 9.3 (3/28)  Fe: 40, sats 16% (3/23)  B12: 1506 (3/23)  Folate: 15.9 (3/23)  Monitor for now    CVA (cerebral vascular accident) (HCC)  Occurred post-op  MRI --> large area of acute infarction in the left PICA territory including the left dorsal lateral medulla  On ASA, Plavix  On Lipitor    NSTEMI (non-ST elevated myocardial infarction) (HCC)  S/P cardiac stent to the mid circumflex  Hx MI in 1995 with stent to the LAD  BP low and can't tolerate a BB or ACE/ARB at this time -- will start when  BP better    Essential hypertension  Has hx  BP low normal with SBP   Currently not on any hypertensive meds  Encouraging fluid intake  Note: home meds include ToprolXL 25 mg daily and Cozaar 50 mg daily  Monitor      This case has been discussed with the attending Physiatrist.    Thank you for the consultation.  Will follow the patient with you.

## 2020-03-28 NOTE — ASSESSMENT & PLAN NOTE
Has hx  BP better recently and ok  Currently not on any hypertensive meds  Encouraging fluid intake  Note: home meds include Toprol XL 25 mg daily and Cozaar 50 mg daily  Monitor

## 2020-03-28 NOTE — ASSESSMENT & PLAN NOTE
Occurred post-op  MRI --> large area of acute infarction in the left PICA territory including the left dorsal lateral medulla  On ASA, Plavix  On Lipitor

## 2020-03-28 NOTE — FLOWSHEET NOTE
03/27/20 1837   Patient History   Pulmonary Diagnosis None   Procedures Relevant to Respiratory Status NSTEMI, recent cardiac stent   Home O2 No   Nocturnal CPAP No   Home Treatments/Frequency No   COPD Risk Screening   Do you have a history of COPD? No   COPD Population Screener   During the past 4 weeks, how much did you feel short of breath? 0   Do you ever cough up any mucus or phlegm? 0   In the past 12 months, you do less than you used to because of your breathing problems 0   Have you smoked at least 100 cigarettes in your entire life? 2   How old are you? 2   COPD Screening Score 4   COPD Coordinator Not Recommended Yes   Protocol Pathways   Protocol Pathways None

## 2020-03-28 NOTE — PROGRESS NOTES
"Rehab Progress Note     Encounter Date: 3/28/2020    CC: I spilled soup all over myself    Interval Events (Subjective)  VSS - Systolic high 90's low 100's  No acute overnight events overnight.  7/10 headache pain.  Was given Tylenol 650 mg.  Sun catheter removed, has not had IC yet    Patient doing well this morning states he does not have any shortness of breath or other difficulty breathing.  Seen eating lunch.  Has no current complaints.    Objective:  VITAL SIGNS: BP (!) 98/60   Pulse 63   Temp 36.6 °C (97.8 °F) (Temporal)   Resp 18   Ht 1.803 m (5' 11\")   Wt 64.9 kg (143 lb)   SpO2 96%   BMI 19.94 kg/m²   Gen: NAD  Psych: Mood & Affect appropriate   CV: No lower extremity edema appreciated.  Resp: Breathing nonlabored on room air.  No respiratory distress appreciated.  Abd: NTND  Skin: No visible rashes or lesions  Neuro: AOx4, Following commands    Recent Results (from the past 72 hour(s))   CBC WITH DIFFERENTIAL    Collection Time: 03/26/20  2:27 AM   Result Value Ref Range    WBC 9.8 4.8 - 10.8 K/uL    RBC 2.94 (L) 4.70 - 6.10 M/uL    Hemoglobin 9.6 (L) 14.0 - 18.0 g/dL    Hematocrit 30.4 (L) 42.0 - 52.0 %    .4 (H) 81.4 - 97.8 fL    MCH 32.7 27.0 - 33.0 pg    MCHC 31.6 (L) 33.7 - 35.3 g/dL    RDW 50.4 (H) 35.9 - 50.0 fL    Platelet Count 156 (L) 164 - 446 K/uL    MPV 11.0 9.0 - 12.9 fL    Neutrophils-Polys 76.30 (H) 44.00 - 72.00 %    Lymphocytes 9.70 (L) 22.00 - 41.00 %    Monocytes 12.50 0.00 - 13.40 %    Eosinophils 0.70 0.00 - 6.90 %    Basophils 0.30 0.00 - 1.80 %    Immature Granulocytes 0.50 0.00 - 0.90 %    Nucleated RBC 0.00 /100 WBC    Neutrophils (Absolute) 7.45 (H) 1.82 - 7.42 K/uL    Lymphs (Absolute) 0.95 (L) 1.00 - 4.80 K/uL    Monos (Absolute) 1.22 (H) 0.00 - 0.85 K/uL    Eos (Absolute) 0.07 0.00 - 0.51 K/uL    Baso (Absolute) 0.03 0.00 - 0.12 K/uL    Immature Granulocytes (abs) 0.05 0.00 - 0.11 K/uL    NRBC (Absolute) 0.00 K/uL   Comp Metabolic Panel    Collection Time: " 03/26/20  2:27 AM   Result Value Ref Range    Sodium 138 135 - 145 mmol/L    Potassium 4.2 3.6 - 5.5 mmol/L    Chloride 106 96 - 112 mmol/L    Co2 23 20 - 33 mmol/L    Anion Gap 9.0 7.0 - 16.0    Glucose 113 (H) 65 - 99 mg/dL    Bun 28 (H) 8 - 22 mg/dL    Creatinine 1.38 0.50 - 1.40 mg/dL    Calcium 9.1 8.5 - 10.5 mg/dL    AST(SGOT) 27 12 - 45 U/L    ALT(SGPT) 15 2 - 50 U/L    Alkaline Phosphatase 66 30 - 99 U/L    Total Bilirubin 0.7 0.1 - 1.5 mg/dL    Albumin 3.8 3.2 - 4.9 g/dL    Total Protein 6.3 6.0 - 8.2 g/dL    Globulin 2.5 1.9 - 3.5 g/dL    A-G Ratio 1.5 g/dL   ESTIMATED GFR    Collection Time: 03/26/20  2:27 AM   Result Value Ref Range    GFR If  59 (A) >60 mL/min/1.73 m 2    GFR If Non African American 49 (A) >60 mL/min/1.73 m 2   CBC WITH DIFFERENTIAL    Collection Time: 03/27/20  3:29 AM   Result Value Ref Range    WBC 9.7 4.8 - 10.8 K/uL    RBC 3.16 (L) 4.70 - 6.10 M/uL    Hemoglobin 10.3 (L) 14.0 - 18.0 g/dL    Hematocrit 33.0 (L) 42.0 - 52.0 %    .4 (H) 81.4 - 97.8 fL    MCH 32.6 27.0 - 33.0 pg    MCHC 31.2 (L) 33.7 - 35.3 g/dL    RDW 50.5 (H) 35.9 - 50.0 fL    Platelet Count 183 164 - 446 K/uL    MPV 10.7 9.0 - 12.9 fL    Neutrophils-Polys 71.50 44.00 - 72.00 %    Lymphocytes 14.50 (L) 22.00 - 41.00 %    Monocytes 11.00 0.00 - 13.40 %    Eosinophils 2.20 0.00 - 6.90 %    Basophils 0.40 0.00 - 1.80 %    Immature Granulocytes 0.40 0.00 - 0.90 %    Nucleated RBC 0.00 /100 WBC    Neutrophils (Absolute) 6.91 1.82 - 7.42 K/uL    Lymphs (Absolute) 1.40 1.00 - 4.80 K/uL    Monos (Absolute) 1.06 (H) 0.00 - 0.85 K/uL    Eos (Absolute) 0.21 0.00 - 0.51 K/uL    Baso (Absolute) 0.04 0.00 - 0.12 K/uL    Immature Granulocytes (abs) 0.04 0.00 - 0.11 K/uL    NRBC (Absolute) 0.00 K/uL   Comp Metabolic Panel    Collection Time: 03/27/20  3:29 AM   Result Value Ref Range    Sodium 138 135 - 145 mmol/L    Potassium 4.4 3.6 - 5.5 mmol/L    Chloride 106 96 - 112 mmol/L    Co2 20 20 - 33 mmol/L     Anion Gap 12.0 7.0 - 16.0    Glucose 99 65 - 99 mg/dL    Bun 24 (H) 8 - 22 mg/dL    Creatinine 1.31 0.50 - 1.40 mg/dL    Calcium 9.4 8.5 - 10.5 mg/dL    AST(SGOT) 17 12 - 45 U/L    ALT(SGPT) 14 2 - 50 U/L    Alkaline Phosphatase 69 30 - 99 U/L    Total Bilirubin 0.7 0.1 - 1.5 mg/dL    Albumin 3.6 3.2 - 4.9 g/dL    Total Protein 6.4 6.0 - 8.2 g/dL    Globulin 2.8 1.9 - 3.5 g/dL    A-G Ratio 1.3 g/dL   ESTIMATED GFR    Collection Time: 03/27/20  3:29 AM   Result Value Ref Range    GFR If African American >60 >60 mL/min/1.73 m 2    GFR If Non African American 52 (A) >60 mL/min/1.73 m 2   CBC with Differential    Collection Time: 03/28/20  5:03 AM   Result Value Ref Range    WBC 6.4 4.8 - 10.8 K/uL    RBC 2.81 (L) 4.70 - 6.10 M/uL    Hemoglobin 9.3 (L) 14.0 - 18.0 g/dL    Hematocrit 29.9 (L) 42.0 - 52.0 %    .4 (H) 81.4 - 97.8 fL    MCH 33.1 (H) 27.0 - 33.0 pg    MCHC 31.1 (L) 33.7 - 35.3 g/dL    RDW 51.8 (H) 35.9 - 50.0 fL    Platelet Count 181 164 - 446 K/uL    MPV 10.8 9.0 - 12.9 fL    Neutrophils-Polys 67.00 44.00 - 72.00 %    Lymphocytes 17.20 (L) 22.00 - 41.00 %    Monocytes 10.60 0.00 - 13.40 %    Eosinophils 4.10 0.00 - 6.90 %    Basophils 0.60 0.00 - 1.80 %    Immature Granulocytes 0.50 0.00 - 0.90 %    Nucleated RBC 0.00 /100 WBC    Neutrophils (Absolute) 4.26 1.82 - 7.42 K/uL    Lymphs (Absolute) 1.09 1.00 - 4.80 K/uL    Monos (Absolute) 0.67 0.00 - 0.85 K/uL    Eos (Absolute) 0.26 0.00 - 0.51 K/uL    Baso (Absolute) 0.04 0.00 - 0.12 K/uL    Immature Granulocytes (abs) 0.03 0.00 - 0.11 K/uL    NRBC (Absolute) 0.00 K/uL   Comp Metabolic Panel (CMP)    Collection Time: 03/28/20  5:03 AM   Result Value Ref Range    Sodium 138 135 - 145 mmol/L    Potassium 4.2 3.6 - 5.5 mmol/L    Chloride 106 96 - 112 mmol/L    Co2 23 20 - 33 mmol/L    Anion Gap 9.0 7.0 - 16.0    Glucose 94 65 - 99 mg/dL    Bun 26 (H) 8 - 22 mg/dL    Creatinine 1.26 0.50 - 1.40 mg/dL    Calcium 8.8 8.5 - 10.5 mg/dL    AST(SGOT) 15 12 - 45  U/L    ALT(SGPT) 11 2 - 50 U/L    Alkaline Phosphatase 64 30 - 99 U/L    Total Bilirubin 0.5 0.1 - 1.5 mg/dL    Albumin 3.1 (L) 3.2 - 4.9 g/dL    Total Protein 5.7 (L) 6.0 - 8.2 g/dL    Globulin 2.6 1.9 - 3.5 g/dL    A-G Ratio 1.2 g/dL   Magnesium    Collection Time: 03/28/20  5:03 AM   Result Value Ref Range    Magnesium 1.7 1.5 - 2.5 mg/dL   Vitamin D, 25-hydroxy (blood)    Collection Time: 03/28/20  5:03 AM   Result Value Ref Range    25-Hydroxy   Vitamin D 25 47 30 - 100 ng/mL   proBrain Natriuretic Peptide, NT    Collection Time: 03/28/20  5:03 AM   Result Value Ref Range    NT-proBNP 31851 (H) 0 - 125 pg/mL   ESTIMATED GFR    Collection Time: 03/28/20  5:03 AM   Result Value Ref Range    GFR If African American >60 >60 mL/min/1.73 m 2    GFR If Non African American 54 (A) >60 mL/min/1.73 m 2       Current Facility-Administered Medications   Medication Frequency   • Respiratory Therapy Consult Continuous RT   • Pharmacy Consult Request ...Pain Management Review 1 Each PHARMACY TO DOSE   • acetaminophen (TYLENOL) tablet 650 mg Q4HRS PRN   • artificial tears ophthalmic solution 1 Drop PRN   • benzocaine-menthol (CEPACOL) lozenge 1 Lozenge Q2HRS PRN   • mag hydrox-al hydrox-simeth (MAALOX PLUS ES or MYLANTA DS) suspension 20 mL Q2HRS PRN   • traZODone (DESYREL) tablet 50 mg QHS PRN   • sodium chloride (OCEAN) 0.65 % nasal spray 2 Spray PRN   • hydrOXYzine HCl (ATARAX) tablet 50 mg Q6HRS PRN   • melatonin tablet 3 mg HS PRN   • lactulose 20 GM/30ML solution 30 mL QDAY PRN   • docusate sodium (ENEMEEZ) enema 283 mg QDAY PRN   • senna-docusate (PERICOLACE or SENOKOT S) 8.6-50 MG per tablet 2 Tab BID    And   • polyethylene glycol/lytes (MIRALAX) PACKET 1 Packet QDAY PRN    And   • magnesium hydroxide (MILK OF MAGNESIA) suspension 30 mL QDAY PRN    And   • bisacodyl (DULCOLAX) suppository 10 mg QDAY PRN   • aspirin (ASA) tablet 325 mg DAILY    Or   • aspirin (ASA) chewable tab 324 mg DAILY   • atorvastatin (LIPITOR)  tablet 80 mg Q EVENING   • clopidogrel (PLAVIX) tablet 75 mg DAILY   • gabapentin (NEURONTIN) capsule 300 mg TID   • magnesium oxide (MAG-OX) tablet 400 mg DAILY   • omeprazole (PRILOSEC) capsule 20 mg BID   • vitamin D (cholecalciferol) tablet 1,000 Units DAILY   • prochlorperazine (COMPAZINE) tablet 10 mg Q6HRS PRN   • lidocaine 2 % jelly PRN   • heparin injection 5,000 Units Q8HRS       Orders Placed This Encounter   Procedures   • Diet Order Cardiac (Monitor with meals.)     Standing Status:   Standing     Number of Occurrences:   1     Order Specific Question:   Diet:     Answer:   Cardiac [6]     Comments:   Monitor with meals.     Order Specific Question:   Texture Modifier     Answer:   Level 6 - Soft & Bite Sized (Dysphagia 3)     Order Specific Question:   Liquid level     Answer:   Level 0 - Thin       Assessment:  Active Hospital Problems    Diagnosis   • *CVA (cerebral vascular accident) ([facility])   • NSTEMI (non-ST elevated myocardial infarction) ([facility])   • Acute on chronic systolic heart failure ([facility])   • Ischemic cardiomyopathy   • COPD (chronic obstructive pulmonary disease) ([facility])   • Essential hypertension   • Urinary retention   • Acute kidney injury ([facility])   • Anemia       Medical Decision Making and Plan:  Patient is admitted to [facility] for ongoing PT, OT and/or SLP.  Continue medical management per primary team.      In addition:   DNR/DNI    Neurogenic Bladder: Sun removed.   -Continue ICP per order set; discussed this with patient's nurse  -Patient has not had to catheterize yet, Sun recently removed  -We will continue to monitor    Admission Labs:   CMP: Stable, WNL.   Vit D: 47, no supp.   CBC: Stable, with macrocytic anemia  BNP: 14k (was ~7k 6 days ago) - Hospitalist following; history of CHF with low EF    Total time:  26 minutes.  I spent greater than 50% of the time for patient care and coordination on this date, including unit/floor time, and face-to-face time with the patient as per  assessment and plan above.    Yary Raphael D.O.

## 2020-03-28 NOTE — ASSESSMENT & PLAN NOTE
Pt asymptomatic  No edema  No lung crackles  O2 sats good on RA  Echo (3/23): severe mitral regurgitation; EF 25%; Global hypokinesis; Grade III diastolic dysfunction; possible severe aortic stenosis   BNP: 6942 (3/22) --> 14,253 (3/28) --> 13,571 (3/30)  BP had been lower but improved recently -- consider adding diuretic   Cont to monitor

## 2020-03-28 NOTE — FLOWSHEET NOTE
03/27/20 1842   Events/Summary/Plan   Events/Summary/Plan Assessment   Vital Signs   Pulse 62   Respiration 16   Pulse Oximetry 96 %   $ Pulse Oximetry (Spot Check) Yes   Respiratory Assessment   Level of Consciousness Alert   Breath Sounds   RUL Breath Sounds Clear   RML Breath Sounds Clear   RLL Breath Sounds Clear   FRANCIE Breath Sounds Clear   LLL Breath Sounds Clear   Secretions   Cough Non Productive   Oxygen   O2 Delivery Device None - Room Air   Smoking History   Have you ever smoked Yes   Have you smoked in the last 12 months No   Confirm Quit Date 03/27/95

## 2020-03-29 PROCEDURE — 770010 HCHG ROOM/CARE - REHAB SEMI PRIVAT*

## 2020-03-29 PROCEDURE — 700111 HCHG RX REV CODE 636 W/ 250 OVERRIDE (IP): Performed by: PHYSICAL MEDICINE & REHABILITATION

## 2020-03-29 PROCEDURE — 92526 ORAL FUNCTION THERAPY: CPT

## 2020-03-29 PROCEDURE — 99232 SBSQ HOSP IP/OBS MODERATE 35: CPT | Performed by: HOSPITALIST

## 2020-03-29 PROCEDURE — 700102 HCHG RX REV CODE 250 W/ 637 OVERRIDE(OP): Performed by: PHYSICAL MEDICINE & REHABILITATION

## 2020-03-29 PROCEDURE — A9270 NON-COVERED ITEM OR SERVICE: HCPCS | Performed by: PHYSICAL MEDICINE & REHABILITATION

## 2020-03-29 RX ADMIN — SENNOSIDES AND DOCUSATE SODIUM 2 TABLET: 8.6; 5 TABLET ORAL at 19:35

## 2020-03-29 RX ADMIN — OMEPRAZOLE 20 MG: 20 CAPSULE, DELAYED RELEASE ORAL at 08:49

## 2020-03-29 RX ADMIN — TRAMADOL HYDROCHLORIDE 50 MG: 50 TABLET, COATED ORAL at 06:45

## 2020-03-29 RX ADMIN — ASPIRIN 325 MG ORAL TABLET 325 MG: 325 PILL ORAL at 08:49

## 2020-03-29 RX ADMIN — CLOPIDOGREL BISULFATE 75 MG: 75 TABLET ORAL at 08:49

## 2020-03-29 RX ADMIN — TRAZODONE HYDROCHLORIDE 50 MG: 50 TABLET ORAL at 21:34

## 2020-03-29 RX ADMIN — GABAPENTIN 300 MG: 300 CAPSULE ORAL at 08:49

## 2020-03-29 RX ADMIN — HEPARIN SODIUM 5000 UNITS: 5000 INJECTION, SOLUTION INTRAVENOUS; SUBCUTANEOUS at 21:33

## 2020-03-29 RX ADMIN — MELATONIN 1000 UNITS: at 08:49

## 2020-03-29 RX ADMIN — OMEPRAZOLE 20 MG: 20 CAPSULE, DELAYED RELEASE ORAL at 19:35

## 2020-03-29 RX ADMIN — Medication 400 MG: at 08:49

## 2020-03-29 RX ADMIN — HEPARIN SODIUM 5000 UNITS: 5000 INJECTION, SOLUTION INTRAVENOUS; SUBCUTANEOUS at 13:43

## 2020-03-29 RX ADMIN — HEPARIN SODIUM 5000 UNITS: 5000 INJECTION, SOLUTION INTRAVENOUS; SUBCUTANEOUS at 05:47

## 2020-03-29 RX ADMIN — GABAPENTIN 300 MG: 300 CAPSULE ORAL at 19:34

## 2020-03-29 RX ADMIN — ATORVASTATIN CALCIUM 80 MG: 40 TABLET, FILM COATED ORAL at 19:34

## 2020-03-29 RX ADMIN — HYDROXYZINE HYDROCHLORIDE 50 MG: 25 TABLET, FILM COATED ORAL at 23:10

## 2020-03-29 RX ADMIN — TRAMADOL HYDROCHLORIDE 50 MG: 50 TABLET, COATED ORAL at 21:34

## 2020-03-29 RX ADMIN — SENNOSIDES AND DOCUSATE SODIUM 2 TABLET: 8.6; 5 TABLET ORAL at 08:49

## 2020-03-29 RX ADMIN — MELATONIN 3 MG: at 19:34

## 2020-03-29 RX ADMIN — GABAPENTIN 300 MG: 300 CAPSULE ORAL at 13:52

## 2020-03-29 ASSESSMENT — ENCOUNTER SYMPTOMS
COUGH: 0
FEVER: 0
DIARRHEA: 0
BLURRED VISION: 0
DIZZINESS: 0
NERVOUS/ANXIOUS: 0

## 2020-03-29 ASSESSMENT — FIBROSIS 4 INDEX: FIB4 SCORE: 2.17

## 2020-03-29 NOTE — PROGRESS NOTES
Received patient on bed. Conscious coherent not in cp distress. Patient verbalized no pain. No headache. No SOB. Patient has difficulty hearing. Patient able to swallow pills without difficulty and able to follow instructions. Patient safety and fall precaution reinforced.

## 2020-03-29 NOTE — THERAPY
Speech Language Pathology  Daily Treatment     Patient Name: Kyler Donato Jr.  Age:  87 y.o., Sex:  male  Medical Record #: [id number]  Today's Date: 3/29/2020     Subjective    Patient arrived on time to ST with assistance.      Objective       03/29/20 0802   Dysphagia    Diet / Liquid Recommendation Thin (0);Soft & Bite-Sized (6) - (Dysphagia III)   Nutritional Liquid Intake Rating Scale Non thickened beverages   Nutritional Food Intake Rating Scale Total oral diet with multiple consistencies without special preparation but with specific food limitations   Nursing Communication Swallow Precaution Sign Posted at Head of Bed   SLP Total Time Spent   SLP Individual Total Time Spent (Mins) 60   Charge Group   SLP Swallowing Dysfunction Treatment Swallowing Dysfunction Treatment       Assessment    Patient was assessed with current diet textures. No overt s/sx of aspiration were noted and patient demonstrated appropriate use of safe swallow strategies.     Plan    Trial level 7 and upgrade as appropriate.

## 2020-03-29 NOTE — PROGRESS NOTES
[facility] Medicine Daily Progress Note    Date of Service  3/29/2020    Chief Complaint:  Hypertension  CHF with elevated BNP    Interval History:  No significant events or changes since last visit    Review of Systems  Review of Systems   Constitutional: Negative for fever.   Eyes: Negative for blurred vision.   Respiratory: Negative for cough.    Cardiovascular: Negative for chest pain.   Gastrointestinal: Negative for diarrhea.   Musculoskeletal: Negative for joint pain.   Neurological: Negative for dizziness.   Psychiatric/Behavioral: The patient is not nervous/anxious.         Physical Exam  Temp:  [36.6 °C (97.8 °F)-36.7 °C (98.1 °F)] 36.6 °C (97.8 °F)  Pulse:  [67-72] 72  Resp:  [18-20] 18  BP: ()/(52-61) 111/61    Physical Exam  Vitals signs and nursing note reviewed.   Constitutional:       Appearance: He is not diaphoretic.   HENT:      Mouth/Throat:      Pharynx: No oropharyngeal exudate or posterior oropharyngeal erythema.   Eyes:      Extraocular Movements: Extraocular movements intact.   Neck:      Vascular: No carotid bruit.   Cardiovascular:      Rate and Rhythm: Normal rate and regular rhythm.   Pulmonary:      Effort: Pulmonary effort is normal.      Breath sounds: No wheezing or rales.   Abdominal:      General: There is no distension.      Palpations: Abdomen is soft.      Tenderness: There is no abdominal tenderness.   Musculoskeletal:         General: Injury:         Right lower leg: No edema.      Left lower leg: No edema.   Skin:     General: Skin is warm and dry.   Neurological:      Mental Status: He is alert and oriented to person, place, and time.   Psychiatric:         Mood and Affect: Mood normal.         Behavior: Behavior normal.         Fluids    Intake/Output Summary (Last 24 hours) at 3/29/2020 0847  Last data filed at 3/29/2020 0800  Gross per 24 hour   Intake 1060 ml   Output 825 ml   Net 235 ml       Laboratory  Recent Labs     03/27/20  0329 03/28/20  0503   WBC 9.7 6.4    RBC 3.16* 2.81*   HEMOGLOBIN 10.3* 9.3*   HEMATOCRIT 33.0* 29.9*   .4* 106.4*   MCH 32.6 33.1*   MCHC 31.2* 31.1*   RDW 50.5* 51.8*   PLATELETCT 183 181   MPV 10.7 10.8     Recent Labs     03/27/20  0329 03/28/20  0503   SODIUM 138 138   POTASSIUM 4.4 4.2   CHLORIDE 106 106   CO2 20 23   GLUCOSE 99 94   BUN 24* 26*   CREATININE 1.31 1.26   CALCIUM 9.4 8.8                   Imaging    Assessment/Plan  * CVA (cerebral vascular accident) ([facility])- (present on admission)  Assessment & Plan  Occurred post-op  MRI --> large area of acute infarction in the left PICA territory including the left dorsal lateral medulla  On ASA, Plavix  On Lipitor    NSTEMI (non-ST elevated myocardial infarction) ([facility])- (present on admission)  Assessment & Plan  S/P cardiac stent to the mid circumflex  Hx MI in 1995 with stent to the LAD  BP low and can't tolerate a BB or ACE/ARB at this time -- will start when BP better    CHF (congestive heart failure) ([facility])- (present on admission)  Assessment & Plan  Pt asymptomatic  No edema  No lung crackles  O2 sats good on RA  Echo (3/23): severe mitral regurgitation; EF 25%; Global hypokinesis; Grade III diastolic dysfunction; possible severe aortic stenosis   BNP: 6942 (3/22) --> 14,253 (3/28)  BP low and can't tolerate BB, ACE/ARB, or diuretics at this time -- will start when BP better  Cont to monitor    Azotemia- (present on admission)  Assessment & Plan  Has hx of CKD S3 (per chart review)  Bun mildly elevated -- has hx: 26 (3/28)  Encouraging fluid intake  Monitor    Anemia- (present on admission)  Assessment & Plan  Hb 9.6 --> 10.3 --> 9.3 (3/28)  Fe: 40, sats 16% (3/23)  B12: 1506 (3/23)  Folate: 15.9 (3/23)  Monitor for now    Essential hypertension- (present on admission)  Assessment & Plan  Has hx  BP low normal with SBP   Currently not on any hypertensive meds  Encouraging fluid intake  Note: home meds include ToprolXL 25 mg daily and Cozaar 50 mg daily  Monitor

## 2020-03-29 NOTE — CARE PLAN
Problem: Safety  Goal: Will remain free from injury  Outcome: PROGRESSING AS EXPECTED  Patient with left sided weakness, unsteady, reminded to use call light for assist with needs,transfers to prevent falls.     Problem: Urinary Elimination:  Goal: Ability to reestablish a normal urinary elimination pattern will improve  Outcome: PROGRESSING AS EXPECTED  Patient with neurogenic bladder, does self cath, results documented.

## 2020-03-30 ENCOUNTER — APPOINTMENT (OUTPATIENT)
Dept: RADIOLOGY | Facility: MEDICAL CENTER | Age: 85
DRG: 281 | End: 2020-03-30
Attending: INTERNAL MEDICINE
Payer: MEDICARE

## 2020-03-30 ENCOUNTER — APPOINTMENT (OUTPATIENT)
Dept: CARDIOLOGY | Facility: MEDICAL CENTER | Age: 85
DRG: 281 | End: 2020-03-30
Attending: INTERNAL MEDICINE
Payer: MEDICARE

## 2020-03-30 ENCOUNTER — HOSPITAL ENCOUNTER (INPATIENT)
Facility: MEDICAL CENTER | Age: 85
LOS: 3 days | DRG: 281 | End: 2020-04-02
Attending: EMERGENCY MEDICINE | Admitting: HOSPITALIST
Payer: MEDICARE

## 2020-03-30 ENCOUNTER — APPOINTMENT (OUTPATIENT)
Dept: RADIOLOGY | Facility: MEDICAL CENTER | Age: 85
DRG: 281 | End: 2020-03-30
Attending: EMERGENCY MEDICINE
Payer: MEDICARE

## 2020-03-30 ENCOUNTER — APPOINTMENT (OUTPATIENT)
Dept: RADIOLOGY | Facility: REHABILITATION | Age: 85
DRG: 056 | End: 2020-03-30
Attending: HOSPITALIST
Payer: MEDICARE

## 2020-03-30 VITALS
WEIGHT: 147.71 LBS | BODY MASS INDEX: 20.68 KG/M2 | TEMPERATURE: 98.8 F | HEIGHT: 71 IN | RESPIRATION RATE: 20 BRPM | OXYGEN SATURATION: 100 % | DIASTOLIC BLOOD PRESSURE: 65 MMHG | SYSTOLIC BLOOD PRESSURE: 113 MMHG | HEART RATE: 70 BPM

## 2020-03-30 DIAGNOSIS — I63.89 CEREBROVASCULAR ACCIDENT (CVA) DUE TO OTHER MECHANISM (HCC): ICD-10-CM

## 2020-03-30 DIAGNOSIS — I25.118 CORONARY ARTERY DISEASE INVOLVING NATIVE CORONARY ARTERY OF NATIVE HEART WITH OTHER FORM OF ANGINA PECTORIS (HCC): ICD-10-CM

## 2020-03-30 DIAGNOSIS — I21.4 NSTEMI (NON-ST ELEVATED MYOCARDIAL INFARCTION) (HCC): ICD-10-CM

## 2020-03-30 DIAGNOSIS — I21.4 NON-STEMI (NON-ST ELEVATED MYOCARDIAL INFARCTION) (HCC): ICD-10-CM

## 2020-03-30 PROBLEM — R07.9 CHEST PAIN: Status: ACTIVE | Noted: 2020-03-30

## 2020-03-30 PROBLEM — N28.9 ACUTE ON CHRONIC RENAL INSUFFICIENCY: Status: ACTIVE | Noted: 2020-03-30

## 2020-03-30 PROBLEM — N18.9 ACUTE ON CHRONIC RENAL INSUFFICIENCY: Status: ACTIVE | Noted: 2020-03-30

## 2020-03-30 PROBLEM — N31.9 NEUROGENIC BLADDER: Status: ACTIVE | Noted: 2020-03-30

## 2020-03-30 LAB
ALBUMIN SERPL BCP-MCNC: 3.7 G/DL (ref 3.2–4.9)
ALBUMIN SERPL BCP-MCNC: 3.9 G/DL (ref 3.2–4.9)
ALBUMIN/GLOB SERPL: 1.4 G/DL
ALBUMIN/GLOB SERPL: 1.4 G/DL
ALP SERPL-CCNC: 79 U/L (ref 30–99)
ALP SERPL-CCNC: 82 U/L (ref 30–99)
ALT SERPL-CCNC: 15 U/L (ref 2–50)
ALT SERPL-CCNC: 21 U/L (ref 2–50)
ANION GAP SERPL CALC-SCNC: 11 MMOL/L (ref 7–16)
ANION GAP SERPL CALC-SCNC: 12 MMOL/L (ref 7–16)
APTT PPP: 31 SEC (ref 24.7–36)
AST SERPL-CCNC: 20 U/L (ref 12–45)
AST SERPL-CCNC: 27 U/L (ref 12–45)
BASOPHILS # BLD AUTO: 0.6 % (ref 0–1.8)
BASOPHILS # BLD AUTO: 0.6 % (ref 0–1.8)
BASOPHILS # BLD AUTO: 0.7 % (ref 0–1.8)
BASOPHILS # BLD: 0.04 K/UL (ref 0–0.12)
BASOPHILS # BLD: 0.04 K/UL (ref 0–0.12)
BASOPHILS # BLD: 0.05 K/UL (ref 0–0.12)
BILIRUB SERPL-MCNC: 0.4 MG/DL (ref 0.1–1.5)
BILIRUB SERPL-MCNC: 0.4 MG/DL (ref 0.1–1.5)
BUN SERPL-MCNC: 22 MG/DL (ref 8–22)
BUN SERPL-MCNC: 22 MG/DL (ref 8–22)
CALCIUM SERPL-MCNC: 9.2 MG/DL (ref 8.5–10.5)
CALCIUM SERPL-MCNC: 9.4 MG/DL (ref 8.5–10.5)
CHLORIDE SERPL-SCNC: 105 MMOL/L (ref 96–112)
CHLORIDE SERPL-SCNC: 107 MMOL/L (ref 96–112)
CO2 SERPL-SCNC: 24 MMOL/L (ref 20–33)
CO2 SERPL-SCNC: 25 MMOL/L (ref 20–33)
CREAT SERPL-MCNC: 1.53 MG/DL (ref 0.5–1.4)
CREAT SERPL-MCNC: 1.55 MG/DL (ref 0.5–1.4)
EKG IMPRESSION: NORMAL
EKG IMPRESSION: NORMAL
EOSINOPHIL # BLD AUTO: 0.17 K/UL (ref 0–0.51)
EOSINOPHIL # BLD AUTO: 0.2 K/UL (ref 0–0.51)
EOSINOPHIL # BLD AUTO: 0.2 K/UL (ref 0–0.51)
EOSINOPHIL NFR BLD: 2.4 % (ref 0–6.9)
EOSINOPHIL NFR BLD: 2.9 % (ref 0–6.9)
EOSINOPHIL NFR BLD: 3.1 % (ref 0–6.9)
ERYTHROCYTE [DISTWIDTH] IN BLOOD BY AUTOMATED COUNT: 48.5 FL (ref 35.9–50)
ERYTHROCYTE [DISTWIDTH] IN BLOOD BY AUTOMATED COUNT: 49.2 FL (ref 35.9–50)
ERYTHROCYTE [DISTWIDTH] IN BLOOD BY AUTOMATED COUNT: 49.4 FL (ref 35.9–50)
GLOBULIN SER CALC-MCNC: 2.6 G/DL (ref 1.9–3.5)
GLOBULIN SER CALC-MCNC: 2.8 G/DL (ref 1.9–3.5)
GLUCOSE SERPL-MCNC: 101 MG/DL (ref 65–99)
GLUCOSE SERPL-MCNC: 114 MG/DL (ref 65–99)
HCT VFR BLD AUTO: 30.5 % (ref 42–52)
HCT VFR BLD AUTO: 31.8 % (ref 42–52)
HCT VFR BLD AUTO: 33.1 % (ref 42–52)
HGB BLD-MCNC: 10.1 G/DL (ref 14–18)
HGB BLD-MCNC: 10.6 G/DL (ref 14–18)
HGB BLD-MCNC: 9.9 G/DL (ref 14–18)
IMM GRANULOCYTES # BLD AUTO: 0.04 K/UL (ref 0–0.11)
IMM GRANULOCYTES NFR BLD AUTO: 0.6 % (ref 0–0.9)
INR PPP: 1.21 (ref 0.87–1.13)
LV EJECT FRACT MOD 2C 99903: 30.44
LV EJECT FRACT MOD 4C 99902: 27.86
LV EJECT FRACT MOD BP 99901: 29.24
LYMPHOCYTES # BLD AUTO: 1.49 K/UL (ref 1–4.8)
LYMPHOCYTES # BLD AUTO: 1.49 K/UL (ref 1–4.8)
LYMPHOCYTES # BLD AUTO: 1.78 K/UL (ref 1–4.8)
LYMPHOCYTES NFR BLD: 21.1 % (ref 22–41)
LYMPHOCYTES NFR BLD: 23.1 % (ref 22–41)
LYMPHOCYTES NFR BLD: 25.7 % (ref 22–41)
MCH RBC QN AUTO: 32.3 PG (ref 27–33)
MCH RBC QN AUTO: 32.6 PG (ref 27–33)
MCH RBC QN AUTO: 32.6 PG (ref 27–33)
MCHC RBC AUTO-ENTMCNC: 31.8 G/DL (ref 33.7–35.3)
MCHC RBC AUTO-ENTMCNC: 32 G/DL (ref 33.7–35.3)
MCHC RBC AUTO-ENTMCNC: 32.5 G/DL (ref 33.7–35.3)
MCV RBC AUTO: 100.3 FL (ref 81.4–97.8)
MCV RBC AUTO: 101.6 FL (ref 81.4–97.8)
MCV RBC AUTO: 101.8 FL (ref 81.4–97.8)
MONOCYTES # BLD AUTO: 0.7 K/UL (ref 0–0.85)
MONOCYTES # BLD AUTO: 0.76 K/UL (ref 0–0.85)
MONOCYTES # BLD AUTO: 0.92 K/UL (ref 0–0.85)
MONOCYTES NFR BLD AUTO: 10.9 % (ref 0–13.4)
MONOCYTES NFR BLD AUTO: 11 % (ref 0–13.4)
MONOCYTES NFR BLD AUTO: 13 % (ref 0–13.4)
NEUTROPHILS # BLD AUTO: 3.98 K/UL (ref 1.82–7.42)
NEUTROPHILS # BLD AUTO: 4.11 K/UL (ref 1.82–7.42)
NEUTROPHILS # BLD AUTO: 4.4 K/UL (ref 1.82–7.42)
NEUTROPHILS NFR BLD: 59.2 % (ref 44–72)
NEUTROPHILS NFR BLD: 61.7 % (ref 44–72)
NEUTROPHILS NFR BLD: 62.2 % (ref 44–72)
NRBC # BLD AUTO: 0 K/UL
NRBC BLD-RTO: 0 /100 WBC
NT-PROBNP SERPL IA-MCNC: ABNORMAL PG/ML (ref 0–125)
PLATELET # BLD AUTO: 213 K/UL (ref 164–446)
PLATELET # BLD AUTO: 217 K/UL (ref 164–446)
PLATELET # BLD AUTO: 224 K/UL (ref 164–446)
PMV BLD AUTO: 10.4 FL (ref 9–12.9)
PMV BLD AUTO: 10.4 FL (ref 9–12.9)
PMV BLD AUTO: 9.8 FL (ref 9–12.9)
POTASSIUM SERPL-SCNC: 4.2 MMOL/L (ref 3.6–5.5)
POTASSIUM SERPL-SCNC: 4.3 MMOL/L (ref 3.6–5.5)
PROT SERPL-MCNC: 6.3 G/DL (ref 6–8.2)
PROT SERPL-MCNC: 6.7 G/DL (ref 6–8.2)
PROTHROMBIN TIME: 15.6 SEC (ref 12–14.6)
RBC # BLD AUTO: 3.04 M/UL (ref 4.7–6.1)
RBC # BLD AUTO: 3.13 M/UL (ref 4.7–6.1)
RBC # BLD AUTO: 3.25 M/UL (ref 4.7–6.1)
SODIUM SERPL-SCNC: 141 MMOL/L (ref 135–145)
SODIUM SERPL-SCNC: 143 MMOL/L (ref 135–145)
TROPONIN T SERPL-MCNC: 2683 NG/L (ref 6–19)
TROPONIN T SERPL-MCNC: 2907 NG/L (ref 6–19)
WBC # BLD AUTO: 6.5 K/UL (ref 4.8–10.8)
WBC # BLD AUTO: 6.9 K/UL (ref 4.8–10.8)
WBC # BLD AUTO: 7.1 K/UL (ref 4.8–10.8)

## 2020-03-30 PROCEDURE — 85730 THROMBOPLASTIN TIME PARTIAL: CPT

## 2020-03-30 PROCEDURE — 700102 HCHG RX REV CODE 250 W/ 637 OVERRIDE(OP): Performed by: PHYSICAL MEDICINE & REHABILITATION

## 2020-03-30 PROCEDURE — 85025 COMPLETE CBC W/AUTO DIFF WBC: CPT

## 2020-03-30 PROCEDURE — 93325 DOPPLER ECHO COLOR FLOW MAPG: CPT | Mod: 26 | Performed by: INTERNAL MEDICINE

## 2020-03-30 PROCEDURE — 85025 COMPLETE CBC W/AUTO DIFF WBC: CPT | Mod: 91

## 2020-03-30 PROCEDURE — 99223 1ST HOSP IP/OBS HIGH 75: CPT | Performed by: INTERNAL MEDICINE

## 2020-03-30 PROCEDURE — 93005 ELECTROCARDIOGRAM TRACING: CPT | Performed by: EMERGENCY MEDICINE

## 2020-03-30 PROCEDURE — 93308 TTE F-UP OR LMTD: CPT | Mod: 26 | Performed by: INTERNAL MEDICINE

## 2020-03-30 PROCEDURE — 770020 HCHG ROOM/CARE - TELE (206)

## 2020-03-30 PROCEDURE — 700102 HCHG RX REV CODE 250 W/ 637 OVERRIDE(OP): Performed by: HOSPITALIST

## 2020-03-30 PROCEDURE — 84484 ASSAY OF TROPONIN QUANT: CPT | Mod: 91

## 2020-03-30 PROCEDURE — 80048 BASIC METABOLIC PNL TOTAL CA: CPT

## 2020-03-30 PROCEDURE — 36415 COLL VENOUS BLD VENIPUNCTURE: CPT

## 2020-03-30 PROCEDURE — 93308 TTE F-UP OR LMTD: CPT

## 2020-03-30 PROCEDURE — 80053 COMPREHEN METABOLIC PANEL: CPT | Mod: 91

## 2020-03-30 PROCEDURE — 700111 HCHG RX REV CODE 636 W/ 250 OVERRIDE (IP): Performed by: EMERGENCY MEDICINE

## 2020-03-30 PROCEDURE — 99232 SBSQ HOSP IP/OBS MODERATE 35: CPT | Performed by: HOSPITALIST

## 2020-03-30 PROCEDURE — 99239 HOSP IP/OBS DSCHRG MGMT >30: CPT | Performed by: PHYSICAL MEDICINE & REHABILITATION

## 2020-03-30 PROCEDURE — 97112 NEUROMUSCULAR REEDUCATION: CPT

## 2020-03-30 PROCEDURE — 96375 TX/PRO/DX INJ NEW DRUG ADDON: CPT

## 2020-03-30 PROCEDURE — 99285 EMERGENCY DEPT VISIT HI MDM: CPT

## 2020-03-30 PROCEDURE — 97530 THERAPEUTIC ACTIVITIES: CPT

## 2020-03-30 PROCEDURE — A9270 NON-COVERED ITEM OR SERVICE: HCPCS | Performed by: PHYSICAL MEDICINE & REHABILITATION

## 2020-03-30 PROCEDURE — 92526 ORAL FUNCTION THERAPY: CPT

## 2020-03-30 PROCEDURE — 83880 ASSAY OF NATRIURETIC PEPTIDE: CPT

## 2020-03-30 PROCEDURE — 80053 COMPREHEN METABOLIC PANEL: CPT

## 2020-03-30 PROCEDURE — 71045 X-RAY EXAM CHEST 1 VIEW: CPT

## 2020-03-30 PROCEDURE — 700111 HCHG RX REV CODE 636 W/ 250 OVERRIDE (IP): Performed by: PHYSICAL MEDICINE & REHABILITATION

## 2020-03-30 PROCEDURE — 70450 CT HEAD/BRAIN W/O DYE: CPT

## 2020-03-30 PROCEDURE — 85610 PROTHROMBIN TIME: CPT

## 2020-03-30 PROCEDURE — 93321 DOPPLER ECHO F-UP/LMTD STD: CPT | Mod: 26 | Performed by: INTERNAL MEDICINE

## 2020-03-30 PROCEDURE — 94760 N-INVAS EAR/PLS OXIMETRY 1: CPT

## 2020-03-30 PROCEDURE — 700117 HCHG RX CONTRAST REV CODE 255: Performed by: INTERNAL MEDICINE

## 2020-03-30 PROCEDURE — A9270 NON-COVERED ITEM OR SERVICE: HCPCS

## 2020-03-30 PROCEDURE — 93010 ELECTROCARDIOGRAM REPORT: CPT | Performed by: INTERNAL MEDICINE

## 2020-03-30 PROCEDURE — A9270 NON-COVERED ITEM OR SERVICE: HCPCS | Performed by: HOSPITALIST

## 2020-03-30 PROCEDURE — 99223 1ST HOSP IP/OBS HIGH 75: CPT | Performed by: HOSPITALIST

## 2020-03-30 PROCEDURE — 96365 THER/PROPH/DIAG IV INF INIT: CPT

## 2020-03-30 PROCEDURE — 84484 ASSAY OF TROPONIN QUANT: CPT

## 2020-03-30 PROCEDURE — 93005 ELECTROCARDIOGRAM TRACING: CPT | Performed by: PHYSICAL MEDICINE & REHABILITATION

## 2020-03-30 PROCEDURE — 700102 HCHG RX REV CODE 250 W/ 637 OVERRIDE(OP)

## 2020-03-30 RX ORDER — AMOXICILLIN 250 MG
2 CAPSULE ORAL 2 TIMES DAILY
Status: DISCONTINUED | OUTPATIENT
Start: 2020-03-30 | End: 2020-04-02 | Stop reason: HOSPADM

## 2020-03-30 RX ORDER — HEPARIN SODIUM 5000 [USP'U]/ML
5000 INJECTION, SOLUTION INTRAVENOUS; SUBCUTANEOUS EVERY 8 HOURS
Status: ON HOLD | COMMUNITY
End: 2020-04-13

## 2020-03-30 RX ORDER — GABAPENTIN 400 MG/1
400 CAPSULE ORAL 4 TIMES DAILY
Status: CANCELLED | OUTPATIENT
Start: 2020-03-30

## 2020-03-30 RX ORDER — PRASUGREL 10 MG/1
10 TABLET, FILM COATED ORAL DAILY
Status: DISCONTINUED | OUTPATIENT
Start: 2020-03-30 | End: 2020-03-30

## 2020-03-30 RX ORDER — ECHINACEA PURPUREA EXTRACT 125 MG
2 TABLET ORAL PRN
Status: CANCELLED | OUTPATIENT
Start: 2020-03-30

## 2020-03-30 RX ORDER — MORPHINE SULFATE 15 MG/1
TABLET ORAL
Status: COMPLETED
Start: 2020-03-30 | End: 2020-03-30

## 2020-03-30 RX ORDER — BISACODYL 10 MG
10 SUPPOSITORY, RECTAL RECTAL
Status: DISCONTINUED | OUTPATIENT
Start: 2020-03-30 | End: 2020-04-02 | Stop reason: HOSPADM

## 2020-03-30 RX ORDER — HEPARIN SODIUM 5000 [USP'U]/ML
5000 INJECTION, SOLUTION INTRAVENOUS; SUBCUTANEOUS EVERY 8 HOURS
Status: DISCONTINUED | OUTPATIENT
Start: 2020-03-30 | End: 2020-03-30

## 2020-03-30 RX ORDER — ASPIRIN 325 MG
325 TABLET ORAL DAILY
Status: CANCELLED | OUTPATIENT
Start: 2020-03-31

## 2020-03-30 RX ORDER — HEPARIN SODIUM 5000 [USP'U]/100ML
INJECTION, SOLUTION INTRAVENOUS CONTINUOUS
Status: DISCONTINUED | OUTPATIENT
Start: 2020-03-30 | End: 2020-03-30

## 2020-03-30 RX ORDER — ACETAMINOPHEN 325 MG/1
650 TABLET ORAL EVERY 6 HOURS PRN
Status: DISCONTINUED | OUTPATIENT
Start: 2020-03-30 | End: 2020-04-02 | Stop reason: HOSPADM

## 2020-03-30 RX ORDER — ASPIRIN 325 MG
325 TABLET ORAL ONCE
Status: DISCONTINUED | OUTPATIENT
Start: 2020-03-30 | End: 2020-03-30

## 2020-03-30 RX ORDER — ALUMINA, MAGNESIA, AND SIMETHICONE 2400; 2400; 240 MG/30ML; MG/30ML; MG/30ML
20 SUSPENSION ORAL
Status: CANCELLED | OUTPATIENT
Start: 2020-03-30

## 2020-03-30 RX ORDER — POLYETHYLENE GLYCOL 3350 17 G/17G
1 POWDER, FOR SOLUTION ORAL
Status: CANCELLED | OUTPATIENT
Start: 2020-03-30

## 2020-03-30 RX ORDER — TRAMADOL HYDROCHLORIDE 50 MG/1
50 TABLET ORAL EVERY 6 HOURS PRN
Status: CANCELLED | OUTPATIENT
Start: 2020-03-30

## 2020-03-30 RX ORDER — GABAPENTIN 400 MG/1
400 CAPSULE ORAL 3 TIMES DAILY
Status: ON HOLD | COMMUNITY
End: 2020-04-13

## 2020-03-30 RX ORDER — POLYETHYLENE GLYCOL 3350 17 G/17G
1 POWDER, FOR SOLUTION ORAL
Status: DISCONTINUED | OUTPATIENT
Start: 2020-03-30 | End: 2020-04-02 | Stop reason: HOSPADM

## 2020-03-30 RX ORDER — OMEPRAZOLE 20 MG/1
20 CAPSULE, DELAYED RELEASE ORAL 2 TIMES DAILY
Status: CANCELLED | OUTPATIENT
Start: 2020-03-30

## 2020-03-30 RX ORDER — ATORVASTATIN CALCIUM 40 MG/1
80 TABLET, FILM COATED ORAL EVERY EVENING
Status: CANCELLED | OUTPATIENT
Start: 2020-03-30

## 2020-03-30 RX ORDER — BISACODYL 10 MG
10 SUPPOSITORY, RECTAL RECTAL
Status: CANCELLED | OUTPATIENT
Start: 2020-03-30

## 2020-03-30 RX ORDER — ACETAMINOPHEN 325 MG/1
650 TABLET ORAL EVERY 4 HOURS PRN
Status: CANCELLED | OUTPATIENT
Start: 2020-03-30

## 2020-03-30 RX ORDER — LANOLIN ALCOHOL/MO/W.PET/CERES
3 CREAM (GRAM) TOPICAL NIGHTLY PRN
Status: CANCELLED | OUTPATIENT
Start: 2020-03-30

## 2020-03-30 RX ORDER — LIDOCAINE HYDROCHLORIDE 20 MG/ML
JELLY TOPICAL PRN
Status: CANCELLED | OUTPATIENT
Start: 2020-03-30

## 2020-03-30 RX ORDER — POLYVINYL ALCOHOL 14 MG/ML
1 SOLUTION/ DROPS OPHTHALMIC PRN
Status: CANCELLED | OUTPATIENT
Start: 2020-03-30

## 2020-03-30 RX ORDER — OMEPRAZOLE 20 MG/1
20 CAPSULE, DELAYED RELEASE ORAL 2 TIMES DAILY
Status: DISCONTINUED | OUTPATIENT
Start: 2020-03-30 | End: 2020-04-02 | Stop reason: HOSPADM

## 2020-03-30 RX ORDER — AMOXICILLIN 250 MG
1 CAPSULE ORAL DAILY
Status: ON HOLD | COMMUNITY
End: 2020-04-13

## 2020-03-30 RX ORDER — AMOXICILLIN 250 MG
2 CAPSULE ORAL 2 TIMES DAILY
Status: CANCELLED | OUTPATIENT
Start: 2020-03-30

## 2020-03-30 RX ORDER — HYDROXYZINE HYDROCHLORIDE 25 MG/1
50 TABLET, FILM COATED ORAL EVERY 6 HOURS PRN
Status: CANCELLED | OUTPATIENT
Start: 2020-03-30

## 2020-03-30 RX ORDER — CLOPIDOGREL BISULFATE 75 MG/1
75 TABLET ORAL DAILY
Status: CANCELLED | OUTPATIENT
Start: 2020-03-31

## 2020-03-30 RX ORDER — HEPARIN SODIUM 1000 [USP'U]/ML
5000 INJECTION, SOLUTION INTRAVENOUS; SUBCUTANEOUS ONCE
Status: COMPLETED | OUTPATIENT
Start: 2020-03-30 | End: 2020-03-30

## 2020-03-30 RX ORDER — MORPHINE SULFATE 15 MG/1
7.5 TABLET ORAL ONCE
Status: COMPLETED | OUTPATIENT
Start: 2020-03-30 | End: 2020-03-30

## 2020-03-30 RX ORDER — NITROGLYCERIN 0.4 MG/1
0.4 TABLET SUBLINGUAL
Status: DISCONTINUED | OUTPATIENT
Start: 2020-03-30 | End: 2020-04-02 | Stop reason: HOSPADM

## 2020-03-30 RX ORDER — TRAZODONE HYDROCHLORIDE 50 MG/1
50 TABLET ORAL
Status: CANCELLED | OUTPATIENT
Start: 2020-03-30

## 2020-03-30 RX ORDER — GABAPENTIN 400 MG/1
400 CAPSULE ORAL 4 TIMES DAILY
Status: DISCONTINUED | OUTPATIENT
Start: 2020-03-30 | End: 2020-03-30 | Stop reason: HOSPADM

## 2020-03-30 RX ORDER — HEPARIN SODIUM 5000 [USP'U]/ML
5000 INJECTION, SOLUTION INTRAVENOUS; SUBCUTANEOUS EVERY 8 HOURS
Status: CANCELLED | OUTPATIENT
Start: 2020-03-30

## 2020-03-30 RX ORDER — MORPHINE SULFATE 4 MG/ML
2-4 INJECTION, SOLUTION INTRAMUSCULAR; INTRAVENOUS
Status: DISCONTINUED | OUTPATIENT
Start: 2020-03-30 | End: 2020-04-02

## 2020-03-30 RX ORDER — PROCHLORPERAZINE MALEATE 10 MG
10 TABLET ORAL EVERY 6 HOURS PRN
Status: CANCELLED | OUTPATIENT
Start: 2020-03-30

## 2020-03-30 RX ORDER — ATORVASTATIN CALCIUM 80 MG/1
80 TABLET, FILM COATED ORAL EVERY EVENING
Status: DISCONTINUED | OUTPATIENT
Start: 2020-03-30 | End: 2020-04-02 | Stop reason: HOSPADM

## 2020-03-30 RX ORDER — LACTULOSE 20 G/30ML
30 SOLUTION ORAL
Status: CANCELLED | OUTPATIENT
Start: 2020-03-30

## 2020-03-30 RX ORDER — METOPROLOL SUCCINATE 50 MG/1
50 TABLET, EXTENDED RELEASE ORAL DAILY
Status: DISCONTINUED | OUTPATIENT
Start: 2020-03-30 | End: 2020-03-30

## 2020-03-30 RX ORDER — GABAPENTIN 300 MG/1
300 CAPSULE ORAL 3 TIMES DAILY
Status: DISCONTINUED | OUTPATIENT
Start: 2020-03-30 | End: 2020-04-02 | Stop reason: HOSPADM

## 2020-03-30 RX ORDER — HEPARIN SODIUM 1000 [USP'U]/ML
2600 INJECTION, SOLUTION INTRAVENOUS; SUBCUTANEOUS PRN
Status: DISCONTINUED | OUTPATIENT
Start: 2020-03-30 | End: 2020-03-30

## 2020-03-30 RX ORDER — HYDROXYZINE 50 MG/1
50 TABLET, FILM COATED ORAL EVERY 6 HOURS PRN
Status: ON HOLD | COMMUNITY
End: 2020-04-02

## 2020-03-30 RX ORDER — ASPIRIN 81 MG/1
324 TABLET, CHEWABLE ORAL DAILY
Status: CANCELLED | OUTPATIENT
Start: 2020-03-31

## 2020-03-30 RX ORDER — VITAMIN B COMPLEX
1000 TABLET ORAL DAILY
Status: CANCELLED | OUTPATIENT
Start: 2020-03-31

## 2020-03-30 RX ADMIN — MORPHINE SULFATE 7.5 MG: 15 TABLET ORAL at 06:45

## 2020-03-30 RX ADMIN — TRAMADOL HYDROCHLORIDE 50 MG: 50 TABLET, COATED ORAL at 04:06

## 2020-03-30 RX ADMIN — OMEPRAZOLE 20 MG: 20 CAPSULE, DELAYED RELEASE ORAL at 18:30

## 2020-03-30 RX ADMIN — OMEPRAZOLE 20 MG: 20 CAPSULE, DELAYED RELEASE ORAL at 09:22

## 2020-03-30 RX ADMIN — HEPARIN SODIUM 5000 UNITS: 1000 INJECTION, SOLUTION INTRAVENOUS; SUBCUTANEOUS at 13:30

## 2020-03-30 RX ADMIN — Medication 400 MG: at 09:22

## 2020-03-30 RX ADMIN — ASPIRIN 325 MG ORAL TABLET 325 MG: 325 PILL ORAL at 06:16

## 2020-03-30 RX ADMIN — HEPARIN SODIUM 5000 UNITS: 5000 INJECTION, SOLUTION INTRAVENOUS; SUBCUTANEOUS at 05:35

## 2020-03-30 RX ADMIN — HUMAN ALBUMIN MICROSPHERES AND PERFLUTREN 3 ML: 10; .22 INJECTION, SOLUTION INTRAVENOUS at 13:51

## 2020-03-30 RX ADMIN — SENNOSIDES AND DOCUSATE SODIUM 2 TABLET: 8.6; 5 TABLET ORAL at 18:30

## 2020-03-30 RX ADMIN — ACETAMINOPHEN 650 MG: 325 TABLET, FILM COATED ORAL at 05:35

## 2020-03-30 RX ADMIN — CLOPIDOGREL BISULFATE 75 MG: 75 TABLET ORAL at 09:22

## 2020-03-30 RX ADMIN — HEPARIN SODIUM 1050 UNITS/HR: 5000 INJECTION, SOLUTION INTRAVENOUS at 14:03

## 2020-03-30 RX ADMIN — GABAPENTIN 300 MG: 300 CAPSULE ORAL at 18:30

## 2020-03-30 RX ADMIN — HYDROXYZINE HYDROCHLORIDE 50 MG: 25 TABLET, FILM COATED ORAL at 06:08

## 2020-03-30 RX ADMIN — MELATONIN 1000 UNITS: at 09:22

## 2020-03-30 RX ADMIN — ATORVASTATIN CALCIUM 80 MG: 80 TABLET, FILM COATED ORAL at 18:30

## 2020-03-30 RX ADMIN — GABAPENTIN 300 MG: 300 CAPSULE ORAL at 09:22

## 2020-03-30 SDOH — ECONOMIC STABILITY: FOOD INSECURITY: WITHIN THE PAST 12 MONTHS, YOU WORRIED THAT YOUR FOOD WOULD RUN OUT BEFORE YOU GOT MONEY TO BUY MORE.: PATIENT DECLINED

## 2020-03-30 SDOH — ECONOMIC STABILITY: TRANSPORTATION INSECURITY
IN THE PAST 12 MONTHS, HAS THE LACK OF TRANSPORTATION KEPT YOU FROM MEDICAL APPOINTMENTS OR FROM GETTING MEDICATIONS?: PATIENT DECLINED

## 2020-03-30 SDOH — ECONOMIC STABILITY: FOOD INSECURITY: WITHIN THE PAST 12 MONTHS, THE FOOD YOU BOUGHT JUST DIDN'T LAST AND YOU DIDN'T HAVE MONEY TO GET MORE.: PATIENT DECLINED

## 2020-03-30 SDOH — ECONOMIC STABILITY: TRANSPORTATION INSECURITY
IN THE PAST 12 MONTHS, HAS LACK OF TRANSPORTATION KEPT YOU FROM MEETINGS, WORK, OR FROM GETTING THINGS NEEDED FOR DAILY LIVING?: PATIENT DECLINED

## 2020-03-30 ASSESSMENT — PATIENT HEALTH QUESTIONNAIRE - PHQ9
2. FEELING DOWN, DEPRESSED, IRRITABLE, OR HOPELESS: NOT AT ALL
1. LITTLE INTEREST OR PLEASURE IN DOING THINGS: NOT AT ALL
SUM OF ALL RESPONSES TO PHQ9 QUESTIONS 1 AND 2: 0

## 2020-03-30 ASSESSMENT — ENCOUNTER SYMPTOMS
DIZZINESS: 0
STRIDOR: 0
VOMITING: 0
NERVOUS/ANXIOUS: 0
BACK PAIN: 0
BLURRED VISION: 0
ABDOMINAL PAIN: 0
SORE THROAT: 0
FEVER: 0
CHILLS: 0
FEVER: 0
HEADACHES: 0
SHORTNESS OF BREATH: 1
NERVOUS/ANXIOUS: 0
NAUSEA: 0
CHILLS: 0
PALPITATIONS: 0
NAUSEA: 0
DIARRHEA: 0
NECK PAIN: 0
SHORTNESS OF BREATH: 0

## 2020-03-30 ASSESSMENT — COGNITIVE AND FUNCTIONAL STATUS - GENERAL
STANDING UP FROM CHAIR USING ARMS: A LOT
CLIMB 3 TO 5 STEPS WITH RAILING: A LOT
TURNING FROM BACK TO SIDE WHILE IN FLAT BAD: A LOT
MOVING TO AND FROM BED TO CHAIR: A LITTLE
DRESSING REGULAR LOWER BODY CLOTHING: A LOT
TOILETING: A LITTLE
EATING MEALS: A LOT
SUGGESTED CMS G CODE MODIFIER MOBILITY: CL
DRESSING REGULAR UPPER BODY CLOTHING: A LOT
HELP NEEDED FOR BATHING: A LOT
WALKING IN HOSPITAL ROOM: A LOT
SUGGESTED CMS G CODE MODIFIER DAILY ACTIVITY: CL
DAILY ACTIVITIY SCORE: 13
MOVING FROM LYING ON BACK TO SITTING ON SIDE OF FLAT BED: A LITTLE
MOBILITY SCORE: 14
PERSONAL GROOMING: A LOT

## 2020-03-30 ASSESSMENT — COPD QUESTIONNAIRES
HAVE YOU SMOKED AT LEAST 100 CIGARETTES IN YOUR ENTIRE LIFE: NO/DON'T KNOW
DURING THE PAST 4 WEEKS HOW MUCH DID YOU FEEL SHORT OF BREATH: NONE/LITTLE OF THE TIME
DO YOU EVER COUGH UP ANY MUCUS OR PHLEGM?: NO/ONLY WITH OCCASIONAL COLDS OR INFECTIONS
COPD SCREENING SCORE: 2
IN THE PAST 12 MONTHS DO YOU DO LESS THAN YOU USED TO BECAUSE OF YOUR BREATHING PROBLEMS: DISAGREE/UNSURE

## 2020-03-30 ASSESSMENT — FIBROSIS 4 INDEX: FIB4 SCORE: 2.01

## 2020-03-30 ASSESSMENT — LIFESTYLE VARIABLES
TOTAL SCORE: 0
EVER HAD A DRINK FIRST THING IN THE MORNING TO STEADY YOUR NERVES TO GET RID OF A HANGOVER: NO
DOES PATIENT WANT TO STOP DRINKING: NO
HAVE PEOPLE ANNOYED YOU BY CRITICIZING YOUR DRINKING: NO
EVER FELT BAD OR GUILTY ABOUT YOUR DRINKING: NO
CONSUMPTION TOTAL: NEGATIVE
DO YOU DRINK ALCOHOL: YES
EVER HAD A DRINK FIRST THING IN THE MORNING TO STEADY YOUR NERVES TO GET RID OF A HANGOVER: NO
AVERAGE NUMBER OF DAYS PER WEEK YOU HAVE A DRINK CONTAINING ALCOHOL: 0
DOES PATIENT WANT TO STOP DRINKING: NO
EVER FELT BAD OR GUILTY ABOUT YOUR DRINKING: NO
TOTAL SCORE: 0
ALCOHOL_USE: YES
AVERAGE NUMBER OF DAYS PER WEEK YOU HAVE A DRINK CONTAINING ALCOHOL: 1
TOTAL SCORE: 0
TOTAL SCORE: 0
HOW MANY TIMES IN THE PAST YEAR HAVE YOU HAD 5 OR MORE DRINKS IN A DAY: 0
CONSUMPTION TOTAL: NEGATIVE
EVER_SMOKED: YES
ON A TYPICAL DAY WHEN YOU DRINK ALCOHOL HOW MANY DRINKS DO YOU HAVE: 0
ON A TYPICAL DAY WHEN YOU DRINK ALCOHOL HOW MANY DRINKS DO YOU HAVE: 1
HOW MANY TIMES IN THE PAST YEAR HAVE YOU HAD 5 OR MORE DRINKS IN A DAY: 0
TOTAL SCORE: 0
TOTAL SCORE: 0
HAVE YOU EVER FELT YOU SHOULD CUT DOWN ON YOUR DRINKING: NO
HAVE PEOPLE ANNOYED YOU BY CRITICIZING YOUR DRINKING: NO
HAVE YOU EVER FELT YOU SHOULD CUT DOWN ON YOUR DRINKING: NO

## 2020-03-30 ASSESSMENT — PAIN DESCRIPTION - DESCRIPTORS: DESCRIPTORS: ACHING

## 2020-03-30 NOTE — ASSESSMENT & PLAN NOTE
Patient had recently been at rehab and was just recently sent home.  3/30 Head CT:  1.  The evolving large left cerebellar/PICA infarct.  2.  No acute intracranial hemorrhage.

## 2020-03-30 NOTE — ED PROVIDER NOTES
ED Provider Note    Scribed for Hernan Malone M.D. by Selam Restrepo. 3/30/2020  11:35 AM    Primary care provider: Pcp Unknown  Means of arrival: EMS  History obtained from: Patient  History limited by: None    CHIEF COMPLAINT  Chief Complaint   Patient presents with   • Shortness of Breath   • Chest Pain     N95 mask, eye protection, and gloves worn during the encounter. Patient wore a surgical mask during the encounter. I was 6 ft from the patient except for 1-1/2 minutes when I approached to examine the patient.     Scribe remained outside the patient's room and did not have any contact with the patient for the duration of patient encounter.     JORGE Donato Jr. is a 87 y.o. male, with a history of coronary artery disease, ischemic cardiomyopathy, SVT, and hypertension, who presents to the Emergency Department as a transfer from [facility] for acute onset of chest pain. Patient notes that since being in the ED, his chest pain has resolved. He reports taking morphine with no alleviation of symptoms. No additional pain or symptoms noted at this time.    Chart review indicates patient was admitted here on 3/22/2020 for chest pain, dizziness, nausea, and vomiting. He was diagnosed with NSTEMI. Patient had a stent placed on 3/23/2020 by Dr. Mccann. After surgery, MRI revealed large acute infarction. He was transferred to [facility] on 3/27/2020. Patient's troponin today is 2,907.    REVIEW OF SYSTEMS  Pertinent positives include chest pain. Pertinent negatives include: No additional pain or symptoms noted at this time. All other systems reviewed and negative.    PAST MEDICAL HISTORY   has a past medical history of Acute anterolateral myocardial infarction (HCC) (1995), Arthritis, Blood clotting disorder (HCC), CAD (coronary artery disease) (1995), Congestive heart failure (HCC), Hyperlipidemia, Hypertension, PSVT (paroxysmal supraventricular tachycardia) ([facility]),  Shoulder pain, and Stroke (HCC).    SURGICAL HISTORY   has a past surgical history that includes shoulder orif; hand surgery; elbowplasty; angioplasty (); and other cardiac surgery.    SOCIAL HISTORY  Social History     Tobacco Use   • Smoking status: Former Smoker     Packs/day: 1.00     Years: 50.00     Pack years: 50.00     Last attempt to quit: 1995     Years since quittin.5   • Smokeless tobacco: Never Used   • Tobacco comment: up to 3 PPD per day at times   Substance Use Topics   • Alcohol use: Yes     Alcohol/week: 4.2 oz     Types: 7 Cans of beer per week   • Drug use: No      Social History     Substance and Sexual Activity   Drug Use No       FAMILY HISTORY  Family History   Problem Relation Age of Onset   • Heart Disease Father    • Arthritis Mother    • Breast Cancer Sister    • Prostate cancer Brother        CURRENT MEDICATIONS  Current Outpatient Medications:   •  aspirin EC (ECOTRIN) 81 MG Tablet Delayed Response, Take 1 Tab by mouth every day., Disp: 30 Tab, Rfl: 11  •  vitamin D (VITAMIND D3) 1000 UNIT Tab, Take 1 Tab by mouth 2 Times a Day., Disp: 60 Tab, Rfl:   •  magnesium oxide 420 (252 Mg) MG Tab, Take 420 mg by mouth every day., Disp: 30 Tab, Rfl:   •  metoprolol SR (TOPROL XL) 50 MG TABLET SR 24 HR, Take 1 Tab by mouth every day., Disp: 30 Tab, Rfl:   •  omeprazole (PRILOSEC) 20 MG delayed-release capsule, Take 1 Cap by mouth 2 Times a Day., Disp: 30 Cap, Rfl:   •  acetaminophen (TYLENOL) 325 MG Tab, Take 2 Tabs by mouth every 6 hours as needed (Mild Pain; (Pain scale 1-3); Temp greater than 100.5 F)., Disp: 30 Tab, Rfl: 0  •  atorvastatin (LIPITOR) 80 MG tablet, Take 1 Tab by mouth every evening., Disp: 30 Tab, Rfl: 11  •  clopidogrel (PLAVIX) 75 MG Tab, Take 1 Tab by mouth every day., Disp: 30 Tab, Rfl: 11  •  gabapentin (NEURONTIN) 300 MG Cap, Take 1 Cap by mouth 3 times a day., Disp: 90 Cap, Rfl:     ALLERGIES  No Known Allergies    PHYSICAL EXAM  VITAL SIGNS: /69    "Pulse 74   Temp 36.7 °C (98.1 °F) (Temporal)   Resp 13   Ht 1.803 m (5' 11\")   Wt 64.9 kg (143 lb)   BMI 19.94 kg/m²     Vital signs reviewed.  Constitutional:  Appears well-developed and well-nourished. No distress.   Head: Normocephalic.   Mouth/Throat: Oropharynx is clear and moist.   Eyes: EOM are normal. Pupils are equal, round, and reactive to light.   Neck: Normal range of motion. Neck supple.   Cardiovascular: Normal rate, regular rhythm and normal heart sounds.    Pulmonary/Chest: Effort normal and breath sounds normal. No wheezes.   Abdominal: Soft. There is no tenderness. There is no rebound and no guarding.   Musculoskeletal: Diffusely weak on upper and lower extremities. Exhibits no edema.   Neurological: Patient is alert and oriented to person, place, and time.  Normal sensation and strength.  Skin: Skin is warm and dry.   Psychiatric: Patient has a normal mood and affect. Behavior is normal.     LABS  Results for orders placed or performed during the hospital encounter of 03/30/20   EC-ECHOCARDIOGRAM LTD W/ CONT   Result Value Ref Range    Eject.Frac. MOD BP 29.24     Eject.Frac. MOD 4C 27.86     Eject.Frac. MOD 2C 30.44    CBC with Differential   Result Value Ref Range    WBC 6.9 4.8 - 10.8 K/uL    RBC 3.25 (L) 4.70 - 6.10 M/uL    Hemoglobin 10.6 (L) 14.0 - 18.0 g/dL    Hematocrit 33.1 (L) 42.0 - 52.0 %    .8 (H) 81.4 - 97.8 fL    MCH 32.6 27.0 - 33.0 pg    MCHC 32.0 (L) 33.7 - 35.3 g/dL    RDW 49.2 35.9 - 50.0 fL    Platelet Count 217 164 - 446 K/uL    MPV 10.4 9.0 - 12.9 fL    Neutrophils-Polys 59.20 44.00 - 72.00 %    Lymphocytes 25.70 22.00 - 41.00 %    Monocytes 11.00 0.00 - 13.40 %    Eosinophils 2.90 0.00 - 6.90 %    Basophils 0.60 0.00 - 1.80 %    Immature Granulocytes 0.60 0.00 - 0.90 %    Nucleated RBC 0.00 /100 WBC    Neutrophils (Absolute) 4.11 1.82 - 7.42 K/uL    Lymphs (Absolute) 1.78 1.00 - 4.80 K/uL    Monos (Absolute) 0.76 0.00 - 0.85 K/uL    Eos (Absolute) 0.20 0.00 - " 0.51 K/uL    Baso (Absolute) 0.04 0.00 - 0.12 K/uL    Immature Granulocytes (abs) 0.04 0.00 - 0.11 K/uL    NRBC (Absolute) 0.00 K/uL   Complete Metabolic Panel (CMP)   Result Value Ref Range    Sodium 141 135 - 145 mmol/L    Potassium 4.2 3.6 - 5.5 mmol/L    Chloride 105 96 - 112 mmol/L    Co2 24 20 - 33 mmol/L    Anion Gap 12.0 7.0 - 16.0    Glucose 114 (H) 65 - 99 mg/dL    Bun 22 8 - 22 mg/dL    Creatinine 1.53 (H) 0.50 - 1.40 mg/dL    Calcium 9.2 8.5 - 10.5 mg/dL    AST(SGOT) 27 12 - 45 U/L    ALT(SGPT) 21 2 - 50 U/L    Alkaline Phosphatase 82 30 - 99 U/L    Total Bilirubin 0.4 0.1 - 1.5 mg/dL    Albumin 3.9 3.2 - 4.9 g/dL    Total Protein 6.7 6.0 - 8.2 g/dL    Globulin 2.8 1.9 - 3.5 g/dL    A-G Ratio 1.4 g/dL   Troponin   Result Value Ref Range    Troponin T 2683 (H) 6 - 19 ng/L   ESTIMATED GFR   Result Value Ref Range    GFR If  52 (A) >60 mL/min/1.73 m 2    GFR If Non  43 (A) >60 mL/min/1.73 m 2   Prothrombin Time   Result Value Ref Range    PT 15.6 (H) 12.0 - 14.6 sec    INR 1.21 (H) 0.87 - 1.13   PTT   Result Value Ref Range    APTT 31.0 24.7 - 36.0 sec   EKG   Result Value Ref Range    Report       [facility] Emergency Dept.    Test Date:  2020  Pt Name:    ELIZABETH SOMMER             Department: ER  MRN:        [id number]                      Room:       BL 18  Gender:     Male                         Technician: [id number]  :        [id number]                   Requested By:DOMINICK REED  Order #:    [id number]                    Reading MD:    Measurements  Intervals                                Axis  Rate:       75                           P:          44  IN:         200                          QRS:        -57  QRSD:       110                          T:          118  QT:         412  QTc:        461    Interpretive Statements  SINUS RHYTHM  ATRIAL PREMATURE COMPLEX  LEFT ANTERIOR FASCICULAR BLOCK  LATERAL INFARCT, AGE INDETERMINATE  ABNRM R  PROG, CONSIDER ASMI OR LEAD PLACEMENT  Compared to ECG 03/30/2020 08:51:37  Atrial premature complex(es) now present  Left anterior fascicular block now present  Left-axis deviation no longer present  Ventricular  premature complex(es) no longer present  Myocardial infarct finding still present       All labs reviewed by me.    EKG  12 Lead EKG interpreted by me to show sinus rhythm at 75. Normal P waves. Widened QRS with left vesicular block. Subtle ST depression in V5 and V6 segments. T wave inversions in AVL and V6. T wave inversions are new compared to EKG done on 3/23/20.     RADIOLOGY  CT-HEAD W/O   Final Result      1.  The evolving large left cerebellar/PICA infarct.   2.  No acute intracranial hemorrhage.      EC-ECHOCARDIOGRAM LTD W/ CONT   Final Result      DX-CHEST-PORTABLE (1 VIEW)   Final Result      Stable bibasilar opacities likely pneumonitis, less likely edema. Possible small pleural effusions.      Cardiomegaly.        The radiologist's interpretation of all radiological studies have been reviewed by me.    COURSE & MEDICAL DECISION MAKING  Pertinent Labs & Imaging studies reviewed. (See chart for details) The patient's [facility] Nursing and past medical records were reviewed and revealed patient was admitted here on 3/22/2020 for chest pain, dizziness, nausea, and vomiting. He was diagnosed with NSTEMI. Patient had a stent placed on 3/23/2020 by Dr. Mccann. After surgery, MRI revealed large acute infarction. He was transferred to [facility] on 3/27/2020. Patient's troponin today is 2,907.    11:35 AM - Patient seen and examined at bedside. I informed the patient the need for labs and radiology to rule out any emergent processes. Currently awaiting results before deciding if intervention is necessary. Patient verbalizes understanding and agreement to this plan of care.  Patient will be treated with aspirin tablet 325 mg. Ordered DX-chest, CBC with diff, CMP, troponin, and  estimated GFR to evaluate his symptoms. The differential diagnoses include but are not limited to: MI vs STEMI vs NSTEMI vs PE.     12:35 PM Paged Cardiology.     12:50 PM I discussed the patient's case and the above findings with Dr. Jain (Cardiology) who discussed starting patient on aspirin, Plavix, and heparin and hospitalizing him for further treatment. Patient will be given heparin injection 5,000 units, heparin infusion 25,000 units in 500 ml 0.45% NaCl and heparin injection 2,600 units.    12:52 PM - I discussed the patient's case and the above findings with Dr. Bazan (Hospitalist) who agreed to evaluate patient for hospitalization. Patient's care is transferred at this time.       DISPOSITION:  Patient will be hospitalized by Dr. Bazan (Hospitalist) in guarded condition.    FINAL IMPRESSION  1. Non-STEMI (non-ST elevated myocardial infarction) (HCC)          Selam MOLINA (Scribe), am scribing for, and in the presence of, Hernan Malone M.D..    Electronically signed by: Selam Restrepo (Joeibe), 3/30/2020    IHernan M.D. personally performed the services described in this documentation, as scribed by Selam Restrepo in my presence, and it is both accurate and complete. C.    The note accurately reflects work and decisions made by me.  Hernan Malone M.D.  3/30/2020  6:17 PM

## 2020-03-30 NOTE — FLOWSHEET NOTE
03/30/20 1003   Events/Summary/Plan   Events/Summary/Plan Pt found on 2 lpm oxygen,  SATS are 100% so 02 removed    Vital Signs   $ Pulse Oximetry (Spot Check) Yes   Respiratory Assessment   Level of Consciousness Alert   Oxygen   O2 Delivery Device Room air w/o2 available

## 2020-03-30 NOTE — PROGRESS NOTES
[facility] Medicine Daily Progress Note    Date of Service  3/30/2020    Chief Complaint:  Hypertension  CHF with elevated BNP    Interval History:  No significant events or changes since last visit    Review of Systems  Review of Systems   Constitutional: Negative for chills and fever.   Respiratory: Negative for shortness of breath.    Cardiovascular: Positive for chest pain.   Gastrointestinal: Negative for abdominal pain, diarrhea, nausea and vomiting.   Psychiatric/Behavioral: The patient is not nervous/anxious.         Physical Exam  Temp:  [36.6 °C (97.9 °F)-37.1 °C (98.8 °F)] 37.1 °C (98.8 °F)  Pulse:  [66-76] 70  Resp:  [18-20] 20  BP: (113-115)/(62-71) 113/65  SpO2:  [93 %-100 %] 100 %    Physical Exam  Vitals signs and nursing note reviewed.   Constitutional:       Appearance: Normal appearance.   HENT:      Head: Atraumatic.   Eyes:      Conjunctiva/sclera: Conjunctivae normal.      Pupils: Pupils are equal, round, and reactive to light.   Neck:      Musculoskeletal: Normal range of motion and neck supple.   Cardiovascular:      Rate and Rhythm: Normal rate and regular rhythm.      Heart sounds: No murmur.   Pulmonary:      Effort: Pulmonary effort is normal.      Breath sounds: No stridor. No wheezing or rales.   Abdominal:      General: There is no distension.      Palpations: Abdomen is soft.      Tenderness: There is no abdominal tenderness.   Musculoskeletal:      Right lower leg: No edema.      Left lower leg: No edema.   Skin:     General: Skin is warm and dry.      Findings: No rash.   Neurological:      Mental Status: He is alert and oriented to person, place, and time.   Psychiatric:         Mood and Affect: Mood normal.         Behavior: Behavior normal.         Fluids    Intake/Output Summary (Last 24 hours) at 3/30/2020 1052  Last data filed at 3/30/2020 0900  Gross per 24 hour   Intake 920 ml   Output 1850 ml   Net -930 ml       Laboratory  Recent Labs     03/28/20  0503 03/30/20  0915   WBC  6.4 6.5   RBC 2.81* 3.13*   HEMOGLOBIN 9.3* 10.1*   HEMATOCRIT 29.9* 31.8*   .4* 101.6*   MCH 33.1* 32.3   MCHC 31.1* 31.8*   RDW 51.8* 49.4   PLATELETCT 181 224   MPV 10.8 10.4     Recent Labs     03/28/20  0503 03/30/20  0915   SODIUM 138 143   POTASSIUM 4.2 4.3   CHLORIDE 106 107   CO2 23 25   GLUCOSE 94 101*   BUN 26* 22   CREATININE 1.26 1.55*   CALCIUM 8.8 9.4                   Imaging    Assessment/Plan  * CVA (cerebral vascular accident) ([facility])- (present on admission)  Assessment & Plan  Occurred post-op  MRI --> large area of acute infarction in the left PICA territory including the left dorsal lateral medulla  On ASA, Plavix  On Lipitor    NSTEMI (non-ST elevated myocardial infarction) ([facility])- (present on admission)  Assessment & Plan  S/P cardiac stent to the mid circumflex  Hx MI in 1995 with stent to the LAD  BP low and can't tolerate a BB or ACE/ARB at this time -- will start when BP better    CHF (congestive heart failure) ([facility])- (present on admission)  Assessment & Plan  Pt asymptomatic  No edema  No lung crackles  O2 sats good on RA  Echo (3/23): severe mitral regurgitation; EF 25%; Global hypokinesis; Grade III diastolic dysfunction; possible severe aortic stenosis   BNP: 6942 (3/22) --> 14,253 (3/28) --> 13,571 (3/30)  BP had been lower but improved recently -- consider adding diuretic   Cont to monitor    Chest pain  Assessment & Plan  Had chest pain this am 3/30  Pain starts behind his left eye or left ear and then radiates down to the chest  Described as sharp, rated 10/10 in severity, lasts 10-30 seconds, no other associated symptoms  Pt states that he has this every so often and started at [facility] after his stroke  Pt states he seems to get it when his head gets turned in a certain direction  EKG: NSR, no ST elevations or depressions, relatively no change since prior study  TropI: 1193 (3/22) --> 967 (3/22) --> 1885 (3/22) --> 2907 (3/30)  BNP: 13,571  CXR: airspace opacity in the right  infrahilar lower lung zone. Consider acute pneumonia. This has developed since 3/22/2020  Will sent to ER for further eval and managment      Azotemia- (present on admission)  Assessment & Plan  Has hx of CKD S3 (per chart review)  Bun mildly elevated -- has hx: 26 (3/28) --> 22 (3/30)  Encouraging fluid intake  Monitor    Anemia- (present on admission)  Assessment & Plan  Hb 9.6 --> 10.3 --> 9.3 (3/28) --> 10.3 (3/30)  Fe: 40, sats 16% (3/23)  B12: 1506 (3/23)  Folate: 15.9 (3/23)  Monitor     Essential hypertension- (present on admission)  Assessment & Plan  Has hx  BP better recently and ok  Currently not on any hypertensive meds  Encouraging fluid intake  Note: home meds include Toprol XL 25 mg daily and Cozaar 50 mg daily  Monitor

## 2020-03-30 NOTE — H&P
Hospital Medicine History & Physical Note    Date of Service  3/30/2020    Primary Care Physician  Pcp Unknown    Consultants  Cardiology    Code Status  DNR/DNI    Chief Complaint  Chest pain that awoke him from sleep    History of Presenting Illness  87 y.o. male with very recent CVA, prior CVA w/ residual left side weakness, ischemic cardiomyopathy w/ EF 25%, chronic urinary retention requiring self cathing, CAD w/ prior stenting, who presented 3/30/2020 with chest pain today while at home.  He states substernal nonradiating pain causing pressure mild shortness of breath but no diaphoresis and no radiation of pain.  Patient states he took a nitroglycerin and laid down went to sleep but then it woke him up again out of sleep this point in time he told his wife to call 911.    Here in the emergency room he is found to have elevated troponin of 2907 along with elevated BNP and worsening renal function from prior labs from 3/28.  Patient is currently awake and oriented able to talk in full sentences.  Denies any current chest pain.      Review of Systems  Review of Systems   Constitutional: Negative for chills and fever.   HENT: Negative for sore throat.    Eyes: Negative for blurred vision.   Respiratory: Positive for shortness of breath. Negative for stridor.    Cardiovascular: Positive for chest pain. Negative for palpitations and leg swelling.   Gastrointestinal: Negative for nausea.   Genitourinary: Negative for dysuria.        Chronically self caths   Musculoskeletal: Negative for back pain and neck pain.   Neurological: Negative for dizziness and headaches.   Psychiatric/Behavioral: The patient is not nervous/anxious.        Past Medical History   has a past medical history of Acute anterolateral myocardial infarction (HCC) (1995), CAD (coronary artery disease) (1995), Hyperlipidemia, Hypertension, PSVT (paroxysmal supraventricular tachycardia) (HCC), and Shoulder pain.    Surgical History   has a past  surgical history that includes shoulder orif; hand surgery; elbowplasty; and angioplasty (1995).     Family History  family history includes Arthritis in his mother; Breast Cancer in his sister; Heart Disease in his father; Prostate cancer in his brother.     Social History   reports that he quit smoking about 24 years ago. He has a 50.00 pack-year smoking history. He has never used smokeless tobacco. He reports current alcohol use of about 4.2 oz of alcohol per week. He reports that he does not use drugs.    Allergies  No Known Allergies    Medications  Prior to Admission Medications   Prescriptions Last Dose Informant Patient Reported? Taking?   aspirin EC (ECOTRIN) 81 MG Tablet Delayed Response 3/30/2020 at 0616 MAR from Other Facility No No   Sig: Take 1 Tab by mouth every day.   atorvastatin (LIPITOR) 80 MG tablet 3/29/2020 at 2100 MAR from Other Facility No No   Sig: Take 1 Tab by mouth every evening.   clopidogrel (PLAVIX) 75 MG Tab 3/30/2020 at 0922 MAR from Other Facility No No   Sig: Take 1 Tab by mouth every day.   gabapentin (NEURONTIN) 400 MG Cap 3/29/2020 at 2100 MAR from Other Facility Yes Yes   Sig: Take 400 mg by mouth 3 times a day. @@  1300  1700  2100   heparin 5000 UNIT/ML Solution 3/30/2020 at 0535 MAR from Other Facility Yes Yes   Sig: Inject 5,000 Units as instructed every 8 hours.   hydrOXYzine HCl (ATARAX) 50 MG Tab 3/30/2020 at 0608 MAR from Other Facility Yes Yes   Sig: Take 50 mg by mouth every 6 hours as needed for Itching or Anxiety.   magnesium oxide 420 (252 Mg) MG Tab 3/30/2020 at 0922 MAR from Other Facility No No   Sig: Take 420 mg by mouth every day.   omeprazole (PRILOSEC) 20 MG delayed-release capsule 3/30/2020 at 0922 MAR from Other Facility No No   Sig: Take 1 Cap by mouth 2 Times a Day.   senna-docusate (PERICOLACE OR SENOKOT S) 8.6-50 MG Tab 3/29/2020 at 0922 MAR from Other Facility Yes Yes   Sig: Take 1 Tab by mouth every day.   vitamin D (VITAMIND D3) 1000 UNIT Tab  3/30/2020 at 0922 MAR from Other Facility No No   Sig: Take 1 Tab by mouth 2 Times a Day.      Facility-Administered Medications: None       Physical Exam  Temp:  [36.7 °C (98.1 °F)] 36.7 °C (98.1 °F)  Pulse:  [74-76] 76  Resp:  [13-25] 15  BP: (110-133)/(67-70) 125/68  SpO2:  [94 %-99 %] 94 %    Physical Exam  Vitals signs reviewed.   Constitutional:       Appearance: Normal appearance. He is not diaphoretic.   HENT:      Head: Normocephalic and atraumatic.      Nose: Nose normal.      Mouth/Throat:      Mouth: Mucous membranes are moist.      Pharynx: No oropharyngeal exudate.   Eyes:      General: No scleral icterus.        Right eye: No discharge.         Left eye: No discharge.      Extraocular Movements: Extraocular movements intact.      Conjunctiva/sclera: Conjunctivae normal.   Neck:      Musculoskeletal: No muscular tenderness.      Vascular: No carotid bruit.   Cardiovascular:      Rate and Rhythm: Regular rhythm. Tachycardia present.      Pulses:           Radial pulses are 2+ on the right side and 2+ on the left side.        Dorsalis pedis pulses are 2+ on the right side and 2+ on the left side.      Heart sounds: Murmur present.   Pulmonary:      Effort: Pulmonary effort is normal. No respiratory distress.      Breath sounds: Normal breath sounds. No wheezing or rales.   Abdominal:      General: Bowel sounds are normal. There is no distension.      Palpations: Abdomen is soft.   Musculoskeletal:         General: No swelling or tenderness.      Right lower leg: No edema.      Left lower leg: No edema.   Lymphadenopathy:      Cervical: No cervical adenopathy.   Skin:     General: Skin is warm.      Coloration: Skin is not jaundiced or pale.   Neurological:      General: No focal deficit present.      Mental Status: He is alert and oriented to person, place, and time.      Cranial Nerves: Cranial nerve deficit (left sided weakness) present.      Motor: Weakness present.   Psychiatric:         Mood and  Affect: Mood normal.         Behavior: Behavior normal.         Laboratory:  Recent Labs     03/28/20  0503 03/30/20  0915 03/30/20  1132   WBC 6.4 6.5 6.9   RBC 2.81* 3.13* 3.25*   HEMOGLOBIN 9.3* 10.1* 10.6*   HEMATOCRIT 29.9* 31.8* 33.1*   .4* 101.6* 101.8*   MCH 33.1* 32.3 32.6   MCHC 31.1* 31.8* 32.0*   RDW 51.8* 49.4 49.2   PLATELETCT 181 224 217   MPV 10.8 10.4 10.4     Recent Labs     03/28/20  0503 03/30/20  0915 03/30/20  1132   SODIUM 138 143 141   POTASSIUM 4.2 4.3 4.2   CHLORIDE 106 107 105   CO2 23 25 24   GLUCOSE 94 101* 114*   BUN 26* 22 22   CREATININE 1.26 1.55* 1.53*   CALCIUM 8.8 9.4 9.2     Recent Labs     03/28/20  0503 03/30/20  0915 03/30/20  1132   ALTSGPT 11 15 21   ASTSGOT 15 20 27   ALKPHOSPHAT 64 79 82   TBILIRUBIN 0.5 0.4 0.4   GLUCOSE 94 101* 114*     Recent Labs     03/30/20  1132   APTT 31.0   INR 1.21*     Recent Labs     03/28/20  0503 03/30/20  0915   NTPROBNP 37302* 46559*         Recent Labs     03/30/20  0915 03/30/20  1132   TROPONINT 2907* 2683*       Urinalysis:    No results found     Imaging:  CT-HEAD W/O   Final Result      1.  The evolving large left cerebellar/PICA infarct.   2.  No acute intracranial hemorrhage.      EC-ECHOCARDIOGRAM LTD W/ CONT   Final Result      DX-CHEST-PORTABLE (1 VIEW)   Final Result      Stable bibasilar opacities likely pneumonitis, less likely edema. Possible small pleural effusions.      Cardiomegaly.            Assessment/Plan:  I anticipate this patient will require at least two midnights for appropriate medical management, necessitating inpatient admission.    NSTEMI (non-ST elevated myocardial infarction) (HCC)- (present on admission)  Assessment & Plan  Atorvastatin 80 mg q. Evening  Monitor on telemetry  Nitroglycerin as needed  Serial troponins  Cardiology consulting  Aspirin consideration of Effient per cardiology  Status post initiation of heparin this was stopped as patient was found to have recent CVA    Ischemic  cardiomyopathy- (present on admission)  Assessment & Plan  3/30 Echo pending  3/23/2020 Echo:  severe mitral regurgitation.  Severely reduced left ventricular systolic function.  Global hypokinesis,  function is best preserved in the basal anterior   and inferolateral wall.  Grade III diastolic dysfunction.  Reduced right ventricular systolic function.  Severe mitral regurgitation.  Possible severe aortic stenosis in the setting of reduced cardiac   output.  Mild aortic insufficiency.    COPD (chronic obstructive pulmonary disease) ([facility])- (present on admission)  Assessment & Plan  No acute exacerbation  RT per protocol if needed    Acute on chronic renal insufficiency  Assessment & Plan  Heart failure w/ poor perfusion vs other.  Monitor I/O's, labs  Renally dose medications    Neurogenic bladder  Assessment & Plan  Patient will require self cath versus Sun catheter placement\  Strict I's and O's    CVA (cerebral vascular accident) ([facility])- (present on admission)  Assessment & Plan  Patient had recently been at rehab and was just recently sent home.  3/30 Head CT:  1.  The evolving large left cerebellar/PICA infarct.  2.  No acute intracranial hemorrhage.    Anemia- (present on admission)  Assessment & Plan  Chronic  Macrocytic   Monitor CBC no acute signs of blood loss    Essential hypertension- (present on admission)  Assessment & Plan  Monitor vitals.      VTE prophylaxis: SCDs for now until plan is finalized from cardiology on initiating heparin

## 2020-03-30 NOTE — REHAB-DIETARY IDT TEAM NOTE
"Dietary   Nutrition  Dietary Problems (Active)      There are no active problems.            Nutrition services: Day 3 of admit.  Kyler Donato Jr. is a 87 y.o. male with admitting DX of (L) Body Involvement (R) Brain, Stroke (cerebrum) (HCC). Dx includes NSTEMI, CHF, azotemia, anemia, HTN    Consult received for MST of 4 on nutrition screen due to report of >/=34 lb weight loss x > 1 year. No report of poor PO.      Assessment:  Height: 180.3 cm (5' 11\")  Weight: 67 kg (147 lb 11.3 oz)  Body mass index is 20.6 kg/m²., BMI classification: based on pt's age, BMI is low.   Diet/Intake: cardiac, level 6-soft and bite size with level 0-thin liquids/% of all meals    Evaluation:   1. Pt reports weight loss of 45 lb (23%) weight loss x 1 year. Weight loss is significant. Pt said that there has been no changes in his PO intake during the time of weight loss. He thinks it is stress related. His wife was sick at this time and he was getting very little sleep. He said he was up until 2:00 or 3:00 in the morning. He said that he has had a complete medical workup with no cause for the weight loss found. He does not drink Boost or Ensure supplements at home. He was not sold on receiving this supplement here. He drinks milkshakes made with ice cream and milk at home. This was added to his lunch daily for additional kcals to encourage weight maintenance. Current PO intake is adequate.   2. Pt looks thin with moderate muscle loss noted in the deltoid region with slight protrusion of the acromion and angling of the shoulders. Loose skin also noted in the torso area. Pt also with slight depression noted in the temple region.   3. GI: LBM: 3/28  4. Labs: reviewed  5. Meds: Mag-Ox, omeprazole, senna-docusate, vitamin D    Malnutrition Risk: Malnutrition in the context of environmental circumstances starvation related due to stress with long days due to his wife's illness AEB 23% wt loss x 1 year and moderate muscle " loss to the temple and deltoid region.    Recommendations/Plan:  1. Add milkshake to lunch daily   2. Encourage intake of >75%  3. Document intake of all meals as % taken in ADL's to provide interdisciplinary communication across all shifts.   4. Monitor weight.  5. Nutrition rep will continue to see patient for ongoing meal and snack preferences.     RD will monitor weekly or PRN.         Section completed by:  Brooke Houston R.D.

## 2020-03-30 NOTE — ASSESSMENT & PLAN NOTE
Atorvastatin 80 mg q. Evening  Monitor on telemetry  Nitroglycerin as needed  Serial troponins  Cardiology consulting  Aspirin Effient per cardiology  Heparin drip was discontinued  Cardiology recommended for medical management

## 2020-03-30 NOTE — PROGRESS NOTES
Received patient on bed. Conscious coherent not in cp distress. Patient verbalized no pain. No headache for now.. No SOB. Patient has difficulty hearing. Patient able to swallow pills without difficulty and able to follow instructions. Patient safety and fall precaution reinforced.

## 2020-03-30 NOTE — DISCHARGE SUMMARY
"Rehab Discharge Note    Admission: 3/27/2020    Discharge: 3/30/2020    Admission Diagnosis:   Active Hospital Problems    Diagnosis   • *CVA (cerebral vascular accident) ([facility])   • NSTEMI (non-ST elevated myocardial infarction) ([facility])   • CHF (congestive heart failure) ([facility])   • Ischemic cardiomyopathy   • COPD (chronic obstructive pulmonary disease) ([facility])   • Essential hypertension   • Chest pain   • Azotemia   • Anemia       Discharge Diagnosis:  Active Hospital Problems    Diagnosis   • *CVA (cerebral vascular accident) ([facility])   • NSTEMI (non-ST elevated myocardial infarction) ([facility])   • CHF (congestive heart failure) ([facility])   • Ischemic cardiomyopathy   • COPD (chronic obstructive pulmonary disease) ([facility])   • Essential hypertension   • Chest pain   • Azotemia   • Anemia       HPI prior to rehab  Per Dr. Mcgraw's H&P \"The patient is a 87 y.o. right hand dominant male with a past medical history of coronary artery disease, ischemic cardiomyopathy with ejection fraction 25%, hypertension, SVT, chronic urinary retention requiring straight cath at home;  who presented on 3/22/2020  3:13 PM with burning chest pain radiating to neck and back with dizziness, nausea and vomiting.  Patient found to have an NSTEMI and was admitted for close cardiac monitoring, serial EKGs and troponins. Patient was initially treated with aspirin and started on IV heparin.  Patient underwent cardiac catheterization and PCI with stent placement in mid circumflex artery on 3/23/2020 by Luis Mccann MD. then on 3/24/2020 patient's nurse called rapid response for neurologic changes with headache, left-sided weakness, left-sided ataxia and nystagmus.  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 Patient did not receive TPA. 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 EKG with prolonged qTC of 503.      Patient current reports numbness on the left side of his face, double vision, as well as weakness on the left side of his body. He has been doing intermittent caths 4 times a day for the last 11 years.  He reports he was told that his bladder muscle just stopped working.  He is moved his bowels recently.  He denies any chest pain or shortness of breath.  He wears glasses and is hard of hearing.  Family to bring in his hearing aids.     Patient was evaluated by Rehab Medicine physician and Physical Therapy, Occupational Therapy and Speech Therapy and determined to be appropriate for acute inpatient rehab and was transferred to [facility] on 3/27/2020  2:20 PM.    Rehab Hospital Course    Patient had been on rehab for only 3 days when he developed complaints of 10/10 chest pain on the morning of 3/30, in addition to a headache behind the left eye. He felt as though his symptoms started behind the left eye, went down behind his left ear, into his left jaw, and then into his left chest.     The hospitalist on call was paged at the time of his complaints, at 6:45 am, and patient was given a dose of morphine which improved his pain.    When I arrived at the hospital at 8:30, I noted the complaints and urgently ordered STAT labs and EKG. Troponin came back elevated at 2907, for which the patient was transferred to the ED for further work up and management by the ED physician and cardiology.    Functional Status at Discharge  Eatin - Modified Independent  Eating Description:  Increased time, Modified diet, Supervision for safety  Groomin - Standby Prompting/Supervision or Set-up  Grooming Description:  Increased time, Supervision for safety(washing face; brushing hair)  Bathing:  3 - Moderate " Assistance  Bathing Description:  Grab bar, Hand held shower, Increased time, Supervision for safety, Verbal cueing(min to mod A for standing tasks; supervision to mod A for seated balance during shower; assist for buttocks and back. Assist for rinsing.)  Upper Body Dressin - Standby Prompting/Supervision or Set-up  Upper Body Dressing Description:  Supervision for safety, Increased time  Lower Body Dressing:  3 - Moderate Assistance  Lower Body Dressing Description:  3 - Moderate Assistance     Walk:  0 - Not tested,medical condition  Distance Walked:  Walks  feet  Walk Description:  (Mod A for left lean/balance with FWW x 60 ft)  Wheelchair:  0 - Not tested,medical condition  Distance Propelled:      Wheelchair Description:     Stairs 0 - Not tested,medical condition  Stairs Description      Comprehension Mode:  Both  Comprehension:  5 - Stand-by Prompting/Supervision or Set-up  Comprehension Description:     Expression Mode:  Vocal  Expression:  5 - Stand-by Prompting/Supervision or Set-up  Expression Description:     Social Interaction:  7 - Independent  Social Interaction Description:     Problem Solvin - Standby Prompting/Supervision or Set-up  Problem Solving Description:  Therapy schedule, Increased time, Bed/chair alarm  Memory:  4 - Minimal Assistance  Memory Description:  Verbal cueing, Therapy schedule     Condition on Discharge:  Guarded.    Patient can return to rehab after medical stabilization, after repeat therapy evaluations and he still meets the criteria for a rehab admission. Recommend re-consulting PMR for assessment once he is stabilized.    More than 40 minutes was spent on discharging this patient, including face-to-face time, prescription management, and the dictation of this note.

## 2020-03-30 NOTE — ASSESSMENT & PLAN NOTE
3/30 Echo pending  3/23/2020 Echo:  severe mitral regurgitation.  Severely reduced left ventricular systolic function.  Global hypokinesis,  function is best preserved in the basal anterior   and inferolateral wall.  Grade III diastolic dysfunction.  Reduced right ventricular systolic function.  Severe mitral regurgitation.  Possible severe aortic stenosis in the setting of reduced cardiac   output.  Mild aortic insufficiency.

## 2020-03-30 NOTE — PROGRESS NOTES
Patient verbalized chest pain and headache not getting worst advice patient to be bedrest until headache and chest pain is relieved and advice by staff.

## 2020-03-30 NOTE — CONSULTS
Cardiology Initial Consultation    Date of Service  3/30/2020    Referring Physician  Hernan Malone M.D.    Reason for Consultation  Chest pain and non-ST elevation MI    History of Presenting Illness  Todd Donato Jr. is a 87 y.o. male who presented 3/30/2020 with chest pain and headache.  Patient is vague historian for me today.  He recalls having a headache and some dizziness.  He is not really certain if he had chest pain.    He was admitted 2020 with acute onset of severe substernal chest pain associate with nausea and shortness of breath.  He presented to the emergency room where his troponin was already 1193.  He was treated for non-ST elevation MI and underwent left heart catheterization the next day.  EF was found to be 25%, mention of possible low-flow low gradient severe aortic stenosis which I think is not present although the aortic valve is heavily calcified, and new severe mitral regurgitation.    Known to already have CAD with prior large anterior MI in , LAD stent placed at that time, stent was patent on his most recent heart catheterization and he had a new stent placed to the circumflex system.  Unfortunately, he had a large territory stroke after the heart catheterization.  His medication list does include aspirin and Plavix.  Has been on metoprolol and atorvastatin.  No ACE inhibitor or ARB.    Has hypertension.  Hyperlipidemia.  Paroxysmal SVT.  [facility]  Chronic kidney disease stage III.    Blood pressure in the ED has been normal.  Hemoglobin 10.6, platelets 217  Creatinine 1.53  Troponin T 2683  proBNP 13,571    Patient's first wife  of ovarian cancer around 2 months after the initial diagnosis.  His second wife is essentially wheelchair-bound and requires a lot of assistance so she has been placed into nursing home.    Patient's power of  is his daughter Georgie whom I spoke to today.    Review of Systems  Review of Systems   All other systems reviewed and  are negative.      Past Medical History   has a past medical history of Acute anterolateral myocardial infarction (HCC) (1995), CAD (coronary artery disease) (1995), Hyperlipidemia, Hypertension, PSVT (paroxysmal supraventricular tachycardia) (HCC), and Shoulder pain.    Surgical History   has a past surgical history that includes shoulder orif; hand surgery; elbowplasty; and angioplasty (1995).    Family History  family history includes Arthritis in his mother; Breast Cancer in his sister; Heart Disease in his father; Prostate cancer in his brother.    Social History   reports that he quit smoking about 24 years ago. He has a 50.00 pack-year smoking history. He has never used smokeless tobacco. He reports current alcohol use of about 4.2 oz of alcohol per week. He reports that he does not use drugs.    Medications  Prior to Admission Medications   Prescriptions Last Dose Informant Patient Reported? Taking?   acetaminophen (TYLENOL) 325 MG Tab   No No   Sig: Take 2 Tabs by mouth every 6 hours as needed (Mild Pain; (Pain scale 1-3); Temp greater than 100.5 F).   aspirin EC (ECOTRIN) 81 MG Tablet Delayed Response   No No   Sig: Take 1 Tab by mouth every day.   atorvastatin (LIPITOR) 80 MG tablet   No No   Sig: Take 1 Tab by mouth every evening.   clopidogrel (PLAVIX) 75 MG Tab   No No   Sig: Take 1 Tab by mouth every day.   gabapentin (NEURONTIN) 300 MG Cap   No No   Sig: Take 1 Cap by mouth 3 times a day.   magnesium oxide 420 (252 Mg) MG Tab   No No   Sig: Take 420 mg by mouth every day.   metoprolol SR (TOPROL XL) 50 MG TABLET SR 24 HR   No No   Sig: Take 1 Tab by mouth every day.   omeprazole (PRILOSEC) 20 MG delayed-release capsule   No No   Sig: Take 1 Cap by mouth 2 Times a Day.   vitamin D (VITAMIND D3) 1000 UNIT Tab   No No   Sig: Take 1 Tab by mouth 2 Times a Day.      Facility-Administered Medications: None       Allergies  No Known Allergies    Vital signs in last 24 hours  Temp:  [36.7 °C (98.1 °F)] 36.7  °C (98.1 °F)  Pulse:  [74-76] 74  Resp:  [13-25] 14  BP: (133)/(69) 133/69  SpO2:  [96 %-97 %] 97 %    Physical Exam  Physical Exam     General: No acute distress. Well nourished.  Appears younger than stated age.  HEENT: EOM grossly intact, no scleral icterus, no pharyngeal erythema.   Neck:  No JVD, no bruits, trachea midline  CVS: RRR. Normal S1, S2. No M/R/G. No LE edema.  2+ radial pulses, 2+ PT pulses  Resp: CTAB. No wheezing or crackles/rhonchi. Normal respiratory effort.  Abdomen: Soft, NT, no julia hepatomegaly.  MSK/Ext: No clubbing or cyanosis.  Skin: Warm and dry, no rashes.  Neurological:  No obvious focal deficits.   Psych: A&O x self, place and situation, appropriate affect, adequate judgement, reduced short-term memory      Lab Review  Lab Results   Component Value Date/Time    WBC 6.9 03/30/2020 11:32 AM    RBC 3.25 (L) 03/30/2020 11:32 AM    HEMOGLOBIN 10.6 (L) 03/30/2020 11:32 AM    HEMATOCRIT 33.1 (L) 03/30/2020 11:32 AM    .8 (H) 03/30/2020 11:32 AM    MCH 32.6 03/30/2020 11:32 AM    MCHC 32.0 (L) 03/30/2020 11:32 AM    MPV 10.4 03/30/2020 11:32 AM      Lab Results   Component Value Date/Time    SODIUM 141 03/30/2020 11:32 AM    POTASSIUM 4.2 03/30/2020 11:32 AM    CHLORIDE 105 03/30/2020 11:32 AM    CO2 24 03/30/2020 11:32 AM    GLUCOSE 114 (H) 03/30/2020 11:32 AM    BUN 22 03/30/2020 11:32 AM    CREATININE 1.53 (H) 03/30/2020 11:32 AM      Lab Results   Component Value Date/Time    ASTSGOT 27 03/30/2020 11:32 AM    ALTSGPT 21 03/30/2020 11:32 AM     Lab Results   Component Value Date/Time    CHOLSTRLTOT 91 (L) 03/25/2020 02:19 AM    LDL 30 03/25/2020 02:19 AM    HDL 47 03/25/2020 02:19 AM    TRIGLYCERIDE 69 03/25/2020 02:19 AM    TROPONINT 2683 (H) 03/30/2020 11:32 AM       Recent Labs     03/28/20  0503 03/30/20  0915   NTPROBNP [id number]* [id number]*       Cardiac Imaging and Procedures Review  EKG:  My personal interpretation of the EKG dated 3/30/2020 is sinus, 75, old anterior MI, left  anterior fascicular block, nonspecific T wave changes, small lateral Q waves    Echocardiogram: 3/30/2020 limited    Echo dated 11/20/2018:   Echocardiography Laboratory    Echo 3/23/2020  EF 25%  Compared to the images of the prior echocardiogram dated 11/20/18 -   there is now severe mitral regurgitation.  Severely reduced left ventricular systolic function.  Global hypokinesis,  function is best preserved in the basal anterior   and inferolateral wall.  Grade III diastolic dysfunction.  Reduced right ventricular systolic function.  Severe mitral regurgitation.  Possible severe aortic stenosis in the setting of reduced cardiac   Output. (Reviewed by me, there is not aortic stenosis.)  Mild aortic insufficiency.     CONCLUSIONS  Severely reduced left ventricular systolic function.  Left ventricular ejection fraction is visually estimated to be 25-30%.  Global hypokinesis with regional variation.  Aortic sclerosis without stenosis.  Unable to estimate pulmonary artery pressure due to an inadequate   tricuspid regurgitant jet.  Mild mitral regurgitation.  Compared to the report of the study done 4/7/2017  there has been   worsening of left ventricular ejection fraction.     Cardiac Catheterization: 3/30/2020  PROCEDURE:  Cardiac catheterization and percutaneous coronary intervention.  A.  Left heart catheterization.  B.  Selective coronary angiography.  C.  Coronary stent implantation, mid circumflex artery 2.5x12 mm [place]   drug-eluting stent.  D.  Intravascular ultrasound  (IVUS) left main artery.  E.  Right femoral artery approach.  F.  Conscious sedation supervision.     PREPROCEDURE DIAGNOSES:  1.  Prior anterior myocardial infarction, 1995, [facility]  2.  Prior left anterior descending artery stent, 1995, [facility]  3.  Ischemic cardiomyopathy, ejection fraction of 25%.  4.  Non-ST elevation myocardial infarction.  5.  Chronic kidney disease.     POSTPROCEDURE DIAGNOSES:  1.  Mid circumflex  artery eccentric 75-90% stenosis, mid 95% stenosis medial   branch of the posterior descending artery.  2.  Patent left anterior descending artery stent.  3.  Mildly elevated left ventricular end-diastolic pressure 18 mmHg.  4.  Noncritical ostial left main artery stenosis.    [facility] 7/21/2017:  Impression:  Pain stent in proximal LAD without significant in-stent stenosis.  Nonobstructive stenosis mid circumflex.  Diffuse plaquing in proximal to mid RCA with 40% stenosis at worst point.  Global hypokinesis of the left ventricle EF 36%  Significant coronary calcification.    Nuclear Perfusion Imaging (4/2017):   Myocardial Perfusion   Report   NUCLEAR IMAGING INTERPRETATION   Large sized, nonreversible, decreased uptake of severe severity in the apex    (LAD), anterior (LAD), apical septal (LAD), apical inferior and mid    anteroseptal (LAD) segments during post stress images suggestive of scar.  No  evidence of ischemia.   Enlarged ventricle with global hypokinesis and likely akinesis of the    anteroseptal and anterior walls (difficult to determine with severely    decrease radiotracer uptake). Measured ejection fraction is 30%.   ECG INTERPRETATION   Negative stress ECG for ischemia.       Imaging  Chest X-Ray: 3/30/2020  Stable bibasilar opacities likely pneumonitis, less likely edema. Possible small pleural effusions.  Cardiomegaly.    MRI brain 3/25/2020  1.  Advanced cerebral atrophy.  2.  Mild supratentorial white matter disease most consistent with microvascular ischemic change.  3.  12 mm curvilinear focus of acute infarction at the right occipital pole. Right posterior cerebral artery parieto-occipital branch territory.  4.  Large area of acute infarction in the left PICA territory including the left dorsal lateral medulla. This is concordant with the findings of left vertebral artery occlusion. No hemorrhagic transformation. Clinically, this type of infarction may be   associated with Wallenberg  syndrome.  5.  Left distal vertebral artery occlusion concordant with CTA findings.    CTA head and neck 3/24/2020  1.  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 Right vertebral artery is patent.     2.  Atherosclerotic change of the carotid arteries bilaterally without significant flow limitation.  -Occluded left vertebral artery.    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  [facility]    Plan:  -Extremely complex and difficult situation  -Heparin drip if okay with neurology given his prior recent large CVA, they recommended CT scan which is pending, heparin drip for 24 to 48 hours if no bleeding  -Nitroglycerin if blood pressure tolerates, Nitropaste if chest pain returns  -Probably change Plavix to Effient  -No plans for repeat left heart catheterization this admission given devastating stroke after prior  -Consider ACE inhibitor or ARB eventually for low EF  -Creatinine too high for spironolactone  -I would not recommend evaluation for an ICD  -Recommend palliative care consultation, discussed with his daughter  -I had a long discussion with the patient's daughter Georgie, she is aware that he is very ill.  She was preparing for him to possibly not survive after his prior stroke.  Goal is to return him to rehab a bit he is DNR, DNI and no plans for aggressive cardiac care, essentially medical therapy only.  She is aware he is at risk for sudden cardiac death with his low EF.  No plans for treatment of mitral regurgitation other than diuresis.    Discussed with Hernan Malone M.D. and Dr. Mihia Tafoya.    Thank you for allowing me  to participate in the care of this patient.    I will continue to follow this patient    Please contact me with any questions.    Radhika Jain M.D.   Cardiologist, [facility]  [phone]

## 2020-03-30 NOTE — DISCHARGE PLANNING
Faxed H&P to [facility]cd[facility].   Emailed life alert systems sample list to daughterElva Georgie Luke.   Patient has been sent to the ED. Case management will follow for disposition.

## 2020-03-30 NOTE — THERAPY
Speech Language Pathology  Daily Treatment     Patient Name: Kyler Donato Jr.  Age:  87 y.o., Sex:  male  Medical Record #: [id number]  Today's Date: 3/30/2020     Subjective    Pt in bed. Discussion with RN pt with chest pain this morning, morpheme administered. RN requesting pt remain in bed.      Objective       03/30/20 0803   SLP Total Time Spent   SLP Individual Total Time Spent (Mins) 30   Charge Group   SLP Swallowing Dysfunction Treatment Swallowing Dysfunction Treatment       Assessment    Therapy completed at bedside with pt sitting up at 90 degrees. Pt assessed with trials of regular textures with thin liquids. No overt s/sx of asp/pn noted. Pt was noted to be impulsive with bite sizes consumed and talking with food in oral cavity. Cues provided and pt receptive. Assess tolerance at lunch time with advancement of diet as appropriate.     Plan    Cont regular texture trials with advancement of diet as appropriate.

## 2020-03-30 NOTE — THERAPY
Physical Therapy   Daily Treatment     Patient Name: Kyler Donato Jr.  Age:  87 y.o., Sex:  male  Medical Record #: [id number]  Today's Date: 3/30/2020     Precautions  Precautions: Fall Risk  Comments: Pt self caths at baseline; Level 6 Diet Soft & Bite Sized and Thin Liquids (per order)    Subjective    Patient received supine with elevated HOB finishing breakfast; pt agreeable to PT but reports had chest pain earlier this morning; pt reports current oxygen use is new to this morning as well; pt reports feeling much better with less pain after having morphine; pt reports rope-like pain from lateral L cranium coming down to ear and then wraps around to pt's L chin and sternum/midline (RN, MD, and RT aware).     Objective       03/30/20 0831   Vitals   O2 (LPM) 3   O2 Delivery Device Nasal Cannula   Vitals Comments Patient reports being on room air yesterday but was put on oxygen due to chest pain this morning.   Interdisciplinary Plan of Care Collaboration   IDT Collaboration with  Nursing;Physician;Respiratory Therapist;Occupational Therapist;Therapy Tech   Collaboration Comments Hospitalist and PT with patient x10 min during session, discussion and clarrification of pt's pain; RT present at end of session for EKG.  Notified OT and therapy scheduling of pt's status; will monitor EKG results.  Discussed POC with patient.   Therapy Missed   Missed Therapy (Minutes) 15   Reason For Missed Therapy Medical - Patient not Able To Participate;Medical - Patient  in Procedure  (EKG being performed due to pt's c/o chest pain this morning)   PT Total Time Spent   PT Individual Total Time Spent (Mins) 15   PT Charge Group   PT Therapeutic Activities 1       FIM Bed/Chair/Wheelchair Transfers Score: 0 - Not tested,medical condition  Bed/Chair/Wheelchair Transfers Description:       FIM Walking Score:  0 - Not tested,medical condition  Walking Description:       FIM Wheelchair Score:  0 - Not tested,medical  condition  Wheelchair Description:       FIM Stairs Score:  0 - Not tested,medical condition  Stairs Description:         Assessment    Patient limited today due to c/o chest pain earlier; it is reported that patient was able to complete T-dine with SLP regarding new texture diet; pt limited with performance due to medical issues today but patient reports that he wants to participate physically.    Plan    Follow-up with RT, RN, or MD regarding EKG results and O2 usage during therapy (OT to see patient at 1000); ambulation, ther ex as tolerated; safety with transfers; standing tolerance.

## 2020-03-30 NOTE — ASSESSMENT & PLAN NOTE
Had chest pain this am 3/30  Pain starts behind his left eye or left ear and then radiates down to the chest  Described as sharp, rated 10/10 in severity, lasts 10-30 seconds, no other associated symptoms  Pt states that he has this every so often and started at [facility] after his stroke  Pt states he seems to get it when his head gets turned in a certain direction  EKG: NSR, no ST elevations or depressions, relatively no change since prior study  TropI: 1193 (3/22) --> 967 (3/22) --> 1885 (3/22) --> 2907 (3/30)  BNP: 13,571  CXR: airspace opacity in the right infrahilar lower lung zone. Consider acute pneumonia. This has developed since 3/22/2020  Will sent to ER for further eval and managment

## 2020-03-30 NOTE — ED NOTES
Prior to administering heparin. This RN and another ED RN (staff) who verified meds requested clearification and got approval to continue giving heparin as ordered. RN spoke with ED pharmacy at that time

## 2020-03-30 NOTE — DISCHARGE PLANNING
CASE MANAGEMENT INITIAL ASSESSMENT    Admit Date:  3/27/2020     I attempted to call rather than visit with patient due to Covid-19, to discuss role of case management / discharge planning / team conference. Patient was not available. I called patient's daughter, Georgie Luke, who will be patient's primary caregiver. Information for assessment obtained from the medical record and Georgie Luke.  Patient is a  87 y.o. male transferred from [facility]. He was inpatient there 3/22 to 3/27/20.   Patient has a past medical history of coronary artery disease, ischemic cardiomyopathy with ejection fraction 25%, hypertension, SVT, chronic urinary retention requiring straight cath at home.  Patient was admitted to [facility] on 3/27/2020 with CVA as the primary rehab diagnosis. Secondary diagnoses/Medical Co-Morbidities include Chronic urinary retention, Peripheral neuropathy, Coronary artery disease, Status post NSTEMI, Cardiomyopathy, Systolic CHF, Hypertension, Macrocytic anemia, Acute versus chronic kidney injury.  The admitting physician is Dr. Eliz Hernandez.     Diagnosis: 0.1.1 (L) Body Involvement (R) Brain  Stroke (cerebrum) ([facility])    Co-morbidities:   Patient Active Problem List    Diagnosis Date Noted   • NSTEMI (non-ST elevated myocardial infarction) ([facility]) 03/22/2020     Priority: High   • CHF (congestive heart failure) ([facility]) 11/19/2018     Priority: High   • Ischemic cardiomyopathy 07/31/2017     Priority: High   • SVT (supraventricular tachycardia) (CMS-HCC) 09/24/2015     Priority: High   • Coronary artery disease involving native coronary artery 09/24/2015     Priority: High   • Hypercholesterolemia 09/24/2015     Priority: High   • COPD (chronic obstructive pulmonary disease) ([facility]) 04/05/2017     Priority: Medium   • Right shoulder pain 09/24/2015     Priority: Medium   • GERD (gastroesophageal reflux disease) 09/24/2015     Priority: Medium   • Essential hypertension 07/06/2016     Priority: Low    • Urinary retention 03/27/2020   • Azotemia 03/27/2020   • CVA (cerebral vascular accident) ([facility]) 03/24/2020   • Anemia 03/22/2020   • Acute respiratory failure with hypoxia ([facility]) 03/22/2020     Prior Living Situation:  Housing / Facility: 1 Story House  Lives with - Patient's Self Care Capacity: Spouse(Pt was caregiver for spouse w/ dementia. Wife being placed)    Prior Level of Function:  Medication Management: Independent  Finances: Independent  Home Management: Independent  Shopping: Independent  Prior Level Of Mobility: Independent Without Device in Community, Independent Without Device in Home  Driving / Transportation: Driving Independent    Support Systems:  Primary : Georgie Luke, Daughter, [phone]  Other support systems:   Power of  (Name & Phone): Georgie Luke, Daughter, [phone]    Previous Services Utilized:   Equipment Owned: Grab Bar(s) In Tub / Shower, Grab Bar(s) By Toilet  Prior Services: None    Other Information:  Occupation (Pre-Hospital Vocational): Retired Due To Age  [facility]  Primary Payor Source: Medicare A, Medicare B  Secondary Payor Source: Supplemental Insurance([employer])  [facility]  Other MDs: Dr. Reanna Ordonez, Neurology; Dr. JHONATAN Deshpande, Cardiology; Dr. Mccann, Cardiology    Patient / Family Goal:  Patient / Family Goal: To improve to the point patient feels he can be alone. Patient wishes to care for his wife again.   DC Disposition: To single level home with 2 step and ramp entry in [place], with daughter's support. Patient is the caregiver for his wife with dementia. Patient's daughter, Georgie Luke, is working with her step-sister to place patient's wife. They have paid for 2 months of care for patient's wife.  Patient wishes to bring his wife back home, but patient's daughter is encouraging him to see how he progresses.   Georgie Luke lives 5 minutes away. She will stay with patient if needed and drive him  to appointments and to see his wife.  DC Needs: Life alert. Emailing sample list of service providers to daughter at [email or web address].  Anticipate home health therapies.  DME TBD. Patient has crutches.   Strengths: Motivated. Strong willed per daughter. PLOF - independent, .    Additional Case Management Questions:  Have you ever received case management services for yourself or a family member? no    Do you feel you have and an understanding of what services  provide? yes    Do you have any additional questions regarding case management? no         CASE MANAGEMENT PLAN OF CARE   Individualized Goals:   1. Improve functional status to return home with intermittent support of daughter.  2. Pt wishes for his wife to return home when possible  3. Driving to be addressed.    Barriers:   1. Cognitive deficits  2. Functional deficits  3. Dysphagia    Plan:  1. Continue to follow patient through hospitalization and provide discharge planning in collaboration with patient, family, physicians and ancillary services.     2. Utilize community resources to ensure a safe discharge.

## 2020-03-30 NOTE — THERAPY
"Occupational Therapy  Daily Treatment     Patient Name: Kyler Donato Jr.  Age:  87 y.o., Sex:  male  Medical Record #: [id number]  Today's Date: 3/30/2020     Precautions  Precautions: (P) Fall Risk  Comments: (P) Pt self caths at baseline; Level 6 Diet Soft & Bite Sized and Thin Liquids (per order)         Subjective    \"I can feel myself leaning to the left\"     Objective       03/30/20 1001   Precautions   Precautions Fall Risk   Comments Pt self caths at baseline; Level 6 Diet Soft & Bite Sized and Thin Liquids (per order)   Cognition    Level of Consciousness Alert   Balance   Standing Balance (Static) Fair -   Standing Balance (Dynamic) Poor +   Weight Shift Standing Fair   Skilled Intervention Compensatory Strategies;Postural Facilitation;Sequencing;Tactile Cuing;Verbal Cuing   Comments observed during balance exercises in // bars with use of UB support as needed.   Bed Mobility    Supine to Sit Contact Guard Assist   Scooting Stand by Assist   Rolling Supervised   Skilled Intervention Verbal Cuing   Interdisciplinary Plan of Care Collaboration   IDT Collaboration with  Nursing;Physician   Patient Position at End of Therapy Seated;Other (Comments)  (pt with nursing at end of session to transfer to acute)   Collaboration Comments pt cleared by RN/Dr. Hernandez to participate in therapy session; lab results came back during OT session; pt being sent acute for further workup.   Therapy Missed   Missed Therapy (Minutes) 15   Reason For Missed Therapy Medical - Other (Please Comment)  (pt being sent acute-lab results came back during OT session.)   OT Total Time Spent   OT Individual Total Time Spent (Mins) 45   OT Charge Group   OT Neuromuscular Re-education / Balance 3     Activities in // bars to address standing tolerance, balance and midline orientation, including:  -Sit<>stand trials with unilateral UB support, 2x10 with cues for upright posture and midline orientation.   -Targeted toe taps on 2.5\" " jadeer, alternating BLEs, 2x20 reps.  -Dynamic reach with BUEs holding therapy ball, 2x10 each: overhead, side to side, and forward reach with gait belt, CGA-Min A and cues to dec L lateral lean.    Assessment    Pt tolerated session well with focus on standing tolerance, balance and midline orientation. Pt con't to demonstrate L lateral lean with fatigue, but able to correct with verbal/tactile cues. Pt with no c/o chest pain or dizziness throughout session. Pt lab results came back during OT session, and per charge RN and MD pt being sent acute for further workup.      Plan    Con't OT for ADLs/IADLs and related functional mobility, LUE strength, LUE neuro re-ed, balance.

## 2020-03-30 NOTE — ED NOTES
Bedside ECHO started . Patient stable, denies pain, SOB, CP or numbness or tinglings. RN will reassess patient as well as continue orders when ECHO tech is finished

## 2020-03-30 NOTE — PROGRESS NOTES
Reported to incoming nurse to watch patient for headache and chest pain. And inform rounding MD if chest pain and headache is not relieved by morphine PO.

## 2020-03-30 NOTE — PROGRESS NOTES
With hx of chest pain from noc, stat labs, EKG, CXR done, results  back with elevated troponin level, BNP.  Transferred to [facility] ER via [facility] at 11:00 am as ordered..

## 2020-03-30 NOTE — CARE PLAN
"  Problem: Balance  Goal: STG-Within one week, patient will maintain static standing  Description: 1) Individualized goal:  With UE support and mirror for visual feedback/ midline posture 2.5 minutes x 2 for pre-gait/ balance training  2) Interventions:  PT Group Therapy, PT E Stim Attended, PT Gait Training, PT Self Care/Home Eval, PT Therapeutic Exercises, PT TENS Application, PT Neuro Re-Ed/Balance, PT Aquatic Therapy, PT Therapeutic Activity, PT Manual Therapy and PT Evaluation   Outcome: DISCHARGED-GOAL NOT MET     Problem: Mobility  Goal: STG-Within one week, patient will ambulate household distance  Description: 1) Individualized goal:  CGA with  ft x 2  2) Interventions:  PT Group Therapy, PT E Stim Attended, PT Gait Training, PT Self Care/Home Eval, PT Therapeutic Exercises, PT TENS Application, PT Neuro Re-Ed/Balance, PT Aquatic Therapy, PT Therapeutic Activity, PT Manual Therapy and PT Evaluation     Outcome: DISCHARGED-GOAL NOT MET  Goal: STG-Within one week, patient will ambulate up/down a curb  Description: 1) Individualized goal:  Min A with // bars for 6\" step x 2  2) Interventions:  PT Group Therapy, PT E Stim Attended, PT Gait Training, PT Self Care/Home Eval, PT Therapeutic Exercises, PT TENS Application, PT Neuro Re-Ed/Balance, PT Aquatic Therapy, PT Therapeutic Activity, PT Manual Therapy and PT Evaluation     Outcome: DISCHARGED-GOAL NOT MET     Problem: Mobility Transfers  Goal: STG-Within one week, patient will transfer bed to chair  Description: 1) Individualized goal:  CGA/ set-up with UE support as needed  2) Interventions:  PT Group Therapy, PT E Stim Attended, PT Gait Training, PT Self Care/Home Eval, PT Therapeutic Exercises, PT TENS Application, PT Neuro Re-Ed/Balance, PT Aquatic Therapy, PT Therapeutic Activity, PT Manual Therapy and PT Evaluation   Outcome: DISCHARGED-GOAL NOT MET     Problem: PT-Long Term Goals  Goal: LTG-By discharge, patient will tolerate " standing  Description: 1) Individualized goal:  With UE support to complete standing exercise program 2 x 15  2) Interventions:  PT Group Therapy, PT E Stim Attended, PT Gait Training, PT Self Care/Home Eval, PT Therapeutic Exercises, PT TENS Application, PT Neuro Re-Ed/Balance, PT Aquatic Therapy, PT Therapeutic Activity, PT Manual Therapy and PT Evaluation     Outcome: DISCHARGED-GOAL NOT MET  Goal: LTG-By discharge, patient will ambulate  Description: 1) Individualized goal:  SPV with  ft x 2  2) Interventions:  PT Group Therapy, PT E Stim Attended, PT Gait Training, PT Self Care/Home Eval, PT Therapeutic Exercises, PT TENS Application, PT Neuro Re-Ed/Balance, PT Aquatic Therapy, PT Therapeutic Activity, PT Manual Therapy and PT Evaluation     Outcome: DISCHARGED-GOAL NOT MET  Goal: LTG-By discharge, patient will transfer one surface to another  Description: 1) Individualized goal:  Modified independent with FWW as needed  2) Interventions:  PT Group Therapy, PT E Stim Attended, PT Gait Training, PT Self Care/Home Eval, PT Therapeutic Exercises, PT TENS Application, PT Neuro Re-Ed/Balance, PT Aquatic Therapy, PT Therapeutic Activity, PT Manual Therapy and PT Evaluation     Outcome: DISCHARGED-GOAL NOT MET  Goal: LTG-By discharge, patient will ambulate up/down 4-6 stairs  Description: 1) Individualized goal:  CGA with hand rails  2) Interventions:  PT Group Therapy, PT E Stim Attended, PT Gait Training, PT Self Care/Home Eval, PT Therapeutic Exercises, PT TENS Application, PT Neuro Re-Ed/Balance, PT Aquatic Therapy, PT Therapeutic Activity, PT Manual Therapy and PT Evaluation     Outcome: DISCHARGED-GOAL NOT MET  Goal: LTG-By discharge, patient will transfer in/out of a car  Description: 1) Individualized goal:  SPV / set-up with FWW as needed  2) Interventions:  PT Group Therapy, PT E Stim Attended, PT Gait Training, PT Self Care/Home Eval, PT Therapeutic Exercises, PT TENS Application, PT Neuro  Re-Ed/Balance, PT Aquatic Therapy, PT Therapeutic Activity, PT Manual Therapy and PT Evaluation     Outcome: DISCHARGED-GOAL NOT MET

## 2020-03-30 NOTE — FLOWSHEET NOTE
03/30/20 0907   Events/Summary/Plan   Events/Summary/Plan EKG done for chest pain.  Copies to Raúl Fishman, and pt chart

## 2020-03-30 NOTE — PROGRESS NOTES
Pt sent to ER for further cardiac workup. He had been c/o  Headache and chest pain this am, on call Dr notified morphine given. Dr Hernandez notified when she came into work further cardiac work up including labs. Troponin came back elevated at 2971, pt sent next door to Er per Dr Hernandez

## 2020-03-30 NOTE — ED TRIAGE NOTES
"Kyler Bo Donato Jr. 87 y.o. male bib EMS from [facility] for     Chief Complaint   Patient presents with   • Shortness of Breath   • Chest Pain     Pt transferred, Dr Malone aware of patient's arrival.  PIV placed by EMS pta.  Pt denies sob or cp since at previous facility.  Per EMS, labs were drawn at [facility] and pt had elevated troponin.  EKG done on arrival.  /69   Pulse 74   Temp 36.7 °C (98.1 °F) (Temporal)   Resp 13   Ht 1.803 m (5' 11\")   Wt 64.9 kg (143 lb)   BMI 19.94 kg/m²   Report to Joao CHATTERJEE.  "

## 2020-03-30 NOTE — ED NOTES
Med rec updated and complete. Allergies reviewed. Per MARS from transferring facility. [facility]). No antibiotics noted.

## 2020-03-30 NOTE — THERAPY
Missed Therapy     Patient Name: Kyler Donato Jr.  Age:  87 y.o., Sex:  male  Medical Record #: [id number]  Today's Date: 3/30/2020    Discussed missed therapy with IDT team at [facility].  This PT will complete patient's discharge note.       03/30/20 1340   Interdisciplinary Plan of Care Collaboration   IDT Collaboration with  Physician   Collaboration Comments At pt's huddle: MD reports that patient has been admitted over at [facility]; will not be returning, due to medical status; note left for therapy scheduling   Therapy Missed   Missed Therapy (Minutes) 30   Reason For Missed Therapy Medical - Patient on Hold from Therapy;Medical - Patient Is Not Medically Stable;Medical - Other (Please Comment)  (transferred [facility])

## 2020-03-30 NOTE — PROGRESS NOTES
Dr Bennie COLLINS informed that patient has been having headache 10/10 dull pain that radiates to the chest 10/10 pain. MD ordered Morphine 7.5 mg PO once. No need for other labs and EKG. Inform rounding MD if pain and headache not relieved by morphine

## 2020-03-31 ENCOUNTER — APPOINTMENT (OUTPATIENT)
Dept: RADIOLOGY | Facility: MEDICAL CENTER | Age: 85
DRG: 281 | End: 2020-03-31
Attending: HOSPITALIST
Payer: MEDICARE

## 2020-03-31 PROBLEM — I50.42 CHRONIC COMBINED SYSTOLIC AND DIASTOLIC CONGESTIVE HEART FAILURE (HCC): Status: ACTIVE | Noted: 2017-07-31

## 2020-03-31 PROBLEM — G93.40 ACUTE ENCEPHALOPATHY: Status: ACTIVE | Noted: 2020-03-31

## 2020-03-31 LAB
ANION GAP SERPL CALC-SCNC: 11 MMOL/L (ref 7–16)
APTT PPP: 30.7 SEC (ref 24.7–36)
APTT PPP: 93.5 SEC (ref 24.7–36)
APTT PPP: 97.1 SEC (ref 24.7–36)
BUN SERPL-MCNC: 21 MG/DL (ref 8–22)
CALCIUM SERPL-MCNC: 9.5 MG/DL (ref 8.5–10.5)
CHLORIDE SERPL-SCNC: 107 MMOL/L (ref 96–112)
CO2 SERPL-SCNC: 25 MMOL/L (ref 20–33)
CREAT SERPL-MCNC: 1.41 MG/DL (ref 0.5–1.4)
GLUCOSE BLD-MCNC: 99 MG/DL (ref 65–99)
GLUCOSE SERPL-MCNC: 98 MG/DL (ref 65–99)
POTASSIUM SERPL-SCNC: 4.5 MMOL/L (ref 3.6–5.5)
SODIUM SERPL-SCNC: 143 MMOL/L (ref 135–145)
TROPONIN T SERPL-MCNC: 2404 NG/L (ref 6–19)

## 2020-03-31 PROCEDURE — 71045 X-RAY EXAM CHEST 1 VIEW: CPT

## 2020-03-31 PROCEDURE — 700111 HCHG RX REV CODE 636 W/ 250 OVERRIDE (IP): Performed by: PHYSICIAN ASSISTANT

## 2020-03-31 PROCEDURE — A9270 NON-COVERED ITEM OR SERVICE: HCPCS | Performed by: HOSPITALIST

## 2020-03-31 PROCEDURE — A9270 NON-COVERED ITEM OR SERVICE: HCPCS | Performed by: PHYSICIAN ASSISTANT

## 2020-03-31 PROCEDURE — 700102 HCHG RX REV CODE 250 W/ 637 OVERRIDE(OP): Performed by: PHYSICIAN ASSISTANT

## 2020-03-31 PROCEDURE — 82962 GLUCOSE BLOOD TEST: CPT

## 2020-03-31 PROCEDURE — 700102 HCHG RX REV CODE 250 W/ 637 OVERRIDE(OP): Performed by: HOSPITALIST

## 2020-03-31 PROCEDURE — 36415 COLL VENOUS BLD VENIPUNCTURE: CPT

## 2020-03-31 PROCEDURE — 70450 CT HEAD/BRAIN W/O DYE: CPT

## 2020-03-31 PROCEDURE — 99233 SBSQ HOSP IP/OBS HIGH 50: CPT | Performed by: HOSPITALIST

## 2020-03-31 PROCEDURE — 85730 THROMBOPLASTIN TIME PARTIAL: CPT

## 2020-03-31 PROCEDURE — 700111 HCHG RX REV CODE 636 W/ 250 OVERRIDE (IP): Performed by: HOSPITALIST

## 2020-03-31 PROCEDURE — 97166 OT EVAL MOD COMPLEX 45 MIN: CPT

## 2020-03-31 PROCEDURE — 99233 SBSQ HOSP IP/OBS HIGH 50: CPT | Performed by: INTERNAL MEDICINE

## 2020-03-31 PROCEDURE — 770020 HCHG ROOM/CARE - TELE (206)

## 2020-03-31 PROCEDURE — 97161 PT EVAL LOW COMPLEX 20 MIN: CPT

## 2020-03-31 RX ORDER — HEPARIN SODIUM 5000 [USP'U]/100ML
INJECTION, SOLUTION INTRAVENOUS CONTINUOUS
Status: DISCONTINUED | OUTPATIENT
Start: 2020-03-31 | End: 2020-04-01

## 2020-03-31 RX ORDER — PRASUGREL 10 MG/1
10 TABLET, FILM COATED ORAL DAILY
Status: DISCONTINUED | OUTPATIENT
Start: 2020-03-31 | End: 2020-04-02

## 2020-03-31 RX ADMIN — METOPROLOL TARTRATE 12.5 MG: 25 TABLET, FILM COATED ORAL at 09:22

## 2020-03-31 RX ADMIN — GABAPENTIN 300 MG: 300 CAPSULE ORAL at 17:50

## 2020-03-31 RX ADMIN — GABAPENTIN 300 MG: 300 CAPSULE ORAL at 04:47

## 2020-03-31 RX ADMIN — Medication 400 MG: at 04:47

## 2020-03-31 RX ADMIN — HEPARIN SODIUM 1050 UNITS/HR: 5000 INJECTION, SOLUTION INTRAVENOUS at 09:31

## 2020-03-31 RX ADMIN — MORPHINE SULFATE 4 MG: 4 INJECTION INTRAVENOUS at 01:12

## 2020-03-31 RX ADMIN — GABAPENTIN 300 MG: 300 CAPSULE ORAL at 12:33

## 2020-03-31 RX ADMIN — OMEPRAZOLE 20 MG: 20 CAPSULE, DELAYED RELEASE ORAL at 04:47

## 2020-03-31 RX ADMIN — SENNOSIDES AND DOCUSATE SODIUM 2 TABLET: 8.6; 5 TABLET ORAL at 04:47

## 2020-03-31 RX ADMIN — NITROGLYCERIN 0.4 MG: 0.4 TABLET, ORALLY DISINTEGRATING SUBLINGUAL at 16:25

## 2020-03-31 RX ADMIN — PRASUGREL 10 MG: 10 TABLET, FILM COATED ORAL at 09:23

## 2020-03-31 RX ADMIN — OMEPRAZOLE 20 MG: 20 CAPSULE, DELAYED RELEASE ORAL at 17:50

## 2020-03-31 RX ADMIN — ATORVASTATIN CALCIUM 80 MG: 80 TABLET, FILM COATED ORAL at 17:50

## 2020-03-31 RX ADMIN — ASPIRIN 81 MG: 81 TABLET, COATED ORAL at 04:47

## 2020-03-31 ASSESSMENT — ENCOUNTER SYMPTOMS
STRIDOR: 0
CLAUDICATION: 0
TINGLING: 0
DIAPHORESIS: 0
NERVOUS/ANXIOUS: 0
SINUS PAIN: 0
NAUSEA: 0
ABDOMINAL PAIN: 0
BLOOD IN STOOL: 0
PALPITATIONS: 0
DEPRESSION: 0
SPUTUM PRODUCTION: 0
DOUBLE VISION: 0
POLYDIPSIA: 0
HEMOPTYSIS: 0
NECK PAIN: 0
WHEEZING: 0
CHILLS: 0
FLANK PAIN: 0
PHOTOPHOBIA: 0
FALLS: 0
DIZZINESS: 1
DIARRHEA: 0
CONSTIPATION: 0
SHORTNESS OF BREATH: 1
MYALGIAS: 0
COUGH: 0
BLURRED VISION: 0
ORTHOPNEA: 0
WEAKNESS: 1
EYE PAIN: 0
FEVER: 0
BACK PAIN: 0
PND: 0
HEARTBURN: 0
SORE THROAT: 0
HEADACHES: 1
VOMITING: 0
TREMORS: 0
BRUISES/BLEEDS EASILY: 0
HALLUCINATIONS: 0

## 2020-03-31 ASSESSMENT — COGNITIVE AND FUNCTIONAL STATUS - GENERAL
SUGGESTED CMS G CODE MODIFIER MOBILITY: CK
MOVING TO AND FROM BED TO CHAIR: A LITTLE
DRESSING REGULAR LOWER BODY CLOTHING: A LITTLE
MOVING FROM LYING ON BACK TO SITTING ON SIDE OF FLAT BED: A LITTLE
STANDING UP FROM CHAIR USING ARMS: A LITTLE
HELP NEEDED FOR BATHING: A LITTLE
CLIMB 3 TO 5 STEPS WITH RAILING: A LOT
DAILY ACTIVITIY SCORE: 22
TURNING FROM BACK TO SIDE WHILE IN FLAT BAD: A LITTLE
SUGGESTED CMS G CODE MODIFIER DAILY ACTIVITY: CJ
WALKING IN HOSPITAL ROOM: A LOT
MOBILITY SCORE: 16

## 2020-03-31 ASSESSMENT — GAIT ASSESSMENTS
GAIT LEVEL OF ASSIST: MINIMAL ASSIST
DISTANCE (FEET): 2
ASSISTIVE DEVICE: FRONT WHEEL WALKER

## 2020-03-31 ASSESSMENT — FIBROSIS 4 INDEX: FIB4 SCORE: 2.41

## 2020-03-31 ASSESSMENT — ACTIVITIES OF DAILY LIVING (ADL): TOILETING: INDEPENDENT

## 2020-03-31 ASSESSMENT — LIFESTYLE VARIABLES: SUBSTANCE_ABUSE: 0

## 2020-03-31 NOTE — RESPIRATORY CARE
COPD EDUCATION by COPD CLINICAL EDUCATOR  3/31/2020 at 6:22 AM by Juanita Coto, RRT     Patient reviewed by COPD education team. Patient does not have a history or diagnosis of COPD and is a non-smoker, therefore does not qualify for the COPD program.

## 2020-03-31 NOTE — THERAPY
"Physical Therapy Evaluation completed.   Bed Mobility:  Supine to Sit: Minimal Assist  Transfers: Sit to Stand: Minimal Assist  Gait: Level Of Assist: Minimal Assist with Front-Wheel Walker       Plan of Care: Will benefit from Physical Therapy 4 times per week  Discharge Recommendations: Equipment: Will Continue to Assess for Equipment Needs. Pt is a pleasant 87 y.o. male who presented 3/30/2020 with chest pain and headache, extensive cardiac history and recent CVA. Today, pt was eager to participate, needed min A to come to EOB with HOB up, able to move L LE across mattress without assist. Pt needed min A sit to stand and Rocio for pivot steps to bs chair, was SOB with sit to stand and transfer to chair. Pt is fragile, likely to show limited activity tolerance due to reduced ejection fraction. PT to follow. Recommend post-acute placement for continued physical therapy services prior to discharge home.     See \"Rehab Therapy-Acute\" Patient Summary Report for complete documentation.     "

## 2020-03-31 NOTE — CARE PLAN
Pt understands that he needs to call before exiting bed. Pt understands plan of care and current diagnosis to the best of his ability.

## 2020-03-31 NOTE — ED NOTES
Bedside report given. Axox4, no neuro changes. Patient remains at arrival baseline. See prior notes

## 2020-03-31 NOTE — PROGRESS NOTES
[facility] Cardiology Follow-up Note    Date of Service:    3/31/2020      Name:   Kyler Donato   YOB: 1932  Age:   87 y.o.  male   MRN:   [id number]      Chief Complaint: NSTEMI    Primary Cardiologist:  Dr Mccormack    HPI:  Mr Donato is an 87 y.o fellow with hx of CAD s/p large anterior MI in 1995 wtih stent to the LAD.  He has hx of ICMO with EF in the 25-35% range.   Some notes of COPD, with a 50 pack year hx.  He has hx of paroxysmal SVT on Holter in 2015.  He presented to [facility] on 3/22/20 with CP and NSTEMI, underwent LHC on 3/23 S/p 2.5 x 12 MATY to the mid LCx.  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      Cardiology was re-consulted 3/30/20 after patient developed CP at rehab, was noted to have up-trending troponin.    Interim Events:  Patient is resting comfortably in bed this morning.  He denies chest pain or shortness of breath  States he was doing well with rehab and getting stronger      ROS  Constitutional:  + fatigue.  Respiratory:  Denies shortness of breath, no cough.  Cardiovascular:  No chest pain.  no lower extremity edema.  Denies orthopnea or PND.  : denies polyuria, no dysuria.  GI:  Denies nausea/vomiting.  No abdominal distention.  Neuro:  Denies dizziness, syncope. + L upper and lower ext weakness, some pain around the L orbital area.   Hem/lymph: Denies easy bleeding/bruising.      All other review of systems reviewed and negative.    Past medical, surgical, social, and family history reviewed and unchanged from admission except as noted in assessment and plan.    Medications: Reviewed in MAR  Current Facility-Administered Medications   Medication Dose Frequency Provider Last Rate Last Dose   • aspirin EC (ECOTRIN) tablet 81 mg  81 mg DAILY Mihai Tafoya D.O.   81 mg at 03/31/20 0447   • atorvastatin (LIPITOR) tablet 80 mg   "80 mg Q EVENING Mihai Tafoya D.O.   80 mg at 03/30/20 1830   • gabapentin (NEURONTIN) capsule 300 mg  300 mg TID Mihai Tafoya D.O.   300 mg at 03/31/20 0447   • magnesium oxide (MAG-OX) tablet 400 mg  400 mg DAILY Mihai Tafoya D.O.   400 mg at 03/31/20 0447   • omeprazole (PRILOSEC) capsule 20 mg  20 mg BID THERESA DeleonOElva   20 mg at 03/31/20 0447   • senna-docusate (PERICOLACE or SENOKOT S) 8.6-50 MG per tablet 2 Tab  2 Tab BID Mihai Tafoya D.O.   2 Tab at 03/31/20 0447    And   • polyethylene glycol/lytes (MIRALAX) PACKET 1 Packet  1 Packet QDAY PRN Mihai Tafoya D.O.        And   • magnesium hydroxide (MILK OF MAGNESIA) suspension 30 mL  30 mL QDAY PRN Mihai Tafoya D.O.        And   • bisacodyl (DULCOLAX) suppository 10 mg  10 mg QDAY PRN Mihai Tafoya D.O.       • acetaminophen (TYLENOL) tablet 650 mg  650 mg Q6HRS PRN Mihai Tafoya D.O.       • morphine (pf) 4 MG/ML injection 2-4 mg  2-4 mg Q5 MIN PRN Mihai Tafoya D.O.   4 mg at 03/31/20 0112   • nitroglycerin (NITROSTAT) tablet 0.4 mg  0.4 mg Q5 MIN PRN Mihai Tafoya D.O.       • Pharmacy Consult Request   PHARMACY TO DOSE Radhika Jain M.D.       Last reviewed on 3/30/2020  6:01 PM by Joellen Holm R.N.    No Known Allergies    Physical Exam  Body mass index is 20.6 kg/m². /72   Pulse 78   Temp 36.9 °C (98.5 °F) (Temporal)   Resp 18   Ht 1.803 m (5' 11\")   Wt 67 kg (147 lb 11.3 oz)   SpO2 93%    Vitals:    03/30/20 1800 03/30/20 1926 03/31/20 0023 03/31/20 0422   BP: 134/79 123/77 117/70 117/72   Pulse: 82 92 85 78   Resp: 18 16 16 18   Temp: 36.6 °C (97.8 °F) 37.5 °C (99.5 °F) 36.9 °C (98.4 °F) 36.9 °C (98.5 °F)   TempSrc: Temporal Temporal Temporal Temporal   SpO2: 92% 92% 92% 93%   Weight:   67 kg (147 lb 11.3 oz)    Height:        Oxygen Therapy:  Pulse Oximetry: 93 %, O2 (LPM): 0, O2 Delivery Device: None - Room Air    General: no apparent distress.  ENT: OP clear  Neck: no JVD   Lungs: normal " respiratory effort,  without crackles, no wheezing or rhonchi.  Heart: normal rate,  regular rhythm, 2/6 systolic murmur USB and 2/6 systolic murmur at apex, no rubs or gallops,  EXT: no edema bilateral lower extremities. + bilateral pedal pulses. no cyanosis  Abdomen: soft, non tender, non distended  Neurological: Normal speech.  Less L sided facial weakness than last week.  L upper and lower ext weakness still present but somewhat improved.  Strength:  R arm 4+, L arm 2+, R leg 3+ L leg 1+  Psychiatric: Appropriate affect, alert and oriented x 3.   Skin: cool extremities, no rash.    Labs (personally reviewed):     Lab Results   Component Value Date/Time    SODIUM 143 03/30/2020 11:16 PM    POTASSIUM 4.5 03/30/2020 11:16 PM    CHLORIDE 107 03/30/2020 11:16 PM    CO2 25 03/30/2020 11:16 PM    GLUCOSE 98 03/30/2020 11:16 PM    BUN 21 03/30/2020 11:16 PM    CREATININE 1.41 (H) 03/30/2020 11:16 PM     Lab Results   Component Value Date/Time    ALKPHOSPHAT 82 03/30/2020 11:32 AM    ASTSGOT 27 03/30/2020 11:32 AM    ALTSGPT 21 03/30/2020 11:32 AM    TBILIRUBIN 0.4 03/30/2020 11:32 AM      Lab Results   Component Value Date/Time    CHOLSTRLTOT 91 (L) 03/25/2020 02:19 AM    LDL 30 03/25/2020 02:19 AM    HDL 47 03/25/2020 02:19 AM    TRIGLYCERIDE 69 03/25/2020 02:19 AM     Lab Results   Component Value Date/Time    BNPBTYPENAT 612 (H) 11/19/2018 02:34 PM         Cardiac Imaging and Procedures Review:      Personal Telemetry Review:  NS in the 70s with PVCs.      Echo 3/30/20:  CONCLUSIONS  Limited echo.  Left ventricular ejection fraction is visually estimated to be 10-15%.  Low flow low gradient aortic stenosis. SVi 20.9 mL/m2. PANCHO 1.2 cm2 by   continuity equation. AV mean gradient 5.7 mmHg. Trace aortic   insufficiency.     Compared to the images of the prior study done  3/23/2020, no   significant changes. Elqf-[id number] comparison unchanged left ventricular   function.    [facility] 3/23/20:     FINDINGS:  HEMODYNAMICS:  LEFT  HEART PRESSURES:  1.  LVEDP 18 mmHg.  2.  Left ventricular systolic pressure 104 mmHg.  3.  Central aortic pressure systolic 105, diastolic 55, mean of 72 mmHg.     CORONARY ARTERIOGRAPHY:  1.  LEFT MAIN ARTERY:  Left main artery is a moderately large caliber vessel   with eccentric ostial stenosis of approximately 30%.  The left main artery   bifurcates to left anterior descending artery and circumflex artery.  2.  LEFT ANTERIOR DESCENDING ARTERY:  Left anterior descending artery gives   rise to normal complement of septal and diagonal branches and extends around   the apex.  The proximal left anterior descending artery has an estimated   relative 40% stenosis followed by what appears to be a stent that is patent.    The remainder of the vessel is patent with mild diffuse intimal atheromatous   disease.  3.  CIRCUMFLEX ARTERY:  The circumflex gives rise to 2 major marginal   branches.  The mid circumflex has an eccentric 75-90% hazy stenosis, which is   new compared to the 2017 study.  4.  RIGHT CORONARY ARTERY:  The right coronary artery gives rise to a   bifurcating posterior descending artery and a posterolateral system.  The   right coronary artery has a midvessel eccentric 30% stenosis.  The medial   branch of the posterior descending artery has a midvessel 95% stenosis.  This   vessel is small caliber.     INTRAVASCULAR ULTRASOUND (IVUS), left main artery demonstrates an   ostial minimal luminal area (MLA) of 8.0-9.3 square millimeters indicating a   nonsignificant obstructive stenosis..     POST-STENT IMPLANTATION, mid circumflex artery with a 2.5x12 mm Gabe   drug-eluting stent demonstrates 0% residual stenosis, no evidence of   dissection or thrombus and ROBBIE 3 antegrade flow.      Assessment and Medical Decision Makin   Recurrent NSTEMI.  S/p 2.5 x 12 MATY to the mid LCx 3/23/20.  Continue 81 mg ASA. Change Plavix to Effient.  Continue heparin ggt - pharmacy to dose weight based for 24-48 hours per  Dr. Jain.  Continue high intensity statin.  Will add low dose BB.    2   Acute Cerebellular CVA, L vertebral artery occlusion. post cath 3/23/20. Head CT 3/30/20 stable with evolving infarct, no hemorrhage.      3   Occasional PVCs.       4   Chronic systolic congestive heart failure, NYHA class II, stage C, secondary to ischemic cardiomyopathy. EF 15%.   Continues on low dose BB, may change to Toprol XL to meet guidelines at d.c.   May benefit from ACEI/ARB in the future.     6   CAD with hx of anterior MI 1995, s/p LAD stent - patent .  Hx of moderate disease to the mid RCA, 95% mid PDA.  LM 30% - not significant by IVUS.  Continue med mgmt.     7   History of paroxysmal SVT.      8   Essential hypertension.  stable.  117/72     9   Hyperlipidemia. Continue atorvastatin 80.  LDL 42.      10  Aortic stenosis. Probably moderate.    11  Severe mitral regurgitation.  Does not appear volume overloaded at this time.     Will follow.    Lian Quintana PA-C  [facility]

## 2020-03-31 NOTE — DISCHARGE PLANNING
[facility] Transitional Care Coordination     F/U for Rehab.  Recurrent NSTEMI.  Heparin gtt.  Monitoring for potential return to IRF as medically appropriate.

## 2020-03-31 NOTE — CARE PLAN
RN and cardiology have plan in place currently to treat pt. Pt is following all unit safety protocols.

## 2020-03-31 NOTE — RESPIRATORY CARE
COPD EDUCATION by COPD CLINICAL EDUCATOR  3/31/2020 at 6:21 AM by Juanita Coto, RRT     Patient reviewed by COPD education team. Patient does not have a history or diagnosis of COPD and is a non-smoker, therefore does not qualify for the COPD program.

## 2020-03-31 NOTE — PROGRESS NOTES
[facility] Medicine Daily Progress Note    Date of Service  3/31/2020    Chief Complaint  87 y.o. male admitted 3/30/2020 with past medical history of CVA, ischemic cardiomyopathy with EF of 25%, aortic stenosis, urinary retention comes into the emergency room from rehab with complaints of chest pain.    Hospital Course    Patient presented with normal vital signs.  EKG found normal sinus rhythm, left anterior fascicular block, Q waves in lateral leads.  Patient troponin was greater than 2900.  CT scan of the head found involving stroke but no acute hemorrhage.      Interval Problem Update  3/31: Cardiology evaluated patient determined that he would likely need medical management rather than a cardiac cath since he had a recent stroke.  In the ER patient was started on heparin drip and Plavix was converted into Effient.  Today the patient described worsening of his stroke symptoms.  I will order a CT scan of the head to rule out hemorrhagic transformation.  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  I suggest to lay the patient flat and avoid use changes in blood pressure.  I will also order a chest x-ray since he was having hypoxia while laying down flat.  I have also consulted palliative care for further advanced care planning.    Consultants/Specialty  Cardiology    Code Status  DNR/DNI    Disposition  TBD    Review of Systems  Review of Systems   Constitutional: Negative for chills, diaphoresis, fever and malaise/fatigue.   HENT: Negative for congestion, ear discharge, ear pain, hearing loss, nosebleeds, sinus pain, sore throat and tinnitus.    Eyes: Negative for blurred vision, double vision, photophobia and pain.   Respiratory: Positive for shortness of breath. Negative for cough, hemoptysis, sputum production, wheezing and stridor.    Cardiovascular: Positive for chest pain. Negative for palpitations, orthopnea,  claudication, leg swelling and PND.   Gastrointestinal: Negative for abdominal pain, blood in stool, constipation, diarrhea, heartburn, melena, nausea and vomiting.   Genitourinary: Negative for dysuria, flank pain, frequency, hematuria and urgency.        Chronically self caths   Musculoskeletal: Negative for back pain, falls, joint pain, myalgias and neck pain.   Skin: Negative for itching and rash.   Neurological: Positive for dizziness, weakness and headaches. Negative for tingling and tremors.   Endo/Heme/Allergies: Negative for environmental allergies and polydipsia. Does not bruise/bleed easily.   Psychiatric/Behavioral: Negative for depression, hallucinations, substance abuse and suicidal ideas. The patient is not nervous/anxious.         Physical Exam  Temp:  [36.6 °C (97.8 °F)-37.5 °C (99.5 °F)] 36.7 °C (98 °F)  Pulse:  [69-92] 69  Resp:  [15-18] 16  BP: (100-134)/(50-79) 113/64  SpO2:  [92 %-95 %] 95 %    Physical Exam  Vitals signs reviewed.   Constitutional:       Appearance: Normal appearance. He is not diaphoretic.   HENT:      Head: Normocephalic and atraumatic.      Nose: Nose normal.      Mouth/Throat:      Mouth: Mucous membranes are moist.      Pharynx: No oropharyngeal exudate.   Eyes:      General: No scleral icterus.        Right eye: No discharge.         Left eye: No discharge.      Extraocular Movements: Extraocular movements intact.      Conjunctiva/sclera: Conjunctivae normal.   Neck:      Musculoskeletal: No muscular tenderness.      Vascular: No carotid bruit.   Cardiovascular:      Rate and Rhythm: Regular rhythm. Tachycardia present.      Pulses:           Radial pulses are 2+ on the right side and 2+ on the left side.        Dorsalis pedis pulses are 2+ on the right side and 2+ on the left side.      Heart sounds: Murmur present.   Pulmonary:      Effort: Pulmonary effort is normal. No respiratory distress.      Breath sounds: Normal breath sounds. No wheezing or rales.   Abdominal:       General: Bowel sounds are normal. There is no distension.      Palpations: Abdomen is soft.   Musculoskeletal:         General: No swelling or tenderness.      Right lower leg: No edema.      Left lower leg: No edema.   Lymphadenopathy:      Cervical: No cervical adenopathy.   Skin:     General: Skin is warm.      Coloration: Skin is not jaundiced or pale.   Neurological:      General: No focal deficit present.      Mental Status: He is alert and oriented to person, place, and time.      Cranial Nerves: Cranial nerve deficit (left sided weakness) present.      Motor: Weakness present.   Psychiatric:         Mood and Affect: Mood normal.         Behavior: Behavior normal.         Fluids    Intake/Output Summary (Last 24 hours) at 3/31/2020 1652  Last data filed at 3/31/2020 0800  Gross per 24 hour   Intake 340 ml   Output 900 ml   Net -560 ml       Laboratory  Recent Labs     03/30/20  0915 03/30/20  1132 03/30/20  2316   WBC 6.5 6.9 7.1   RBC 3.13* 3.25* 3.04*   HEMOGLOBIN 10.1* 10.6* 9.9*   HEMATOCRIT 31.8* 33.1* 30.5*   .6* 101.8* 100.3*   MCH 32.3 32.6 32.6   MCHC 31.8* 32.0* 32.5*   RDW 49.4 49.2 48.5   PLATELETCT 224 217 213   MPV 10.4 10.4 9.8     Recent Labs     03/30/20  0915 03/30/20  1132 03/30/20  2316   SODIUM 143 141 143   POTASSIUM 4.3 4.2 4.5   CHLORIDE 107 105 107   CO2 25 24 25   GLUCOSE 101* 114* 98   BUN 22 22 21   CREATININE 1.55* 1.53* 1.41*   CALCIUM 9.4 9.2 9.5     Recent Labs     03/30/20  1132 03/31/20  0846 03/31/20  1516   APTT 31.0 30.7 97.1*   INR 1.21*  --   --                Imaging  CT-HEAD W/O   Final Result      1.  The evolving large left cerebellar/PICA infarct.   2.  No acute intracranial hemorrhage.      EC-ECHOCARDIOGRAM LTD W/ CONT   Final Result      DX-CHEST-PORTABLE (1 VIEW)   Final Result      Stable bibasilar opacities likely pneumonitis, less likely edema. Possible small pleural effusions.      Cardiomegaly.      CT-HEAD W/O    (Results Pending)    DX-CHEST-PORTABLE (1 VIEW)    (Results Pending)        Assessment/Plan  NSTEMI (non-ST elevated myocardial infarction) ([facility])- (present on admission)  Assessment & Plan  Atorvastatin 80 mg q. Evening  Monitor on telemetry  Nitroglycerin as needed  Serial troponins  Cardiology consulting  Aspirin Effient per cardiology  Cardiology recommended for medical management    Chronic combined systolic and diastolic congestive heart failure (HCC)- (present on admission)  Assessment & Plan  3/30 Echo pending  3/23/2020 Echo:  severe mitral regurgitation.  Severely reduced left ventricular systolic function.  Global hypokinesis,  function is best preserved in the basal anterior   and inferolateral wall.  Grade III diastolic dysfunction.  Reduced right ventricular systolic function.  Severe mitral regurgitation.  Possible severe aortic stenosis in the setting of reduced cardiac   output.  Mild aortic insufficiency.    Coronary artery disease involving native coronary artery- (present on admission)  Assessment & Plan  Continue aspirin and Effient    COPD (chronic obstructive pulmonary disease) ([facility])- (present on admission)  Assessment & Plan  No acute exacerbation  RT per protocol if needed    Acute encephalopathy  Assessment & Plan  Patient had episode of confusion with worsening of his old stroke symptoms.  It is likely that the patient is hypoperfusion in the brain  We will keep him flat to help perfuse  I have ordered a CT scan of the head to rule out hemorrhagic transformation    Acute on chronic renal insufficiency  Assessment & Plan  Heart failure w/ poor perfusion vs other.  Monitor I/O's, labs  Renally dose medications    Neurogenic bladder  Assessment & Plan  Patient will require self cath versus Sun catheter placement\  Strict I's and O's    CVA (cerebral vascular accident) ([facility])- (present on admission)  Assessment & Plan  Patient had recently been at rehab and was just recently sent home.  3/30 Head CT:  1.  The  evolving large left cerebellar/PICA infarct.  2.  No acute intracranial hemorrhage.    Anemia- (present on admission)  Assessment & Plan  Chronic  Macrocytic   Monitor CBC no acute signs of blood loss    Essential hypertension- (present on admission)  Assessment & Plan  Monitor vitals.         VTE prophylaxis: Heparin

## 2020-03-31 NOTE — PROGRESS NOTES
Skin Check on admit.  Skin intact. Generalized bruising on body. Pts sacrum, heels, and elbows and ears red but blanching.

## 2020-03-31 NOTE — DISCHARGE PLANNING
Care Transition Team Assessment    Information Source  Orientation : Oriented x 4  Information Given By: Patient    In the case of an emergency, please contact Georgie Luke  at [phone].     This RN Case Manager spoke with Georgie via telephone and obtained the information used in this assessment. Georgie verified accuracy of facesheet. Patient lived with his wife in a one story home prior to last admission. Then patient [facility]. Prior to current hospitalization, patient was completely independent with ADLS/IADLS. He was also the primary caregiver for his wife who has dementia. His wife is now being cared for at a group home. Patient is a retired [employer]. Patient's support system include his daughter and step daughter. Patient has an advanced directive. Patient's discharge plan is pending at this time. Rehab vs home with hospice.        Elopement Risk  Legal Hold: No  Ambulatory or Self Mobile in Wheelchair: Yes  Disoriented: No  Psychiatric Symptoms: None  History of Wandering: No  Elopement this Admit: No  Vocalizing Wanting to Leave: No  Displays Behaviors, Body Language Wanting to Leave: No-Not at Risk for Elopement  Elopement Risk: Not at Risk for Elopement    Interdisciplinary Discharge Planning  Does Admitting Nurse Feel This Could be a Complex Discharge?: No  Lives with - Patient's Self Care Capacity: Attendant / Paid Care Giver  Patient or legal guardian wants to designate a caregiver (see row info): No  Support Systems: Family Member(s)  Housing / Facility: Assisted Living Residence  Do You Take your Prescribed Medications Regularly: Yes  Able to Return to Previous ADL's: Yes  Mobility Issues: Yes  Patient Expects to be Discharged to:: rehab center   Assistance Needed: No  Durable Medical Equipment: Not Applicable      Vision / Hearing Impairment  Vision Impairment : Yes  Right Eye Vision: Impaired, Wears Glasses  Left  Eye Vision: Impaired, Wears Glasses  Hearing Impairment : Yes  Hearing Impairment: Both Ears, Hearing Device(s) Available  Does Pt Need Special Equipment for the Hearing Impaired?: No     Domestic Abuse  Have you ever been the victim of abuse or violence?: No  Physical Abuse or Sexual Abuse: No  Possible Abuse Reported to:: Not Applicable

## 2020-03-31 NOTE — THERAPY
"Occupational Therapy Evaluation completed.   Functional Status:  Pt supine in bed on arrival.  Pt very eager to regain his independence.  Pt able to perform his own self cath with set up.  Pt was Min A for supine to sit EOB.  Pt was Min A for LB dressing.  Pt has mild weakness LUE.  Pt stood with Min A & transferred to bedside chair with Min A.  Pt able to self feed with supervision.  Pt does fatigue easily & becomes SOB with minimal exertion.  Pt required rest breaks between ADL's.  Plan of Care: Will benefit from Occupational Therapy 3 times per week  Discharge Recommendations:  Equipment: Will Continue to Assess for Equipment Needs. Post-acute therapy Recommend post-acute placement for additional occupational therapy services prior to discharge home.      See \"Rehab Therapy-Acute\" Patient Summary Report for complete documentation.    "

## 2020-03-31 NOTE — ASSESSMENT & PLAN NOTE
Patient had episode of confusion with worsening of his old stroke symptoms.  It is likely that the patient is hypoperfusion in the brain  We will keep him flat to help perfuse  I have ordered a CT scan of the head to rule out hemorrhagic transformation

## 2020-03-31 NOTE — PROGRESS NOTES
Pt sent to the ER at 1105 per Dr. Chambers, pt was c/o chest pain this am the night RN notified the on call Dr who ordered a dose of morphine with resolve to pain. Dr chambers ordered further labs in the am, when labs came back pt had elevated troponin's so we sent him to the ER. Pt did not want us to contact his family at this time because he didn't want them to worry. From the look of the notes it looked like patient would be staying at the hospital for now so we notified spouse, who in turn notified daughter Georgie. Georgie called very concerned wants to be the first notified due to underlying dementia in with patients spouse, this was noted on the patients chart.

## 2020-03-31 NOTE — CARE PLAN
Problem: Communication  Goal: The ability to communicate needs accurately and effectively will improve  Outcome: PROGRESSING AS EXPECTED    Pt whiteboard updated on plan of care  Pt encouraged to call for assistance  Pt encouraged to voice any concerns or questions regarding care plan  Pt updated on plan of care as it develops and changes    Problem: Safety  Goal: Will remain free from injury  Outcome: PROGRESSING AS EXPECTED  Bed locked and lowest position. Bed alarm on. Treaded socks on. Call light and belongings within reach. Pt educated to call for assistance. Pt verbalized understanding. Hourly rounding in place.

## 2020-04-01 LAB
ALBUMIN SERPL BCP-MCNC: 3.8 G/DL (ref 3.2–4.9)
ALBUMIN/GLOB SERPL: 1.4 G/DL
ALP SERPL-CCNC: 82 U/L (ref 30–99)
ALT SERPL-CCNC: 34 U/L (ref 2–50)
ANION GAP SERPL CALC-SCNC: 12 MMOL/L (ref 7–16)
APPEARANCE UR: CLEAR
APTT PPP: 136.2 SEC (ref 24.7–36)
APTT PPP: 50.7 SEC (ref 24.7–36)
AST SERPL-CCNC: 37 U/L (ref 12–45)
BACTERIA #/AREA URNS HPF: ABNORMAL /HPF
BASOPHILS # BLD AUTO: 0.7 % (ref 0–1.8)
BASOPHILS # BLD: 0.05 K/UL (ref 0–0.12)
BILIRUB SERPL-MCNC: 0.4 MG/DL (ref 0.1–1.5)
BILIRUB UR QL STRIP.AUTO: NEGATIVE
BUN SERPL-MCNC: 21 MG/DL (ref 8–22)
CALCIUM SERPL-MCNC: 9.3 MG/DL (ref 8.5–10.5)
CHLORIDE SERPL-SCNC: 105 MMOL/L (ref 96–112)
CO2 SERPL-SCNC: 26 MMOL/L (ref 20–33)
COLOR UR: YELLOW
CREAT SERPL-MCNC: 1.44 MG/DL (ref 0.5–1.4)
EOSINOPHIL # BLD AUTO: 0.17 K/UL (ref 0–0.51)
EOSINOPHIL NFR BLD: 2.2 % (ref 0–6.9)
EPI CELLS #/AREA URNS HPF: NEGATIVE /HPF
ERYTHROCYTE [DISTWIDTH] IN BLOOD BY AUTOMATED COUNT: 49.5 FL (ref 35.9–50)
GLOBULIN SER CALC-MCNC: 2.7 G/DL (ref 1.9–3.5)
GLUCOSE SERPL-MCNC: 86 MG/DL (ref 65–99)
GLUCOSE UR STRIP.AUTO-MCNC: NEGATIVE MG/DL
HCT VFR BLD AUTO: 31.9 % (ref 42–52)
HGB BLD-MCNC: 10.3 G/DL (ref 14–18)
HYALINE CASTS #/AREA URNS LPF: ABNORMAL /LPF
IMM GRANULOCYTES # BLD AUTO: 0.04 K/UL (ref 0–0.11)
IMM GRANULOCYTES NFR BLD AUTO: 0.5 % (ref 0–0.9)
KETONES UR STRIP.AUTO-MCNC: NEGATIVE MG/DL
LEUKOCYTE ESTERASE UR QL STRIP.AUTO: ABNORMAL
LYMPHOCYTES # BLD AUTO: 1.46 K/UL (ref 1–4.8)
LYMPHOCYTES NFR BLD: 19.2 % (ref 22–41)
MAGNESIUM SERPL-MCNC: 1.9 MG/DL (ref 1.5–2.5)
MCH RBC QN AUTO: 32.5 PG (ref 27–33)
MCHC RBC AUTO-ENTMCNC: 32.3 G/DL (ref 33.7–35.3)
MCV RBC AUTO: 100.6 FL (ref 81.4–97.8)
MICRO URNS: ABNORMAL
MONOCYTES # BLD AUTO: 1 K/UL (ref 0–0.85)
MONOCYTES NFR BLD AUTO: 13.2 % (ref 0–13.4)
NEUTROPHILS # BLD AUTO: 4.88 K/UL (ref 1.82–7.42)
NEUTROPHILS NFR BLD: 64.2 % (ref 44–72)
NITRITE UR QL STRIP.AUTO: NEGATIVE
NRBC # BLD AUTO: 0 K/UL
NRBC BLD-RTO: 0 /100 WBC
PH UR STRIP.AUTO: 8 [PH] (ref 5–8)
PHOSPHATE SERPL-MCNC: 3.4 MG/DL (ref 2.5–4.5)
PLATELET # BLD AUTO: 202 K/UL (ref 164–446)
PMV BLD AUTO: 10.3 FL (ref 9–12.9)
POTASSIUM SERPL-SCNC: 4.4 MMOL/L (ref 3.6–5.5)
PROCALCITONIN SERPL-MCNC: 0.07 NG/ML
PROT SERPL-MCNC: 6.5 G/DL (ref 6–8.2)
PROT UR QL STRIP: NEGATIVE MG/DL
RBC # BLD AUTO: 3.17 M/UL (ref 4.7–6.1)
RBC # URNS HPF: ABNORMAL /HPF
RBC UR QL AUTO: NEGATIVE
SODIUM SERPL-SCNC: 143 MMOL/L (ref 135–145)
SP GR UR STRIP.AUTO: 1.01
UROBILINOGEN UR STRIP.AUTO-MCNC: 0.2 MG/DL
WBC # BLD AUTO: 7.6 K/UL (ref 4.8–10.8)
WBC #/AREA URNS HPF: ABNORMAL /HPF

## 2020-04-01 PROCEDURE — A9270 NON-COVERED ITEM OR SERVICE: HCPCS | Performed by: HOSPITALIST

## 2020-04-01 PROCEDURE — 85730 THROMBOPLASTIN TIME PARTIAL: CPT

## 2020-04-01 PROCEDURE — 36415 COLL VENOUS BLD VENIPUNCTURE: CPT

## 2020-04-01 PROCEDURE — 81001 URINALYSIS AUTO W/SCOPE: CPT

## 2020-04-01 PROCEDURE — A9270 NON-COVERED ITEM OR SERVICE: HCPCS | Performed by: PHYSICIAN ASSISTANT

## 2020-04-01 PROCEDURE — 84100 ASSAY OF PHOSPHORUS: CPT

## 2020-04-01 PROCEDURE — 99497 ADVNCD CARE PLAN 30 MIN: CPT | Performed by: HOSPITALIST

## 2020-04-01 PROCEDURE — 86480 TB TEST CELL IMMUN MEASURE: CPT

## 2020-04-01 PROCEDURE — 84145 PROCALCITONIN (PCT): CPT

## 2020-04-01 PROCEDURE — 83735 ASSAY OF MAGNESIUM: CPT

## 2020-04-01 PROCEDURE — 99232 SBSQ HOSP IP/OBS MODERATE 35: CPT | Mod: 25 | Performed by: HOSPITALIST

## 2020-04-01 PROCEDURE — 80053 COMPREHEN METABOLIC PANEL: CPT

## 2020-04-01 PROCEDURE — 85025 COMPLETE CBC W/AUTO DIFF WBC: CPT

## 2020-04-01 PROCEDURE — 700111 HCHG RX REV CODE 636 W/ 250 OVERRIDE (IP): Performed by: HOSPITALIST

## 2020-04-01 PROCEDURE — 700102 HCHG RX REV CODE 250 W/ 637 OVERRIDE(OP): Performed by: HOSPITALIST

## 2020-04-01 PROCEDURE — 97116 GAIT TRAINING THERAPY: CPT

## 2020-04-01 PROCEDURE — 770020 HCHG ROOM/CARE - TELE (206)

## 2020-04-01 PROCEDURE — 700102 HCHG RX REV CODE 250 W/ 637 OVERRIDE(OP): Performed by: PHYSICIAN ASSISTANT

## 2020-04-01 PROCEDURE — 97530 THERAPEUTIC ACTIVITIES: CPT

## 2020-04-01 PROCEDURE — 99233 SBSQ HOSP IP/OBS HIGH 50: CPT | Performed by: INTERNAL MEDICINE

## 2020-04-01 RX ORDER — MECLIZINE HYDROCHLORIDE 25 MG/1
25 TABLET ORAL 3 TIMES DAILY PRN
Status: DISCONTINUED | OUTPATIENT
Start: 2020-04-01 | End: 2020-04-02 | Stop reason: HOSPADM

## 2020-04-01 RX ADMIN — GABAPENTIN 300 MG: 300 CAPSULE ORAL at 12:44

## 2020-04-01 RX ADMIN — PRASUGREL 10 MG: 10 TABLET, FILM COATED ORAL at 06:36

## 2020-04-01 RX ADMIN — METOPROLOL TARTRATE 12.5 MG: 25 TABLET, FILM COATED ORAL at 05:59

## 2020-04-01 RX ADMIN — METOPROLOL TARTRATE 12.5 MG: 25 TABLET, FILM COATED ORAL at 17:05

## 2020-04-01 RX ADMIN — Medication 400 MG: at 06:00

## 2020-04-01 RX ADMIN — SENNOSIDES AND DOCUSATE SODIUM 2 TABLET: 8.6; 5 TABLET ORAL at 05:51

## 2020-04-01 RX ADMIN — OMEPRAZOLE 20 MG: 20 CAPSULE, DELAYED RELEASE ORAL at 05:50

## 2020-04-01 RX ADMIN — MORPHINE SULFATE 2 MG: 4 INJECTION INTRAVENOUS at 21:32

## 2020-04-01 RX ADMIN — GABAPENTIN 300 MG: 300 CAPSULE ORAL at 17:05

## 2020-04-01 RX ADMIN — ASPIRIN 81 MG: 81 TABLET, COATED ORAL at 05:50

## 2020-04-01 RX ADMIN — ATORVASTATIN CALCIUM 80 MG: 80 TABLET, FILM COATED ORAL at 17:05

## 2020-04-01 RX ADMIN — OMEPRAZOLE 20 MG: 20 CAPSULE, DELAYED RELEASE ORAL at 17:05

## 2020-04-01 RX ADMIN — GABAPENTIN 300 MG: 300 CAPSULE ORAL at 06:00

## 2020-04-01 RX ADMIN — MORPHINE SULFATE 2 MG: 4 INJECTION INTRAVENOUS at 06:00

## 2020-04-01 ASSESSMENT — PATIENT HEALTH QUESTIONNAIRE - PHQ9
SUM OF ALL RESPONSES TO PHQ9 QUESTIONS 1 AND 2: 0
2. FEELING DOWN, DEPRESSED, IRRITABLE, OR HOPELESS: NOT AT ALL
1. LITTLE INTEREST OR PLEASURE IN DOING THINGS: NOT AT ALL

## 2020-04-01 ASSESSMENT — ENCOUNTER SYMPTOMS
FALLS: 0
HEADACHES: 1
WEAKNESS: 1
DIAPHORESIS: 0
VOMITING: 0
ABDOMINAL PAIN: 0
ORTHOPNEA: 0
FEVER: 0
CONSTIPATION: 0
FLANK PAIN: 0
NECK PAIN: 0
STRIDOR: 0
PND: 0
TINGLING: 0
BACK PAIN: 0
NERVOUS/ANXIOUS: 0
TREMORS: 0
SORE THROAT: 0
EYE PAIN: 0
DIZZINESS: 1
SINUS PAIN: 0
DIARRHEA: 0
DOUBLE VISION: 0
NAUSEA: 0
BLOOD IN STOOL: 0
PALPITATIONS: 0
WHEEZING: 0
SHORTNESS OF BREATH: 1
SPUTUM PRODUCTION: 0
BLURRED VISION: 0
POLYDIPSIA: 0
COUGH: 0
CLAUDICATION: 0
MYALGIAS: 0
BRUISES/BLEEDS EASILY: 0
CHILLS: 0
HEARTBURN: 0
HALLUCINATIONS: 0
DEPRESSION: 0
SHORTNESS OF BREATH: 0
PHOTOPHOBIA: 0
HEMOPTYSIS: 0

## 2020-04-01 ASSESSMENT — COGNITIVE AND FUNCTIONAL STATUS - GENERAL
CLIMB 3 TO 5 STEPS WITH RAILING: TOTAL
MOBILITY SCORE: 12
STANDING UP FROM CHAIR USING ARMS: A LITTLE
WALKING IN HOSPITAL ROOM: A LOT
MOVING TO AND FROM BED TO CHAIR: UNABLE
MOVING FROM LYING ON BACK TO SITTING ON SIDE OF FLAT BED: UNABLE
SUGGESTED CMS G CODE MODIFIER MOBILITY: CL

## 2020-04-01 ASSESSMENT — LIFESTYLE VARIABLES: SUBSTANCE_ABUSE: 0

## 2020-04-01 ASSESSMENT — GAIT ASSESSMENTS
DISTANCE (FEET): 100
GAIT LEVEL OF ASSIST: MINIMAL ASSIST
ASSISTIVE DEVICE: FRONT WHEEL WALKER

## 2020-04-01 ASSESSMENT — FIBROSIS 4 INDEX: FIB4 SCORE: 2.73

## 2020-04-01 NOTE — CARE PLAN
Pt is following all unit safety protocols. Pt does have occasional chest pain related to disease processes. Pain is controlled by occasional dose of morphine with pain.

## 2020-04-01 NOTE — CARE PLAN
Problem: Communication  Goal: The ability to communicate needs accurately and effectively will improve  Outcome: PROGRESSING AS EXPECTED   Plan of care discussed with patient, Whiteboard updated.   Problem: Safety  Goal: Will remain free from injury  Outcome: PROGRESSING AS EXPECTED   Bed locked and in low position, fall precautions in place   Problem: Knowledge Deficit  Goal: Knowledge of disease process/condition, treatment plan, diagnostic tests, and medications will improve  Outcome: PROGRESSING AS EXPECTED

## 2020-04-01 NOTE — DISCHARGE PLANNING
Patient has been inpatient at [facility] for 2 midnights. Will continue to follow and discharge if remains inpatient tonight.

## 2020-04-01 NOTE — DISCHARGE PLANNING
Agency/Facility Name: [facility]   Spoke To: Rachel   Outcome: Per Rachel referral was not received. [facility] re faxed referral.

## 2020-04-01 NOTE — CODE DOCUMENTATION
Dr. Atkinson at bedside. MD does not want to activated a code stroke at the time. Order for CT w/o.

## 2020-04-01 NOTE — PROGRESS NOTES
Care assumed of patient by SHENA Allison. Patient resting in bed on 1L NC. Patient with some disorientation upon waking which improves with redirection. Bed locked and in low position. Bed alarm on and fall precautions in place. Will continue to monitor.

## 2020-04-01 NOTE — DISCHARGE PLANNING
Received [facility] form at [phone]  Agency/Facility Name: [facility]  Referral sent per [facility] form @ [phone]

## 2020-04-01 NOTE — PROGRESS NOTES
[facility] Cardiology Follow-up Note    Date of Service:    4/1/2020      Name:   Kyler Donato   YOB: 1932  Age:   87 y.o.  male   MRN:   [id number]      Chief Complaint: NSTEMI    Primary Cardiologist:  Dr Mccormack    HPI:  Mr Donato is an 87 y.o fellow with hx of CAD s/p large anterior MI in 1995 wtih stent to the LAD.  He has hx of ICMO with EF in the 25-35% range.   Some notes of COPD, with a 50 pack year hx.  He has hx of paroxysmal SVT on Holter in 2015.  He presented to [facility] on 3/22/20 with CP and NSTEMI, underwent LHC on 3/23 S/p 2.5 x 12 MATY to the mid LCx.  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      Cardiology was re-consulted 3/30/20 after patient developed CP at rehab, was noted to have up-trending troponin.    Interim Events:  4/1/2020: Patient resting in bed in no distress.  No rhythm issues overnight.  Denies chest pain, palpitations, orthopnea, lower extremity edema.  Has a headache which is helped with morphine.  Patient reports that he is somewhat dizzy when he stands up but by the time he ambulates to the restroom the dizziness has resolved.  DC heparin this morning.  Telemetry: SR 70s-80s  Labs:    ROS  Review of Systems   Constitutional: Negative for chills and fever.   Respiratory: Negative for cough, hemoptysis, sputum production, shortness of breath and wheezing.    Cardiovascular: Negative for chest pain, palpitations, orthopnea, claudication, leg swelling and PND.   Gastrointestinal: Negative for nausea and vomiting.   Neurological: Positive for dizziness and headaches.   All other systems reviewed and are negative.        Past medical, surgical, social, and family history reviewed and unchanged from admission except as noted in assessment and plan.    Medications: Reviewed in MAR  Current Facility-Administered Medications  "  Medication Dose Frequency Provider Last Rate Last Dose   • prasugrel (EFFIENT) tablet 10 mg  10 mg DAILY Lian Quintana P.A.-C.   10 mg at 04/01/20 0636   • heparin infusion 25,000 units in 500 mL 0.45% NACL   Continuous Lian Quintana P.A.-C. 17 mL/hr at 04/01/20 0547 850 Units/hr at 04/01/20 0547   • metoprolol (LOPRESSOR) tablet 12.5 mg  12.5 mg TWICE DAILY Lian Quintana P.A.-C.   12.5 mg at 04/01/20 0559   • aspirin EC (ECOTRIN) tablet 81 mg  81 mg DAILY ALBA Deleon.OElva   81 mg at 04/01/20 0550   • atorvastatin (LIPITOR) tablet 80 mg  80 mg Q EVENING ALBA Deleon.O.   80 mg at 03/31/20 1750   • gabapentin (NEURONTIN) capsule 300 mg  300 mg TID THERESA DeleonOElva   300 mg at 04/01/20 0600   • magnesium oxide (MAG-OX) tablet 400 mg  400 mg DAILY ALBA Deleon.OElva   400 mg at 04/01/20 0600   • omeprazole (PRILOSEC) capsule 20 mg  20 mg BID THERESA DeleonOElva   20 mg at 04/01/20 0550   • senna-docusate (PERICOLACE or SENOKOT S) 8.6-50 MG per tablet 2 Tab  2 Tab BID THERESA DeleonO.   2 Tab at 04/01/20 0551    And   • polyethylene glycol/lytes (MIRALAX) PACKET 1 Packet  1 Packet QDAY PRN Mihai Tafoya D.O.        And   • magnesium hydroxide (MILK OF MAGNESIA) suspension 30 mL  30 mL QDAY PRN Mihai Tafoya D.O.        And   • bisacodyl (DULCOLAX) suppository 10 mg  10 mg QDAY PRN THERESA DeleonO.       • acetaminophen (TYLENOL) tablet 650 mg  650 mg Q6HRS PRN Mihai Tafoya D.O.       • morphine (pf) 4 MG/ML injection 2-4 mg  2-4 mg Q5 MIN PRN THERESA DeleonOElva   2 mg at 04/01/20 0600   • nitroglycerin (NITROSTAT) tablet 0.4 mg  0.4 mg Q5 MIN PRN Mihai Tafoya D.O.   0.4 mg at 03/31/20 1625   Last reviewed on 3/30/2020  6:01 PM by Joellen Holm RTOLU.    No Known Allergies    Physical Exam  Body mass index is 20.6 kg/m². /54   Pulse 69   Temp 36.7 °C (98 °F) (Temporal)   Resp 16   Ht 1.803 m (5' 11\")   Wt 67 kg (147 lb 11.3 oz)   SpO2 99%    Vitals:    03/31/20 1717 " 03/31/20 2018 04/01/20 0000 04/01/20 0430   BP: 101/63 102/57 104/53 108/54   Pulse: 75 81 79 69   Resp: 18 16 17 16   Temp:  36.8 °C (98.2 °F) 37.4 °C (99.4 °F) 36.7 °C (98 °F)   TempSrc:  Temporal Temporal Temporal   SpO2: 99% 96% 97% 99%   Weight:       Height:        Oxygen Therapy:  Pulse Oximetry: 99 %, O2 (LPM): 2, O2 Delivery Device: Silicone Nasal Cannula    Physical Exam   Constitutional: He is oriented to person, place, and time and well-developed, well-nourished, and in no distress. No distress.   HENT:   Head: Normocephalic and atraumatic.   Eyes: EOM are normal.   Neck: Neck supple. No JVD present. No tracheal deviation present.   Cardiovascular: Normal rate, regular rhythm and normal heart sounds.   No murmur heard.  Pulmonary/Chest: Effort normal and breath sounds normal.   Abdominal: Soft. Bowel sounds are normal.   Musculoskeletal: Normal range of motion.         General: No edema.   Neurological: He is alert and oriented to person, place, and time.   Skin: Skin is warm and dry. He is not diaphoretic.   Psychiatric: Mood, memory, affect and judgment normal.   Nursing note and vitals reviewed.        Labs (personally reviewed):     Lab Results   Component Value Date/Time    SODIUM 143 03/30/2020 11:16 PM    POTASSIUM 4.5 03/30/2020 11:16 PM    CHLORIDE 107 03/30/2020 11:16 PM    CO2 25 03/30/2020 11:16 PM    GLUCOSE 98 03/30/2020 11:16 PM    BUN 21 03/30/2020 11:16 PM    CREATININE 1.41 (H) 03/30/2020 11:16 PM     Lab Results   Component Value Date/Time    ALKPHOSPHAT 82 03/30/2020 11:32 AM    ASTSGOT 27 03/30/2020 11:32 AM    ALTSGPT 21 03/30/2020 11:32 AM    TBILIRUBIN 0.4 03/30/2020 11:32 AM      Lab Results   Component Value Date/Time    CHOLSTRLTOT 91 (L) 03/25/2020 02:19 AM    LDL 30 03/25/2020 02:19 AM    HDL 47 03/25/2020 02:19 AM    TRIGLYCERIDE 69 03/25/2020 02:19 AM     Lab Results   Component Value Date/Time    BNPBTYPENAT 612 (H) 11/19/2018 02:34 PM         Cardiac Imaging and  Procedures Review:      Personal Telemetry Review:  NS 70s.      Echo 3/30/20:  CONCLUSIONS  Limited echo.  Left ventricular ejection fraction is visually estimated to be 10-15%.  Low flow low gradient aortic stenosis. SVi 20.9 mL/m2. PANCHO 1.2 cm2 by   continuity equation. AV mean gradient 5.7 mmHg. Trace aortic   insufficiency.     Compared to the images of the prior study done  3/23/2020, no   significant changes. [id number] comparison unchanged left ventricular   function.    [facility] 3/23/20:     FINDINGS:  HEMODYNAMICS:  LEFT HEART PRESSURES:  1.  LVEDP 18 mmHg.  2.  Left ventricular systolic pressure 104 mmHg.  3.  Central aortic pressure systolic 105, diastolic 55, mean of 72 mmHg.     CORONARY ARTERIOGRAPHY:  1.  LEFT MAIN ARTERY:  Left main artery is a moderately large caliber vessel   with eccentric ostial stenosis of approximately 30%.  The left main artery   bifurcates to left anterior descending artery and circumflex artery.  2.  LEFT ANTERIOR DESCENDING ARTERY:  Left anterior descending artery gives   rise to normal complement of septal and diagonal branches and extends around   the apex.  The proximal left anterior descending artery has an estimated   relative 40% stenosis followed by what appears to be a stent that is patent.    The remainder of the vessel is patent with mild diffuse intimal atheromatous   disease.  3.  CIRCUMFLEX ARTERY:  The circumflex gives rise to 2 major marginal   branches.  The mid circumflex has an eccentric 75-90% hazy stenosis, which is   new compared to the 2017 study.  4.  RIGHT CORONARY ARTERY:  The right coronary artery gives rise to a   bifurcating posterior descending artery and a posterolateral system.  The   right coronary artery has a midvessel eccentric 30% stenosis.  The medial   branch of the posterior descending artery has a midvessel 95% stenosis.  This   vessel is small caliber.     INTRAVASCULAR ULTRASOUND (IVUS), left main artery demonstrates an    ostial minimal luminal area (MLA) of 8.0-9.3 square millimeters indicating a   nonsignificant obstructive stenosis..     POST-STENT IMPLANTATION, mid circumflex artery with a 2.5x12 mm [place]   drug-eluting stent demonstrates 0% residual stenosis, no evidence of   dissection or thrombus and ROBBIE 3 antegrade flow.      Assessment and Medical Decision Makin   Recurrent NSTEMI    -S/p 2.5 x 12 MATY to the mid LCx 3/23/20   -Continue 81 mg ASA, Effient 10 mg daily, atorvastatin 80 mg every evening, metoprolol 12.5 mg twice daily    2   Acute Cerebellular CVA, L vertebral artery occlusion  -Post cath 3/23/20  -Head CT 3/30/20 stable with evolving infarct, no hemorrhage.      3   Occasional PVCs  -Continue to monitor     4   Chronic systolic congestive heart failure, NYHA class II, stage C, secondary to ischemic cardiomyopathy. EF 15%  -Continue metoprolol 12.5 mg twice daily  -May change to Toprol XL to meet guidelines at d.c.     -May benefit from ACEI/ARB in the future.     6   CAD   -DC heparin drip  -Hx of anterior MI , s/p LAD stent - patent .    -Hx of moderate disease to the mid RCA, 95% mid PDA.    -LM 30% - not significant by IVUS.  Continue med mgmt.     7   History of paroxysmal SVT.      8   Essential hypertension   -Stable 108/54     9   Hyperlipidemia.   -Continue atorvastatin 80.  LDL 42.      10  Aortic stenosis.   -Probably moderate.    11  Severe mitral regurgitation.  Does not appear volume overloaded at this time.         MICAH Main  [facility]  [phone]    Future Appointments   Date Time Provider Department Center   2020  1:20 PM [place] [facility] None       Please note that this dictation was created using voice recognition software. I have worked with consultants from the vendor as well as technical experts from [facility] to optimize the interface. I have made every reasonable attempt to correct obvious errors, but I  expect that there are errors of grammar and possibly content I did not discover before finalizing the note.

## 2020-04-01 NOTE — PROGRESS NOTES
Hospital Medicine Daily Progress Note    Date of Service  4/1/2020    Chief Complaint  87 y.o. male admitted 3/30/2020 with past medical history of CVA, ischemic cardiomyopathy with EF of 25%, aortic stenosis, urinary retention comes into the emergency room from rehab with complaints of chest pain.    Hospital Course    Patient presented with normal vital signs.  EKG found normal sinus rhythm, left anterior fascicular block, Q waves in lateral leads.  Patient troponin was greater than 2900.  CT scan of the head found involving stroke but no acute hemorrhage.      Interval Problem Update  3/31: Cardiology evaluated patient determined that he would likely need medical management rather than a cardiac cath since he had a recent stroke.  In the ER patient was started on heparin drip and Plavix was converted into Effient.  Today the patient described worsening of his stroke symptoms.  I will order a CT scan of the head to rule out hemorrhagic transformation.  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  I suggest to lay the patient flat and avoid use changes in blood pressure.  I will also order a chest x-ray since he was having hypoxia while laying down flat.  I have also consulted palliative care for further advanced care planning.  4/1: Patient was seen and examined by me today.  Left-sided weakness is improving after laying patient down flat.  We will continue to monitor his blood pressure on metoprolol.  I will also send out a UA and CBC to rule out infection.  We had a long discussion with the patient and daughter in regards to further advance care planning.  It was decided that patient would be discharged to the group home where he can be on hospice with his wife.  Consultants/Specialty  Cardiology    Code Status  DNR/DNI    Disposition  TBD    Review of Systems  Review of Systems   Constitutional: Negative for chills,  diaphoresis, fever and malaise/fatigue.   HENT: Negative for congestion, ear discharge, ear pain, hearing loss, nosebleeds, sinus pain, sore throat and tinnitus.    Eyes: Negative for blurred vision, double vision, photophobia and pain.   Respiratory: Positive for shortness of breath. Negative for cough, hemoptysis, sputum production, wheezing and stridor.    Cardiovascular: Positive for chest pain. Negative for palpitations, orthopnea, claudication, leg swelling and PND.   Gastrointestinal: Negative for abdominal pain, blood in stool, constipation, diarrhea, heartburn, melena, nausea and vomiting.   Genitourinary: Negative for dysuria, flank pain, frequency, hematuria and urgency.        Chronically self caths   Musculoskeletal: Negative for back pain, falls, joint pain, myalgias and neck pain.   Skin: Negative for itching and rash.   Neurological: Positive for dizziness, weakness and headaches. Negative for tingling and tremors.   Endo/Heme/Allergies: Negative for environmental allergies and polydipsia. Does not bruise/bleed easily.   Psychiatric/Behavioral: Negative for depression, hallucinations, substance abuse and suicidal ideas. The patient is not nervous/anxious.         Physical Exam  Temp:  [36.7 °C (98 °F)-37.4 °C (99.4 °F)] 37.1 °C (98.8 °F)  Pulse:  [69-81] 73  Resp:  [16-18] 18  BP: (101-113)/(49-64) 104/49  SpO2:  [95 %-100 %] 96 %    Physical Exam  Vitals signs reviewed.   Constitutional:       Appearance: Normal appearance. He is not diaphoretic.   HENT:      Head: Normocephalic and atraumatic.      Nose: Nose normal.      Mouth/Throat:      Mouth: Mucous membranes are moist.      Pharynx: No oropharyngeal exudate.   Eyes:      General: No scleral icterus.        Right eye: No discharge.         Left eye: No discharge.      Extraocular Movements: Extraocular movements intact.      Conjunctiva/sclera: Conjunctivae normal.   Neck:      Musculoskeletal: No muscular tenderness.      Vascular: No carotid  bruit.   Cardiovascular:      Rate and Rhythm: Regular rhythm. Tachycardia present.      Pulses:           Radial pulses are 2+ on the right side and 2+ on the left side.        Dorsalis pedis pulses are 2+ on the right side and 2+ on the left side.      Heart sounds: Murmur present.   Pulmonary:      Effort: Pulmonary effort is normal. No respiratory distress.      Breath sounds: Normal breath sounds. No wheezing or rales.   Abdominal:      General: Bowel sounds are normal. There is no distension.      Palpations: Abdomen is soft.   Musculoskeletal:         General: No swelling or tenderness.      Right lower leg: No edema.      Left lower leg: No edema.   Lymphadenopathy:      Cervical: No cervical adenopathy.   Skin:     General: Skin is warm.      Coloration: Skin is not jaundiced or pale.   Neurological:      General: No focal deficit present.      Mental Status: He is alert and oriented to person, place, and time.      Cranial Nerves: Cranial nerve deficit (left sided weakness) present.      Motor: Weakness present.   Psychiatric:         Mood and Affect: Mood normal.         Behavior: Behavior normal.         Fluids    Intake/Output Summary (Last 24 hours) at 4/1/2020 1521  Last data filed at 4/1/2020 0800  Gross per 24 hour   Intake 390 ml   Output 2050 ml   Net -1660 ml       Laboratory  Recent Labs     03/30/20  1132 03/30/20  2316 04/01/20  1252   WBC 6.9 7.1 7.6   RBC 3.25* 3.04* 3.17*   HEMOGLOBIN 10.6* 9.9* 10.3*   HEMATOCRIT 33.1* 30.5* 31.9*   .8* 100.3* 100.6*   MCH 32.6 32.6 32.5   MCHC 32.0* 32.5* 32.3*   RDW 49.2 48.5 49.5   PLATELETCT 217 213 202   MPV 10.4 9.8 10.3     Recent Labs     03/30/20  1132 03/30/20  2316 04/01/20  1252   SODIUM 141 143 143   POTASSIUM 4.2 4.5 4.4   CHLORIDE 105 107 105   CO2 24 25 26   GLUCOSE 114* 98 86   BUN 22 21 21   CREATININE 1.53* 1.41* 1.44*   CALCIUM 9.2 9.5 9.3     Recent Labs     03/30/20  1132  03/31/20  2139 04/01/20  0359 04/01/20  0953   APTT  31.0   < > 93.5* 136.2* 50.7*   INR 1.21*  --   --   --   --     < > = values in this interval not displayed.               Imaging  DX-CHEST-PORTABLE (1 VIEW)   Final Result      1.  Stable ill-defined bilateral parenchymal opacities suspicious for pneumonia and/or pulmonary edema.      CT-HEAD W/O   Final Result      Left cerebellar/PICA infarct is unchanged since recent prior study. No hemorrhagic transformation. No herniation.      CT-HEAD W/O   Final Result      1.  The evolving large left cerebellar/PICA infarct.   2.  No acute intracranial hemorrhage.      EC-ECHOCARDIOGRAM LTD W/ CONT   Final Result      DX-CHEST-PORTABLE (1 VIEW)   Final Result      Stable bibasilar opacities likely pneumonitis, less likely edema. Possible small pleural effusions.      Cardiomegaly.           Assessment/Plan  NSTEMI (non-ST elevated myocardial infarction) ([facility])- (present on admission)  Assessment & Plan  Atorvastatin 80 mg q. Evening  Monitor on telemetry  Nitroglycerin as needed  Serial troponins  Cardiology consulting  Aspirin Effient per cardiology  Heparin drip was discontinued  Cardiology recommended for medical management    Chronic combined systolic and diastolic congestive heart failure (HCC)- (present on admission)  Assessment & Plan  3/30 Echo pending  3/23/2020 Echo:  severe mitral regurgitation.  Severely reduced left ventricular systolic function.  Global hypokinesis,  function is best preserved in the basal anterior   and inferolateral wall.  Grade III diastolic dysfunction.  Reduced right ventricular systolic function.  Severe mitral regurgitation.  Possible severe aortic stenosis in the setting of reduced cardiac   output.  Mild aortic insufficiency.    Coronary artery disease involving native coronary artery- (present on admission)  Assessment & Plan  Continue aspirin and Effient    COPD (chronic obstructive pulmonary disease) ([facility])- (present on admission)  Assessment & Plan  No acute exacerbation  RT per  protocol if needed    Acute encephalopathy  Assessment & Plan  Patient had episode of confusion with worsening of his old stroke symptoms.  It is likely that the patient is hypoperfusion in the brain  We will keep him flat to help perfuse  I have ordered a CT scan of the head to rule out hemorrhagic transformation    Acute on chronic renal insufficiency  Assessment & Plan  Heart failure w/ poor perfusion vs other.  Monitor I/O's, labs  Renally dose medications    Neurogenic bladder  Assessment & Plan  Patient will require self cath versus Sun catheter placement\  Strict I's and O's    CVA (cerebral vascular accident) (HCC)- (present on admission)  Assessment & Plan  Patient had recently been at rehab and was just recently sent home.  3/30 Head CT:  1.  The evolving large left cerebellar/PICA infarct.  2.  No acute intracranial hemorrhage.    Anemia- (present on admission)  Assessment & Plan  Chronic  Macrocytic   Monitor CBC no acute signs of blood loss    Essential hypertension- (present on admission)  Assessment & Plan  Monitor vitals.       VTE prophylaxis: Heparin    I discussed advance care planning for at least 20 minutes with the patient and daughter, including diagnosis, prognosis, plan of care, risks and benefits of any therapies that could be offered, as well as alternatives including palliation and hospice, as appropriate. The patient has opted hospice. Time spent is exclusive of evaluation and management or other separately billable procedures. Start time: 10:00 AM end time: 10:30 am

## 2020-04-01 NOTE — DISCHARGE PLANNING
Anticipated Discharge Disposition: acute rehab vs group home with hospice    Action: RN CM informed by palliative RN, Sylvia, that she has discussed discharge options with patient and daughter. They would like referral sent to [facility] but also to see if [facility] would be an option first. SHENA DODD spoke with Mario at [facility] and patient would not qualify for acute rehab if he was considering hospice. Mario is having patient's belongings sent to patient's room since patient was admitted from their facility.    Barriers to Discharge: TBD    Plan: Case coordination to f/u with patient, daughter, and treatment team for discharge planning

## 2020-04-01 NOTE — CONSULTS
Reason for PC Consult: Advance Care Planning    Consulted by: Dr Atkinson    Assessment:  General: 86 yo male admitted on 3/30/2020 for NSTEMI. PMH: Acute anterolateral myocardial infarction-1995, CAD, Hyperlipidemia, HTN, PSVT, Shoulder pain.     Pt with recent admit 3/22 through 3/27 for NSTEMI. Stent placed 3/23, EF 25%, CT of head showed new evolving stroke left vertebral artery at C2 level-no intervention. Pt [facility].    Transferred to ED from [facility] for acute onset of chest pain. Troponin 2907.    Dyspnea: No- resp rate 18, 100% on 2 L NC  Last BM: 03/29/20-    Pain: No- currently denies  Depression: Mood appropriate for situation-    Dementia: No;       Spiritual:  Is Lutheran or spirituality important for coping with this illness? Yes-    Has a  or spiritual provider visit been requested? No    Palliative Performance Scale: 60%    Advance Directive: Advance Directive on File.   DPOA: Yes- Lia Donato [phone], spouse. Pts spouse had dementia, currently residing in . Pts daughter Georgie Luke [phone] for decision making as needed.   POLST: Yes-      Code Status: DNR/DNI    Outcome:  Met with the pt at the bedside, introduce self and the role of Palliative Care. Spoke about pts earlier admit, DC to rehab, and now re-admit. Spoke about pts worsening symptoms last night and repeat CT. Pt currently feeling improved with less of a HA, denies need for any medication.   [facility] Pt happy to know that we currently have a copy.   Spoke with pt about his wife, who he was previously her caretaker due to early dementia. Pt spoke about her current placement in the , her being mainly WC bound, and his wish to recover, gain strength, and return to home to continue supporting her.   Asked the pt what his thoughts are if he was unable to regain his strength due to NSTEMI and stroke and EF of 15-25%. Pt stated that he is unsure, spoke about his daughter,  Georgie, who lives nearby and has been helping them both over the past several weeks prior to this current admission. Pt acknowledged that he is still weak, still has bouts of dizziness upon attempting to get up to reach the bathroom.   Spoke with pt on the possibility that he may not regain enough strength to be his wife's primary care giver again.   Spoke about the  where she is at this time. Pt stated that he has been speaking with her everyday and that she is happy there and enjoys the place, her only complaint is about having a TV bedside, which their daughter is apparently brining in.   Spoke about notes pertaining to a Hospice referral for his wife prior to his admission. Pt stated that they were about to do this and that he was still interested in this.   Pt concerned about his cloths and shaving kit which is still at [facility]. Pt spoke about trying to philomena his daughter to see if she needed to  his things. Asked the pt if he would like for me to speak with his daughter and assist with his POC. Pt requested that his daughter be involved. Let pt know I would update him after calling Georgie.     Called and spoke with Georgie. Pts current wife is her step-mother but they are all very close, including her step-sister, Deyanira, who lives in [place]. Discussed current situation, provided update on pts decline last night and repeat head CT. Spoke about her fathers concerns about taking care of his wife. Georgie stated that she and Deyanira have been working very closely together to try and create a plan and feel at a loss.   They had the pts wife assessed by Hospice but she was declined as she remains to high functioning at this time. Georgie stated that she used to work for [employer] but recently stopped in order to care for her parents.   Spoke about the possibility financially of placing her father at the  with her step-mother due to his current level of debility, lack of possibly interventions,  "and frailty.   Georgie stated that she and Deyanira have been discussing this. Spoke about the pts limited activity tolerance and possibility that he may not be able to tolerate returning to Rehab. Georgie stated that she felt he would likely not be able to be independent regardless.    Agreed that this PC RN will speak with the pt on the option of a DC to the  in order to be with his spouse, and for them both to have that level of support and remain together.     Met with the pt. Discussed options of Rehab vs SNF, [facility] with Hospice or HH.   Pt very interested in . Spoke further on quality of life, his stressed importance of being with his wife and how much he misses being with her. Spoke about the likely madrigal of his requiring more assistance himself upon his DC whether he goes to rehab or SNF or not and that he may not be able to return to being her primary caregiver.   Pt acknowledged his weakness and need for assistance. Pt feels that moving to the  with his wife \"sounds like a good plan\" at this time. Let pt know I will update his daughter so that she can speak with the . Pt happy and states that \"she's a good girl, she takes care of everything for us, I know she can do this\".     Called and updated pts daughter Georgie. Georgie very happy to hear that her fatter is amendable to this plan and hopes to be able to reserve a bed for him today so that he could be his wife's roommate.     Spoke with Dr Atkinson who will order [employer] and referral for hospice at this time.     Updated: Dr Atkinson, BS SHENA Cuenca, CM SHENA Sanford    Plan: DC to  likely with either HH or Hospice. Hospice referral choice faxed for [facility] to determine if the pt would qualify to assist with medications per pt and daughter approval.     Thank you for allowing Palliative Care to participate in this patient's care. Please feel free to call [phone] with any questions or concerns.  "

## 2020-04-02 ENCOUNTER — HOSPITAL ENCOUNTER (INPATIENT)
Facility: REHABILITATION | Age: 85
LOS: 11 days | DRG: 057 | End: 2020-04-13
Attending: PHYSICAL MEDICINE & REHABILITATION | Admitting: PHYSICAL MEDICINE & REHABILITATION
Payer: MEDICARE

## 2020-04-02 VITALS
TEMPERATURE: 97.9 F | HEART RATE: 75 BPM | HEIGHT: 71 IN | SYSTOLIC BLOOD PRESSURE: 101 MMHG | RESPIRATION RATE: 20 BRPM | OXYGEN SATURATION: 96 % | DIASTOLIC BLOOD PRESSURE: 57 MMHG | WEIGHT: 149.25 LBS | BODY MASS INDEX: 20.9 KG/M2

## 2020-04-02 DIAGNOSIS — I25.118 CORONARY ARTERY DISEASE INVOLVING NATIVE CORONARY ARTERY OF NATIVE HEART WITH OTHER FORM OF ANGINA PECTORIS (HCC): ICD-10-CM

## 2020-04-02 LAB
ALBUMIN SERPL BCP-MCNC: 3.9 G/DL (ref 3.2–4.9)
ALBUMIN/GLOB SERPL: 1.5 G/DL
ALP SERPL-CCNC: 77 U/L (ref 30–99)
ALT SERPL-CCNC: 33 U/L (ref 2–50)
ANION GAP SERPL CALC-SCNC: 13 MMOL/L (ref 7–16)
AST SERPL-CCNC: 30 U/L (ref 12–45)
BASOPHILS # BLD AUTO: 0.7 % (ref 0–1.8)
BASOPHILS # BLD: 0.06 K/UL (ref 0–0.12)
BILIRUB SERPL-MCNC: 0.5 MG/DL (ref 0.1–1.5)
BUN SERPL-MCNC: 25 MG/DL (ref 8–22)
CALCIUM SERPL-MCNC: 9.2 MG/DL (ref 8.5–10.5)
CHLORIDE SERPL-SCNC: 104 MMOL/L (ref 96–112)
CO2 SERPL-SCNC: 24 MMOL/L (ref 20–33)
CREAT SERPL-MCNC: 1.46 MG/DL (ref 0.5–1.4)
EOSINOPHIL # BLD AUTO: 0.16 K/UL (ref 0–0.51)
EOSINOPHIL NFR BLD: 1.9 % (ref 0–6.9)
ERYTHROCYTE [DISTWIDTH] IN BLOOD BY AUTOMATED COUNT: 49 FL (ref 35.9–50)
GLOBULIN SER CALC-MCNC: 2.6 G/DL (ref 1.9–3.5)
GLUCOSE SERPL-MCNC: 107 MG/DL (ref 65–99)
HCT VFR BLD AUTO: 30.5 % (ref 42–52)
HGB BLD-MCNC: 9.9 G/DL (ref 14–18)
IMM GRANULOCYTES # BLD AUTO: 0.04 K/UL (ref 0–0.11)
IMM GRANULOCYTES NFR BLD AUTO: 0.5 % (ref 0–0.9)
LYMPHOCYTES # BLD AUTO: 1.62 K/UL (ref 1–4.8)
LYMPHOCYTES NFR BLD: 19.1 % (ref 22–41)
MCH RBC QN AUTO: 32.7 PG (ref 27–33)
MCHC RBC AUTO-ENTMCNC: 32.5 G/DL (ref 33.7–35.3)
MCV RBC AUTO: 100.7 FL (ref 81.4–97.8)
MONOCYTES # BLD AUTO: 1.05 K/UL (ref 0–0.85)
MONOCYTES NFR BLD AUTO: 12.4 % (ref 0–13.4)
NEUTROPHILS # BLD AUTO: 5.54 K/UL (ref 1.82–7.42)
NEUTROPHILS NFR BLD: 65.4 % (ref 44–72)
NRBC # BLD AUTO: 0 K/UL
NRBC BLD-RTO: 0 /100 WBC
PLATELET # BLD AUTO: 227 K/UL (ref 164–446)
PMV BLD AUTO: 10.4 FL (ref 9–12.9)
POTASSIUM SERPL-SCNC: 4.4 MMOL/L (ref 3.6–5.5)
PROT SERPL-MCNC: 6.5 G/DL (ref 6–8.2)
RBC # BLD AUTO: 3.03 M/UL (ref 4.7–6.1)
SODIUM SERPL-SCNC: 141 MMOL/L (ref 135–145)
WBC # BLD AUTO: 8.5 K/UL (ref 4.8–10.8)

## 2020-04-02 PROCEDURE — 99233 SBSQ HOSP IP/OBS HIGH 50: CPT | Performed by: INTERNAL MEDICINE

## 2020-04-02 PROCEDURE — 97112 NEUROMUSCULAR REEDUCATION: CPT

## 2020-04-02 PROCEDURE — 700102 HCHG RX REV CODE 250 W/ 637 OVERRIDE(OP): Performed by: HOSPITALIST

## 2020-04-02 PROCEDURE — 770010 HCHG ROOM/CARE - REHAB SEMI PRIVAT*

## 2020-04-02 PROCEDURE — 700111 HCHG RX REV CODE 636 W/ 250 OVERRIDE (IP): Performed by: PHYSICAL MEDICINE & REHABILITATION

## 2020-04-02 PROCEDURE — A9270 NON-COVERED ITEM OR SERVICE: HCPCS | Performed by: PHYSICAL MEDICINE & REHABILITATION

## 2020-04-02 PROCEDURE — 97116 GAIT TRAINING THERAPY: CPT

## 2020-04-02 PROCEDURE — 700102 HCHG RX REV CODE 250 W/ 637 OVERRIDE(OP): Performed by: PHYSICAL MEDICINE & REHABILITATION

## 2020-04-02 PROCEDURE — 36415 COLL VENOUS BLD VENIPUNCTURE: CPT

## 2020-04-02 PROCEDURE — 97530 THERAPEUTIC ACTIVITIES: CPT

## 2020-04-02 PROCEDURE — 700111 HCHG RX REV CODE 636 W/ 250 OVERRIDE (IP): Performed by: HOSPITALIST

## 2020-04-02 PROCEDURE — A9270 NON-COVERED ITEM OR SERVICE: HCPCS | Performed by: HOSPITALIST

## 2020-04-02 PROCEDURE — 85025 COMPLETE CBC W/AUTO DIFF WBC: CPT

## 2020-04-02 PROCEDURE — 700102 HCHG RX REV CODE 250 W/ 637 OVERRIDE(OP): Performed by: PHYSICIAN ASSISTANT

## 2020-04-02 PROCEDURE — 99223 1ST HOSP IP/OBS HIGH 75: CPT | Mod: AI | Performed by: PHYSICAL MEDICINE & REHABILITATION

## 2020-04-02 PROCEDURE — A9270 NON-COVERED ITEM OR SERVICE: HCPCS | Performed by: PHYSICIAN ASSISTANT

## 2020-04-02 PROCEDURE — 80053 COMPREHEN METABOLIC PANEL: CPT

## 2020-04-02 PROCEDURE — 99239 HOSP IP/OBS DSCHRG MGMT >30: CPT | Performed by: HOSPITALIST

## 2020-04-02 RX ORDER — CLOPIDOGREL BISULFATE 75 MG/1
75 TABLET ORAL DAILY
Status: DISCONTINUED | OUTPATIENT
Start: 2020-04-03 | End: 2020-04-02 | Stop reason: HOSPADM

## 2020-04-02 RX ORDER — CLOPIDOGREL BISULFATE 75 MG/1
75 TABLET ORAL DAILY
Status: CANCELLED | OUTPATIENT
Start: 2020-04-03

## 2020-04-02 RX ORDER — CLOPIDOGREL BISULFATE 75 MG/1
75 TABLET ORAL DAILY
Status: DISCONTINUED | OUTPATIENT
Start: 2020-04-03 | End: 2020-04-13 | Stop reason: HOSPADM

## 2020-04-02 RX ORDER — ECHINACEA PURPUREA EXTRACT 125 MG
2 TABLET ORAL PRN
Status: DISCONTINUED | OUTPATIENT
Start: 2020-04-02 | End: 2020-04-13 | Stop reason: HOSPADM

## 2020-04-02 RX ORDER — LORAZEPAM 1 MG/1
1 TABLET ORAL EVERY 4 HOURS PRN
Status: DISCONTINUED | OUTPATIENT
Start: 2020-04-02 | End: 2020-04-13 | Stop reason: HOSPADM

## 2020-04-02 RX ORDER — MORPHINE SULFATE 15 MG/1
7.5 TABLET ORAL EVERY 6 HOURS PRN
Status: DISCONTINUED | OUTPATIENT
Start: 2020-04-02 | End: 2020-04-13 | Stop reason: HOSPADM

## 2020-04-02 RX ORDER — TRAZODONE HYDROCHLORIDE 50 MG/1
50 TABLET ORAL
Status: DISCONTINUED | OUTPATIENT
Start: 2020-04-02 | End: 2020-04-13 | Stop reason: HOSPADM

## 2020-04-02 RX ORDER — NITROGLYCERIN 0.4 MG/1
0.4 TABLET SUBLINGUAL PRN
Qty: 25 TAB | Status: ON HOLD
Start: 2020-04-02 | End: 2020-04-13 | Stop reason: SDUPTHER

## 2020-04-02 RX ORDER — MECLIZINE HYDROCHLORIDE 25 MG/1
25 TABLET ORAL 3 TIMES DAILY PRN
Status: DISCONTINUED | OUTPATIENT
Start: 2020-04-02 | End: 2020-04-13 | Stop reason: HOSPADM

## 2020-04-02 RX ORDER — ACETAMINOPHEN 325 MG/1
650 TABLET ORAL EVERY 4 HOURS PRN
Status: DISCONTINUED | OUTPATIENT
Start: 2020-04-02 | End: 2020-04-13 | Stop reason: HOSPADM

## 2020-04-02 RX ORDER — GABAPENTIN 300 MG/1
300 CAPSULE ORAL 3 TIMES DAILY
Status: CANCELLED | OUTPATIENT
Start: 2020-04-02

## 2020-04-02 RX ORDER — BISACODYL 10 MG
10 SUPPOSITORY, RECTAL RECTAL
Status: CANCELLED | OUTPATIENT
Start: 2020-04-02

## 2020-04-02 RX ORDER — OMEPRAZOLE 20 MG/1
20 CAPSULE, DELAYED RELEASE ORAL 2 TIMES DAILY
Status: CANCELLED | OUTPATIENT
Start: 2020-04-02

## 2020-04-02 RX ORDER — LIDOCAINE HYDROCHLORIDE 20 MG/ML
JELLY TOPICAL PRN
Status: DISCONTINUED | OUTPATIENT
Start: 2020-04-02 | End: 2020-04-13 | Stop reason: HOSPADM

## 2020-04-02 RX ORDER — OMEPRAZOLE 20 MG/1
20 CAPSULE, DELAYED RELEASE ORAL 2 TIMES DAILY
Status: DISCONTINUED | OUTPATIENT
Start: 2020-04-02 | End: 2020-04-13 | Stop reason: HOSPADM

## 2020-04-02 RX ORDER — GABAPENTIN 300 MG/1
300 CAPSULE ORAL 3 TIMES DAILY
Status: DISCONTINUED | OUTPATIENT
Start: 2020-04-02 | End: 2020-04-07

## 2020-04-02 RX ORDER — POLYETHYLENE GLYCOL 3350 17 G/17G
1 POWDER, FOR SOLUTION ORAL
Status: CANCELLED | OUTPATIENT
Start: 2020-04-02

## 2020-04-02 RX ORDER — NITROGLYCERIN 0.4 MG/1
0.4 TABLET SUBLINGUAL
Status: DISCONTINUED | OUTPATIENT
Start: 2020-04-02 | End: 2020-04-13 | Stop reason: HOSPADM

## 2020-04-02 RX ORDER — ONDANSETRON 2 MG/ML
4 INJECTION INTRAMUSCULAR; INTRAVENOUS 4 TIMES DAILY PRN
Status: DISCONTINUED | OUTPATIENT
Start: 2020-04-02 | End: 2020-04-13 | Stop reason: HOSPADM

## 2020-04-02 RX ORDER — ONDANSETRON 4 MG/1
4 TABLET, ORALLY DISINTEGRATING ORAL 4 TIMES DAILY PRN
Status: DISCONTINUED | OUTPATIENT
Start: 2020-04-02 | End: 2020-04-13 | Stop reason: HOSPADM

## 2020-04-02 RX ORDER — ATORVASTATIN CALCIUM 40 MG/1
80 TABLET, FILM COATED ORAL EVERY EVENING
Status: DISCONTINUED | OUTPATIENT
Start: 2020-04-02 | End: 2020-04-13 | Stop reason: HOSPADM

## 2020-04-02 RX ORDER — OXYCODONE HYDROCHLORIDE 5 MG/1
5 TABLET ORAL EVERY 4 HOURS PRN
Status: DISCONTINUED | OUTPATIENT
Start: 2020-04-02 | End: 2020-04-02 | Stop reason: HOSPADM

## 2020-04-02 RX ORDER — LACTULOSE 20 G/30ML
30 SOLUTION ORAL
Status: DISCONTINUED | OUTPATIENT
Start: 2020-04-02 | End: 2020-04-13 | Stop reason: HOSPADM

## 2020-04-02 RX ORDER — AMOXICILLIN 250 MG
2 CAPSULE ORAL 2 TIMES DAILY
Status: CANCELLED | OUTPATIENT
Start: 2020-04-02

## 2020-04-02 RX ORDER — ATORVASTATIN CALCIUM 80 MG/1
80 TABLET, FILM COATED ORAL EVERY EVENING
Status: CANCELLED | OUTPATIENT
Start: 2020-04-02

## 2020-04-02 RX ORDER — NITROGLYCERIN 0.4 MG/1
0.4 TABLET SUBLINGUAL
Status: CANCELLED | OUTPATIENT
Start: 2020-04-02

## 2020-04-02 RX ORDER — LANOLIN ALCOHOL/MO/W.PET/CERES
3 CREAM (GRAM) TOPICAL NIGHTLY PRN
Status: DISCONTINUED | OUTPATIENT
Start: 2020-04-02 | End: 2020-04-13 | Stop reason: HOSPADM

## 2020-04-02 RX ORDER — MECLIZINE HYDROCHLORIDE 25 MG/1
25 TABLET ORAL 3 TIMES DAILY PRN
Qty: 30 TAB | Refills: 0 | Status: ON HOLD
Start: 2020-04-02 | End: 2020-04-13

## 2020-04-02 RX ORDER — AMOXICILLIN 250 MG
2 CAPSULE ORAL 2 TIMES DAILY
Status: DISCONTINUED | OUTPATIENT
Start: 2020-04-02 | End: 2020-04-13 | Stop reason: HOSPADM

## 2020-04-02 RX ORDER — MECLIZINE HYDROCHLORIDE 25 MG/1
25 TABLET ORAL 3 TIMES DAILY PRN
Status: CANCELLED | OUTPATIENT
Start: 2020-04-02

## 2020-04-02 RX ORDER — HYDROXYZINE HYDROCHLORIDE 25 MG/1
50 TABLET, FILM COATED ORAL EVERY 6 HOURS PRN
Status: DISCONTINUED | OUTPATIENT
Start: 2020-04-02 | End: 2020-04-13 | Stop reason: HOSPADM

## 2020-04-02 RX ORDER — HEPARIN SODIUM 5000 [USP'U]/ML
5000 INJECTION, SOLUTION INTRAVENOUS; SUBCUTANEOUS EVERY 8 HOURS
Status: DISCONTINUED | OUTPATIENT
Start: 2020-04-02 | End: 2020-04-13 | Stop reason: HOSPADM

## 2020-04-02 RX ORDER — POLYVINYL ALCOHOL 14 MG/ML
1 SOLUTION/ DROPS OPHTHALMIC PRN
Status: DISCONTINUED | OUTPATIENT
Start: 2020-04-02 | End: 2020-04-13 | Stop reason: HOSPADM

## 2020-04-02 RX ORDER — ALUMINA, MAGNESIA, AND SIMETHICONE 2400; 2400; 240 MG/30ML; MG/30ML; MG/30ML
20 SUSPENSION ORAL
Status: DISCONTINUED | OUTPATIENT
Start: 2020-04-02 | End: 2020-04-13 | Stop reason: HOSPADM

## 2020-04-02 RX ADMIN — HYDROXYZINE HYDROCHLORIDE 50 MG: 25 TABLET, FILM COATED ORAL at 19:53

## 2020-04-02 RX ADMIN — ASPIRIN 81 MG: 81 TABLET, COATED ORAL at 05:25

## 2020-04-02 RX ADMIN — METOPROLOL TARTRATE 12.5 MG: 25 TABLET, FILM COATED ORAL at 05:25

## 2020-04-02 RX ADMIN — HEPARIN SODIUM 5000 UNITS: 5000 INJECTION, SOLUTION INTRAVENOUS; SUBCUTANEOUS at 22:00

## 2020-04-02 RX ADMIN — OMEPRAZOLE 20 MG: 20 CAPSULE, DELAYED RELEASE ORAL at 05:25

## 2020-04-02 RX ADMIN — Medication 400 MG: at 05:26

## 2020-04-02 RX ADMIN — ACETAMINOPHEN 650 MG: 325 TABLET, FILM COATED ORAL at 00:20

## 2020-04-02 RX ADMIN — ATORVASTATIN CALCIUM 80 MG: 40 TABLET, FILM COATED ORAL at 19:53

## 2020-04-02 RX ADMIN — GABAPENTIN 300 MG: 300 CAPSULE ORAL at 05:25

## 2020-04-02 RX ADMIN — MORPHINE SULFATE 2 MG: 4 INJECTION INTRAVENOUS at 05:59

## 2020-04-02 RX ADMIN — TRAZODONE HYDROCHLORIDE 50 MG: 50 TABLET ORAL at 19:53

## 2020-04-02 RX ADMIN — GABAPENTIN 300 MG: 300 CAPSULE ORAL at 13:26

## 2020-04-02 RX ADMIN — OMEPRAZOLE 20 MG: 20 CAPSULE, DELAYED RELEASE ORAL at 19:53

## 2020-04-02 RX ADMIN — MORPHINE SULFATE 7.5 MG: 15 TABLET ORAL at 19:52

## 2020-04-02 RX ADMIN — PRASUGREL 10 MG: 10 TABLET, FILM COATED ORAL at 05:29

## 2020-04-02 RX ADMIN — GABAPENTIN 300 MG: 300 CAPSULE ORAL at 19:53

## 2020-04-02 RX ADMIN — MORPHINE SULFATE 2 MG: 4 INJECTION INTRAVENOUS at 08:21

## 2020-04-02 RX ADMIN — MECLIZINE HYDROCHLORIDE 25 MG: 25 TABLET ORAL at 13:26

## 2020-04-02 SDOH — HEALTH STABILITY: MENTAL HEALTH: HOW OFTEN DO YOU HAVE A DRINK CONTAINING ALCOHOL?: 2-3 TIMES A WEEK

## 2020-04-02 SDOH — HEALTH STABILITY: MENTAL HEALTH: HOW OFTEN DO YOU HAVE 6 OR MORE DRINKS ON ONE OCCASION?: LESS THAN MONTHLY

## 2020-04-02 SDOH — HEALTH STABILITY: MENTAL HEALTH: HOW MANY STANDARD DRINKS CONTAINING ALCOHOL DO YOU HAVE ON A TYPICAL DAY?: 3 OR 4

## 2020-04-02 ASSESSMENT — LIFESTYLE VARIABLES
HAVE PEOPLE ANNOYED YOU BY CRITICIZING YOUR DRINKING: NO
EVER FELT BAD OR GUILTY ABOUT YOUR DRINKING: NO
EVER FELT BAD OR GUILTY ABOUT YOUR DRINKING: NO
ALCOHOL_USE: NO
TOTAL SCORE: 0
ALCOHOL_USE: NO
HAVE YOU EVER FELT YOU SHOULD CUT DOWN ON YOUR DRINKING: NO
HAVE YOU EVER FELT YOU SHOULD CUT DOWN ON YOUR DRINKING: NO
TOTAL SCORE: 0
TOTAL SCORE: 0
HOW MANY TIMES IN THE PAST YEAR HAVE YOU HAD 5 OR MORE DRINKS IN A DAY: 0
ON A TYPICAL DAY WHEN YOU DRINK ALCOHOL HOW MANY DRINKS DO YOU HAVE: 7
EVER HAD A DRINK FIRST THING IN THE MORNING TO STEADY YOUR NERVES TO GET RID OF A HANGOVER: NO
TOTAL SCORE: 0
EVER HAD A DRINK FIRST THING IN THE MORNING TO STEADY YOUR NERVES TO GET RID OF A HANGOVER: NO
HAVE PEOPLE ANNOYED YOU BY CRITICIZING YOUR DRINKING: NO
CONSUMPTION TOTAL: INCOMPLETE
CONSUMPTION TOTAL: NEGATIVE
AVERAGE NUMBER OF DAYS PER WEEK YOU HAVE A DRINK CONTAINING ALCOHOL: 2
ON A TYPICAL DAY WHEN YOU DRINK ALCOHOL HOW MANY DRINKS DO YOU HAVE: 3
AVERAGE NUMBER OF DAYS PER WEEK YOU HAVE A DRINK CONTAINING ALCOHOL: 3
TOTAL SCORE: 0
TOTAL SCORE: 0

## 2020-04-02 ASSESSMENT — COGNITIVE AND FUNCTIONAL STATUS - GENERAL
MOBILITY SCORE: 15
MOVING FROM LYING ON BACK TO SITTING ON SIDE OF FLAT BED: UNABLE
CLIMB 3 TO 5 STEPS WITH RAILING: TOTAL
STANDING UP FROM CHAIR USING ARMS: A LITTLE
SUGGESTED CMS G CODE MODIFIER MOBILITY: CK
WALKING IN HOSPITAL ROOM: A LOT

## 2020-04-02 ASSESSMENT — ENCOUNTER SYMPTOMS
ORTHOPNEA: 0
COUGH: 0
DIZZINESS: 0
FEVER: 0
WHEEZING: 0
NAUSEA: 0
PND: 0
CLAUDICATION: 0
SPUTUM PRODUCTION: 0
SHORTNESS OF BREATH: 0
PALPITATIONS: 0
VOMITING: 0
CHILLS: 0
HEADACHES: 0
HEMOPTYSIS: 0

## 2020-04-02 ASSESSMENT — FIBROSIS 4 INDEX: FIB4 SCORE: 2

## 2020-04-02 ASSESSMENT — GAIT ASSESSMENTS
GAIT LEVEL OF ASSIST: MINIMAL ASSIST
ASSISTIVE DEVICE: FRONT WHEEL WALKER
DISTANCE (FEET): 50

## 2020-04-02 NOTE — PROGRESS NOTES
2 RN skin check done with admitting RN and SHENA Elizondo. Face photo and skin photos documented in media. Appropriate LDAs opened.   Pt with Erwin score of 18, RN wound protocol not needed at this time.

## 2020-04-02 NOTE — THERAPY
"Physical Therapy Treatment completed.   Bed Mobility:  Supine to Sit: Minimal Assist  Transfers: Sit to Stand: Minimal Assist(with FWW)  Gait: Level Of Assist: Minimal Assist(to mod A ) with Front-Wheel Walker  [phone]      Plan of Care: Will benefit from Physical Therapy 4 times per week  Discharge Recommendations: Equipment: Will Continue to Assess for Equipment Needs    Pt with improving upright tolerance; reports he is only going to group home 'for a little while'; pt eager to progress with therapy and tolerated 40 minute session today with little fatigue, BP stable with standing and ambulation at 120s/70s; did get tired by end of session, needs extensive education on EF in role of workload during activity as he gets frustrated with his fatigue; from a PT perspective, recommend ongoing therapy in whatever dc location is deemed medically/socially appropriate as pt is eager to progress. Will follow.     See \"Rehab Therapy-Acute\" Patient Summary Report for complete documentation.       "

## 2020-04-02 NOTE — DISCHARGE SUMMARY
Discharge Summary    CHIEF COMPLAINT ON ADMISSION  Chief Complaint   Patient presents with   • Shortness of Breath   • Chest Pain       Reason for Admission  EMS     Admission Date  3/30/2020    CODE STATUS  DNAR/DNI    HPI & HOSPITAL COURSE  This is a 87 y.o. male here with with past medical history of CVA, ischemic cardiomyopathy with EF of 25%, aortic stenosis, urinary retention comes into the emergency room from rehab with complaints of chest pain.   Patient presented with normal vital signs.  EKG found normal sinus rhythm, left anterior fascicular block, Q waves in lateral leads.  Patient troponin was greater than 2900.  CT scan of the head found involving stroke but no acute hemorrhage. Cardiology evaluated patient started on heparin drip.  Patient's Plavix was converted into Effient but since he had a recent stroke he was transitioned back to Plavix.  After long discussion with the patient, daughter it was decided that further cardiac cath would be futile and is best to medically manage him.  Heparin was continued for another 48 hours.  He had an episode where his left sided stroke symptoms and work was worsened.  He was noticed to be hypoperfusing due to low blood pressure.  His metoprolol was was discontinued. After speaking with cardiology, rehab and daughter it was determined that we should send him back to rehab and if he develops another stroke or MI that he would be enrolled in hospice. His wife is at a group home where he could be transitioned to for further hospice management.     Therefore, he is discharged in fair and stable condition to an inpatient rehabilitation hospital.    The patient met 2-midnight criteria for an inpatient stay at the time of discharge.    Discharge Date  4/2/2020    FOLLOW UP ITEMS POST DISCHARGE  Further Rehab management     DISCHARGE DIAGNOSES  Active Problems:    Coronary artery disease involving native coronary artery POA: Yes      Overview: 1995: Acute anterolateral  infarct. PCI/stent of proximal LAD      April 2017: MPI with scar in anterior/inferior wall, no ischemia, LVEF       30%. Echocardiogram with fixed defects in anterior wall and septal       inferior wall, no ischemia, LVEF 40%.      July 2017: Coronary angiogram with patent LAD stent, diffuse       nonobstructive lesions in RCA, 40% mid RCA, 50% mid circumflex.    Chronic combined systolic and diastolic congestive heart failure ([facility]) POA: Yes    NSTEMI (non-ST elevated myocardial infarction) ([facility]) POA: Yes    COPD (chronic obstructive pulmonary disease) ([facility]) POA: Yes    Anemia POA: Yes    CVA (cerebral vascular accident) ([facility]) POA: Yes    Neurogenic bladder POA: Unknown    Acute on chronic renal insufficiency POA: Unknown    Acute encephalopathy POA: Unknown    Essential hypertension POA: Yes  Resolved Problems:    * No resolved hospital problems. *      FOLLOW UP  Future Appointments   Date Time Provider Department Center   4/27/2020  1:20 PM [facility] None     No follow-up provider specified.    MEDICATIONS ON DISCHARGE     Medication List      START taking these medications      Instructions   meclizine 25 MG Tabs  Commonly known as:  ANTIVERT   Take 1 Tab by mouth 3 times a day as needed.  Dose:  25 mg     nitroglycerin 0.4 MG Subl  Commonly known as:  NITROSTAT   Place 1 Tab under tongue as needed for Chest Pain (up to 3 doses (if SBP greater than 90 mmHg)).  Dose:  0.4 mg        CONTINUE taking these medications      Instructions   aspirin EC 81 MG Tbec  Commonly known as:  ECOTRIN   Take 1 Tab by mouth every day.  Dose:  81 mg     atorvastatin 80 MG tablet  Commonly known as:  LIPITOR   Take 1 Tab by mouth every evening.  Dose:  80 mg     Cholecalciferol 1000 UNIT Tabs  Commonly known as:  VITAMIND D3   Take 1 Tab by mouth 2 Times a Day.  Dose:  1,000 Units     clopidogrel 75 MG Tabs  Commonly known as:  PLAVIX   Take 1 Tab by mouth every day.  Dose:  75 mg     gabapentin 400 MG  Caps  Commonly known as:  NEURONTIN   Take 400 mg by mouth 3 times a day. @@  1300  1700  2100  Dose:  400 mg     heparin 5000 UNIT/ML Soln   Inject 5,000 Units as instructed every 8 hours.  Dose:  5,000 Units     Magnesium Oxide 420 (252 Mg) MG Tabs   Take 420 mg by mouth every day.  Dose:  420 mg     omeprazole 20 MG delayed-release capsule  Commonly known as:  PRILOSEC   Take 1 Cap by mouth 2 Times a Day.  Dose:  20 mg     senna-docusate 8.6-50 MG Tabs  Commonly known as:  PERICOLACE or SENOKOT S   Take 1 Tab by mouth every day.  Dose:  1 Tab        STOP taking these medications    hydrOXYzine HCl 50 MG Tabs  Commonly known as:  ATARAX            Allergies  No Known Allergies    DIET  Orders Placed This Encounter   Procedures   • Diet Order Cardiac     Standing Status:   Standing     Number of Occurrences:   1     Order Specific Question:   Diet:     Answer:   Cardiac [6]       ACTIVITY  As tolerated.  Weight bearing as tolerated    CONSULTATIONS  Cardiology    PROCEDURES  None    LABORATORY  Lab Results   Component Value Date    SODIUM 141 04/02/2020    POTASSIUM 4.4 04/02/2020    CHLORIDE 104 04/02/2020    CO2 24 04/02/2020    GLUCOSE 107 (H) 04/02/2020    BUN 25 (H) 04/02/2020    CREATININE 1.46 (H) 04/02/2020        Lab Results   Component Value Date    WBC 8.5 04/02/2020    HEMOGLOBIN 9.9 (L) 04/02/2020    HEMATOCRIT 30.5 (L) 04/02/2020    PLATELETCT 227 04/02/2020        Total time of the discharge process exceeds 50 minutes.

## 2020-04-02 NOTE — CARE PLAN
Problem: Communication  Goal: The ability to communicate needs accurately and effectively will improve  Outcome: PROGRESSING AS EXPECTED     Problem: Safety  Goal: Will remain free from injury  Outcome: PROGRESSING AS EXPECTED  Goal: Will remain free from falls  Outcome: PROGRESSING AS EXPECTED     Problem: Infection  Goal: Will remain free from infection  Outcome: PROGRESSING AS EXPECTED     Problem: Venous Thromboembolism (VTW)/Deep Vein Thrombosis (DVT) Prevention:  Goal: Patient will participate in Venous Thrombosis (VTE)/Deep Vein Thrombosis (DVT)Prevention Measures  Outcome: PROGRESSING AS EXPECTED     Problem: Bowel/Gastric:  Goal: Normal bowel function is maintained or improved  Outcome: PROGRESSING AS EXPECTED  Goal: Will not experience complications related to bowel motility  Outcome: PROGRESSING AS EXPECTED     Problem: Knowledge Deficit  Goal: Knowledge of disease process/condition, treatment plan, diagnostic tests, and medications will improve  Outcome: PROGRESSING AS EXPECTED  Goal: Knowledge of the prescribed therapeutic regimen will improve  Outcome: PROGRESSING AS EXPECTED     Problem: Discharge Barriers/Planning  Goal: Patient's continuum of care needs will be met  Outcome: PROGRESSING AS EXPECTED     Problem: Respiratory:  Goal: Respiratory status will improve  Outcome: PROGRESSING AS EXPECTED     Problem: Fluid Volume:  Goal: Will maintain balanced intake and output  Outcome: PROGRESSING AS EXPECTED     Problem: Pain Management  Goal: Pain level will decrease to patient's comfort goal  Outcome: PROGRESSING AS EXPECTED     Problem: Urinary Elimination:  Goal: Ability to reestablish a normal urinary elimination pattern will improve  Outcome: PROGRESSING AS EXPECTED

## 2020-04-02 NOTE — PROGRESS NOTES
Attempted to call report at [facility]. RN currently at lunch. Extension provided for call back     13:56: Report called to SHENA Crouch at [facility]. Patient  scheduled for 14:00

## 2020-04-02 NOTE — CARE PLAN
Problem: Communication  Goal: The ability to communicate needs accurately and effectively will improve  Outcome: MET     Problem: Safety  Goal: Will remain free from injury  Outcome: MET  Goal: Will remain free from falls  Outcome: MET     Problem: Infection  Goal: Will remain free from infection  Outcome: MET     Problem: Venous Thromboembolism (VTW)/Deep Vein Thrombosis (DVT) Prevention:  Goal: Patient will participate in Venous Thrombosis (VTE)/Deep Vein Thrombosis (DVT)Prevention Measures  Outcome: MET     Problem: Bowel/Gastric:  Goal: Normal bowel function is maintained or improved  Outcome: MET  Goal: Will not experience complications related to bowel motility  Outcome: MET     Problem: Knowledge Deficit  Goal: Knowledge of disease process/condition, treatment plan, diagnostic tests, and medications will improve  Outcome: MET  Goal: Knowledge of the prescribed therapeutic regimen will improve  Outcome: MET     Problem: Discharge Barriers/Planning  Goal: Patient's continuum of care needs will be met  Outcome: MET     Problem: Respiratory:  Goal: Respiratory status will improve  Outcome: MET     Problem: Fluid Volume:  Goal: Will maintain balanced intake and output  Outcome: MET     Problem: Pain Management  Goal: Pain level will decrease to patient's comfort goal  Outcome: MET     Problem: Urinary Elimination:  Goal: Ability to reestablish a normal urinary elimination pattern will improve  Outcome: MET

## 2020-04-02 NOTE — DISCHARGE PLANNING
Approved transfer to [facility] at [address] Dr. Hernandez accepting Attending and CM aware.

## 2020-04-02 NOTE — PROGRESS NOTES
Patient admitted to facility at 1425 via wheelchair; accompanied by hospital transport.  Patient assisted to room and positioned in bed for comfort and safety; call light within reach.  Patient assisted with stowing belongings and oriented to room and facility.  Admission assessment performed and documented in computer.  Admission paperwork completed; signed copies placed in chart.  Will continue to monitor.

## 2020-04-02 NOTE — PROGRESS NOTES
[facility] Cardiology Follow-up Note    Date of Service:    4/2/2020      Name:   Kyler Donato   YOB: 1932  Age:   87 y.o.  male   MRN:   [id number]      Chief Complaint: NSTEMI    Primary Cardiologist:  Dr Mccormack    HPI:  Mr Donato is an 87 y.o fellow with hx of CAD s/p large anterior MI in 1995 wtih stent to the LAD.  He has hx of ICMO with EF in the 25-35% range.   Some notes of COPD, with a 50 pack year hx.  He has hx of paroxysmal SVT on Holter in 2015.  He presented to [facility] on 3/22/20 with CP and NSTEMI, underwent LHC on 3/23 S/p 2.5 x 12 MATY to the mid LCx.  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      Cardiology was re-consulted 3/30/20 after patient developed CP at rehab, was noted to have up-trending troponin.    Interim Events:  4/2/2020: Patient had an episode of left facial head, and neck numbness.  Patient continues to have a headache.  He has been given morphine as needed.  No rhythm issues overnight.  SR 70-90s.  LABS: Na 141, K 4.4, BUN 25, creatinine 1.46, GFR 46    4/1/2020: Patient resting in bed in no distress.  No rhythm issues overnight.  Denies chest pain, palpitations, orthopnea, lower extremity edema.  Has a headache which is helped with morphine.  Patient reports that he is somewhat dizzy when he stands up but by the time he ambulates to the restroom the dizziness has resolved.  DC heparin this morning.  Telemetry: SR 70s-80s  Labs:    ROS  Review of Systems   Constitutional: Negative for chills and fever.   Respiratory: Negative for cough, hemoptysis, sputum production, shortness of breath and wheezing.    Cardiovascular: Negative for chest pain, palpitations, orthopnea, claudication, leg swelling and PND.   Gastrointestinal: Negative for nausea and vomiting.   Neurological: Negative for dizziness and headaches.   All other  systems reviewed and are negative.        Past medical, surgical, social, and family history reviewed and unchanged from admission except as noted in assessment and plan.    Medications: Reviewed in MAR  Current Facility-Administered Medications   Medication Dose Frequency Provider Last Rate Last Dose   • [START ON 4/3/2020] clopidogrel (PLAVIX) tablet 75 mg  75 mg DAILY MICHAELA Gonzales       • meclizine (ANTIVERT) tablet 25 mg  25 mg TID PRN Tierra Atkinson M.D.       • metoprolol (LOPRESSOR) tablet 12.5 mg  12.5 mg TWICE DAILY Lian Quintana P.A.-C.   12.5 mg at 04/02/20 0525   • aspirin EC (ECOTRIN) tablet 81 mg  81 mg DAILY ALBA Deleon.O.   81 mg at 04/02/20 0525   • atorvastatin (LIPITOR) tablet 80 mg  80 mg Q EVENING ALBA Deleon.O.   80 mg at 04/01/20 1705   • gabapentin (NEURONTIN) capsule 300 mg  300 mg TID THERESA DeleonO.   300 mg at 04/02/20 0525   • magnesium oxide (MAG-OX) tablet 400 mg  400 mg DAILY THERESA DeleonO.   400 mg at 04/02/20 0526   • omeprazole (PRILOSEC) capsule 20 mg  20 mg BID THERESA DeleonO.   20 mg at 04/02/20 0525   • senna-docusate (PERICOLACE or SENOKOT S) 8.6-50 MG per tablet 2 Tab  2 Tab BID THERESA DeleonO.   2 Tab at 04/01/20 0551    And   • polyethylene glycol/lytes (MIRALAX) PACKET 1 Packet  1 Packet QDAY PRN Mihai Tafoya D.O.        And   • magnesium hydroxide (MILK OF MAGNESIA) suspension 30 mL  30 mL QDAY PRN Mihai Tafoya D.O.        And   • bisacodyl (DULCOLAX) suppository 10 mg  10 mg QDAY PRN THERESA DeleonO.       • acetaminophen (TYLENOL) tablet 650 mg  650 mg Q6HRS PRN THERESA DeleonO.   650 mg at 04/02/20 0020   • morphine (pf) 4 MG/ML injection 2-4 mg  2-4 mg Q5 MIN PRN THERESA DeleonOElva   2 mg at 04/02/20 0559   • nitroglycerin (NITROSTAT) tablet 0.4 mg  0.4 mg Q5 MIN PRN THERESA DeleonOElva   0.4 mg at 03/31/20 1625   Last reviewed on 3/30/2020  6:01 PM by Joellen Holm, RTOLU.    No Known Allergies    Physical  "Exam  Body mass index is 20.82 kg/m². /78   Pulse 68   Temp 36.8 °C (98.2 °F) (Temporal)   Resp 18   Ht 1.803 m (5' 11\")   Wt 67.7 kg (149 lb 4 oz)   SpO2 95%    Vitals:    04/01/20 2035 04/02/20 0010 04/02/20 0458 04/02/20 0558   BP: 117/61 101/62 117/65 131/78   Pulse: 75 74 68    Resp: 18 18 18    Temp: 37.2 °C (99 °F) 37.3 °C (99.2 °F) 36.8 °C (98.2 °F)    TempSrc: Temporal Temporal Temporal    SpO2: 96% 91% 95%    Weight: 67.7 kg (149 lb 4 oz)      Height:        Oxygen Therapy:  Pulse Oximetry: 95 %, O2 (LPM): 0, O2 Delivery Device: None - Room Air    Physical Exam   Constitutional: He is oriented to person, place, and time and well-developed, well-nourished, and in no distress.   HENT:   Head: Normocephalic and atraumatic.   Eyes: EOM are normal.   Neck: Normal range of motion. Neck supple. No thyromegaly present.   Cardiovascular: Normal rate and regular rhythm.   Murmur heard.   Systolic murmur is present with a grade of 3/6.  Pulses:       Carotid pulses are 2+ on the right side and 2+ on the left side.       Radial pulses are 2+ on the right side and 2+ on the left side.   Pulmonary/Chest: Effort normal and breath sounds normal.   Abdominal: Soft. Bowel sounds are normal.   Neurological: He is alert and oriented to person, place, and time.   Skin: Skin is warm and dry.   Psychiatric: Mood, affect and judgment normal.   Nursing note and vitals reviewed.        Labs (personally reviewed):     Lab Results   Component Value Date/Time    SODIUM 141 04/02/2020 03:29 AM    POTASSIUM 4.4 04/02/2020 03:29 AM    CHLORIDE 104 04/02/2020 03:29 AM    CO2 24 04/02/2020 03:29 AM    GLUCOSE 107 (H) 04/02/2020 03:29 AM    BUN 25 (H) 04/02/2020 03:29 AM    CREATININE 1.46 (H) 04/02/2020 03:29 AM     Lab Results   Component Value Date/Time    ALKPHOSPHAT 77 04/02/2020 03:29 AM    ASTSGOT 30 04/02/2020 03:29 AM    ALTSGPT 33 04/02/2020 03:29 AM    TBILIRUBIN 0.5 04/02/2020 03:29 AM      Lab Results   Component " Value Date/Time    CHOLSTRLTOT 91 (L) 03/25/2020 02:19 AM    LDL 30 03/25/2020 02:19 AM    HDL 47 03/25/2020 02:19 AM    TRIGLYCERIDE 69 03/25/2020 02:19 AM     Lab Results   Component Value Date/Time    BNPBTYPENAT 612 (H) 11/19/2018 02:34 PM         Cardiac Imaging and Procedures Review:      Personal Telemetry Review:  NS 70s.      Echo 3/30/20:  CONCLUSIONS  Limited echo.  Left ventricular ejection fraction is visually estimated to be 10-15%.  Low flow low gradient aortic stenosis. SVi 20.9 mL/m2. PANCHO 1.2 cm2 by   continuity equation. AV mean gradient 5.7 mmHg. Trace aortic   insufficiency.     Compared to the images of the prior study done  3/23/2020, no   significant changes. [id number] comparison unchanged left ventricular   function.    [facility] 3/23/20:     FINDINGS:  HEMODYNAMICS:  LEFT HEART PRESSURES:  1.  LVEDP 18 mmHg.  2.  Left ventricular systolic pressure 104 mmHg.  3.  Central aortic pressure systolic 105, diastolic 55, mean of 72 mmHg.     CORONARY ARTERIOGRAPHY:  1.  LEFT MAIN ARTERY:  Left main artery is a moderately large caliber vessel   with eccentric ostial stenosis of approximately 30%.  The left main artery   bifurcates to left anterior descending artery and circumflex artery.  2.  LEFT ANTERIOR DESCENDING ARTERY:  Left anterior descending artery gives   rise to normal complement of septal and diagonal branches and extends around   the apex.  The proximal left anterior descending artery has an estimated   relative 40% stenosis followed by what appears to be a stent that is patent.    The remainder of the vessel is patent with mild diffuse intimal atheromatous   disease.  3.  CIRCUMFLEX ARTERY:  The circumflex gives rise to 2 major marginal   branches.  The mid circumflex has an eccentric 75-90% hazy stenosis, which is   new compared to the 2017 study.  4.  RIGHT CORONARY ARTERY:  The right coronary artery gives rise to a   bifurcating posterior descending artery and a posterolateral system.   The   right coronary artery has a midvessel eccentric 30% stenosis.  The medial   branch of the posterior descending artery has a midvessel 95% stenosis.  This   vessel is small caliber.     INTRAVASCULAR ULTRASOUND (IVUS), left main artery demonstrates an   ostial minimal luminal area (MLA) of 8.0-9.3 square millimeters indicating a   nonsignificant obstructive stenosis..     POST-STENT IMPLANTATION, mid circumflex artery with a 2.5x12 mm [place]   drug-eluting stent demonstrates 0% residual stenosis, no evidence of   dissection or thrombus and ROBBIE 3 antegrade flow.      Assessment and Medical Decision Makin   Recurrent NSTEMI    -S/p 2.5 x 12 MATY to the mid LCx 3/23/20   -Continue 81 mg ASA, atorvastatin 80 mg every evening, metoprolol 12.5 mg twice daily  - Stop Prasugrel, start clopidogrel 75 mg daily starting tomorrow    2   Acute Cerebellular CVA, L vertebral artery occlusion  -Post cath 3/23/20  -Head CT 3/30/20 stable with evolving infarct, no hemorrhage.      3   Occasional PVCs  -Continue to monitor     4   Chronic systolic congestive heart failure, NYHA class II, stage C, secondary to ischemic cardiomyopathy. EF 15%  -Continue metoprolol 12.5 mg twice daily  -May change to Toprol XL to meet guidelines at d.c.     -May benefit from ACEI/ARB in the future.     6   CAD   -Hx of anterior MI , s/p LAD stent - patent .    -Hx of moderate disease to the mid RCA, 95% mid PDA.    -LM 30% - not significant by IVUS.  Continue med mgmt.     7   History of paroxysmal SVT.      8   Essential hypertension   -Stable 104/54    9   Hyperlipidemia.   -Continue atorvastatin 80.  LDL 42.      10  Aortic stenosis.   -Probably moderate.    11  Severe mitral regurgitation.  Does not appear volume overloaded at this time.         MICAH Main  [facility]  [phone]    Future Appointments   Date Time Provider Department Center   2020  1:20 PM STONE Ruby.  [facility] None       Please note that this dictation was created using voice recognition software. I have worked with consultants from the vendor as well as technical experts from [facility] to optimize the interface. I have made every reasonable attempt to correct obvious errors, but I expect that there are errors of grammar and possibly content I did not discover before finalizing the note.

## 2020-04-02 NOTE — THERAPY
"Physical Therapy Treatment completed.      Bed Mobility:  Supine to Sit: Supervised  Transfers: Sit to Stand: Minimal Assist  Gait: Level Of Assist: Minimal Assist with 4-Wheel Walker       Plan of Care: Will benefit from Physical Therapy 3 times per week  Discharge Recommendations: Equipment: Front-Wheel Walker. Post-acute therapy Discharge to a transitional care facility for continued skilled therapy services.    Patient demonstrates good tolerance with physical therapy, with HR starting in the 70s, and never increasing over 125.   Patient beginning to verbalize about pacing and activity modification post NSTEMI and understanding cardiovascular rehab.  Focused today's session on interval training allowing patient to exercise until HR gets to 120, and then rest until it returns to below 80.  Patient initially required only 1-2 minutes between bouts, however quickly increased to needing 4-5 minutes of rest.  RPE of 13 per patient, and patients being fatigued but feeling good overall. Interval tasks were focused on foot placement, proper weight shifting, and sensory integration.  Mild posterior LOB during weight shifting tasks, with mild right reactions demonstrated but requires PT assist.     Billy Zaman PT     See \"Rehab Therapy-Acute\" Patient Summary Report for complete documentation.       "

## 2020-04-02 NOTE — DISCHARGE PLANNING
An[facility]    Action: Mario at [facility] notified this RN CM that they are accepting patient and have set up transport for 1400 today. Dr. Atkinson and BS RN, Eugenia, notified. RN CM called and updated daughter, Georgie.    Barriers to Discharge: none    Plan: Case coordination available for discharge needs

## 2020-04-02 NOTE — PROGRESS NOTES
Page place to on-call hospitalist for patient c/o left facial, head, neck numbness and pain.  Per notes patient is to lay flat with feet elevated and morphine given as needed.   Performed these tasks. Patient b/p 131/78.      Awaiting call back.      Dr. Villalobos returned call.  Updated on patient status.

## 2020-04-02 NOTE — DISCHARGE PLANNING
[facility] Transitional Care Coordination     Referral from:  Dr. Atkinson   Facesheet indicates: Medicare   Potential Rehab Diagnosis: CVA     Chart review indicates patient does have on going medical management and does have therapy needs to possibly meet inpatient rehab facility criteria with the goal of returning to community.    D/C support: Daughter versus Group home.     Physiatry consultation forwarded per protocol              Thank you for the referral.

## 2020-04-02 NOTE — PREADMISSION SCREENING NOTE
Pre-Admission Screening Form    Patient Information:   Name: Kyler Donato Jr.     MRN: [id number]       : 1932      Age: 87 y.o.   Gender: male      Race: White [7]       Marital Status:  [2]  Family Contact: Georgie Luke Mary        Relationship: Daughter [2]  Spouse [17]  Home Phone:   [phone]           Cell Phone: [phone]    Advanced Directives: None  Code Status:  DNAR/DNI  Current Attending Provider: Tierra Atkinson M.D.  Referring Physician: Dr. Atkinson      Physiatrist Consult: Dr. Hernandez        Referral Date: 20  Primary Payor Source:  MEDICARE  Secondary Payor Source:  [employer]    Medical Information:   Date of Admission to Acute Care Setting:3/30/2020  Room Number: T831/02  Rehabilitation Diagnosis: 01.2 (R) Body Involvement (L) Brain   Immunization History   Administered Date(s) Administered   • Influenza Vaccine Adult HD 2020   • Pneumococcal Conjugate Vaccine (Prevnar/PCV-13) 2017     No Known Allergies  Past Medical History:   Diagnosis Date   • Acute anterolateral myocardial infarction (HCC)    • Arthritis    • Blood clotting disorder ([facility])    • CAD (coronary artery disease)     PCI/stent to the LAD. 2017: MPI with scar in anterior/inferior wall, no ischemia, LVEF 30%. Echocardiogram with fixed defects in anterior wall and septal inferior wall, no ischemia, LVEF 40%. 2017: Coronary angiogram with patent LAD stent, diffuse nonobstructive lesions in RCA, 40% mid RCA, 50% mid circumflex.   • Congestive heart failure (HCC)    • Hyperlipidemia    • Hypertension    • PSVT (paroxysmal supraventricular tachycardia) ([facility])    • Shoulder pain    • Stroke ([facility])      Past Surgical History:   Procedure Laterality Date   • ANGIOPLASTY      stents   • ELBOWPLASTY      ulnar nerve transposition   • HAND SURGERY     • OTHER CARDIAC SURGERY     • SHOULDER ORIF         History Leading to Admission, Conditions that Caused the Need for Rehab  (CMS):     Mihai Tafoya D.O.   Physician   [facility] Medicine   H&P   Signed   Date of Service:  3/30/2020  4:18 PM                      Hospital Medicine History & Physical Note     Date of Service  3/30/2020     Primary Care Physician  Pcp Unknown     Consultants  Cardiology     Code Status  DNR/DNI     Chief Complaint  Chest pain that awoke him from sleep     History of Presenting Illness  87 y.o. male with very recent CVA, prior CVA w/ residual left side weakness, ischemic cardiomyopathy w/ EF 25%, chronic urinary retention requiring self cathing, CAD w/ prior stenting, who presented 3/30/2020 with chest pain today while at home.  He states substernal nonradiating pain causing pressure mild shortness of breath but no diaphoresis and no radiation of pain.  Patient states he took a nitroglycerin and laid down went to sleep but then it woke him up again out of sleep this point in time he told his wife to call 911.     Here in the emergency room he is found to have elevated troponin of 2907 along with elevated BNP and worsening renal function from prior labs from 3/28.  Patient is currently awake and oriented able to talk in full sentences.  Denies any current chest pain.              Assessment/Plan:  I anticipate this patient will require at least two midnights for appropriate medical management, necessitating inpatient admission.     NSTEMI (non-ST elevated myocardial infarction) (HCC)- (present on admission)  Assessment & Plan  Atorvastatin 80 mg q. Evening  Monitor on telemetry  Nitroglycerin as needed  Serial troponins  Cardiology consulting  Aspirin consideration of Effient per cardiology  Status post initiation of heparin this was stopped as patient was found to have recent CVA     Ischemic cardiomyopathy- (present on admission)  Assessment & Plan  3/30 Echo pending  3/23/2020 Echo:  severe mitral regurgitation.  Severely reduced left ventricular systolic function.  Global hypokinesis,  function is best  preserved in the basal anterior   and inferolateral wall.  Grade III diastolic dysfunction.  Reduced right ventricular systolic function.  Severe mitral regurgitation.  Possible severe aortic stenosis in the setting of reduced cardiac   output.  Mild aortic insufficiency.     COPD (chronic obstructive pulmonary disease) ([facility])- (present on admission)  Assessment & Plan  No acute exacerbation  RT per protocol if needed     Acute on chronic renal insufficiency  Assessment & Plan  Heart failure w/ poor perfusion vs other.  Monitor I/O's, labs  Renally dose medications     Neurogenic bladder  Assessment & Plan  Patient will require self cath versus Sun catheter placement\  Strict I's and O's     CVA (cerebral vascular accident) ([facility])- (present on admission)  Assessment & Plan  Patient had recently been at rehab and was just recently sent home.  3/30 Head CT:  1.  The evolving large left cerebellar/PICA infarct.  2.  No acute intracranial hemorrhage.     Anemia- (present on admission)  Assessment & Plan  Chronic  Macrocytic   Monitor CBC no acute signs of blood loss     Essential hypertension- (present on admission)  Assessment & Plan  Monitor vitals.        VTE prophylaxis: SCDs for now until plan is finalized from cardiology on initiating heparin          CARLOS Gonzales.   Nurse Practitioner   Cardiology   Progress Notes   Attested   Date of Service:  4/2/2020  7:49 AM               Attestation signed by Radhika Jain M.D. at 4/2/2020 11:22 AM   Cardiology Attending Addendum:     Radhika MOLINA, performed a substantial portion of the EM visit face-to-face with the same patient on the same date of service with Janeen HYLTON.     I was personally involved in reviewing and conducting elements of the history, physical exam and/or medical decision making including the information as described below:     Having drops in BP when out of bed which are stimulating strokelike activities , correlated to be  worse when he is given metoprolol, and still having some headache and vertigo.  No chest pain.  RRR on exam, 2/6 holosys murmur at apex.     Plan to cont DAPT and statin only. Plavix, not effient given prior CVA, this has been changed back.  Patient not tolerating metoprolol so this will have to stop.  Could consider isosorbide if he starts having chest pain but this might also lower blood pressure and stimulate stroke symptoms.     After long discussion with his daughter Georgie, she agrees with palliative care but would like to respect patient's wish to try to go back to rehab 1 more time to see if he can get stronger and come home.  Georgie is thinking of moving in with her dad and stepmother to provide their care if he can make it home and then call in hospice at home when the time is right.  Also, if he is readmitted to the hospital 1 more time for stroke or heart attack, then hospice at that time.     DW Dr. Atkinson, cardiology will sign off, please recall as needed.     Data reviewed by me personally:  Current medications  Tele- sinus        FH/SH reviewed/unchanged.               Expand All Collapse All      []Hide copied text    []Hover for details           [facility] Cardiology Follow-up Note     Date of Service:    4/2/2020       Name:                          Kyler Donato   YOB: 1932  Age:                             87 y.o.  male     MRN:                           [id number]        Chief Complaint: NSTEMI     Primary Cardiologist:  Dr Mccormack     HPI:  Mr Donato is an 87 y.o fellow with hx of CAD s/p large anterior MI in 1995 [facility] stent to the LAD.  He has hx of ICMO with EF in the 25-35% range.   Some notes of COPD, with a 50 pack year hx.  He has hx of paroxysmal SVT on Holter in 2015.  He presented to [facility] on 3/22/20 with CP and NSTEMI, underwent LHC on 3/23 S/p 2.5 x 12 MATY to the mid LCx.  Overnight 3/23 patient began to c/o dizziness and was  eventually identified as having large cerebellular territory CVA with L vertebral artery occlusion.       Cardiology was re-consulted 3/30/20 after patient developed CP at rehab, was noted to have up-trending troponin.     Interim Events:  4/2/2020: Patient had an episode of left facial head, and neck numbness.  Patient continues to have a headache.  He has been given morphine as needed.  No rhythm issues overnight.  SR 70-90s.  LABS: Na 141, K 4.4, BUN 25, creatinine 1.46, GFR 46     4/1/2020: Patient resting in bed in no distress.  No rhythm issues overnight.  Denies chest pain, palpitations, orthopnea, lower extremity edema.  Has a headache which is helped with morphine.  Patient reports that he is somewhat dizzy when he stands up but by the time he ambulates to the restroom the dizziness has resolved.  DC heparin this morning.  Telemetry: SR 70s-80s  Labs:     ROS  Review of Systems   Constitutional: Negative for chills and fever.   Respiratory: Negative for cough, hemoptysis, sputum production, shortness of breath and wheezing.    Cardiovascular: Negative for chest pain, palpitations, orthopnea, claudication, leg swelling and PND.   Gastrointestinal: Negative for nausea and vomiting.   Neurological: Negative for dizziness and headaches.   All other systems reviewed and are negative.           Past medical, surgical, social, and family history reviewed and unchanged from admission except as noted in assessment and plan.     Medications: Reviewed in MAR           Current Facility-Administered Medications   Medication Dose Frequency Provider Last Rate Last Dose   • [START ON 4/3/2020] clopidogrel (PLAVIX) tablet 75 mg  75 mg DAILY Lilliam Rodriguez, A.P.N.       • meclizine (ANTIVERT) tablet 25 mg  25 mg TID PRN Tierra Atkinson M.D.       • metoprolol (LOPRESSOR) tablet 12.5 mg  12.5 mg TWICE DAILY DARLEEN MoraAFAIZA   12.5 mg at 04/02/20 0525   • aspirin EC (ECOTRIN) tablet 81 mg  81 mg DAILY Mihai Tafoya  "D.O.   81 mg at 04/02/20 0525   • atorvastatin (LIPITOR) tablet 80 mg  80 mg Q EVENING THERESA DeleonOElva   80 mg at 04/01/20 1705   • gabapentin (NEURONTIN) capsule 300 mg  300 mg TID Mihai Tafoya D.O.   300 mg at 04/02/20 0525   • magnesium oxide (MAG-OX) tablet 400 mg  400 mg DAILY Mihai Tafoya D.O.   400 mg at 04/02/20 0526   • omeprazole (PRILOSEC) capsule 20 mg  20 mg BID THERESA DeleonOElva   20 mg at 04/02/20 0525   • senna-docusate (PERICOLACE or SENOKOT S) 8.6-50 MG per tablet 2 Tab  2 Tab BID Mihai Tafoya D.O.   2 Tab at 04/01/20 0551     And   • polyethylene glycol/lytes (MIRALAX) PACKET 1 Packet  1 Packet QDAY PRN Mihai Tafoya D.O.         And   • magnesium hydroxide (MILK OF MAGNESIA) suspension 30 mL  30 mL QDAY PRN Mihai Tafoya D.O.         And   • bisacodyl (DULCOLAX) suppository 10 mg  10 mg QDAY PRN Mihai Tafoya D.O.       • acetaminophen (TYLENOL) tablet 650 mg  650 mg Q6HRS PRN Mihai Tafoya D.O.   650 mg at 04/02/20 0020   • morphine (pf) 4 MG/ML injection 2-4 mg  2-4 mg Q5 MIN PRN Mihai Tafoya D.O.   2 mg at 04/02/20 0559   • nitroglycerin (NITROSTAT) tablet 0.4 mg  0.4 mg Q5 MIN PRN Mihai Tafoya D.O.   0.4 mg at 03/31/20 1625   Last reviewed on 3/30/2020  6:01 PM by Joellen Holm R.N.     No Known Allergies     Physical Exam  Body mass index is 20.82 kg/m². /78   Pulse 68   Temp 36.8 °C (98.2 °F) (Temporal)   Resp 18   Ht 1.803 m (5' 11\")   Wt 67.7 kg (149 lb 4 oz)   SpO2 95%    Vitals          Vitals:     04/01/20 2035 04/02/20 0010 04/02/20 0458 04/02/20 0558   BP: 117/61 101/62 117/65 131/78   Pulse: 75 74 68     Resp: 18 18 18     Temp: 37.2 °C (99 °F) 37.3 °C (99.2 °F) 36.8 °C (98.2 °F)     TempSrc: Temporal Temporal Temporal     SpO2: 96% 91% 95%     Weight: 67.7 kg (149 lb 4 oz)         Height:               Oxygen Therapy:  Pulse Oximetry: 95 %, O2 (LPM): 0, O2 Delivery Device: None - Room Air     Physical Exam   Constitutional: He is " oriented to person, place, and time and well-developed, well-nourished, and in no distress.   HENT:   Head: Normocephalic and atraumatic.   Eyes: EOM are normal.   Neck: Normal range of motion. Neck supple. No thyromegaly present.   Cardiovascular: Normal rate and regular rhythm.   Murmur heard.   Systolic murmur is present with a grade of 3/6.  Pulses:       Carotid pulses are 2+ on the right side and 2+ on the left side.       Radial pulses are 2+ on the right side and 2+ on the left side.   Pulmonary/Chest: Effort normal and breath sounds normal.   Abdominal: Soft. Bowel sounds are normal.   Neurological: He is alert and oriented to person, place, and time.   Skin: Skin is warm and dry.   Psychiatric: Mood, affect and judgment normal.   Nursing note and vitals reviewed.           Labs (personally reviewed):           Lab Results   Component Value Date/Time     SODIUM 141 04/02/2020 03:29 AM     POTASSIUM 4.4 04/02/2020 03:29 AM     CHLORIDE 104 04/02/2020 03:29 AM     CO2 24 04/02/2020 03:29 AM     GLUCOSE 107 (H) 04/02/2020 03:29 AM     BUN 25 (H) 04/02/2020 03:29 AM     CREATININE 1.46 (H) 04/02/2020 03:29 AM            Lab Results   Component Value Date/Time     ALKPHOSPHAT 77 04/02/2020 03:29 AM     ASTSGOT 30 04/02/2020 03:29 AM     ALTSGPT 33 04/02/2020 03:29 AM     TBILIRUBIN 0.5 04/02/2020 03:29 AM            Lab Results   Component Value Date/Time     CHOLSTRLTOT 91 (L) 03/25/2020 02:19 AM     LDL 30 03/25/2020 02:19 AM     HDL 47 03/25/2020 02:19 AM     TRIGLYCERIDE 69 03/25/2020 02:19 AM            Lab Results   Component Value Date/Time     BNPBTYPENAT 612 (H) 11/19/2018 02:34 PM            Cardiac Imaging and Procedures Review:       Personal Telemetry Review:  NS 70s.        Echo 3/30/20:  CONCLUSIONS  Limited echo.  Left ventricular ejection fraction is visually estimated to be 10-15%.  Low flow low gradient aortic stenosis. SVi 20.9 mL/m2. PANCHO 1.2 cm2 by   continuity equation. AV mean gradient 5.7  mmHg. Trace aortic   insufficiency.     Compared to the images of the prior study done  3/23/2020, no   significant changes. Pmsm-dh-usil comparison unchanged left ventricular   function.     [facility] 3/23/20:     FINDINGS:  HEMODYNAMICS:  LEFT HEART PRESSURES:  1.  LVEDP 18 mmHg.  2.  Left ventricular systolic pressure 104 mmHg.  3.  Central aortic pressure systolic 105, diastolic 55, mean of 72 mmHg.     CORONARY ARTERIOGRAPHY:  1.  LEFT MAIN ARTERY:  Left main artery is a moderately large caliber vessel   with eccentric ostial stenosis of approximately 30%.  The left main artery   bifurcates to left anterior descending artery and circumflex artery.  2.  LEFT ANTERIOR DESCENDING ARTERY:  Left anterior descending artery gives   rise to normal complement of septal and diagonal branches and extends around   the apex.  The proximal left anterior descending artery has an estimated   relative 40% stenosis followed by what appears to be a stent that is patent.    The remainder of the vessel is patent with mild diffuse intimal atheromatous   disease.  3.  CIRCUMFLEX ARTERY:  The circumflex gives rise to 2 major marginal   branches.  The mid circumflex has an eccentric 75-90% hazy stenosis, which is   new compared to the 2017 study.  4.  RIGHT CORONARY ARTERY:  The right coronary artery gives rise to a   bifurcating posterior descending artery and a posterolateral system.  The   right coronary artery has a midvessel eccentric 30% stenosis.  The medial   branch of the posterior descending artery has a midvessel 95% stenosis.  This   vessel is small caliber.     INTRAVASCULAR ULTRASOUND (IVUS), left main artery demonstrates an   ostial minimal luminal area (MLA) of 8.0-9.3 square millimeters indicating a   nonsignificant obstructive stenosis..     POST-STENT IMPLANTATION, mid circumflex artery with a 2.5x12 mm Gabe   drug-eluting stent demonstrates 0% residual stenosis, no evidence of   dissection or thrombus and ROBBIE 3 antegrade  flow.        Assessment and Medical Decision Makin   Recurrent NSTEMI    -S/p 2.5 x 12 MATY to the mid LCx 3/23/20   -Continue 81 mg ASA, atorvastatin 80 mg every evening, metoprolol 12.5 mg twice daily  - Stop Prasugrel, start clopidogrel 75 mg daily starting tomorrow     2   Acute Cerebellular CVA, L vertebral artery occlusion  -Post cath 3/23/20  -Head CT 3/30/20 stable with evolving infarct, no hemorrhage.       3   Occasional PVCs  -Continue to monitor     4   Chronic systolic congestive heart failure, NYHA class II, stage C, secondary to ischemic cardiomyopathy. EF 15%  -Continue metoprolol 12.5 mg twice daily  -May change to Toprol XL to meet guidelines at d.c.     -May benefit from ACEI/ARB in the future.     6   CAD   -Hx of anterior MI , s/p LAD stent - patent .    -Hx of moderate disease to the mid RCA, 95% mid PDA.    -LM 30% - not significant by IVUS.  Continue med mgmt.     7   History of paroxysmal SVT.      8   Essential hypertension   -Stable 104/54     9   Hyperlipidemia.   -Continue atorvastatin 80.  LDL 42.      10  Aortic stenosis.   -Probably moderate.     11  Severe mitral regurgitation.  Does not appear volume overloaded at this time.            MICAH Main  [facility]  [phone]            Future Appointments   Date Time Provider Department Center   2020  1:20 PM [place] [facility] None         Please note that this dictation was created using voice recognition software. I have worked with consultants from the vendor as well as technical experts from [facility] to optimize the interface. I have made every reasonable attempt to correct obvious errors, but I expect that there are errors of grammar and possibly content I did not discover before finalizing the note.                           Cosigned by: Radhika Jain M.D. at 2020 11:22 AM                 Co-morbidities: as listed above   Potential Risk -  "Complications: Aphasia, Cognitive Impairment, Contractures, Deep Vein Thrombosis, Dysphagia, Incontinence, Malnutrition, Pain, Paralysis, Perceptual Impairment and Pneumonia  Level of Risk: High    Ongoing Medical Management Needed (Medical/Nursing Needs):   Patient Active Problem List    Diagnosis Date Noted   • NSTEMI (non-ST elevated myocardial infarction) ([facility]) 03/22/2020     Priority: High   • CHF (congestive heart failure) (formerly [facility]) 11/19/2018     Priority: High   • Chronic combined systolic and diastolic congestive heart failure ([facility]) 07/31/2017     Priority: High   • SVT (supraventricular tachycardia) (CMS-HCC) 09/24/2015     Priority: High   • Coronary artery disease involving native coronary artery 09/24/2015     Priority: High   • Hypercholesterolemia 09/24/2015     Priority: High   • COPD (chronic obstructive pulmonary disease) ([facility]) 04/05/2017     Priority: Medium   • Right shoulder pain 09/24/2015     Priority: Medium   • GERD (gastroesophageal reflux disease) 09/24/2015     Priority: Medium   • Essential hypertension 07/06/2016     Priority: Low   • Acute encephalopathy 03/31/2020   • Chest pain 03/30/2020   • Neurogenic bladder 03/30/2020   • Acute on chronic renal insufficiency 03/30/2020   • Urinary retention 03/27/2020   • Azotemia 03/27/2020   • CVA (cerebral vascular accident) ([facility]) 03/24/2020   • Anemia 03/22/2020   • Acute respiratory failure with hypoxia (formerly [facility]) 03/22/2020       Current Vital Signs:   Temperature: 37.1 °C (98.7 °F) Pulse: 65 Respiration: 18 Blood Pressure : 106/66  Weight: 67.7 kg (149 lb 4 oz) Height: 180.3 cm (5' 11\")  Pulse Oximetry: 100 % O2 (LPM): 2      Completed Laboratory Reports:  Recent Labs     03/30/20  2316 03/31/20  1639 04/01/20  1252 04/02/20  0329   WBC 7.1  --  7.6 8.5   HEMOGLOBIN 9.9*  --  10.3* 9.9*   HEMATOCRIT 30.5*  --  31.9* 30.5*   PLATELETCT 213  --  202 227   SODIUM 143  --  143 141   POTASSIUM 4.5  --  4.4 4.4   BUN 21  --  21 25*   CREATININE 1.41*  -- "  1.44* 1.46*   ALBUMIN  --   --  3.8 3.9   GLUCOSE 98  --  86 107*   POCGLUCOSE  --  99  --   --      Additional Labs: Not Applicable    Prior Living Situation:   Housing / Facility: 1 Story House  Steps Into Home: 0(ramp)  Steps In Home: 0  Lives with - Patient's Self Care Capacity: (wife just moved to group home as pt unable to care for her)  Equipment Owned: None    Prior Level of Function / Living Situation:   Physical Therapy: Prior Services: None  Housing / Facility: 1 Story House  Steps Into Home: 0(ramp)  Steps In Home: 0  Elevator: No  Bathroom Set up: Bathtub / Shower Combination, Walk In Shower  Equipment Owned: None  Lives with - Patient's Self Care Capacity: (wife just moved to group home as pt unable to care for her)  Bed Mobility: Independent  Transfer Status: Independent  Ambulation: Required Assist  Distance Ambulation (Feet): (with therapy staff at rehab)  Assistive Devices Used: Front-Wheel Walker  Stairs: Required Assist  Current Level of Function:   Level Of Assist: Minimal Assist(to mod A )  Assistive Device: Front Wheel Walker  Distance (Feet): 100  Deviation: (SOB with activity. )  # of Stairs Climbed: 0  Weight Bearing Status: no restrictions  Comments: very narrow base of support requiring constant cueing to maintain upright and needs cues to 'reset'   Supine to Sit: Minimal Assist  Sit to Supine: Supervised  Scooting: Supervised  Rolling: Supervised  Sit to Stand: Minimal Assist(with FWW)  Bed, Chair, Wheelchair Transfer: Minimal Assist  Toilet Transfers: Minimal Assist  Transfer Method: Stand Pivot  Sitting in Chair: > 5 mins during   Sitting Edge of Bed: 15 mins   Standing: 15 mins total   Occupational Therapy:   Self Feeding: Independent  Grooming / Hygiene: Independent  Bathing: Independent  Dressing: Independent  Toileting: Independent  Medication Management: Independent  Laundry: Independent  Kitchen Mobility: Independent  Finances: Independent  Home Management:  Independent  Shopping: Independent  Prior Level Of Mobility: Independent With Device in Community  Prior Services: None  Housing / Facility: 1 Story House  Occupation (Pre-Hospital Vocational): Retired Due To Age  Current Level of Function:   Eating: Modified Independent  Upper Body Dressing: Supervision  Lower Body Dressing: Minimal Assist  Toileting: Supervision  Speech Language Pathology:      Rehabilitation Prognosis/Potential: Good  Estimated Length of Stay: 14 days    Nursing:   Orientation : Oriented x 4  Continent    Scope/Intensity of Services Recommended:  Physical Therapy: 1 hr / day  5 days / week. Therapeutic Interventions Required: Maximize Endurance, Mobility, Strength and Safety  Occupational Therapy: 1 hr / day 5 days / week. Therapeutic Interventions Required: Maximize Self Care, ADLs, IADLs and Energy Conservation  Speech & Language Pathology: 1 hr / day 5 days / week. Therapeutic Interventions Required: Maximize Cognition, Swallowing and Safety  Rehabilitation Nursin/7. Therapeutic Interventions Required: Monitor Pain, Skin, Vital Signs, Intake and Output, Labs, Safety, Aspiration Risk and Family Training  Rehabilitation Physician: 3 - 5 days / week. Therapeutic Interventions Required: Medical Management  Respiratory Care: evaluate . Therapeutic Interventions Required: Pulmonary Toileting, O2 Weaning and Aspiration Risk  Dietician: consult . Therapeutic Interventions Required: nutritional need     Rehabilitation Goals and Plan (Expected frequency & duration of treatment in the IRF):   Return to the Community, Minimal Assist Level of Care and Family Able to Provide 24 Assistance  Anticipated Date of Rehabilitation Admission: 20  Patient/Family oriented IRF level of care/facility/plan: Yes  Patient/Family willing to participate in IRF care/facility/plan: Yes  Patient able to tolerate IRF level of care proposed: Yes  Patient has potential to benefit IRF level of care proposed:  Yes  Comments: Not Applicable    Special Needs or Precautions - Medical Necessity:  Safety Concerns/Precautions:  Fall Risk / High Risk for Falls, Balance and Cognition  Pain Management  IV Site: Peripheral  Requires Oxygen  Cardiac Precautions  Current Medications:    Current Facility-Administered Medications Ordered in Epic   Medication Dose Route Frequency Provider Last Rate Last Dose   • [START ON 4/3/2020] clopidogrel (PLAVIX) tablet 75 mg  75 mg Oral DAILY MICHAELA Gonzales       • oxyCODONE immediate-release (ROXICODONE) tablet 5 mg  5 mg Oral Q4HRS PRN Tierra Atkinson M.D.       • meclizine (ANTIVERT) tablet 25 mg  25 mg Oral TID PRN Tierra Atkinson M.D.       • aspirin EC (ECOTRIN) tablet 81 mg  81 mg Oral DAILY ALBA Deleon.O.   81 mg at 04/02/20 0525   • atorvastatin (LIPITOR) tablet 80 mg  80 mg Oral Q EVENING THERESA DeleonO.   80 mg at 04/01/20 1705   • gabapentin (NEURONTIN) capsule 300 mg  300 mg Oral TID ALBA Deleon.O.   300 mg at 04/02/20 0525   • magnesium oxide (MAG-OX) tablet 400 mg  400 mg Oral DAILY THERESA DeleonOElva   400 mg at 04/02/20 0526   • omeprazole (PRILOSEC) capsule 20 mg  20 mg Oral BID THERESA DeleonO.   20 mg at 04/02/20 0525   • senna-docusate (PERICOLACE or SENOKOT S) 8.6-50 MG per tablet 2 Tab  2 Tab Oral BID THERESA DeleonO.   2 Tab at 04/01/20 0551    And   • polyethylene glycol/lytes (MIRALAX) PACKET 1 Packet  1 Packet Oral QDAY PRN Mihai Tafoya D.O.        And   • magnesium hydroxide (MILK OF MAGNESIA) suspension 30 mL  30 mL Oral QDAY PRN Mihai Tafoya D.O.        And   • bisacodyl (DULCOLAX) suppository 10 mg  10 mg Rectal QDAY PRN Mihai Tafoya D.O.       • acetaminophen (TYLENOL) tablet 650 mg  650 mg Oral Q6HRS PRN THERESA DeleonO.   650 mg at 04/02/20 0020   • nitroglycerin (NITROSTAT) tablet 0.4 mg  0.4 mg Sublingual Q5 MIN PRN Mihai Tafoya D.O.   0.4 mg at 03/31/20 1625     Current Outpatient Medications Ordered in Epic    Medication Sig Dispense Refill   • meclizine (ANTIVERT) 25 MG Tab Take 1 Tab by mouth 3 times a day as needed. 30 Tab 0   • nitroglycerin (NITROSTAT) 0.4 MG SL Tab Place 1 Tab under tongue as needed for Chest Pain (up to 3 doses (if SBP greater than 90 mmHg)). 25 Tab      Diet:   DIET ORDERS (From admission to next 24h)     Start     Ordered    03/30/20 1424  Diet Order Cardiac  ALL MEALS     Question:  Diet:  Answer:  Cardiac    03/30/20 1425                Anticipated Discharge Destination / Patient/Family Goal:  Destination: Home with Assistance Support System: Family   Anticipated home health services: Not Applicable, OT, PT, Nursing and Aide  Previously used HH service/ provider: Not Applicable  Anticipated DME Needs: Oxygen and Walker  Outpatient Services: OT, PT and SLP  Alternative resources to address additional identified needs:   Potential group home placement.  Pre-Screen Completed: 4/2/2020 12:05 PM Mario Nielsen

## 2020-04-02 NOTE — PROGRESS NOTES
Tele    [phone]  [facility], trigem, coup  0.20/0.10/0.40    12 hr cc

## 2020-04-02 NOTE — DISCHARGE PLANNING
Anticipated Discharge Disposition: TBD    Action: RN CM spoke with patient's daughter and with patient. Patient is adamant that he wants to try to go to rehab to get stronger and then go be with his wife at home. He does not wish for hospice services at this time. Georgie states that if that is what the patient wants, she will probably move in with her father and his wife, her step mom, and stay to help out.  SHENA DODD contacted Mario at rehab and he will look at referral.    Barriers to Discharge: rehab consult    Plan: Case coordination to f/u with treatment team for discharge planning once rehab has assessed patient

## 2020-04-02 NOTE — PROGRESS NOTES
Patient discharged to [facility]. Patient taken via transport. IV site and telemetry removed. Report called. Patient self cath'd prior to discharge. Belongings packed. Hear aids present in brown bag with patient.

## 2020-04-02 NOTE — PROGRESS NOTES
Patient complaining of sharp headache increasing with position. Complaint of left numbness, tingling. Patient laid flat with legs elevated. Patient reports mild dizziness with change in position. Bp stable. Giving 2mg morphine. Will continue to monitor.

## 2020-04-02 NOTE — CARE PLAN
Problem: Safety  Goal: Will remain free from injury  Note: Pt educated to use call light when in need of assistance, call light and personal belongings within reach.

## 2020-04-02 NOTE — DISCHARGE INSTRUCTIONS
Discharge Instructions    Discharged to other by medical transportation with escort. Discharged via wheelchair, hospital escort: Yes.  Special equipment needed: Not Applicable    Be sure to schedule a follow-up appointment with your primary care doctor or any specialists as instructed.     Discharge Plan:   Diet Plan: Discussed  Activity Level: Discussed  Confirmed Follow up Appointment: Appointment Scheduled(patient dc to rehab )  Confirmed Symptoms Management: Discussed  Medication Reconciliation Updated: Yes  Influenza Vaccine Indication: Not indicated: Previously immunized this influenza season and > 8 years of age    I understand that a diet low in cholesterol, fat, and sodium is recommended for good health. Unless I have been given specific instructions below for another diet, I accept this instruction as my diet prescription.       Special Instructions:   HF Patient Discharge Instructions  · Monitor your weight daily, and maintain a weight chart, to track your weight changes.   · Activity as tolerated, unless your Doctor has ordered otherwise. Other activity order: as tolerated.  · Follow a low fat, low cholesterol, low salt diet unless instructed otherwise by your Doctor. Read the labels on the back of food products and track your intake of fat, cholesterol and salt.   · Fluid Restriction No. If a Fluid Restriction has been ordered by your Doctor, measure fluids with a measuring cup to ensure that you are not exceeding the restriction.   · No smoking.  · Oxygen No. If your Doctor has ordered that you wear Oxygen at home, it is important to wear it as ordered.  · Did you receive an explanation from staff on the importance of taking each of your medications and why it is necessary to keep taking them unless your doctor says to stop? Yes  · Were all of your questions answered about how to manage your heart failure and what to do if you have increased signs and symptoms after you go home? Yes  · Do you feel like  your heart failure care team involved you in the care treatment plan and allowed you to make decisions regarding your care while in the hospital and addressed any discharge needs you might have? Yes    See the educational handout provided at discharge for more information on monitoring your daily weight, activity and diet. This also explains more about Heart Failure, symptoms of a flare-up and some of the tests that you have undergone.     Warning Signs of a Flare-Up include:  · Swelling in the ankles or lower legs.  · Shortness of breath, while at rest, or while doing normal activities.   · Shortness of breath at night when in bed, or coughing in bed.   · Requiring more pillows to sleep at night, or needing to sit up at night to sleep.  · Feeling weak, dizzy or fatigued.     When to call your Doctor:  · Call [facility] seven days a week from 8:00 a.m. to 8:00 p.m. for medical questions [phone].  · Call your Primary Care Physician or Cardiologist if:   1. You experience any pain radiating to your jaw or neck.  2. You have any difficulty breathing.  3. You experience weight gain of 3 lbs in a day or 5 lbs in a week.   4. You feel any palpitations or irregular heartbeats.  5. You become dizzy or lose consciousness.   If you have had an angiogram or had a pacemaker or AICD placed, and experience:  1. Bleeding, drainage or swelling at the surgical / puncture site.  2. Fever greater than 100.0 F  3. Shock from internal defibrillator.  4. Cool and / or numb extremities.      · Is patient discharged on Warfarin / Coumadin?   No     Depression / Suicide Risk    As you are discharged from this [facility], it is important to learn how to keep safe from harming yourself.    Recognize the warning signs:  · Abrupt changes in personality, positive or negative- including increase in energy   · Giving away possessions  · Change in eating patterns- significant weight changes-  positive or  negative  · Change in sleeping patterns- unable to sleep or sleeping all the time   · Unwillingness or inability to communicate  · Depression  · Unusual sadness, discouragement and loneliness  · Talk of wanting to die  · Neglect of personal appearance   · Rebelliousness- reckless behavior  · Withdrawal from people/activities they love  · Confusion- inability to concentrate     If you or a loved one observes any of these behaviors or has concerns about self-harm, here's what you can do:  · Talk about it- your feelings and reasons for harming yourself  · Remove any means that you might use to hurt yourself (examples: pills, rope, extension cords, firearm)  · Get professional help from the community (Mental Health, Substance Abuse, psychological counseling)  · Do not be alone:Call your Safe Contact- someone whom you trust who will be there for you.  · Call your local CRISIS HOTLINE 848-1131 or 545-969-2686  · Call your local Children's Mobile Crisis Response Team [place] (047) 691-9102 or www.MarketPage  · Call the toll free National Suicide Prevention Hotlines   · National Suicide Prevention Lifeline 861-010-XIWE (1650)  · National Hope Line Network 800-SUICIDE (893-3800)      Discharge Instructions    Special Instructions: Diagnosis:  Acute Coronary Syndrome (ACS) is a diagnosis that encompasses cardiac-related chest pain and heart attack. ACS occurs when the blood flow to the heart muscle is severely reduced or cut off completely due to a slow process called atherosclerosis.  Atherosclerosis is a disease in which the coronary arteries become narrow from a buildup of fat, cholesterol, and other substances that combine to form plaque. If the plaque breaks, a blood clot will form and block the blood flow to the heart muscle. This lack of blood flow can cause damage or death to the heart muscle which is called a heart attack or Myocardial Infarction (MI). There are two different types of MIs:  ST Elevation  Myocardial Infarction or STEMI (the most severe type of heart attack) and Non-ST Elevation Myocardial Infarction or NSTEMI.    Treatment Plan:  · Cardiac Diet  - Low fat, low salt, low cholesterol   · Cardiac Rehab  - Your doctor has ordered you a referral to [facility].  Call [phone] to schedule an appointment.  · Attend my follow-up appointment with my Cardiologist.  · Take my medications as prescribed by my doctor  · Exercise daily  · Lower my bad cholesterol and raise my good cholesterol and Reduce stress    Medications:  Certain medications are used to treat ACS.  Remember to always take medications as prescribed and never stop talking medications unless told by your doctor.    You have been prescribed the following medicatons:    Aspirin - Aspirin is used as a blood thinning medication and you will require this medication indefinitely.  Anti-platelet/blood thinner - Your Anti-platelet/Blood thinning medication is called plavix, and is used in combination with aspirin to prevent clots from forming in your heart and/or around your stent.  Your doctor will determine how long you need to be on this medicine.  Statin - Statin lipitor is used to lower cholesterol.  Nitroglycerine - Nitroglycerine is used to relieve chest pain.    · Is patient discharged on Warfarin / Coumadin?   No     Depression / Suicide Risk    As [facility] are [facility], it is important to learn how to keep safe from harming yourself.    Recognize the warning signs:  · Abrupt changes in personality, positive or negative- including increase in energy   · Giving away possessions  · Change in eating patterns- significant weight changes-  positive or negative  · Change in sleeping patterns- unable to sleep or sleeping all the time   · Unwillingness or inability to communicate  · Depression  · Unusual sadness, discouragement and loneliness  · Talk of wanting to die  · Neglect of personal appearance   · Rebelliousness-  reckless behavior  · Withdrawal from people/activities they love  · Confusion- inability to concentrate     If you or a loved one observes any of these behaviors or has concerns about self-harm, here's what you can do:  · Talk about it- your feelings and reasons for harming yourself  · Remove any means that you might use to hurt yourself (examples: pills, rope, extension cords, firearm)  · Get professional help from the community (Mental Health, Substance Abuse, psychological counseling)  · Do not be alone:Call your Safe Contact- someone whom you trust who will be there for you.  · Call your local CRISIS HOTLINE 015-6560 or 181-143-8021  · Call your local Children's Mobile Crisis Response Team [place] (407) 968-2534 or www.Experticity  · Call the toll free National Suicide Prevention Hotlines   · National Suicide Prevention Lifeline 972-534-OYBS (1864)  · National Hope Line Network 800-SUICIDE (717-9102)

## 2020-04-03 PROBLEM — N18.30 CKD (CHRONIC KIDNEY DISEASE) STAGE 3, GFR 30-59 ML/MIN: Status: ACTIVE | Noted: 2020-04-03

## 2020-04-03 LAB
25(OH)D3 SERPL-MCNC: 47 NG/ML (ref 30–100)
ALBUMIN SERPL BCP-MCNC: 3.8 G/DL (ref 3.2–4.9)
ALBUMIN/GLOB SERPL: 1.5 G/DL
ALP SERPL-CCNC: 81 U/L (ref 30–99)
ALT SERPL-CCNC: 30 U/L (ref 2–50)
ANION GAP SERPL CALC-SCNC: 13 MMOL/L (ref 7–16)
AST SERPL-CCNC: 28 U/L (ref 12–45)
BASOPHILS # BLD AUTO: 0.5 % (ref 0–1.8)
BASOPHILS # BLD: 0.04 K/UL (ref 0–0.12)
BILIRUB SERPL-MCNC: 0.5 MG/DL (ref 0.1–1.5)
BUN SERPL-MCNC: 25 MG/DL (ref 8–22)
CALCIUM SERPL-MCNC: 9.3 MG/DL (ref 8.5–10.5)
CHLORIDE SERPL-SCNC: 105 MMOL/L (ref 96–112)
CO2 SERPL-SCNC: 25 MMOL/L (ref 20–33)
CREAT SERPL-MCNC: 1.47 MG/DL (ref 0.5–1.4)
EOSINOPHIL # BLD AUTO: 0.22 K/UL (ref 0–0.51)
EOSINOPHIL NFR BLD: 2.6 % (ref 0–6.9)
ERYTHROCYTE [DISTWIDTH] IN BLOOD BY AUTOMATED COUNT: 49.9 FL (ref 35.9–50)
GAMMA INTERFERON BACKGROUND BLD IA-ACNC: 0.02 IU/ML
GLOBULIN SER CALC-MCNC: 2.6 G/DL (ref 1.9–3.5)
GLUCOSE SERPL-MCNC: 87 MG/DL (ref 65–99)
HCT VFR BLD AUTO: 32.2 % (ref 42–52)
HGB BLD-MCNC: 10.3 G/DL (ref 14–18)
IMM GRANULOCYTES # BLD AUTO: 0.03 K/UL (ref 0–0.11)
IMM GRANULOCYTES NFR BLD AUTO: 0.4 % (ref 0–0.9)
LYMPHOCYTES # BLD AUTO: 1.51 K/UL (ref 1–4.8)
LYMPHOCYTES NFR BLD: 18.1 % (ref 22–41)
M TB IFN-G BLD-IMP: NEGATIVE
M TB IFN-G CD4+ BCKGRND COR BLD-ACNC: 0.01 IU/ML
MAGNESIUM SERPL-MCNC: 1.7 MG/DL (ref 1.5–2.5)
MCH RBC QN AUTO: 32.6 PG (ref 27–33)
MCHC RBC AUTO-ENTMCNC: 32 G/DL (ref 33.7–35.3)
MCV RBC AUTO: 101.9 FL (ref 81.4–97.8)
MITOGEN IGNF BCKGRD COR BLD-ACNC: >10 IU/ML
MONOCYTES # BLD AUTO: 0.97 K/UL (ref 0–0.85)
MONOCYTES NFR BLD AUTO: 11.6 % (ref 0–13.4)
NEUTROPHILS # BLD AUTO: 5.59 K/UL (ref 1.82–7.42)
NEUTROPHILS NFR BLD: 66.8 % (ref 44–72)
NRBC # BLD AUTO: 0 K/UL
NRBC BLD-RTO: 0 /100 WBC
PLATELET # BLD AUTO: 236 K/UL (ref 164–446)
PMV BLD AUTO: 10.7 FL (ref 9–12.9)
POTASSIUM SERPL-SCNC: 3.8 MMOL/L (ref 3.6–5.5)
PROT SERPL-MCNC: 6.4 G/DL (ref 6–8.2)
QFT TB2 - NIL TBQ2: 0.01 IU/ML
RBC # BLD AUTO: 3.16 M/UL (ref 4.7–6.1)
SODIUM SERPL-SCNC: 143 MMOL/L (ref 135–145)
WBC # BLD AUTO: 8.4 K/UL (ref 4.8–10.8)

## 2020-04-03 PROCEDURE — 36415 COLL VENOUS BLD VENIPUNCTURE: CPT

## 2020-04-03 PROCEDURE — 83735 ASSAY OF MAGNESIUM: CPT

## 2020-04-03 PROCEDURE — 92610 EVALUATE SWALLOWING FUNCTION: CPT

## 2020-04-03 PROCEDURE — 770010 HCHG ROOM/CARE - REHAB SEMI PRIVAT*

## 2020-04-03 PROCEDURE — 700111 HCHG RX REV CODE 636 W/ 250 OVERRIDE (IP): Performed by: PHYSICAL MEDICINE & REHABILITATION

## 2020-04-03 PROCEDURE — 97162 PT EVAL MOD COMPLEX 30 MIN: CPT

## 2020-04-03 PROCEDURE — 97535 SELF CARE MNGMENT TRAINING: CPT

## 2020-04-03 PROCEDURE — 97166 OT EVAL MOD COMPLEX 45 MIN: CPT

## 2020-04-03 PROCEDURE — 85025 COMPLETE CBC W/AUTO DIFF WBC: CPT

## 2020-04-03 PROCEDURE — 82306 VITAMIN D 25 HYDROXY: CPT

## 2020-04-03 PROCEDURE — A9270 NON-COVERED ITEM OR SERVICE: HCPCS | Performed by: PHYSICAL MEDICINE & REHABILITATION

## 2020-04-03 PROCEDURE — 700102 HCHG RX REV CODE 250 W/ 637 OVERRIDE(OP): Performed by: PHYSICAL MEDICINE & REHABILITATION

## 2020-04-03 PROCEDURE — 99222 1ST HOSP IP/OBS MODERATE 55: CPT | Performed by: HOSPITALIST

## 2020-04-03 PROCEDURE — 97530 THERAPEUTIC ACTIVITIES: CPT

## 2020-04-03 PROCEDURE — 80053 COMPREHEN METABOLIC PANEL: CPT

## 2020-04-03 PROCEDURE — 99233 SBSQ HOSP IP/OBS HIGH 50: CPT | Performed by: PHYSICAL MEDICINE & REHABILITATION

## 2020-04-03 PROCEDURE — 92523 SPEECH SOUND LANG COMPREHEN: CPT

## 2020-04-03 RX ADMIN — HEPARIN SODIUM 5000 UNITS: 5000 INJECTION, SOLUTION INTRAVENOUS; SUBCUTANEOUS at 20:09

## 2020-04-03 RX ADMIN — HEPARIN SODIUM 5000 UNITS: 5000 INJECTION, SOLUTION INTRAVENOUS; SUBCUTANEOUS at 14:34

## 2020-04-03 RX ADMIN — MORPHINE SULFATE 7.5 MG: 15 TABLET ORAL at 10:28

## 2020-04-03 RX ADMIN — GABAPENTIN 300 MG: 300 CAPSULE ORAL at 20:09

## 2020-04-03 RX ADMIN — CLOPIDOGREL BISULFATE 75 MG: 75 TABLET ORAL at 08:11

## 2020-04-03 RX ADMIN — HEPARIN SODIUM 5000 UNITS: 5000 INJECTION, SOLUTION INTRAVENOUS; SUBCUTANEOUS at 05:17

## 2020-04-03 RX ADMIN — HYDROXYZINE HYDROCHLORIDE 50 MG: 25 TABLET, FILM COATED ORAL at 20:09

## 2020-04-03 RX ADMIN — GABAPENTIN 300 MG: 300 CAPSULE ORAL at 08:11

## 2020-04-03 RX ADMIN — ATORVASTATIN CALCIUM 80 MG: 40 TABLET, FILM COATED ORAL at 20:09

## 2020-04-03 RX ADMIN — GABAPENTIN 300 MG: 300 CAPSULE ORAL at 14:34

## 2020-04-03 RX ADMIN — OMEPRAZOLE 20 MG: 20 CAPSULE, DELAYED RELEASE ORAL at 20:09

## 2020-04-03 RX ADMIN — ASPIRIN 81 MG: 81 TABLET, COATED ORAL at 08:11

## 2020-04-03 RX ADMIN — OMEPRAZOLE 20 MG: 20 CAPSULE, DELAYED RELEASE ORAL at 08:11

## 2020-04-03 ASSESSMENT — ENCOUNTER SYMPTOMS
MUSCULOSKELETAL NEGATIVE: 1
VOMITING: 0
CHILLS: 0
FEVER: 0
EYES NEGATIVE: 1
FOCAL WEAKNESS: 1
ABDOMINAL PAIN: 0
PALPITATIONS: 0
POLYDIPSIA: 0
BRUISES/BLEEDS EASILY: 0
COUGH: 0
SHORTNESS OF BREATH: 0
NAUSEA: 0

## 2020-04-03 ASSESSMENT — BRIEF INTERVIEW FOR MENTAL STATUS (BIMS)
ASKED TO RECALL BLUE: YES, NO CUE REQUIRED
WHAT MONTH IS IT: ACCURATE WITHIN 5 DAYS
ASKED TO RECALL SOCK: YES, AFTER CUEING (SOMETHING TO WEAR")"
INITIAL REPETITION OF BED BLUE SOCK - FIRST ATTEMPT: 3
WHAT DAY OF THE WEEK IS IT: CORRECT
WHAT YEAR IS IT: CORRECT
BIMS SUMMARY SCORE: 14
ASKED TO RECALL BED: YES, NO CUE REQUIRED

## 2020-04-03 ASSESSMENT — ACTIVITIES OF DAILY LIVING (ADL): TOILETING: INDEPENDENT

## 2020-04-03 NOTE — ASSESSMENT & PLAN NOTE
Occurred post-op  MRI --> large area of acute infarction in the left PICA territory including the left dorsal lateral medulla  On ASA, Plavix  On Lipitor  Note: if pt has another stroke --> no further treatment and no transfer to [facility]

## 2020-04-03 NOTE — REHAB-PHARMACY IDT TEAM NOTE
Pharmacy   Pharmacy  Antibiotics/Antifungals/Antivirals:  Medication:      Active Orders (From admission, onward)    None        Route:        NA  Stop Date:  NA  Reason:      NA  Antihypertensives/Cardiac:  Medication:    Orders (72h ago, onward)     Start     Ordered    04/02/20 2100  atorvastatin (LIPITOR) tablet 80 mg  EVERY EVENING      04/02/20 1724    04/02/20 1718  nitroglycerin (NITROSTAT) tablet 0.4 mg  EVERY 5 MINUTES PRN      04/02/20 1724              Patient Vitals for the past 24 hrs:   BP Pulse   04/03/20 1409 118/66 70   04/03/20 0831 107/45 80   04/03/20 0710 113/63 84   04/02/20 1823 101/51 77     Anticoagulation:  Medication: Aspirin, Plavix, Heparin                                  Other key medications: A review of the medication list reveals no issues at this time.    Section completed by: Diego Simon [facility]

## 2020-04-03 NOTE — CONSULTS
HOSPITAL MEDICINE CONSULTATION    Requesting Physician:  Dr. Hernandez    Reason for Consult:  S/P NSTEMI, CHF    History of Present Illness:  The patient is an 87-year-old  male with past medical history significant for coronary artery disease status post remote history of LAD stent and more recent history of mid-circumflex PCI and stage three chronic kidney disease.  He was recently admitted to [facility] for non-ST elevation myocardial infarction and underwent cardiac catheterization with percutaneous intervention.  His hospital course at that time was complicated by left sided weakness, consistent with an acute cerebrovascular accident.  He was then transferred to [facility] due to his ongoing functional debility.  While at Rehab, the patient developed chest pain with Troponin greater than 2000.  He was transferred back to [facility] on 3/30/20 and Cardiology consultation recommended no further aggressive intervention.  Echocardiogram showed ejection fraction 15%, grade three diastolic dysfunction, and aortic stenosis.  Palliative Care was consulted and Hospice was discussed.  The patient presently hopes to gain enough physical conditioning so that he may join his wife at a group home.  Thus, he was referred back to acute Rehab on 4/2/20.  He would be interested in pursuing Hospice should his condition further deteriorate.  Hospital Medicine consultation is requested to assist in the management of this patient's cardiac comorbidities.    Review of Systems:  Review of Systems   Constitutional: Negative for chills and fever.   HENT: Negative.    Eyes: Negative.    Respiratory: Negative for cough and shortness of breath.    Cardiovascular: Negative for chest pain and palpitations.   Gastrointestinal: Negative for abdominal pain, nausea and vomiting.   Musculoskeletal: Negative.    Skin: Negative for itching and rash.   Neurological: Positive for focal weakness.    Endo/Heme/Allergies: Negative for polydipsia. Does not bruise/bleed easily.   All other systems reviewed and are negative.      Allergies:  No Known Allergies    Medications:    Current Facility-Administered Medications:   •  Respiratory Therapy Consult, , Nebulization, Continuous RT, Eliz Hernandez M.D.  •  Pharmacy Consult Request ...Pain Management Review 1 Each, 1 Each, Other, PHARMACY TO DOSE, Eliz Hernandez M.D.  •  acetaminophen (TYLENOL) tablet 650 mg, 650 mg, Oral, Q4HRS PRN, Eliz Hernandez M.D.  •  artificial tears ophthalmic solution 1 Drop, 1 Drop, Both Eyes, PRN, Eliz Hernandez M.D.  •  benzocaine-menthol (CEPACOL) lozenge 1 Lozenge, 1 Lozenge, Mouth/Throat, Q2HRS PRN, Eliz Hernandez M.D.  •  mag hydrox-al hydrox-simeth (MAALOX PLUS ES or MYLANTA DS) suspension 20 mL, 20 mL, Oral, Q2HRS PRN, Eliz Hernandez M.D.  •  ondansetron (ZOFRAN ODT) dispertab 4 mg, 4 mg, Oral, 4X/DAY PRN **OR** ondansetron (ZOFRAN) syringe/vial injection 4 mg, 4 mg, Intramuscular, 4X/DAY PRN, Eliz Hernandez M.D.  •  traZODone (DESYREL) tablet 50 mg, 50 mg, Oral, QHS PRN, Eliz Hernandez M.D., 50 mg at 04/02/20 1953  •  sodium chloride (OCEAN) 0.65 % nasal spray 2 Spray, 2 Spray, Nasal, PRN, lEiz Hernandez M.D.  •  hydrOXYzine HCl (ATARAX) tablet 50 mg, 50 mg, Oral, Q6HRS PRN, Eliz Hernandez M.D., 50 mg at 04/02/20 1953  •  melatonin tablet 3 mg, 3 mg, Oral, HS PRN, Eliz Hernandez M.D.  •  lactulose 20 GM/30ML solution 30 mL, 30 mL, Oral, QDAY PRN, Eliz Hernandez M.D.  •  docusate sodium (ENEMEEZ) enema 283 mg, 283 mg, Rectal, QDAY PRN, Eliz Hernandez M.D.  •  meclizine (ANTIVERT) tablet 25 mg, 25 mg, Oral, TID PRN, Eliz Hernandez M.D.  •  nitroglycerin (NITROSTAT) tablet 0.4 mg, 0.4 mg, Sublingual, Q5 MIN PRN, Eliz Hernandez M.D.  •  morphine (MS IR) tablet 7.5 mg, 7.5 mg, Oral, Q6HRS PRN, Eliz Hernandez M.D., 7.5 mg at 04/03/20 1028  •  aspirin EC (ECOTRIN) tablet  81 mg, 81 mg, Oral, DAILY, Eliz Hernandez M.D., 81 mg at 04/03/20 0811  •  atorvastatin (LIPITOR) tablet 80 mg, 80 mg, Oral, Q EVENING, Eliz Hernandez M.D., 80 mg at 04/02/20 1953  •  clopidogrel (PLAVIX) tablet 75 mg, 75 mg, Oral, DAILY, Eliz Hernandez M.D., 75 mg at 04/03/20 0811  •  heparin injection 5,000 Units, 5,000 Units, Subcutaneous, Q8HRS, Eliz Hernandez M.D., 5,000 Units at 04/03/20 0517  •  omeprazole (PRILOSEC) capsule 20 mg, 20 mg, Oral, BID, Eliz Hernandez M.D., 20 mg at 04/03/20 0811  •  gabapentin (NEURONTIN) capsule 300 mg, 300 mg, Oral, TID, Eliz Hernandez M.D., 300 mg at 04/03/20 0811  •  senna-docusate (PERICOLACE or SENOKOT S) 8.6-50 MG per tablet 2 Tab, 2 Tab, Oral, BID, Eliz Hernandez M.D.  •  magnesium hydroxide (MILK OF MAGNESIA) suspension 30 mL, 30 mL, Oral, QDAY PRN, Eliz Hernandez M.D.  •  Initiate Scheduled Intermittent Catheterization Program for Spinal Cord Injuries, , , CONTINUOUS **AND** Straight Cath, , , Q4H **AND** Educate patient and caregiver on Bladder Program, , , Once **AND** lidocaine 2 % jelly, , Topical, PRN, Eliz Hernandez M.D.  •  LORazepam (ATIVAN) tablet 1 mg, 1 mg, Oral, Q4HRS PRN, Eliz Hernandez M.D.    Past Medical/Surgical History:  Past Medical History:   Diagnosis Date   • Acute anterolateral myocardial infarction (HCC) 1995   • Arthritis    • Blood clotting disorder (HCC)    • CAD (coronary artery disease) 1995    PCI/stent to the LAD. April 2017: MPI with scar in anterior/inferior wall, no ischemia, LVEF 30%. Echocardiogram with fixed defects in anterior wall and septal inferior wall, no ischemia, LVEF 40%. July 2017: Coronary angiogram with patent LAD stent, diffuse nonobstructive lesions in RCA, 40% mid RCA, 50% mid circumflex.   • Congestive heart failure (HCC)    • Hyperlipidemia    • Hypertension    • PSVT (paroxysmal supraventricular tachycardia) ([facility])    • Shoulder pain    • Stroke ([facility])      Past Surgical  History:   Procedure Laterality Date   • ANGIOPLASTY      stents   • ELBOWPLASTY      ulnar nerve transposition   • HAND SURGERY     • OTHER CARDIAC SURGERY     • SHOULDER ORIF         Social History:  Social History     Socioeconomic History   • Marital status:      Spouse name: Not on file   • Number of children: Not on file   • Years of education: Not on file   • Highest education level: Not on file   Occupational History   • Not on file   Social Needs   • Financial resource strain: Not on file   • Food insecurity     Worry: Patient refused     Inability: Patient refused   • Transportation needs     Medical: Patient refused     Non-medical: Patient refused   Tobacco Use   • Smoking status: Former Smoker     Packs/day: 1.00     Years: 50.00     Pack years: 50.00     Last attempt to quit: 1995     Years since quittin.5   • Smokeless tobacco: Never Used   • Tobacco comment: up to 3 PPD per day at times   Substance and Sexual Activity   • Alcohol use: Yes     Alcohol/week: 1.8 oz     Types: 3 Cans of beer per week     Frequency: 2-3 times a week     Drinks per session: 3 or 4     Binge frequency: Less than monthly   • Drug use: No   • Sexual activity: Not on file   Lifestyle   • Physical activity     Days per week: Not on file     Minutes per session: Not on file   • Stress: Not on file   Relationships   • Social connections     Talks on phone: Not on file     Gets together: Not on file     Attends [employer] service: Not on file     Active member of club or organization: Not on file     Attends meetings of clubs or organizations: Not on file     Relationship status: Not on file   • Intimate partner violence     Fear of current or ex partner: Not on file     Emotionally abused: Not on file     Physically abused: Not on file     Forced sexual activity: Not on file   Other Topics Concern   • Not on file   Social History Narrative   • Not on file       Family History  Family History   Problem  "Relation Age of Onset   • Heart Disease Father    • Arthritis Mother    • Breast Cancer Sister    • Prostate cancer Brother        Physical Examination:   Vitals:    04/02/20 1420 04/02/20 1823 04/03/20 0710   BP: (!) 99/54 101/51 113/63   Pulse: 73 77 84   Resp: 18 18 17   Temp: 36.6 °C (97.8 °F) 37 °C (98.6 °F) 36.5 °C (97.7 °F)   TempSrc: Temporal Oral Oral   SpO2: 96%     Weight: 64.5 kg (142 lb 3.2 oz)     Height: 1.803 m (5' 11\")         Physical Exam   Constitutional: He is oriented to person, place, and time. No distress.   HENT:   Head: Normocephalic and atraumatic.   Right Ear: External ear normal.   Left Ear: External ear normal.   Eyes: Conjunctivae and EOM are normal. Right eye exhibits no discharge. Left eye exhibits no discharge.   Neck: Normal range of motion. Neck supple. No tracheal deviation present.   Cardiovascular: Normal rate and regular rhythm.   Pulmonary/Chest: No stridor. No respiratory distress. He has no wheezes.   Decreased BS   Abdominal: Soft. Bowel sounds are normal. He exhibits no distension. There is no abdominal tenderness.   Musculoskeletal:         General: No tenderness or edema.   Neurological: He is alert and oriented to person, place, and time.   Skin: Skin is warm and dry. He is not diaphoretic.   Vitals reviewed.      Laboratory Data:  Recent Labs     04/01/20  1252 04/02/20  0329 04/03/20  0517   WBC 7.6 8.5 8.4   RBC 3.17* 3.03* 3.16*   HEMOGLOBIN 10.3* 9.9* 10.3*   HEMATOCRIT 31.9* 30.5* 32.2*   .6* 100.7* 101.9*   MCH 32.5 32.7 32.6   MCHC 32.3* 32.5* 32.0*   RDW 49.5 49.0 49.9   PLATELETCT 202 227 236   MPV 10.3 10.4 10.7     Recent Labs     04/01/20  1252 04/02/20  0329 04/03/20  0517   SODIUM 143 141 143   POTASSIUM 4.4 4.4 3.8   CHLORIDE 105 104 105   CO2 26 24 25   GLUCOSE 86 107* 87   BUN 21 25* 25*   CREATININE 1.44* 1.46* 1.47*   CALCIUM 9.3 9.2 9.3       Imaging:  No orders to display       Impressions/Recommendations:  CVA (cerebral vascular accident) " (HCC)  Has left sided weakness  On ASA, Plavix, and Lipitor    CKD (chronic kidney disease) stage 3, GFR 30-59 ml/min (HCC)  Avoid nephrotoxins  Renal dose all meds  Outpt Nephrology F/U    Coronary artery disease involving native coronary artery  Remote h/o MI s/p LAD stent  More recent NSTEMI s/p Mid Circumflex PCI  Echo-EF 15%, grade III diastolic dysfunction, and aortic stenosis  On ASA, Plavix, and Lipitor  Blood pressure too low for ACE-I or B-Bl  Closely monitor volume status  No further aggressive intervention per Cardiology    DNR    Thank you for the opportunity to assist in this patient's care.  We will continue to follow along with you.

## 2020-04-03 NOTE — H&P
"REHABILITATION HISTORY AND PHYSICAL/POST ADMISSION EVALUATION    4/2/2020  5:43 PM  Kyler Donato Jr.  [id number]  Admission  4/2/2020  2:32 PM  [facility] Code/Reason for admission: 01.1 (L) Body    Etiologic diagnosis/problem: CVA (cerebral vascular accident) (HCC)  Chief Complaint: left sided facial numbness    HPI:  Per Dr. Mcgraw's consult note \"The patient is a 87 y.o. right hand dominant male with a past medical history of coronary artery disease, ischemic cardiomyopathy with ejection fraction 25%, hypertension, SVT, chronic urinary retention requiring straight cath at home;  who presented on 3/22/2020  3:13 PM with burning chest pain radiating to neck and back with dizziness, nausea and vomiting.  Patient found to have an NSTEMI and was admitted for close cardiac monitoring, serial EKGs and troponins. Patient was initially treated with aspirin and started on IV heparin.  Patient underwent cardiac catheterization and PCI with stent placement in mid circumflex artery on 3/23/2020 by Luis Mccann MD. then on 3/24/2020 patient's nurse called rapid response for neurologic changes with headache, left-sided weakness, left-sided ataxia and nystagmus.  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 Patient did not receive TPA. 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 EKG with prolonged qTC of 503. Patient was initially admitted to the rehab hospital on 3/27 and had begun his therapies, when he developed acute chest pain. He had elevated troponin to 2907, so was sent back to acute. He was put on a heparin drip, had repeat Head CT that showed his evolving infarction, no hemorrhage. He was " seen by cardiology, had ECHO that showed decreased EF to 15%, and was not a candidate for any intervention.     Initially the plan was for hospice and referral was made, but patient wanted to try rehab to get back home to his wife. The patient tells me he does NOT want to go back to the hospital for any new symptoms of a stroke or heart attack, and would just want to be made comfortable. This was discussed with his daughter Mariia, who is his POA, and she is in agreement. If this were to occur, we would stop therapy, start comfort care only, and try to get him home on hospice.    Patient is currently denying any chest pain.  He reports continued and intermittent numbness in the left side of his face and his left foot.  He is right-hand dominant.  He currently is reporting a very mild headache.  He does do intermittent catheterizations for his chronic neurogenic bladder.    Patient was evaluated by Rehab Medicine physician and Physical Therapy and Occupational Therapy and determined to be appropriate for acute inpatient rehab and was transferred to [facility] on 4/2/2020  2:32 PM.    With this acute therapeutic intervention, this patient hopes to improve his functional status, and return to independent living with the supportive care of family.    REVIEW OF SYSTEMS:     A complete review of systems was performed and was negative in detail with the exception of items mentioned elsewhere in this document.    PMH:  Past Medical History:   Diagnosis Date   • Acute anterolateral myocardial infarction (HCC) 1995   • Arthritis    • Blood clotting disorder (HCC)    • CAD (coronary artery disease) 1995    PCI/stent to the LAD. April 2017: MPI with scar in anterior/inferior wall, no ischemia, LVEF 30%. Echocardiogram with fixed defects in anterior wall and septal inferior wall, no ischemia, LVEF 40%. July 2017: Coronary angiogram with patent LAD stent, diffuse nonobstructive lesions in RCA, 40% mid RCA, 50%  mid circumflex.   • Congestive heart failure (HCC)    • Hyperlipidemia    • Hypertension    • PSVT (paroxysmal supraventricular tachycardia) ([facility])    • Shoulder pain    • Stroke ([facility])        PSH:  Past Surgical History:   Procedure Laterality Date   • ANGIOPLASTY  1995 stents   • ELBOWPLASTY      ulnar nerve transposition   • HAND SURGERY     • OTHER CARDIAC SURGERY     • SHOULDER ORIF         Family History   Problem Relation Age of Onset   • Heart Disease Father    • Arthritis Mother    • Breast Cancer Sister    • Prostate cancer Brother         MEDICATIONS:  Current Facility-Administered Medications   Medication Dose   • Respiratory Therapy Consult     • Pharmacy Consult Request ...Pain Management Review 1 Each  1 Each   • acetaminophen (TYLENOL) tablet 650 mg  650 mg   • artificial tears ophthalmic solution 1 Drop  1 Drop   • benzocaine-menthol (CEPACOL) lozenge 1 Lozenge  1 Lozenge   • mag hydrox-al hydrox-simeth (MAALOX PLUS ES or MYLANTA DS) suspension 20 mL  20 mL   • ondansetron (ZOFRAN ODT) dispertab 4 mg  4 mg    Or   • ondansetron (ZOFRAN) syringe/vial injection 4 mg  4 mg   • traZODone (DESYREL) tablet 50 mg  50 mg   • sodium chloride (OCEAN) 0.65 % nasal spray 2 Spray  2 Spray   • hydrOXYzine HCl (ATARAX) tablet 50 mg  50 mg   • melatonin tablet 3 mg  3 mg   • lactulose 20 GM/30ML solution 30 mL  30 mL   • docusate sodium (ENEMEEZ) enema 283 mg  283 mg   • meclizine (ANTIVERT) tablet 25 mg  25 mg   • nitroglycerin (NITROSTAT) tablet 0.4 mg  0.4 mg   • morphine (MS IR) tablet 7.5 mg  7.5 mg   • [START ON 4/3/2020] aspirin EC (ECOTRIN) tablet 81 mg  81 mg   • atorvastatin (LIPITOR) tablet 80 mg  80 mg   • [START ON 4/3/2020] clopidogrel (PLAVIX) tablet 75 mg  75 mg   • heparin injection 5,000 Units  5,000 Units   • omeprazole (PRILOSEC) capsule 20 mg  20 mg   • gabapentin (NEURONTIN) capsule 300 mg  300 mg   • senna-docusate (PERICOLACE or SENOKOT S) 8.6-50 MG per tablet 2 Tab  2 Tab   • magnesium  "hydroxide (MILK OF MAGNESIA) suspension 30 mL  30 mL   • lidocaine 2 % jelly     • LORazepam (ATIVAN) tablet 1 mg  1 mg       ALLERGIES:  Patient has no known allergies.    PSYCHOSOCIAL HISTORY:  Pre-mobidly, the patient lived in a single level home with ramp to enter, in [place] with spouse.  He was taking care of his spouse who has dementia but she now is going into a group home and his daughter Mariia will provide / care.  This was his second marriage.  He reports his first wife  of cancer after they have been  for 55 years.  She was in the [employer] for 4 years and saw combat in [place].  Is 10% service-connected and [facility].  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  Smoked tobacco in the [employer], drinks 3 to 4 cans of beer per week or more in the summer.       LEVEL OF FUNCTION PRIOR TO DISABILTY:  Independent,     LEVEL OF FUNCTION PRIOR TO ADMISSION to [facility]:  Min assist for mobility and ADLs    CURRENT LEVEL OF FUNCTION:   Same as level of function prior to admission to [facility]    PHYSICAL EXAM:     VITAL SIGNS:   height is 1.803 m (5' 11\") and weight is 64.5 kg (142 lb 3.2 oz). His temporal temperature is 36.6 °C (97.8 °F). His blood pressure is 99/54 (abnormal) and his pulse is 73. His respiration is 18 and oxygen saturation is 96%.     GENERAL: No apparent distress  HEENT: Normocephalic/atraumatic, EOMI and PERRL, horizontal nystagmus to the right  CARDIAC: Regular rate and rhythm, normal S1, S2, no murmurs, no peripheral edema   LUNGS: Clear to auscultation, normal respiratory effort, on room air   ABDOMINAL: bowel sounds present, soft, nontender and nondistended    EXTREMITIES: no edema or no calf tenderness bilaterally    NEURO:    Mental status: alert  Speech: fluent, no aphasia or dysarthria    CRANIAL NERVES:  2,3: visual acuity grossly intact, PERRL  3,4,6: EOMI bilaterally, + " right horizontal nystagmus, no diplopia  5: decreased on left side of face  7: no facial asymmetry  8: hearing grossly intact  9,10: symmetric palate elevation  11: SCM/Trapezius strength 5/5 bilaterally  12: tongue protrudes midline    Motor:  Shoulder flexors:  Right -  5/5, Left -  4/5  Elbow flexors:  Right -  5/5, Left -  4/5  Elbow extensors:  Right -  5/5, Left -  4/5  Slightly weaker  on the left  Hip flexors:  Right -  5/5, Left -  4/5  Knee ext:  Right -  5/5, Left -  4/5  Dorsiflexors:  Right -  5/5, Left -  4/5  Plantar flexors:  Right -  5/5, Left -  4/5     Sensory:   Decreased on left face, left foot to light touch     DTRs: 1+ in bilateral biceps, triceps, brachioradialis, 0+ in bilateral patellar and achilles tendons  No clonus at bilateral ankles  Negative babinski b/l  Negative Frye b/l     Coordination:   Impaired finger to nose bilaterally    RADIOLOGY:              Results for orders placed during the hospital encounter of 03/22/20   MR-BRAIN-W/O    Impression 1.  Advanced cerebral atrophy.  2.  Mild supratentorial white matter disease most consistent with microvascular ischemic change.  3.  12 mm curvilinear focus of acute infarction at the right occipital pole. Right posterior cerebral artery parieto-occipital branch territory.  4.  Large area of acute infarction in the left PICA territory including the left dorsal lateral medulla. This is concordant with the findings of left vertebral artery occlusion. No hemorrhagic transformation. 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  5.  Left distal vertebral artery occlusion concordant with CTA findings.                                                                                                                           Results for orders placed during the hospital encounter of 03/22/20   CT-CTA NECK WITH & W/O-POST PROCESSING    Impression 1.  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 Right  vertebral artery is patent.    2.  Atherosclerotic change of the carotid arteries bilaterally without significant flow limitation.    This was discussed with ADALBERTO MEZA at 2:57 PM on 3/24/2020.                                                                    LABS:  Recent Labs     03/30/20 2316 04/01/20  1252 04/02/20  0329   SODIUM 143 143 141   POTASSIUM 4.5 4.4 4.4   CHLORIDE 107 105 104   CO2 25 26 24   GLUCOSE 98 86 107*   BUN 21 21 25*   CREATININE 1.41* 1.44* 1.46*   CALCIUM 9.5 9.3 9.2     Recent Labs     03/30/20 2316 04/01/20  1252 04/02/20  0329   WBC 7.1 7.6 8.5   RBC 3.04* 3.17* 3.03*   HEMOGLOBIN 9.9* 10.3* 9.9*   HEMATOCRIT 30.5* 31.9* 30.5*   .3* 100.6* 100.7*   MCH 32.6 32.5 32.7   MCHC 32.5* 32.3* 32.5*   RDW 48.5 49.5 49.0   PLATELETCT 213 202 227   MPV 9.8 10.3 10.4     Recent Labs     03/31/20 2139 04/01/20  0359 04/01/20  0953   APTT 93.5* 136.2* 50.7*       PRIMARY REHAB DIAGNOSIS:    This patient is a 87 y.o. male admitted for acute inpatient rehabilitation with CVA (cerebral vascular accident) (HCC).    IMPAIRMENTS:   Cognitive  ADLs/IADLs  Mobility  Swallow    SECONDARY DIAGNOSIS/MEDICAL CO-MORBIDITIES AFFECTING FUNCTION:    Dizziness  Chronic urinary retention  Peripheral neuropathy  Coronary artery disease  S/p NSTEMI x 2  Acute systolic CHF  Hypotension  Chronic kidney disease  Macrocytic Anemia    RELEVANT CHANGES SINCE PREADMISSION EVALUATION:    Status unchanged    The patient's rehabilitation potential is fair  The patient's medical prognosis is poor    PLAN:   Discussion and Recommendations, discussed with the patient and/or family:   1. The patient requires an acute inpatient rehabilitation program with a coordinated program of care at an intensity and frequency not available at a lower level of care. This recommendation is substantiated by the patient's medical physicians who recommend that the patient's intervention and assessment of medical issues needs to be done at  an acute level of care for patient's safety and maximum outcome.     2. A coordinated program of care will be supplied by an interdisciplinary team of physical therapy, occupational therapy, rehab physician, rehab nursing, and, if needed, speech therapy and rehab psychology. Rehab team presents a patient-specific rehabilitation and education program concentrating on prevention of future problems related to accessibility, mobility, skin, bowel, bladder, sexuality, and psychosocial and medical/surgical problems.     3. Need for Rehabilitation Physician: The rehab physician will be evaluating the patient on a multi-weekly basis to help coordinate the program of care. The rehab physician communicates between medical physicians, therapists, and nurses to maximize the patient's potential outcome. Specific areas in which the rehab physician will be providing daily assessment include the following:   A. Assessing the patient's heart rate and blood pressure response (vitals monitoring) to activity and making adjustments in medications or conservative measures as needed.   B. The rehab physician will be assessing the frequency at which the program can be increased to allow the patient to reach optimal functional outcome.   C. The rehab physician will also provide assessments in daily skin care, especially in light of patient's impairments in mobility.   D. The rehab physician will provide special expertise in understanding how to work with functional impairment and recommend appropriate interventions, compensatory techniques, and education that will facilitate the patient's outcome.     4. Rehab R.N.   The rehab RN will be working with patient to carry over in room mobility and activities of daily living when the patient is not in 3 hours of skilled therapy. Rehab nursing will be working in conjunction with rehab physician to address all the medical issues above and continue to assess laboratory work and discuss abnormalities  with the treating physicians, assess vitals, and response to activity, and discuss and report abnormalities with the rehab physician. Rehab RN will also continue daily skin care, supervise bladder/bowel program, instruct in medication administration, and ensure patient safety.     5. Therapies to treat at intensity and frequency of (may change after completion of evaluation by all therapeutic disciplines):       PT:  Physical therapy to address mobility, transfer, gait training and evaluation for adaptive equipment needs 1hour/day at least 5 days/week for the duration of the ELOS (see below)       OT:  Occupational therapy to address ADLs, self-care, home management training, functional mobility/transfers and assistive device evaluation, and community re-integration 1hour/day at least 5 days/week for the duration of the ELOS (see below).        ST/Dysphagia:  Speech therapy to address speech, language, and cognitive deficits as well as swallowing difficulties with retraining/dysphagia management and community re-integration with comprehension, expression, cognitive training 1hour/day at least 5 days/week for the duration of the ELOS (see below).     6. Medical management / Rehabilitation Issues/Adverse Potential affecting function as part of rehabilitation plan.    Dizziness  PRN meclizine    Chronic urinary retention  Does ICs    Peripheral neuropathy  Continue gabapentin    Appreciate assistance of hospitalist with his medical co-morbidities:    Coronary artery disease  S/p NSTEMI x 2 - if patient has symptoms of another heart attack, no plan to send to ED, plan to transfer to palliative/hospice - daughter and patient in agreement  Acute systolic CHF, EF 15%  Hypotension  Chronic kidney disease  Macrocytic Anemia    I performed a complete drug regimen review and did not identify any potential clinically significant medication issues.    The patient's CODE STATUS was confirmed as DNR/DNI on admission, with the  patient and/or family at bedside.    REHABILITATION ISSUES/ADVERSE POTENTIAL:  1.  CVA (Cerebrovascular Accident): Cont aspirin and Plavix for secondary prophylaxis as well as lipid and blood pressure management. Patient demonstrates functional deficits in strength, balance, coordination, and ADL's. Patient is admitted to [facility] for comprehensive rehabilitation therapy as described below.   Rehabilitation nursing monitors bowel and bladder control, educates on medication administration, co-morbidities and monitors patient safety.    2.  DVT prophylaxis:  Patient is on heparin for anticoagulation upon transfer. Encourage OOB. Monitor daily for signs and symptoms of DVT including but not limited to swelling and pain to prevent the development of DVT that may interfere with therapies.    3.  Pain: No issues with pain currently / Controlled with as needed oral analgesics.    4.  Nutrition/Dysphagia: Dietician monitors nutrient intake, recommend supplements prn and provide nutrition education to pt/family to promote optimal nutrition for wound healing/recovery.     5.  Bladder/bowel:  Start bowel and bladder program as described below, to prevent constipation, urinary retention (which may lead to UTI), and urinary incontinence (which will impact upon pt's functional independence).   - TV Q3h while awake with post void bladder scans, I&O cath for PVRs >400  - up to commode after meal     6.  Skin/dermal ulcer prophylaxis: Monitor for new skin conditions with q.2 h. turns as required to prevent the development of skin breakdown.     7. Respiratory therapy: RT performs O2 management prn, breathing retraining, pulmonary hygiene and bronchospasm management prn to optimize participation in therapies.    Pt was seen today for 74 min, and entire time spent in face-to-face contact was >50% in counseling and coordination of care as detailed in A/P above.        GOALS/EXPECTED LEVEL OF FUNCTION BASED ON  CURRENT MEDICAL AND FUNCTIONAL STATUS (may change based on patient's medical status and rate of impairment recovery):  Transfers:   Supervision  Mobility/Gait:   Supervision  ADL's:   Supervision  Cognition:  Supervision    DISPOSITION: Discharge to pre-morbid independent living setting with the supportive care of patient's family.      ELOS: 2 weeks    Eliz Hernandez M.D.  Physical Medicine and Rehabilitation

## 2020-04-03 NOTE — DISCHARGE PLANNING
CASE MANAGEMENT INITIAL ASSESSMENT    Admit Date:  4/2/2020     I met with patient to discuss role of case management / discharge planning / team conference. I spoke with patient's daughter, Mariia Luke, by phone.   Patient is a  87 y.o. male transferred from [facility]; was inpatient 3/30/20 - 4/2/2020.  Patient was admitted to [facility] on 4/2/20.  The admitting physician is Dr. Eliz Hernandez.    Diagnosis: 01.2 (R) Body Involvement (L) Brain  Stroke (cerebrum) ([facility])  NSTEMI (non-ST elevated myocardial infarction) ([facility])    Co-morbidities:   Patient Active Problem List    Diagnosis Date Noted   • NSTEMI (non-ST elevated myocardial infarction) ([facility]) 03/22/2020     Priority: High   • CHF (congestive heart failure) ([facility]) 11/19/2018     Priority: High   • Chronic combined systolic and diastolic congestive heart failure ([facility]) 07/31/2017     Priority: High   • SVT (supraventricular tachycardia) (CMS-HCC) 09/24/2015     Priority: High   • Coronary artery disease involving native coronary artery 09/24/2015     Priority: High   • Hypercholesterolemia 09/24/2015     Priority: High   • COPD (chronic obstructive pulmonary disease) ([facility]) 04/05/2017     Priority: Medium   • Right shoulder pain 09/24/2015     Priority: Medium   • GERD (gastroesophageal reflux disease) 09/24/2015     Priority: Medium   • Essential hypertension 07/06/2016     Priority: Low   • CKD (chronic kidney disease) stage 3, GFR 30-59 ml/min ([facility]) 04/03/2020   • Acute encephalopathy 03/31/2020   • Chest pain 03/30/2020   • Neurogenic bladder 03/30/2020   • Acute on chronic renal insufficiency 03/30/2020   • Urinary retention 03/27/2020   • Azotemia 03/27/2020   • CVA (cerebral vascular accident) ([facility]) 03/24/2020   • Anemia 03/22/2020   • Acute respiratory failure with hypoxia ([facility]) 03/22/2020     Prior Living Situation:  Housing / Facility: 1 Story House  Lives with - Patient's Self Care Capacity: Spouse    Prior Level of Function:  Medication  Management: Independent  Finances: Independent  Home Management: Independent  Shopping: Independent  Prior Level Of Mobility: Independent Without Device in Community  Driving / Transportation: Driving Independent    Support Systems:  Primary : Georgie Luke,   Other support systems:   Power of  (Name & Phone): Georgie Luke, [place], [phone]    Previous Services Utilized:   Equipment Owned: Grab Bar(s) In Tub / Shower, Grab Bar(s) By Toilet  Prior Services: Home-Independent    Other Information:  Occupation (Pre-Hospital Vocational): Retired Due To Age     Primary Payor Source: Medicare A, Medicare B  Secondary Payor Source: Supplemental Insurance([employer])  Primary Care Practitioner : [facility] Mcdaniels, [facility]  Other MDs: Dr. Mccann, Cardiology; Dr. Reanna Ordonez, Neurology; Dr. Jain, Cardiology    Patient / Family Goal:  Patient / Family Goal: To return home as soon as possible   DC Disposition: To single level home in [place], [place], with support from patient's daughter. Daughter works, but she will spend the nights with patient.   Patient is caregiver for his wife. Wife is currently in a group home.   DC Needs: DME needs TBD.   Anticipate home health care.   MD f/u appointments.  Strengths: Motivated. Supportive daughter.     Additional Case Management Questions:  Have you ever received case management services for yourself or a family member? yes    Do you feel you have and an understanding of what services  provide? yes    Do you have any additional questions regarding case management?   no       CASE MANAGEMENT PLAN OF CARE   Individualized Goals:   1. Improve functional status to return home with intermittent support of daughter  2. Improve functional status to return home, care for wife with assistance of his daughter, his other dtr and step dtr who live out of town    Barriers:   1. Functional deficits  2. S/P NSTEMI x2. EF 15%    Plan:  1. Continue to follow patient through  hospitalization and provide discharge planning in collaboration with patient, family, physicians and ancillary services.     2. Utilize community resources to ensure a safe discharge.

## 2020-04-03 NOTE — THERAPY
"Speech Language Pathology   Initial Assessment     Patient Name: Kyler Donato Jr.  AGE:  87 y.o., SEX:  male  Medical Record #: [id number]  Today's Date: 4/3/2020     Subjective    Per Dr. Mcgraw's consult note \"The patient is a 87 y.o. right hand dominant male with a past medical history of coronary artery disease, ischemic cardiomyopathy with ejection fraction 25%, hypertension, SVT, chronic urinary retention requiring straight cath at home;  who presented on 3/22/2020  3:13 PM with burning chest pain radiating to neck and back with dizziness, nausea and vomiting.  Patient found to have an NSTEMI and was admitted for close cardiac monitoring, serial EKGs and troponins. Patient was initially treated with aspirin and started on IV heparin.  Patient underwent cardiac catheterization and PCI with stent placement in mid circumflex artery on 3/23/2020 by Luis Mccann MD. then on 3/24/2020 patient's nurse called rapid response for neurologic changes with headache, left-sided weakness, left-sided ataxia and nystagmus.  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 Patient did not receive TPA. 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.\"     Objective       04/03/20 0931   Prior Level Of Function   Communication Within Functional Limits   Swallow Within Functional Limits   Dentition Edentulous   Dentures Lowers;Uppers   Hearing Impaired Both Ears   Hearing Aid Right;Left   Vision Wears Corrective Lenses;Reading    Patient's Primary Language English   Occupation (Pre-Hospital Vocational) Retired Due To Age   Receptive Language / Auditory Comprehension   Receptive Language / Auditory Comprehension X   Answers Yes / No Personal Questions Within Functional " "Limits (6-7)   Answers Yes / No Simple / Contextual Questions Within Functional Limits (6-7)   Follows One Unit Commands Within Functional Limits (6-7)   Follows Two Unit Commands Minimal (4)   Understands Simple, Structured Conversation  Within Functional Limits (6-7)   Understands Complex Conversation Minimal (4)   Expressive Language   Expressive Language (WDL) WDL   Reading Comprehension    Reading Comprehension (WDL)   (to be assessed )   Written Language Expression   Written Language Expression (WDL) X  (To be assessed )   Cognition   Cognitive-Linguistic (WDL) X   Simple Attention Within Functional Limits (6-7)   Moderate Attention Minimal (4)   Orientation  Within Functional Limits (6-7)   Functional Memory Activities Moderate (3)   Simple Reasoning / Problem Solving Supervision (5)   IRF-PEDRO:  Cognitive Pattern Assessment   Cognitive Pattern Assessment Used BIMS   IRF-PEDRO:  Brief Interview for Mental Status (BIMS)   Repetition of Three Words (First Attempt) 3   Temporal Orientation: Able to Report the Correct Year Correct   Temporal Orientation: Able to Report the Correct Month Accurate within 5 days   Temporal Orientation: Able to Report the Correct Day of the Week Correct   Able to Recall \"Sock\" Yes, after cueing (\"something to wear\")   Able to Recall \"Blue\" Yes, no cue required   Able to Recall \"Bed\" Yes, no cue required   BIMS Summary Score 14   Social / Pragmatic Communication   Social / Pragmatic Communication WDL   Tracheostomy   Tracheostomy No   Speech Mechanisms / Voice Production   Speech Mechanisms / Voice Production (WDL) WDL   Labial Function   Labial Function (WDL) WDL   Lingual Function   Lingual Function (WDL) WDL   Jaw Function   Jaw Function (WDL) WDL   Velar Function   Velar Function (WDL) WDL   Laryngeal Function   Laryngeal Function (WDL) WDL   Swallowing   Swallowing (WDL) WDL   Dysphagia Strategies / Recommendations   Strategies / Interventions Recommended (Yes / No) No   Barriers To " Oral Feeding   Barriers To Oral Feeding None   Cervical Ausculatation Not Tested   Pre-Feeding Oral Stimulation Trial Not Tested   IRF-PEDRO:  Swallowing/Nutritional Status   Swallowing/Nutritional Status Regular food   IRF-PEDRO:  Eating   Assistance Needed Independent   CARE Score 6   Discharge Goal:  Assistance Needed Independent   Discharge Goal:  Score 6   Outcome Measures   Outcome Measures Utilized SCCAN   SCCAN (Scales of Cognitive and Communicative Ability for Neurorehabilitation)   Oral Expression - Raw Score 18   Oral Expression - Scale Performance Score 95   Orientation - Raw Score 12   Orientation - Scale Performance Score 100   Memory - Raw Score 12   Memory - Scale Performance Score 63   Speech Comprehension - Raw Score 11   Speech Comprehension - Scale Performance Score 85   Problem List   Problem List Cognitive-Linguistic Deficits;Memory Deficit;Executive Function Deficit;Verbal Problem Solving Deficits;Attention Deficit;Hearing Deficit   Current Discharge Plan   Current Discharge Plan Return to Prior Living Situation   Benefit   Therapy Benefit Patient would benefit from Inpatient Rehab Speech-Language Pathology to address above identified deficits.   Speech Language Pathologist Assigned   Assigned SLP / Pager # [phone], [phone]   SLP Total Time Spent   SLP Individual Total Time Spent (Mins) 60   SLP Charge Group   Charges Yes   SLP Speech Language Evaluation Speech Sound Language Comprehension   SLP Oral Pharyngeal Evaluation Oral Pharyngeal Evaluation       FIM Comprehension Score:  5 - Stand-by Prompting/Supervision or Set-up  Comprehension Description:  Hearing aids/amplifiers, Verbal cues    FIM Expression Score:  7 - Independent  Expression Description:       FIM Social Interaction Score:  7 - Independent  Social Interaction Description:       FIM Problem Solving Score:  5 - Standby Prompting/Supervision or Set-up  Problem Solving Description:  Verbal cueing, Therapy schedule, Increased time    FIM Memory  "Score:  4 - Minimal Assistance  Memory Description:  Verbal cueing, Therapy schedule    Assessment    Patient is 87 y.o. male with a diagnosis of CVA.  Additional factors influencing patient status/progress (ie: cognitive factors, co-morbidities, social support, etc): mild cognitive deficits, motivated to participate, primary caregiver for wife.      Clinical swallow evaluation completed.  Oral mech found all oral structures to be WFL's in terms of strength and ROM.  Pt tolerated trials of regular textures and thin liquids without any overt s/sx of aspiration/penetration.  [place] swallow protocol completed without any difficulty noted.  Pt will remain on a 7- Regular Texture diet with 0-Thin liquids.  No further ST recommended for dysphagia treatment.     Cognitive assessment initiated using the SCCAN (unable to complete this session) revealing mild-moderate memory deficits (63%).  Pt reported that he feels that his cognition is \"a little different\" than before his recent stroke.  Pt is hard of hearing, but does not like wearing his hearing aids because they \"make too much noise\".  Pt also uses glasses for reading and reported that he has them here in the hospital, but was unable to locate them this session.  Pt reported that he manages finances and medications for him and his wife (pt is the primary caregiver for his wife).  Recommend continue administration of the SCCAN and target medication management and memory.  Pt may also benefit from targeting financial management tasks.      Plan  Recommend Speech Therapy 30-60 minutes per day 5-6 days per week for 1-2 weeks for the following treatments:  SLP Speech Language Treatment, SLP Self Care / ADL Training , SLP Cognitive Skill Development and SLP Group Treatment.    Goals:  Long term and short term goals have been discussed with patient and they are in agreement.    Speech Therapy Problems     Problem: Problem Solving STGs     Dates: Start: 04/03/20       " Description:     Goal: STG-Within one week, patient will     Dates: Start: 04/03/20       Description: 1) Individualized goal:  Complete administration of the SCCAN with goals added as appropriate   2) Interventions:  SLP Speech Language Treatment, SLP Self Care / ADL Training , SLP Cognitive Skill Development and SLP Group Treatment             Goal: STG-Within one week, patient will     Dates: Start: 04/03/20       Description: 1) Individualized goal:  Complete a medication sorting task with spv to achieve 100% accuracy   2) Interventions:  SLP Speech Language Treatment, SLP Self Care / ADL Training , SLP Cognitive Skill Development and SLP Group Treatment                   Problem: Speech/Swallowing LTGs     Dates: Start: 04/03/20       Description:     Goal: LTG-By discharge, patient will solve complex problem     Dates: Start: 04/03/20       Description: 1) Individualized goal:  With modified independence for a safe discharge home   2) Interventions:  SLP Speech Language Treatment, SLP Self Care / ADL Training , SLP Cognitive Skill Development and SLP Group Treatment

## 2020-04-03 NOTE — CARE PLAN
"  Problem: Safety  Goal: Will remain free from injury  Note: Patient uses call light appropriately. Bed locked and in the lowest position. Non skid socks in place. Hourly rounding in place.       Problem: Urinary Elimination:  Goal: Ability to reestablish a normal urinary elimination pattern will improve  Note: Pt. performs self catheterization on his own. Per patient, he usually drains bladder only when he feels \"pressure\".  Encouraged patient to drain bladder more often. Will continue to monitor.       "

## 2020-04-03 NOTE — THERAPY
Physical Therapy   Initial Evaluation     Patient Name: Kyler Donato Jr.  Age:  87 y.o., Sex:  male  Medical Record #: [id number]  Today's Date: 4/3/2020     Subjective    Pt seated in room, agreeable to PT evaluation.      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 and physically is at WC level   Prior Level of Functional Mobility   Bed Mobility Independent   Transfer Status Independent   Ambulation Independent   Assistive Devices Used None   Stairs Independent   IRF-PEDRO:  Prior Functioning: Everyday Activities   Self Care Independent   Indoor Mobility (Ambulation) Independent   Stairs Independent   Functional Cognition Independent   Prior Device Use None of the given options   Vitals   Pulse 80   Patient BP Position Sitting   Blood Pressure  107/45   Pulse Oximetry 96 %   Vitals Comments Heart-rate monitored throughout session; remained 90 bpm and lower, pt reports he was told to maintain heartrate below 100   Passive ROM Lower Body   Passive ROM Lower Body WDL   Active ROM Lower Body    Comments RLE WDL, LLE limited by strength    Strength Lower Body   Lt Hip Flexion Strength 3- (F-)   Lt Hip Abduction Strength 3+ (F+)   Lt Hip Adduction Strength 3+ (F+)   Lt Knee Flexion Strength 3- (F-)   Lt Knee Extension Strength 3- (F-)   Lt Ankle Dorsiflexion Strength 3- (F-)   Lt Ankle Plantar Flexion Strength 3- (F-)   Comments RLE 5/5 throughout. LLE not tested in gravity eliminated    Sensation Lower Body   Comments Pt reports decreased light touch sensation in L compared to R    Balance Assessment   Sitting Balance (Static) Good   Sitting Balance (Dynamic) Fair +   Standing Balance (Static) Poor +   Standing Balance (Dynamic) Poor   Weight Shift Sitting Fair    Weight Shift Standing Poor   Bed Mobility    Supine to Sit Stand by Assist   Sit to Supine Stand by Assist   Sit to Stand Contact Guard Assist   Scooting Stand by Assist   Rolling Supervised   IRF-PEDRO:  Roll Left and Right   Assistance Needed Supervision   CARE Score 4   Discharge Goal:  Assistance Needed Independent   Discharge Goal:  Score 6   IRF-PEDRO:  Sit to Lying   Assistance Needed Supervision   CARE Score 4   Discharge Goal:  Assistance Needed Independent   Discharge Goal:  Score 6   IRF-PEDRO:  Lying to Sitting on Side of Bed   Assistance Needed Supervision   CARE Score 4   Discharge Goal:  Assistance Needed Independent   Discharge Goal:  Score 6   IRF-PEDRO:  Sit to Stand   Assistance Needed Incidental touching   CARE Score 4   Discharge Goal:  Assistance Needed Independent   Discahrge Goal:  Score 6   IRF-PEDRO:  Chair/Bed-to-Chair Transfer   Assistance Needed Physical assistance   Physical Assistance Level 25%-49%   CARE Score 3   Discharge Goal:  Assistance Needed Independent;Adaptive equipment   Discharge Goal:  Score 6   IRF-PEDRO:  Car Transfer   Reason if not Attempted Safety concerns   CARE Score 88   Discharge Goal:  Assistance Needed Supervision;Adaptive equipment   Discharge Goal:  Score 4   IRF PEDRO:  Walking   Does the Patient Walk? Yes   IRF PEDRO:  Walk 10 Feet   Assistance Needed Physical assistance;Adaptive equipment   Physical Assistance Level 50%-74%   CARE Score 2   Discharge Goal:  Assistance Needed Supervision;Adaptive equipment   Discharge Goal:  Score 4   IRF-PEDRO:  Walk 50 Feet with Two Turns   Assistance Needed Physical assistance;Adaptive equipment   Physical Assistance Level 50%-74%   CARE Score 2   Discharge Goal:  Assistance Needed Independent;Adaptive equipment   Discharge Goal:  Score 6   IRF-PEDRO:  Walk 150 Feet   Reason if not Attempted Safety concerns   CARE Score 88   Discharge Goal:  Assistance Needed Supervision;Adaptive equipment   Discharge Goal:  Score 4   IRF PEDRO:  Walking 10  Feet on Uneven Surfaces   Assistance Needed Physical assistance;Adaptive equipment   Physical Assistance Level 25%-49%   CARE Score 3   Discharge Goal:  Assistance Needed Adaptive equipment;Supervision   Discharge Goal:  Score 4   IRF PEDRO:  1 Step (Curb)   Assistance Needed Incidental touching;Adaptive equipment   CARE Score 4   Discharge Goal:  Assistance Needed Supervision;Adaptive equipment   Discharge Goal:  Score 4   IRF-PEDRO:  4 Steps   Assistance Needed Incidental touching;Adaptive equipment   CARE Score 4   Discharge Goal:  Assistance Needed Adaptive equipment;Supervision   Discharge Goal:  Score 4   IRF PEDRO:  12 Steps   Reason if not Attempted Activity not applicable   CARE Score 9   Discharge Goal:  Assistance Needed Physical assistance   Discharge Goal:  Physical Assistance Level Total assistance   Discharge Goal:  Score 1   IRF PEDRO:  Picking Up Object   Assistance Needed Physical assistance;Adaptive equipment   Physical Assistance Level 50%-74%   CARE Score 2   Discharge Goal:  Assistance Needed Supervision;Adaptive equipment   Discharge Goal:  Score 4   IRF-PEDRO:  Wheel 50 Feet with Two Turns   Indicate the Type of Wheelchair or Scooter Used Manual   Assistance Needed Supervision   CARE Score 4   Discharge Goal:  Assistance Needed Independent   Discharge Goal:  Score 6   IRF-PEDRO:  Wheel 150 Feet   Reason if not Attempted Safety concerns   CARE Score 88   Discharge Goal:  Assistance Needed Independent   Discharge Goal:  Score 6   Problem List    Problems Impaired Balance;Impaired Coordination;Decreased Activity Tolerance;Impaired Transfers;Impaired Ambulation   Precautions   Precautions Fall Risk   Current Discharge Plan   Current Discharge Plan Return to Prior Living Situation  (possible 24/7 care from daughter, TBD)   Interdisciplinary Plan of Care Collaboration   IDT Collaboration with  Occupational Therapist   Patient Position at End of Therapy Seated;Call Light within Reach;Tray Table within Reach  "  Collaboration Comments spoke with OT regarding CLOF and POC    Benefit   Therapy Benefit Patient Would Benefit from Inpatient Rehabilitation Physical Therapy to Maximize Functional [place] with ADLs, IADLs and Mobility.   PT Total Time Spent   PT Individual Total Time Spent (Mins) 60   PT Charge Group   PT Therapeutic Activities 1   PT Evaluation PT Evaluation Mod       FIM Bed/Chair/Wheelchair Transfers Score: 4 - Minimal Assistance  Bed/Chair/Wheelchair Transfers Description:  Increased time, Set-up of equipment(WC <> supine on flat treatment mat, min A SPT without AD, SBA with bed mobility)    FIM Walking Score:  2 - Max Assistance  Walking Description:  Extra time, Walker, Verbal cueing(100 ft with FWW, constant CGA with intermittent min- mod A for balance assist )    FIM Wheelchair Score:  2 - Max Assistance  Wheelchair Description:  Extra time(100 ft using UEs, spv )    FIM Stairs Score:  2 - Max Assistance  Stairs Description:  Extra time, Hand rails, Verbal cueing, Requires incidental assist(6 4\" steps with 2 HRs and CGA, verbal cuing for step to pattern )    Assessment  Patient is 87 y.o. 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  Additional factors influencing patient status / progress (ie: cognitive factors, co-morbidities, social support, etc): Independent PLOF, caregiver for wife, supportive daughter lives nearby, pt reports MD instructed to maintain heartrate below 100.      Plan  Recommend Physical Therapy 30-60 minutes per day 5-7 days per week for 2-3 weeks for the following treatments:  PT Group Therapy, PT E Stim Attended, PT Gait Training, PT Therapeutic Exercises, PT TENS Application, PT Neuro Re-Ed/Balance, PT Aquatic Therapy, PT Therapeutic Activity and PT Manual Therapy.    Goals:  Long term and short term goals have been discussed with patient and they are in " agreement.    Physical Therapy Problems     Problem: Balance     Dates: Start: 04/03/20       Description:     Goal: STG-Within one week, patient will maintain static standing     Dates: Start: 04/03/20       Description: 1) Individualized goal:  3 min without UE support and SBA- CGA   2) Interventions:  PT Group Therapy, PT E Stim Attended, PT Gait Training, PT Therapeutic Exercises, PT TENS Application, PT Neuro Re-Ed/Balance, PT Aquatic Therapy, PT Therapeutic Activity and PT Manual Therapy                   Problem: Mobility     Dates: Start: 04/03/20       Description:     Goal: STG-Within one week, patient will propel wheelchair community     Dates: Start: 04/03/20       Description: 1) Individualized goal:  150 ft spv   2) Interventions: PT Group Therapy, PT E Stim Attended, PT Gait Training, PT Therapeutic Exercises, PT TENS Application, PT Neuro Re-Ed/Balance, PT Aquatic Therapy, PT Therapeutic Activity and PT Manual Therapy                 Goal: STG-Within one week, patient will ambulate community distances     Dates: Start: 04/03/20       Description: 1) Individualized goal:  150 ft with FWW and min A  2) Interventions: PT Group Therapy, PT E Stim Attended, PT Gait Training, PT Therapeutic Exercises, PT TENS Application, PT Neuro Re-Ed/Balance, PT Aquatic Therapy, PT Therapeutic Activity and PT Manual Therapy                       Problem: Mobility Transfers     Dates: Start: 04/03/20       Description:     Goal: STG-Within one week, patient will transfer bed to chair     Dates: Start: 04/03/20       Description: 1) Individualized goal:  CGA without AD  2) Interventions: PT Group Therapy, PT E Stim Attended, PT Gait Training, PT Therapeutic Exercises, PT TENS Application, PT Neuro Re-Ed/Balance, PT Aquatic Therapy, PT Therapeutic Activity and PT Manual Therapy                       Problem: PT-Long Term Goals     Dates: Start: 04/03/20       Description:     Goal: LTG-By discharge, patient will maintain  balance     Dates: Start: 04/03/20       Description: 1) Individualized goal:  Complete standardized balance assessment with score indicating low fall risk  2) Interventions:  PT Group Therapy, PT E Stim Attended, PT Gait Training, PT Therapeutic Exercises, PT TENS Application, PT Neuro Re-Ed/Balance, PT Aquatic Therapy, PT Therapeutic Activity and PT Manual Therapy                 Goal: LTG-By discharge, patient will ambulate     Dates: Start: 04/03/20       Description: 1) Individualized goal:  >150 ft with LRAD, spv   2) Interventions: PT Group Therapy, PT E Stim Attended, PT Gait Training, PT Therapeutic Exercises, PT TENS Application, PT Neuro Re-Ed/Balance, PT Aquatic Therapy, PT Therapeutic Activity and PT Manual Therapy                 Goal: LTG-By discharge, patient will transfer one surface to another     Dates: Start: 04/03/20       Description: 1) Individualized goal:  Mod I without AD  2) Interventions: PT Group Therapy, PT E Stim Attended, PT Gait Training, PT Therapeutic Exercises, PT TENS Application, PT Neuro Re-Ed/Balance, PT Aquatic Therapy, PT Therapeutic Activity and PT Manual Therapy                 Goal: LTG-By discharge, patient will transfer in/out of a car     Dates: Start: 04/03/20       Description: 1) Individualized goal:  Spv with LRAD  2) Interventions: PT Group Therapy, PT E Stim Attended, PT Gait Training, PT Therapeutic Exercises, PT TENS Application, PT Neuro Re-Ed/Balance, PT Aquatic Therapy, PT Therapeutic Activity and PT Manual Therapy

## 2020-04-03 NOTE — CARE PLAN
Problem: Safety  Goal: Will remain free from falls  Outcome: PROGRESSING AS EXPECTED     Problem: Bowel/Gastric:  Goal: Normal bowel function is maintained or improved  Outcome: PROGRESSING AS EXPECTED  Note: Patient educated on fluid intake, diet, bowel medications and mobilization to maximize the potential for regular bowel movements while taking opiates or opiate derivatives for pain. Patient engages in learning and verbalizes understanding.

## 2020-04-03 NOTE — REHAB-CM IDT TEAM NOTE
Case Management    DC Planning  DC destination/dispostion:  Home with family assistance    Referrals: TBD     DC Needs: TBD     Barriers to discharge:  Decreased mobility     Strengths: Supportive family     Section completed by:  Latia Marcus

## 2020-04-03 NOTE — ASSESSMENT & PLAN NOTE
Has hx of MI with LAD stent  Had recent NSTEMI s/p mid circumflex PCI  Had recent NSTEMI (at the Rehab) --> Cardio suggest medical management  Echo (3/23): EF 25%  Echo (3/30): EF 15%, grade III diastolic dysfunction, and aortic stenosis  On ASA, Plavix, and Lipitor  No further aggressive intervention per Cardiology  Monitor

## 2020-04-03 NOTE — THERAPY
Occupational Therapy   Initial Evaluation     Patient Name: Kyler Donato Jr.  Age:  87 y.o., Sex:  male  Medical Record #: [id number]  Today's Date: 4/3/2020     Subjective    Patient awake in bed and requesting to get up and dressed upon OT arrival to room.  Declined shower due to showering last night.     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 and physically is at WC level   Prior Level of ADL Function   Self Feeding Independent   Grooming / Hygiene Independent   Bathing Independent   Dressing Independent   Toileting Independent   Prior Level of IADL Function   Medication Management Independent   Laundry Independent   Kitchen Mobility Independent   Finances Independent   Home Management Independent   Shopping Independent   Prior Level Of Mobility Independent Without Device in Community   Driving / Transportation Driving Independent   Occupation (Pre-Hospital Vocational) Retired Due To Age   Leisure Interests Reading;Other (Comments)  (going out to eat)   IRF-PEDRO:  Prior Functioning: Everyday Activities   Self Care Independent   Indoor Mobility (Ambulation) Independent   Stairs Independent   Functional Cognition Independent   Prior Device Use None of the given options   Vitals   O2 Delivery Device None - Room Air   Pain 0 - 10 Group   Location Head   Therapist Pain Assessment Prior to Activity   Cognition    Level of Consciousness Alert   Vision Screen   Vision Not tested   Passive ROM Upper Body   Passive ROM Upper Body WDL   Active ROM Upper Body   Active ROM Upper Body  WDL   Dominant Hand Right   Strength Upper Body   Upper Body Strength  X   Lt Shoulder Flexion Strength 3+ (F+)   Lt Shoulder Extension Strength 3+ (F+)    Lt Shoulder Abduction Strength 3+ (F+)   Lt Shoulder Adduction Strength 3+ (F+)   Lt Elbow Flexion Strength 3+ (F+)   Lt Elbow Extension Strength 3 (F)   Lt Forearm Supination Strength 3+ (F+)   Lt Forearm Pronation Strength 3+ (F+)   Lt Wrist Flexion Strength 4- (G-)   Lt Wrist Extension Strength 4- (G-)   Left  Impaired   Sensation Upper Body   Upper Extremity Sensation  Not Tested   Upper Body Muscle Tone   Upper Body Muscle Tone  Not Tested   Balance Assessment   Sitting Balance (Static) Good   Sitting Balance (Dynamic) Poor +  (sitting EOB when donning pants (fell to the left))   Standing Balance (Static) Poor +   Standing Balance (Dynamic) Poor   Weight Shift Sitting Fair   Weight Shift Standing Poor   Bed Mobility    Supine to Sit Stand by Assist   Sit to Stand Stand by Assist   Scooting Stand by Assist   Coordination Upper Body   Coordination X   Fine Motor Coordination impaired bilaterally, left worse than right   Gross Motor Coordination impaired bilaterally, left worse than right   IRF-PEDRO:  Eating   Assistance Needed Independent;Adaptive equipment   CARE Score 6   Discharge Goal:  Assistance Needed Independent;Adaptive equipment   Discharge Goal:  Score 6   IRF-PEDRO:  Oral Hygiene   Assistance Needed Set-up / clean-up;Supervision   CARE Score 4   Discharge Goal:  Assistance Needed Independent   Discharge Goal:  Score 6   IRF-PEDRO:  Shower/Bathe Self   Reason if not Attempted Refused to perform   CARE Score 7   Discharge Goal:  Assistance Needed Set-up / clean-up;Adaptive equipment;Supervision;Verbal cues   Discharge Goal:  Physical Assistance Level No physical assistance or only touching/steadying assist   Discharge Goal Score 4   IRF-EPDRO:  Upper Body Dressing   Assistance Needed Set-up / clean-up;Supervision   CARE Score 4   Discharge Goal:  Assistance Needed Supervision   Dischage Goal:  Score 4   IRF-PEDRO:  Lower Body Dressing   Assistance Needed Set-up / clean-up;Supervision;Verbal cues;Physical  assistance   Physical Assistance Level Less than 25%   CARE Score 3   Discharge Goal:  Assistance Needed Supervision   Discharge Goal:  Score 4   IRF PEDRO:  Putting On/Taking Off Footwear   Assistance Needed Set-up / clean-up;Supervision   Physical Assistance Level No physical assistance or only touching/steadying assist   CARE Score 4   Discharge Goal:  Assistance Needed Independent   Discharge Goal:  Score 6   IRF-PEDRO:  Toileting Hygiene   Assistance Needed Set-up / clean-up;Supervision;Verbal cues;Adaptive equipment;Physical assistance   Physical Assistance Level Less than 25%   CARE Score 3   Discharge Goal:  Assistance Needed Adaptive equipment;Set-up / clean-up;Supervision   Discharge Goal:  Physical Assistance Level No physical assistance or only touching/steadying assist   Discharge Goal:  Score 4   IRF-PEDRO:  Toilet Transfer   Assistance Needed Set-up / clean-up;Supervision;Verbal cues;Adaptive equipment;Incidental touching   Physical Assistance Level No physical assistance or only touching/steadying assist   CARE Score 4   Discharge Goal:  Assistance Needed Adaptive equipment;Set-up / clean-up;Supervision   Discahrge Goal:  Score 4   IRF-PEDRO:  Hearing, Speech, and Vision   Expression of Ideas and Wants 4   Understanding Verbal and Non-Verbal Content 4   Problem List   Problem List Decreased Active Daily Living Skills;Decreased Homemaking Skills;Decreased Upper Extremity Strength Left;Decreased Functional Mobility;Decreased Activity Tolerance;Impaired Coordination Left Upper Extremity;Impaired Coordination Right Upper Extremity;Impaired Postural Control / Balance   Precautions   Precautions Fall Risk;Cardiac Precautions (See Comments)   Comments Hold therapy if any complaints of chest pain, if pulse over 100 with activity, if drop in SBP to less than 80, or if patient complains of increased stroke symptoms - left sided paresthesias, left sided weakness   Current Discharge Plan   Current Discharge Plan Return  to Prior Living Situation   Benefit    Therapy Benefit Patient Would Benefit from Inpatient Rehab Occupational Therapy to Maximize [place] with ADLs, IADLs and Functional Mobility.   Interdisciplinary Plan of Care Collaboration   IDT Collaboration with  Physical Therapist   Patient Position at End of Therapy Seated;Self Releasing Lap Belt Applied;Call Light within Reach;Tray Table within Reach   Collaboration Comments CLOF and cardiac precautions   Equipment Needs   Assistive Device / DME Front-Wheel Walker;Parallel Bars;Wheelchair;Shower Chair;Grab Bars In Shower / Tub;Grab Bars By Toilet   Adaptive Equipment None   OT Total Time Spent   OT Individual Total Time Spent (Mins) 60   OT Charge Group   Charges Yes   OT Self Care / ADL 1   OT Evaluation OT Evaluation Mod       FIM Eating Score:  6 - Modified Independent  Eating Description:  Adaptive equipment(partials)    FIM Grooming Score:  5 - Standby Prompting/Supervision or Set-up  Grooming Description:  Set-up of equipment, Supervision for safety    FIM Bathing Score:  0 - Not tested, patient refused  Bathing Description:       FIM Upper Body Dressin - Standby Prompting/Supervision or Set-up  Upper Body Dressing Description:  Set-up of equipment, Supervision for safety(setup/sba to don pullover shirt seated EOB)    FIM Lower Body Dressing Score:  4 - Minimal Assistance  Lower Body Dressing Description:  Supervision for safety, Set-up of equipment(min standing balance to don sweatpants EOB (stood to pull up))    FIM Toileting Body Dressing:     Toileting Description:       FIM Bed/Chair/Wheelchair Transfers Score:    Bed/Chair/Wheelchair Transfers Description:       FIM Toilet Transfer Score:  4 - Minimal Assistance  Toilet Transfer Description:  Grab bar, Supervision for safety, Set-up of equipment(SBA with grab bar )    FIM Tub/Shower Transfers Score:  0 - Not tested, patient refused  Tub/Shower Transfers Description:       FIM Comprehension Score:      Comprehension Description:       FIM Expression Score:     Expression Description:       FIM Social Interaction Score:     Social Interaction Description:       FIM Problem Solving Score:     Problem Solving Description:       Assessment  Patient is 87 y.o. male with a diagnosis of L CVA and NSTEMI.  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      Plan  Recommend Occupational Therapy  minutes per day 5-7 days per week for 1-2 weeks for the following treatments:  OT Self Care/ADL, OT Manual Ther Technique, OT Neuro Re-Ed/Balance, OT Therapeutic Activity, OT Evaluation and OT Therapeutic Exercise.    Goals:  Long term and short term goals have been discussed with patient and they are in agreement.    Occupational Therapy Goals     Problem: Bathing     Dates: Start: 04/03/20       Description:     Goal: STG-Within one week, patient will bathe     Dates: Start: 04/03/20       Description: 1) Individualized Goal:  Body with setup and SBA using grab bar, bench and HHSH.  2) Interventions:  OT Self Care/ADL, OT Manual Ther Technique, OT Neuro Re-Ed/Balance, OT Therapeutic Activity, OT Evaluation and OT Therapeutic Exercise                   Problem: Dressing     Dates: Start: 04/03/20       Description:     Goal: STG-Within one week, patient will dress LB     Dates: Start: 04/03/20       Description: 1) Individualized Goal:  With setup and SBA using AE/AD as needed.  2) Interventions:  OT Self Care/ADL, OT Manual Ther Technique, OT Neuro Re-Ed/Balance, OT Therapeutic Activity, OT Evaluation and OT Therapeutic Exercise                 Problem: OT Long Term Goals     Dates: Start: 04/03/20       Description:     Goal: LTG-By discharge, patient will complete basic self care tasks     Dates: Start: 04/03/20       Description: 1)  Individualized Goal:  With supervision using AE/AD as needed  2) Interventions:  OT Self Care/ADL, OT Manual Ther Technique, OT Neuro Re-Ed/Balance, OT Therapeutic Activity, OT Evaluation and OT Therapeutic Exercise           Goal: LTG-By discharge, patient will perform bathroom transfers     Dates: Start: 04/03/20       Description: 1) Individualized Goal:  With supervision using AE/AD as needed  2) Interventions:  OT Self Care/ADL, OT Manual Ther Technique, OT Neuro Re-Ed/Balance, OT Therapeutic Activity, OT Evaluation and OT Therapeutic Exercise           Goal: LTG-By discharge, patient will complete basic home management     Dates: Start: 04/03/20       Description: 1) Individualized Goal:  With supervision using AE/AD as needed  2) Interventions:  OT Self Care/ADL, OT Manual Ther Technique, OT Neuro Re-Ed/Balance, OT Therapeutic Activity, OT Evaluation and OT Therapeutic Exercise                 Problem: Toileting     Dates: Start: 04/03/20       Description:     Goal: STG-Within one week, patient will complete toileting tasks     Dates: Start: 04/03/20       Description: 1) Individualized Goal:  With SBA using grab bar as needed.  2) Interventions:  OT Self Care/ADL, OT Manual Ther Technique, OT Neuro Re-Ed/Balance, OT Therapeutic Activity, OT Evaluation and OT Therapeutic Exercise

## 2020-04-04 PROCEDURE — 99232 SBSQ HOSP IP/OBS MODERATE 35: CPT | Performed by: PHYSICAL MEDICINE & REHABILITATION

## 2020-04-04 PROCEDURE — 99231 SBSQ HOSP IP/OBS SF/LOW 25: CPT | Performed by: HOSPITALIST

## 2020-04-04 PROCEDURE — 700102 HCHG RX REV CODE 250 W/ 637 OVERRIDE(OP): Performed by: PHYSICAL MEDICINE & REHABILITATION

## 2020-04-04 PROCEDURE — 770010 HCHG ROOM/CARE - REHAB SEMI PRIVAT*

## 2020-04-04 PROCEDURE — 700111 HCHG RX REV CODE 636 W/ 250 OVERRIDE (IP): Performed by: PHYSICAL MEDICINE & REHABILITATION

## 2020-04-04 PROCEDURE — A9270 NON-COVERED ITEM OR SERVICE: HCPCS | Performed by: PHYSICAL MEDICINE & REHABILITATION

## 2020-04-04 RX ADMIN — ATORVASTATIN CALCIUM 80 MG: 40 TABLET, FILM COATED ORAL at 19:50

## 2020-04-04 RX ADMIN — MORPHINE SULFATE 7.5 MG: 15 TABLET ORAL at 19:54

## 2020-04-04 RX ADMIN — HEPARIN SODIUM 5000 UNITS: 5000 INJECTION, SOLUTION INTRAVENOUS; SUBCUTANEOUS at 05:32

## 2020-04-04 RX ADMIN — GABAPENTIN 300 MG: 300 CAPSULE ORAL at 19:50

## 2020-04-04 RX ADMIN — MORPHINE SULFATE 7.5 MG: 15 TABLET ORAL at 11:44

## 2020-04-04 RX ADMIN — CLOPIDOGREL BISULFATE 75 MG: 75 TABLET ORAL at 08:42

## 2020-04-04 RX ADMIN — HEPARIN SODIUM 5000 UNITS: 5000 INJECTION, SOLUTION INTRAVENOUS; SUBCUTANEOUS at 20:28

## 2020-04-04 RX ADMIN — GABAPENTIN 300 MG: 300 CAPSULE ORAL at 08:42

## 2020-04-04 RX ADMIN — ASPIRIN 81 MG: 81 TABLET, COATED ORAL at 08:42

## 2020-04-04 RX ADMIN — OMEPRAZOLE 20 MG: 20 CAPSULE, DELAYED RELEASE ORAL at 19:49

## 2020-04-04 RX ADMIN — GABAPENTIN 300 MG: 300 CAPSULE ORAL at 14:11

## 2020-04-04 RX ADMIN — HEPARIN SODIUM 5000 UNITS: 5000 INJECTION, SOLUTION INTRAVENOUS; SUBCUTANEOUS at 14:11

## 2020-04-04 RX ADMIN — OMEPRAZOLE 20 MG: 20 CAPSULE, DELAYED RELEASE ORAL at 08:42

## 2020-04-04 RX ADMIN — SENNOSIDES AND DOCUSATE SODIUM 1 TABLET: 8.6; 5 TABLET ORAL at 08:41

## 2020-04-04 RX ADMIN — SENNOSIDES AND DOCUSATE SODIUM 2 TABLET: 8.6; 5 TABLET ORAL at 19:49

## 2020-04-04 NOTE — CARE PLAN
Problem: Safety  Goal: Will remain free from injury  Note: Patient calling appropriately for assistance, does not attempt to transfer self without staff present. Call light within reach. Non skid footwear in place. Bed locked and in lowest position. Hourly rounding in place.       Problem: Pain Management  Goal: Pain level will decrease to patient's comfort goal  Note: Pt. c/o headache (left side). PRN Morphine given with positive relief.

## 2020-04-05 PROCEDURE — 99231 SBSQ HOSP IP/OBS SF/LOW 25: CPT | Performed by: HOSPITALIST

## 2020-04-05 PROCEDURE — 97130 THER IVNTJ EA ADDL 15 MIN: CPT

## 2020-04-05 PROCEDURE — 700102 HCHG RX REV CODE 250 W/ 637 OVERRIDE(OP): Performed by: PHYSICAL MEDICINE & REHABILITATION

## 2020-04-05 PROCEDURE — 97110 THERAPEUTIC EXERCISES: CPT

## 2020-04-05 PROCEDURE — A9270 NON-COVERED ITEM OR SERVICE: HCPCS | Performed by: PHYSICAL MEDICINE & REHABILITATION

## 2020-04-05 PROCEDURE — 97129 THER IVNTJ 1ST 15 MIN: CPT

## 2020-04-05 PROCEDURE — 700111 HCHG RX REV CODE 636 W/ 250 OVERRIDE (IP): Performed by: PHYSICAL MEDICINE & REHABILITATION

## 2020-04-05 PROCEDURE — 97530 THERAPEUTIC ACTIVITIES: CPT

## 2020-04-05 PROCEDURE — 97116 GAIT TRAINING THERAPY: CPT

## 2020-04-05 PROCEDURE — 770010 HCHG ROOM/CARE - REHAB SEMI PRIVAT*

## 2020-04-05 RX ADMIN — GABAPENTIN 300 MG: 300 CAPSULE ORAL at 14:50

## 2020-04-05 RX ADMIN — GABAPENTIN 300 MG: 300 CAPSULE ORAL at 08:27

## 2020-04-05 RX ADMIN — TRAZODONE HYDROCHLORIDE 50 MG: 50 TABLET ORAL at 01:16

## 2020-04-05 RX ADMIN — OMEPRAZOLE 20 MG: 20 CAPSULE, DELAYED RELEASE ORAL at 20:52

## 2020-04-05 RX ADMIN — HEPARIN SODIUM 5000 UNITS: 5000 INJECTION, SOLUTION INTRAVENOUS; SUBCUTANEOUS at 14:50

## 2020-04-05 RX ADMIN — HEPARIN SODIUM 5000 UNITS: 5000 INJECTION, SOLUTION INTRAVENOUS; SUBCUTANEOUS at 05:21

## 2020-04-05 RX ADMIN — ASPIRIN 81 MG: 81 TABLET, COATED ORAL at 08:27

## 2020-04-05 RX ADMIN — MORPHINE SULFATE 7.5 MG: 15 TABLET ORAL at 20:51

## 2020-04-05 RX ADMIN — SENNOSIDES AND DOCUSATE SODIUM 2 TABLET: 8.6; 5 TABLET ORAL at 20:52

## 2020-04-05 RX ADMIN — GABAPENTIN 300 MG: 300 CAPSULE ORAL at 20:52

## 2020-04-05 RX ADMIN — SENNOSIDES AND DOCUSATE SODIUM 2 TABLET: 8.6; 5 TABLET ORAL at 08:27

## 2020-04-05 RX ADMIN — HEPARIN SODIUM 5000 UNITS: 5000 INJECTION, SOLUTION INTRAVENOUS; SUBCUTANEOUS at 20:56

## 2020-04-05 RX ADMIN — CLOPIDOGREL BISULFATE 75 MG: 75 TABLET ORAL at 08:27

## 2020-04-05 RX ADMIN — ATORVASTATIN CALCIUM 80 MG: 40 TABLET, FILM COATED ORAL at 20:52

## 2020-04-05 RX ADMIN — OMEPRAZOLE 20 MG: 20 CAPSULE, DELAYED RELEASE ORAL at 08:27

## 2020-04-05 ASSESSMENT — ENCOUNTER SYMPTOMS
POLYDIPSIA: 0
EYES NEGATIVE: 1
BRUISES/BLEEDS EASILY: 0
COUGH: 0
VOMITING: 0
PALPITATIONS: 0
NAUSEA: 0
MUSCULOSKELETAL NEGATIVE: 1
SHORTNESS OF BREATH: 0
FEVER: 0
FOCAL WEAKNESS: 1
CHILLS: 0
ABDOMINAL PAIN: 0

## 2020-04-05 ASSESSMENT — FIBROSIS 4 INDEX: FIB4 SCORE: 1.88

## 2020-04-05 NOTE — CARE PLAN
Problem: Balance  Goal: STG-Within one week, patient will maintain static standing  Description: 1) Individualized goal:  3 min without UE support and SBA- CGA   2) Interventions:  PT Group Therapy, PT E Stim Attended, PT Gait Training, PT Therapeutic Exercises, PT TENS Application, PT Neuro Re-Ed/Balance, PT Aquatic Therapy, PT Therapeutic Activity and PT Manual Therapy     Outcome: NOT MET     Problem: Mobility  Goal: STG-Within one week, patient will propel wheelchair community  Description: 1) Individualized goal:  150 ft spv   2) Interventions: PT Group Therapy, PT E Stim Attended, PT Gait Training, PT Therapeutic Exercises, PT TENS Application, PT Neuro Re-Ed/Balance, PT Aquatic Therapy, PT Therapeutic Activity and PT Manual Therapy         Outcome: NOT MET  Goal: STG-Within one week, patient will ambulate community distances  Description: 1) Individualized goal:  150 ft with FWW and min A  2) Interventions: PT Group Therapy, PT E Stim Attended, PT Gait Training, PT Therapeutic Exercises, PT TENS Application, PT Neuro Re-Ed/Balance, PT Aquatic Therapy, PT Therapeutic Activity and PT Manual Therapy         Outcome: NOT MET     Problem: Mobility Transfers  Goal: STG-Within one week, patient will transfer bed to chair  Description: 1) Individualized goal:  CGA without AD  2) Interventions: PT Group Therapy, PT E Stim Attended, PT Gait Training, PT Therapeutic Exercises, PT TENS Application, PT Neuro Re-Ed/Balance, PT Aquatic Therapy, PT Therapeutic Activity and PT Manual Therapy         Outcome: MET

## 2020-04-05 NOTE — REHAB-PT IDT TEAM NOTE
"Physical Therapy   Mobility  Bed mobility:   SBA  Bed /Chair/Wheelchair Transfer Initial:  4 - Minimal Assistance  Bed /Chair/Wheelchair Transfer Current:  4 - Minimal Assistance   Bed/Chair/Wheelchair Transfer Description:  Supervision for safety, Increased time, Adaptive equipment, Verbal cueing, Set-up of equipment(Bed mobility with SBA; SPT with CGA )  Walk Initial:  2 - Max Assistance  Walk Current:  2 - Max Assistance   Walk Description:  Extra time, Walker, Verbal cueing(100 ft with FWW, constant CGA with intermittent min- mod A for balance assist )  Wheelchair Initial:  2 - Max Assistance  Wheelchair Current:  2 - Max Assistance   Wheelchair Description:  Extra time(100 ft using UEs, spv )  Stairs Initial:  2 - Max Assistance  Stairs Current: 2 - Max Assistance   Stairs Description: Extra time, Hand rails, Verbal cueing, Requires incidental assist(6 4\" steps with 2 HRs and CGA, verbal cuing for step to pattern )  Patient/Family Training/Education:  Education on PT role, PT POC, rehab orientation, safety with mobility   DME/DC Recommendations:  TBD  Strengths:  Effective communication skills, Independent PLOF, Motivated for self care and independence, Pleasant and cooperative, Supportive family and Willingly participates in therapeutic activities  Barriers:   Decreased endurance, Generalized weakness, Limited mobility, Pain poorly managed, Poor activity tolerance and Poor balance, L lateral pulsion, decreased smooth pursuits/saccades  # of short term goals set= 4  # of short term goals met=1  Physical Therapy Problems     Problem: Balance     Dates: Start: 04/03/20       Description:     Goal: STG-Within one week, patient will maintain static standing     Dates: Start: 04/03/20       Description: 1) Individualized goal:  3 min without UE support and SBA- CGA   2) Interventions:  PT Group Therapy, PT E Stim Attended, PT Gait Training, PT Therapeutic Exercises, PT TENS Application, PT Neuro Re-Ed/Balance, PT " Aquatic Therapy, PT Therapeutic Activity and PT Manual Therapy                   Problem: Mobility     Dates: Start: 04/03/20       Description:     Goal: STG-Within one week, patient will propel wheelchair community     Dates: Start: 04/03/20       Description: 1) Individualized goal:  150 ft spv   2) Interventions: PT Group Therapy, PT E Stim Attended, PT Gait Training, PT Therapeutic Exercises, PT TENS Application, PT Neuro Re-Ed/Balance, PT Aquatic Therapy, PT Therapeutic Activity and PT Manual Therapy                 Goal: STG-Within one week, patient will ambulate community distances     Dates: Start: 04/03/20       Description: 1) Individualized goal:  150 ft with FWW and min A  2) Interventions: PT Group Therapy, PT E Stim Attended, PT Gait Training, PT Therapeutic Exercises, PT TENS Application, PT Neuro Re-Ed/Balance, PT Aquatic Therapy, PT Therapeutic Activity and PT Manual Therapy                       Problem: PT-Long Term Goals     Dates: Start: 04/03/20       Description:     Goal: LTG-By discharge, patient will maintain balance     Dates: Start: 04/03/20       Description: 1) Individualized goal:  Complete standardized balance assessment with score indicating low fall risk  2) Interventions:  PT Group Therapy, PT E Stim Attended, PT Gait Training, PT Therapeutic Exercises, PT TENS Application, PT Neuro Re-Ed/Balance, PT Aquatic Therapy, PT Therapeutic Activity and PT Manual Therapy                 Goal: LTG-By discharge, patient will ambulate     Dates: Start: 04/03/20       Description: 1) Individualized goal:  >150 ft with LRAD, spv   2) Interventions: PT Group Therapy, PT E Stim Attended, PT Gait Training, PT Therapeutic Exercises, PT TENS Application, PT Neuro Re-Ed/Balance, PT Aquatic Therapy, PT Therapeutic Activity and PT Manual Therapy                 Goal: LTG-By discharge, patient will transfer one surface to another     Dates: Start: 04/03/20       Description: 1) Individualized goal:  Mod  I without AD  2) Interventions: PT Group Therapy, PT E Stim Attended, PT Gait Training, PT Therapeutic Exercises, PT TENS Application, PT Neuro Re-Ed/Balance, PT Aquatic Therapy, PT Therapeutic Activity and PT Manual Therapy                 Goal: LTG-By discharge, patient will transfer in/out of a car     Dates: Start: 04/03/20       Description: 1) Individualized goal:  Spv with LRAD  2) Interventions: PT Group Therapy, PT E Stim Attended, PT Gait Training, PT Therapeutic Exercises, PT TENS Application, PT Neuro Re-Ed/Balance, PT Aquatic Therapy, PT Therapeutic Activity and PT Manual Therapy                               Section completed by:  Carmen Jain DPT

## 2020-04-05 NOTE — THERAPY
"Physical Therapy   Daily Treatment     Patient Name: Kyler Donato Jr.  Age:  87 y.o., Sex:  male  Medical Record #: [id number]  Today's Date: 4/5/2020     Precautions  Precautions: Fall Risk, Cardiac Precautions (See Comments)  Comments: Hold therapy if pulse >100 bpm, SBP <80 mmHg, or pt report of stroke symptoms or chest pain. Use ear probe for increased accuracy of vitals readings.     Subjective    Pt was seated EOB upon arrival and agreeable to treatment.  Pt reported feeling \"off kilter\" at times, but overall well.       Objective       04/05/20 1031   Precautions   Precautions Fall Risk;Cardiac Precautions (See Comments)   Comments Hold therapy if pulse >100 bpm, SBP <80 mmHg, or pt report of stroke symptoms or chest pain.   Vitals   Pulse 63   Blood Pressure  104/56   Pulse Oximetry 98 %   O2 Delivery Device None - Room Air   Bed Mobility    Supine to Sit Stand by Assist   Sit to Supine Stand by Assist   Sit to Stand Contact Guard Assist   Scooting Stand by Assist   Rolling Supervised   Interdisciplinary Plan of Care Collaboration   IDT Collaboration with  Occupational Therapist   Patient Position at End of Therapy In Bed;Call Light within Reach;Tray Table within Reach;Phone within Reach   Collaboration Comments CLOF   PT Total Time Spent   PT Individual Total Time Spent (Mins) 60   PT Charge Group   PT Gait Training 1   PT Therapeutic Activities 3       FIM Bed/Chair/Wheelchair Transfers Score: 4 - Minimal Assistance  Bed/Chair/Wheelchair Transfers Description:  Supervision for safety, Increased time, Adaptive equipment, Verbal cueing, Set-up of equipment(Bed mobility with SBA; SPT with CGA )    Completed limited oculomotor assessment:     Subjective: Pt with report of double vision at closer ranges with decreased double vision at longer distances.  Pt reported vertigo sensations in the past, but is not experiencing these sensations currently. Pt with constant sense of L pulling with ambulation.  "     Oculomotor Exam:     Spontaneous nystagmus: negative  Smooth pursuit: positive with overall slowed oculomotor movements L>R   Saccades: positive with undershooting L>R    Additional oculomotor testing deferred at this time secondary to pt report of increase in headache; will complete at later date.   Completed gait training in // bars with focus on decreased L lateral pulsion x 10 feet FW/BW x 5 reps in total intermittently using mirror for increased visual feedback.      Assessment    Pt currently demonstrating central vestibular signs consistent with CVA with poor oculomotor movements and accuracy of tracking.  Pt continues to demonstrate L lateral pulsion with no decrease with use of visual feedback.  Pt with improving stability when cued for increased step width.  Pt's vitals within parameters during session.      Plan    Continue to progress gait training in // bars and with FWW with focus on compensation for L lateral pulsion, Lite gait/Vector training, balance activities to assist with ataxia, complete remaining vestibular exam within pt tolerance.

## 2020-04-05 NOTE — CARE PLAN
Problem: Safety  Goal: Will remain free from injury  Outcome: PROGRESSING AS EXPECTED  Note: Call light with in reach. Redirection to use call light for assistance. Non skid socks. Upper siderails up x2 while in bed.      Problem: Bowel/Gastric:  Goal: Normal bowel function is maintained or improved  Outcome: PROGRESSING AS EXPECTED  Note: Patient refused PRN laxatives for no BM x 2 days. Scheduled Senna given. CN notified. Encouraged increase fluids intake. Will continue to monitor.     Problem: Pain Management  Goal: Pain level will decrease to patient's comfort goal  Outcome: PROGRESSING AS EXPECTED  Note: Educate patient of non-pharmacological comfort measures: repositioning, relaxation/breathing technique, cold compress and activities.

## 2020-04-05 NOTE — THERAPY
Occupational Therapy  Daily Treatment     Patient Name: Kyler Donato Jr.  Age:  87 y.o., Sex:  male  Medical Record #: [id number]  Today's Date: 4/5/2020     Precautions  Precautions: Fall Risk, Cardiac Precautions (See Comments)  Comments: maintain heart rate below 100 during therapies, SBP above 80.    Safety   ADL Safety : Requires Physical Assist for Safety, Requires Cueing for Safety  Bathroom Safety: Requires Physical Assist for Safety, Requires Cuing for Safety    Subjective    No new issues noted.pleasant, motivated for therapy session     Objective       04/05/20 1401   Precautions   Precautions Fall Risk;Cardiac Precautions (See Comments)   Comments maintain heart rate below 100 during therapies, SBP above 80.   Safety    ADL Safety  Requires Physical Assist for Safety;Requires Cueing for Safety   Bathroom Safety Requires Physical Assist for Safety;Requires Cuing for Safety   Vitals   Pulse 66  (earline to 82 at highest, during session. 72 once back in room)   Blood Pressure  100/58   Respiration 18   O2 Delivery Device None - Room Air   Pain 0 - 10 Group   Therapist Pain Assessment Post Activity Pain Same as Prior to Activity;Nurse Notified;0   Cognition    Level of Consciousness Alert   Passive ROM Upper Body   Passive ROM Upper Body WDL   Active ROM Upper Body   Active ROM Upper Body  WDL   Dominant Hand Right   Strength Upper Body   Upper Body Strength  X   Lt Shoulder Flexion Strength 3+ (F+)   Lt Shoulder Extension Strength 3+ (F+)   Lt Shoulder Abduction Strength 3+ (F+)   Lt Elbow Flexion Strength 3+ (F+)   Lt Elbow Extension Strength 3 (F)   Lt Forearm Supination Strength 3+ (F+)   Lt Forearm Pronation Strength 3+ (F+)   Lt Wrist Flexion Strength 4- (G-)   Lt Wrist Extension Strength 4- (G-)   Left  Impaired   Sitting Upper Body Exercises   Sitting Upper Body Exercises Yes   Comments putty and koosh ball roll, forward, backward, circles and side to side   Hand Strengthening   Hand  Strengthening Theraputty (Comment on Resistance)   Comment green putty, pinch, squeeze, roll, twist   Fine Motor / Dexterity    Fine Motor / Dexterity Yes   Fine Motor / Dexterity Interventions putty, nuts and bolts   Bed Mobility    Supine to Sit   (found seated in wheelchiar)   Sit to Supine   (left seated in wheelchiar after session)   Interdisciplinary Plan of Care Collaboration   IDT Collaboration with  Nursing   Patient Position at End of Therapy Seated;Self Releasing Lap Belt Applied;Call Light within Reach;Tray Table within Reach;Phone within Reach   Collaboration Comments nurse in with medications at end of session. aware of HR and tolerance for therapies   OT Total Time Spent   OT Individual Total Time Spent (Mins) 60   OT Charge Group   OT Therapy Activity 2   OT Therapeutic Exercise  2       FIM Grooming Score:  5 - Standby Prompting/Supervision or Set-up  Grooming Description:  Increased time, Supervision for safety, Verbal cueing      Assessment    HR remains stable throughout session. 71 at start, highest 82, 72 once returned to room  Tolerates all activity without pain. Fatigues quickly, but returns to tasks after brief seated rest. Motivated for progress and participation.    Plan    Follow current OT POC

## 2020-04-05 NOTE — PROGRESS NOTES
Hospital Medicine Daily Progress Note      Chief Complaint  S/P NSTEMI, CHF    Interval Problem Update  No chest pain, shortness of breath, or palpitations.    Review of Systems  Review of Systems   Constitutional: Negative for chills and fever.   HENT: Negative.    Eyes: Negative.    Respiratory: Negative for cough and shortness of breath.    Cardiovascular: Negative for chest pain and palpitations.   Gastrointestinal: Negative for abdominal pain, nausea and vomiting.   Musculoskeletal: Negative.    Skin: Negative for itching and rash.   Neurological: Positive for focal weakness.   Endo/Heme/Allergies: Negative for polydipsia. Does not bruise/bleed easily.        Physical Exam  Temp:  [36.4 °C (97.6 °F)-36.8 °C (98.2 °F)] 36.4 °C (97.6 °F)  Pulse:  [77-85] 85  Resp:  [18] 18  BP: (100-113)/(54-64) 113/64  SpO2:  [98 %] 98 %    Physical Exam  Vitals signs reviewed.   Constitutional:       General: He is not in acute distress.     Appearance: Normal appearance. He is not ill-appearing.   HENT:      Head: Normocephalic and atraumatic.      Right Ear: External ear normal.      Left Ear: External ear normal.      Nose: Nose normal.      Mouth/Throat:      Pharynx: Oropharynx is clear.   Eyes:      General:         Right eye: No discharge.         Left eye: No discharge.      Extraocular Movements: Extraocular movements intact.      Conjunctiva/sclera: Conjunctivae normal.   Neck:      Musculoskeletal: Normal range of motion and neck supple.   Cardiovascular:      Rate and Rhythm: Normal rate and regular rhythm.   Pulmonary:      Effort: No respiratory distress.      Breath sounds: No wheezing.      Comments: Decreased BS  Abdominal:      General: Abdomen is flat. Bowel sounds are normal.      Palpations: Abdomen is soft.   Musculoskeletal:      Right lower leg: No edema.      Left lower leg: No edema.   Skin:     General: Skin is warm and dry.   Neurological:      Mental Status: He is alert and oriented to person,  place, and time.         Fluids    Intake/Output Summary (Last 24 hours) at 4/5/2020 0044  Last data filed at 4/4/2020 1800  Gross per 24 hour   Intake 1200 ml   Output 1550 ml   Net -350 ml       Laboratory  Recent Labs     04/02/20  0329 04/03/20  0517   WBC 8.5 8.4   RBC 3.03* 3.16*   HEMOGLOBIN 9.9* 10.3*   HEMATOCRIT 30.5* 32.2*   .7* 101.9*   MCH 32.7 32.6   MCHC 32.5* 32.0*   RDW 49.0 49.9   PLATELETCT 227 236   MPV 10.4 10.7     Recent Labs     04/02/20 0329 04/03/20  0517   SODIUM 141 143   POTASSIUM 4.4 3.8   CHLORIDE 104 105   CO2 24 25   GLUCOSE 107* 87   BUN 25* 25*   CREATININE 1.46* 1.47*   CALCIUM 9.2 9.3                 Assessment/Plan  * CVA (cerebral vascular accident) ([facility])- (present on admission)  Assessment & Plan  Has left sided weakness  On ASA, Plavix, and Lipitor    Coronary artery disease involving native coronary artery- (present on admission)  Assessment & Plan  Remote h/o MI s/p LAD stent  More recent NSTEMI s/p Mid Circumflex PCI  Echo-EF 15%, grade III diastolic dysfunction, and aortic stenosis  On ASA, Plavix, and Lipitor  Blood pressure too low for ACE-I or B-Bl  Closely monitor volume status  No further aggressive intervention per Cardiology    CKD (chronic kidney disease) stage 3, GFR 30-59 ml/min ([facility])  Assessment & Plan  Avoid nephrotoxins  Renal dose all meds  Outpt Nephrology F/U       DNR     None

## 2020-04-05 NOTE — THERAPY
Speech Language Pathology  Daily Treatment     Patient Name: Kyler Donato Jr.  Age:  87 y.o., Sex:  male  Medical Record #: [id number]  Today's Date: 4/5/2020     Subjective    Patient was in room at time of ST. Was aware of therapy schedule and willing to participate.      Objective       04/05/20 0902   Outcome Measures   Outcome Measures Utilized SCCAN   SCCAN (Scales of Cognitive and Communicative Ability for Neurorehabilitation)   Oral Expression - Raw Score 18   Oral Expression - Scale Performance Score 95   Orientation - Raw Score 12   Orientation - Scale Performance Score 100   Memory - Raw Score 12   Memory - Scale Performance Score 63   Speech Comprehension - Raw Score 11   Speech Comprehension - Scale Performance Score 85   Reading Comprehension - Raw Score 9   Reading Comprehension - Scale Performance Score 75   Writing - Raw Score 5   Writing - Scale Performance Score 71   Attention - Raw Score 11   Attention - Scale Performance Score 69   Problem Solving - Raw Score 18   Problem Solving - Scale Performance Score 78   SCCAN Total Raw Score 76   SCCAN Degree of Severity Mild Impairment   SLP Total Time Spent   SLP Individual Total Time Spent (Mins) 60   Charge Group   SLP Cognitive Skill Development First 15 Minutes 1   SLP Cognitive Skill Development Additional 15 Minutes 3       Assessment    Completed SCCAN initiated at previous session. Patient with a final raw score of 76 indicating mild cognitive deficits. Difficulties noted in memory, reading comp, writing, attention, and problem solving. Reviewed results with patient.   Patient demonstrated appropriate sequencing and safety during functional transfer.     Plan    Target attention and recall.

## 2020-04-05 NOTE — CARE PLAN
Problem: Safety  Goal: Will remain free from injury  Outcome: PROGRESSING AS EXPECTED  Patient uses call light appropriately for assist with needs/transfers.     Problem: Pain Management  Goal: Pain level will decrease to patient's comfort goal  Outcome: PROGRESSING AS EXPECTED  Patient comfortable at this time, denies pain.

## 2020-04-05 NOTE — PROGRESS NOTES
"Rehab Progress Note     Date of Service: 4/4/2020  Chief Complaint: follow up stroke, heart attack    Interval Events (Subjective)  Patient was seen in his room today.  He reports that he is feeling pretty good today.  He had a little bit of trouble sleeping overnight, so he feels a little bit sleepy this morning.    No reports of pain. No chest pain.  No shortness of breath.    Objective:  VITAL SIGNS: /54   Pulse 83   Temp 36.8 °C (98.2 °F) (Oral)   Resp 18   Ht 1.803 m (5' 11\")   Wt 64.5 kg (142 lb 3.2 oz)   SpO2 96%   BMI 19.83 kg/m²   Gen: alert, no apparent distress  CV: RRR, nl S1, S2, no peripheral edema  Lungs: CTA bilaterally  Ext: No calf tenderness bilaterally      Recent Results (from the past 72 hour(s))   URINALYSIS    Collection Time: 04/01/20  5:44 PM   Result Value Ref Range    Color Yellow     Character Clear     Specific Gravity 1.009 <1.035    Ph 8.0 5.0 - 8.0    Glucose Negative Negative mg/dL    Ketones Negative Negative mg/dL    Protein Negative Negative mg/dL    Bilirubin Negative Negative    Urobilinogen, Urine 0.2 Negative    Nitrite Negative Negative    Leukocyte Esterase Large (A) Negative    Occult Blood Negative Negative    Micro Urine Req Microscopic    URINE MICROSCOPIC (W/UA)    Collection Time: 04/01/20  5:44 PM   Result Value Ref Range    WBC  (A) /hpf    RBC 0-2 (A) /hpf    Bacteria Few (A) None /hpf    Epithelial Cells Negative /hpf    Hyaline Cast 0-2 /lpf   CBC WITH DIFFERENTIAL    Collection Time: 04/02/20  3:29 AM   Result Value Ref Range    WBC 8.5 4.8 - 10.8 K/uL    RBC 3.03 (L) 4.70 - 6.10 M/uL    Hemoglobin 9.9 (L) 14.0 - 18.0 g/dL    Hematocrit 30.5 (L) 42.0 - 52.0 %    .7 (H) 81.4 - 97.8 fL    MCH 32.7 27.0 - 33.0 pg    MCHC 32.5 (L) 33.7 - 35.3 g/dL    RDW 49.0 35.9 - 50.0 fL    Platelet Count 227 164 - 446 K/uL    MPV 10.4 9.0 - 12.9 fL    Neutrophils-Polys 65.40 44.00 - 72.00 %    Lymphocytes 19.10 (L) 22.00 - 41.00 %    Monocytes 12.40 " 0.00 - 13.40 %    Eosinophils 1.90 0.00 - 6.90 %    Basophils 0.70 0.00 - 1.80 %    Immature Granulocytes 0.50 0.00 - 0.90 %    Nucleated RBC 0.00 /100 WBC    Neutrophils (Absolute) 5.54 1.82 - 7.42 K/uL    Lymphs (Absolute) 1.62 1.00 - 4.80 K/uL    Monos (Absolute) 1.05 (H) 0.00 - 0.85 K/uL    Eos (Absolute) 0.16 0.00 - 0.51 K/uL    Baso (Absolute) 0.06 0.00 - 0.12 K/uL    Immature Granulocytes (abs) 0.04 0.00 - 0.11 K/uL    NRBC (Absolute) 0.00 K/uL   Comp Metabolic Panel    Collection Time: 04/02/20  3:29 AM   Result Value Ref Range    Sodium 141 135 - 145 mmol/L    Potassium 4.4 3.6 - 5.5 mmol/L    Chloride 104 96 - 112 mmol/L    Co2 24 20 - 33 mmol/L    Anion Gap 13.0 7.0 - 16.0    Glucose 107 (H) 65 - 99 mg/dL    Bun 25 (H) 8 - 22 mg/dL    Creatinine 1.46 (H) 0.50 - 1.40 mg/dL    Calcium 9.2 8.5 - 10.5 mg/dL    AST(SGOT) 30 12 - 45 U/L    ALT(SGPT) 33 2 - 50 U/L    Alkaline Phosphatase 77 30 - 99 U/L    Total Bilirubin 0.5 0.1 - 1.5 mg/dL    Albumin 3.9 3.2 - 4.9 g/dL    Total Protein 6.5 6.0 - 8.2 g/dL    Globulin 2.6 1.9 - 3.5 g/dL    A-G Ratio 1.5 g/dL   ESTIMATED GFR    Collection Time: 04/02/20  3:29 AM   Result Value Ref Range    GFR If  55 (A) >60 mL/min/1.73 m 2    GFR If Non  46 (A) >60 mL/min/1.73 m 2   CBC with Differential    Collection Time: 04/03/20  5:17 AM   Result Value Ref Range    WBC 8.4 4.8 - 10.8 K/uL    RBC 3.16 (L) 4.70 - 6.10 M/uL    Hemoglobin 10.3 (L) 14.0 - 18.0 g/dL    Hematocrit 32.2 (L) 42.0 - 52.0 %    .9 (H) 81.4 - 97.8 fL    MCH 32.6 27.0 - 33.0 pg    MCHC 32.0 (L) 33.7 - 35.3 g/dL    RDW 49.9 35.9 - 50.0 fL    Platelet Count 236 164 - 446 K/uL    MPV 10.7 9.0 - 12.9 fL    Neutrophils-Polys 66.80 44.00 - 72.00 %    Lymphocytes 18.10 (L) 22.00 - 41.00 %    Monocytes 11.60 0.00 - 13.40 %    Eosinophils 2.60 0.00 - 6.90 %    Basophils 0.50 0.00 - 1.80 %    Immature Granulocytes 0.40 0.00 - 0.90 %    Nucleated RBC 0.00 /100 WBC    Neutrophils  (Absolute) 5.59 1.82 - 7.42 K/uL    Lymphs (Absolute) 1.51 1.00 - 4.80 K/uL    Monos (Absolute) 0.97 (H) 0.00 - 0.85 K/uL    Eos (Absolute) 0.22 0.00 - 0.51 K/uL    Baso (Absolute) 0.04 0.00 - 0.12 K/uL    Immature Granulocytes (abs) 0.03 0.00 - 0.11 K/uL    NRBC (Absolute) 0.00 K/uL   Comp Metabolic Panel (CMP)    Collection Time: 04/03/20  5:17 AM   Result Value Ref Range    Sodium 143 135 - 145 mmol/L    Potassium 3.8 3.6 - 5.5 mmol/L    Chloride 105 96 - 112 mmol/L    Co2 25 20 - 33 mmol/L    Anion Gap 13.0 7.0 - 16.0    Glucose 87 65 - 99 mg/dL    Bun 25 (H) 8 - 22 mg/dL    Creatinine 1.47 (H) 0.50 - 1.40 mg/dL    Calcium 9.3 8.5 - 10.5 mg/dL    AST(SGOT) 28 12 - 45 U/L    ALT(SGPT) 30 2 - 50 U/L    Alkaline Phosphatase 81 30 - 99 U/L    Total Bilirubin 0.5 0.1 - 1.5 mg/dL    Albumin 3.8 3.2 - 4.9 g/dL    Total Protein 6.4 6.0 - 8.2 g/dL    Globulin 2.6 1.9 - 3.5 g/dL    A-G Ratio 1.5 g/dL   Magnesium    Collection Time: 04/03/20  5:17 AM   Result Value Ref Range    Magnesium 1.7 1.5 - 2.5 mg/dL   Vitamin D, 25-hydroxy (blood)    Collection Time: 04/03/20  5:17 AM   Result Value Ref Range    25-Hydroxy   Vitamin D 25 47 30 - 100 ng/mL   ESTIMATED GFR    Collection Time: 04/03/20  5:17 AM   Result Value Ref Range    GFR If  55 (A) >60 mL/min/1.73 m 2    GFR If Non African American 45 (A) >60 mL/min/1.73 m 2       Current Facility-Administered Medications   Medication Frequency   • Respiratory Therapy Consult Continuous RT   • Pharmacy Consult Request ...Pain Management Review 1 Each PHARMACY TO DOSE   • acetaminophen (TYLENOL) tablet 650 mg Q4HRS PRN   • artificial tears ophthalmic solution 1 Drop PRN   • benzocaine-menthol (CEPACOL) lozenge 1 Lozenge Q2HRS PRN   • mag hydrox-al hydrox-simeth (MAALOX PLUS ES or MYLANTA DS) suspension 20 mL Q2HRS PRN   • ondansetron (ZOFRAN ODT) dispertab 4 mg 4X/DAY PRN    Or   • ondansetron (ZOFRAN) syringe/vial injection 4 mg 4X/DAY PRN   • traZODone (DESYREL)  tablet 50 mg QHS PRN   • sodium chloride (OCEAN) 0.65 % nasal spray 2 Spray PRN   • hydrOXYzine HCl (ATARAX) tablet 50 mg Q6HRS PRN   • melatonin tablet 3 mg HS PRN   • lactulose 20 GM/30ML solution 30 mL QDAY PRN   • docusate sodium (ENEMEEZ) enema 283 mg QDAY PRN   • meclizine (ANTIVERT) tablet 25 mg TID PRN   • nitroglycerin (NITROSTAT) tablet 0.4 mg Q5 MIN PRN   • morphine (MS IR) tablet 7.5 mg Q6HRS PRN   • aspirin EC (ECOTRIN) tablet 81 mg DAILY   • atorvastatin (LIPITOR) tablet 80 mg Q EVENING   • clopidogrel (PLAVIX) tablet 75 mg DAILY   • heparin injection 5,000 Units Q8HRS   • omeprazole (PRILOSEC) capsule 20 mg BID   • gabapentin (NEURONTIN) capsule 300 mg TID   • senna-docusate (PERICOLACE or SENOKOT S) 8.6-50 MG per tablet 2 Tab BID   • magnesium hydroxide (MILK OF MAGNESIA) suspension 30 mL QDAY PRN   • lidocaine 2 % jelly PRN   • LORazepam (ATIVAN) tablet 1 mg Q4HRS PRN       Orders Placed This Encounter   Procedures   • Diet Order Cardiac     Standing Status:   Standing     Number of Occurrences:   1     Order Specific Question:   Diet:     Answer:   Cardiac [6]       Assessment:  Active Hospital Problems    Diagnosis   • *CVA (cerebral vascular accident) ([facility])   • NSTEMI (non-ST elevated myocardial infarction) ([facility])   • Chronic combined systolic and diastolic congestive heart failure ([facility])   • Coronary artery disease involving native coronary artery   • GERD (gastroesophageal reflux disease)   • CKD (chronic kidney disease) stage 3, GFR 30-59 ml/min ([facility])   • Neurogenic bladder   • Urinary retention     This patient is a 87 y.o. male admitted for acute inpatient rehabilitation with CVA (cerebral vascular accident) ([facility]).    Medical Decision Making and Plan:    Left cerebellar stroke  Left vertebral artery occlusion  2/2 cath/PCI  Left sided hemiparesis  Left sided paresthesias  Non-dominant  Continue full rehab program  OT/OT/SLP, 1 hr each discipline, 5 days per week  Continue aspirin, plavix, and  statin for secondary stroke prophylaxis    Dizziness  PRN meclizine     Chronic urinary retention  Does ICs at home for last 11 years     Peripheral neuropathy  Continue gabapentin 300mg po tid    Bowel  Meds as needed  Last BM 4/2    DVT prophylaxis  Heparin 5000IU SC q8h    Appreciate assistance of hospitalist with his medical co-morbidities:     Coronary artery disease  S/p NSTEMI x 2 - if patient has symptoms of another heart attack, no plan to send to ED, plan to transfer to palliative/hospice - daughter and patient in agreement  Acute systolic CHF, EF 15%  Hypotension  Chronic kidney disease  Macrocytic Anemia  WILD Vargas M.D.   Physical Medicine and Rehabilitation

## 2020-04-06 ENCOUNTER — APPOINTMENT (OUTPATIENT)
Dept: RADIOLOGY | Facility: REHABILITATION | Age: 85
DRG: 057 | End: 2020-04-06
Attending: PHYSICAL MEDICINE & REHABILITATION
Payer: MEDICARE

## 2020-04-06 PROCEDURE — A9270 NON-COVERED ITEM OR SERVICE: HCPCS | Performed by: PHYSICAL MEDICINE & REHABILITATION

## 2020-04-06 PROCEDURE — 97535 SELF CARE MNGMENT TRAINING: CPT

## 2020-04-06 PROCEDURE — 97130 THER IVNTJ EA ADDL 15 MIN: CPT

## 2020-04-06 PROCEDURE — 700102 HCHG RX REV CODE 250 W/ 637 OVERRIDE(OP): Performed by: PHYSICAL MEDICINE & REHABILITATION

## 2020-04-06 PROCEDURE — 74018 RADEX ABDOMEN 1 VIEW: CPT

## 2020-04-06 PROCEDURE — 97116 GAIT TRAINING THERAPY: CPT

## 2020-04-06 PROCEDURE — 770010 HCHG ROOM/CARE - REHAB SEMI PRIVAT*

## 2020-04-06 PROCEDURE — 700111 HCHG RX REV CODE 636 W/ 250 OVERRIDE (IP): Performed by: PHYSICAL MEDICINE & REHABILITATION

## 2020-04-06 PROCEDURE — 99231 SBSQ HOSP IP/OBS SF/LOW 25: CPT | Performed by: HOSPITALIST

## 2020-04-06 PROCEDURE — 99233 SBSQ HOSP IP/OBS HIGH 50: CPT | Performed by: PHYSICAL MEDICINE & REHABILITATION

## 2020-04-06 PROCEDURE — 97530 THERAPEUTIC ACTIVITIES: CPT

## 2020-04-06 PROCEDURE — 97129 THER IVNTJ 1ST 15 MIN: CPT

## 2020-04-06 RX ORDER — BISACODYL 10 MG
10 SUPPOSITORY, RECTAL RECTAL
Status: DISCONTINUED | OUTPATIENT
Start: 2020-04-06 | End: 2020-04-13 | Stop reason: HOSPADM

## 2020-04-06 RX ADMIN — GABAPENTIN 300 MG: 300 CAPSULE ORAL at 14:09

## 2020-04-06 RX ADMIN — GABAPENTIN 300 MG: 300 CAPSULE ORAL at 07:57

## 2020-04-06 RX ADMIN — CLOPIDOGREL BISULFATE 75 MG: 75 TABLET ORAL at 07:56

## 2020-04-06 RX ADMIN — GABAPENTIN 300 MG: 300 CAPSULE ORAL at 21:08

## 2020-04-06 RX ADMIN — MAGNESIUM HYDROXIDE 30 ML: 400 SUSPENSION ORAL at 09:30

## 2020-04-06 RX ADMIN — LACTULOSE 30 ML: 20 SOLUTION ORAL at 01:00

## 2020-04-06 RX ADMIN — OMEPRAZOLE 20 MG: 20 CAPSULE, DELAYED RELEASE ORAL at 07:56

## 2020-04-06 RX ADMIN — SENNOSIDES AND DOCUSATE SODIUM 2 TABLET: 8.6; 5 TABLET ORAL at 07:57

## 2020-04-06 RX ADMIN — ATORVASTATIN CALCIUM 80 MG: 40 TABLET, FILM COATED ORAL at 21:08

## 2020-04-06 RX ADMIN — MORPHINE SULFATE 7.5 MG: 15 TABLET ORAL at 21:13

## 2020-04-06 RX ADMIN — HEPARIN SODIUM 5000 UNITS: 5000 INJECTION, SOLUTION INTRAVENOUS; SUBCUTANEOUS at 21:07

## 2020-04-06 RX ADMIN — OMEPRAZOLE 20 MG: 20 CAPSULE, DELAYED RELEASE ORAL at 21:08

## 2020-04-06 RX ADMIN — HEPARIN SODIUM 5000 UNITS: 5000 INJECTION, SOLUTION INTRAVENOUS; SUBCUTANEOUS at 05:07

## 2020-04-06 RX ADMIN — HEPARIN SODIUM 5000 UNITS: 5000 INJECTION, SOLUTION INTRAVENOUS; SUBCUTANEOUS at 14:05

## 2020-04-06 RX ADMIN — ASPIRIN 81 MG: 81 TABLET, COATED ORAL at 07:56

## 2020-04-06 ASSESSMENT — ENCOUNTER SYMPTOMS
VOMITING: 0
ABDOMINAL PAIN: 0
MUSCULOSKELETAL NEGATIVE: 1
COUGH: 0
FEVER: 0
BRUISES/BLEEDS EASILY: 0
POLYDIPSIA: 0
NAUSEA: 0
EYES NEGATIVE: 1
CHILLS: 0
SHORTNESS OF BREATH: 0
CONSTIPATION: 1
PALPITATIONS: 0
FOCAL WEAKNESS: 1

## 2020-04-06 NOTE — THERAPY
"Speech Language Pathology  Daily Treatment     Patient Name: Kyler Donato Jr.  Age:  87 y.o., Sex:  male  Medical Record #: [id number]  Today's Date: 4/6/2020     Subjective    \"Gosh darn, I don't want to waste a good day of therapy not feeling good.\" Pt in bed at time of scheduled ST. Initially refusing due to constipation and abdominal discomfort. Pt eventually agreeable to therapy at bedside.      Objective     04/06/20 0834   Cognition   Medication Management  Moderate (3)   Interdisciplinary Plan of Care Collaboration   IDT Collaboration with  Nursing   Patient Position at End of Therapy In Bed;Call Light within Reach;Tray Table within Reach;Phone within Reach   Collaboration Comments CLOF with nursing and c/o constipation request for milk of mag   SLP Total Time Spent   SLP Individual Total Time Spent (Mins) 60   Charge Group   SLP Cognitive Skill Development First 15 Minutes 1   SLP Cognitive Skill Development Additional 15 Minutes 3       FIM Eating Score:     Eating Description:       FIM Comprehension Score:  5 - Stand-by Prompting/Supervision or Set-up  Comprehension Description:  Hearing aids/amplifiers, Verbal cues, Glasses    FIM Expression Score:  7 - Independent  Expression Description:       FIM Social Interaction Score:  7 - Independent  Social Interaction Description:       FIM Problem Solving Score:  5 - Standby Prompting/Supervision or Set-up  Problem Solving Description:  Verbal cueing, Increased time, Seat belt, Therapy schedule    FIM Memory Score:  5 - Standby Prompting/Supervision or Set-up  Memory Description:  Verbal cueing, Therapy schedule      Assessment    Initiated medication education this date. Pt did not recall names of any medications taken at home, however, when provided with names of meds he was able to state \"no I don't take that\" - Pt participation limited by abdominal fullness secondary to constipation. Pt with attempts x2 for BM with limited success per patient " report. Pt requesting milk of magnesia when reviewing PRN bowel meds.    Plan    Continue med list, fxl med sort

## 2020-04-06 NOTE — CARE PLAN
Problem: Balance  Goal: STG-Within one week, patient will maintain static standing  Description: 1) Individualized goal:  3 min without UE support and SBA- CGA   2) Interventions:  PT Group Therapy, PT E Stim Attended, PT Gait Training, PT Therapeutic Exercises, PT TENS Application, PT Neuro Re-Ed/Balance, PT Aquatic Therapy, PT Therapeutic Activity and PT Manual Therapy     Outcome: PROGRESSING AS EXPECTED  Note: < 3 min     Problem: Mobility  Goal: STG-Within one week, patient will propel wheelchair community  Description: 1) Individualized goal:  150 ft spv   2) Interventions: PT Group Therapy, PT E Stim Attended, PT Gait Training, PT Therapeutic Exercises, PT TENS Application, PT Neuro Re-Ed/Balance, PT Aquatic Therapy, PT Therapeutic Activity and PT Manual Therapy         Outcome: PROGRESSING AS EXPECTED  Note: 100 ft  Goal: STG-Within one week, patient will ambulate community distances  Description: 1) Individualized goal:  150 ft with FWW and min A  2) Interventions: PT Group Therapy, PT E Stim Attended, PT Gait Training, PT Therapeutic Exercises, PT TENS Application, PT Neuro Re-Ed/Balance, PT Aquatic Therapy, PT Therapeutic Activity and PT Manual Therapy         Outcome: PROGRESSING AS EXPECTED  Note: 125 ft

## 2020-04-06 NOTE — THERAPY
Physical Therapy   Daily Treatment     Patient Name: Kyler Donato Jr.  Age:  87 y.o., Sex:  male  Medical Record #: [id number]  Today's Date: 4/6/2020     Precautions  Precautions: Fall Risk, Cardiac Precautions (See Comments)  Comments: maintain heart rate below 100 during therapies, SBP above 80.    Subjective    Pt reports he is concerned to walk because he needs to have BM but is constipated     Objective       04/06/20 0931   Vitals   Pulse 97  (with activity )   Patient BP Position Sitting   Blood Pressure  111/42   Pulse Oximetry 97 %   Interdisciplinary Plan of Care Collaboration   Patient Position at End of Therapy Seated  (handoff xray tech)   PT Total Time Spent   PT Individual Total Time Spent (Mins) 60   PT Charge Group   PT Gait Training 2   PT Therapeutic Activities 2       FIM Toilet Transfer Score:  4 - Minimal Assistance  Toilet Transfer Description:  Grab bar, Increased time, Verbal cueing(min A for steadying with GBs during SPT)    FIM Walking Score:  2 - Max Assistance  Walking Description:  Safety concerns, Requires incidental assist, Walker, Extra time, Requires wheelchair follow, Verbal cueing(20 ft // bars CGA,125 ft FWW, min A to prevent lateral lean L, limited by L UE weakness)      Assessment    Pt limited this session by needing to make 3 trips to bathroom for attempted BM - unsuccessful . MD aware and ordered xray of abdomen. Pt does demo improved walking with decreased lateral lean this session but cont to require min A for transfers and walking dt lean L.    Plan    Monitor vitals and do not push pt too hard - very high risk for cardiac event, Continue to progress gait training with FWW with focus on compensation for L lateral pulsion, Vector training no AD, balance activities to assist with ataxia, HEP, outcome measures, complete remaining vestibular exam within pt tolerance,

## 2020-04-06 NOTE — DISCHARGE PLANNING
CM spoke with patients daughter Georgie and updated on IDT and target DC date.  She is fine with that and so happy that patient is doing well.  She chose [facility] and no preference for DME.  Therapies recommends FWW.  Patient has shower chair and grab bars in shower and by toilet.  CM will continue to follow for DC needs.

## 2020-04-06 NOTE — PROGRESS NOTES
Hospital Medicine Daily Progress Note    Chief Complaint  S/P NSTEMI, CHF    Interval Problem Update  Had good night, no complaints.    Review of Systems  Review of Systems   Constitutional: Negative for chills and fever.   HENT: Negative.    Eyes: Negative.    Respiratory: Negative for cough and shortness of breath.    Cardiovascular: Negative for chest pain and palpitations.   Gastrointestinal: Negative for abdominal pain, nausea and vomiting.   Musculoskeletal: Negative.    Skin: Negative for itching and rash.   Neurological: Positive for focal weakness.   Endo/Heme/Allergies: Negative for polydipsia. Does not bruise/bleed easily.        Physical Exam  Temp:  [36.8 °C (98.2 °F)-37.3 °C (99.1 °F)] 36.8 °C (98.2 °F)  Pulse:  [63-82] 66  Resp:  [18] 18  BP: (100-104)/(56-58) 100/58  SpO2:  [98 %] 98 %    Physical Exam  Vitals signs reviewed.   Constitutional:       Appearance: Normal appearance.   HENT:      Head: Normocephalic and atraumatic.      Right Ear: External ear normal.      Left Ear: External ear normal.      Nose: Nose normal.      Mouth/Throat:      Pharynx: Oropharynx is clear.   Eyes:      General:         Right eye: No discharge.         Left eye: No discharge.      Extraocular Movements: Extraocular movements intact.      Conjunctiva/sclera: Conjunctivae normal.   Neck:      Musculoskeletal: Normal range of motion and neck supple.   Cardiovascular:      Rate and Rhythm: Normal rate and regular rhythm.   Pulmonary:      Effort: No respiratory distress.      Breath sounds: No wheezing.      Comments: Decreased BS  Abdominal:      General: Bowel sounds are normal. There is no distension.      Palpations: Abdomen is soft.      Tenderness: There is no abdominal tenderness.   Musculoskeletal:      Right lower leg: No edema.      Left lower leg: No edema.   Skin:     General: Skin is warm and dry.   Neurological:      Mental Status: He is alert and oriented to person, place, and time.          Fluids    Intake/Output Summary (Last 24 hours) at 4/5/2020 2056  Last data filed at 4/5/2020 1800  Gross per 24 hour   Intake 840 ml   Output 1700 ml   Net -860 ml       Laboratory  Recent Labs     04/03/20  0517   WBC 8.4   RBC 3.16*   HEMOGLOBIN 10.3*   HEMATOCRIT 32.2*   .9*   MCH 32.6   MCHC 32.0*   RDW 49.9   PLATELETCT 236   MPV 10.7     Recent Labs     04/03/20  0517   SODIUM 143   POTASSIUM 3.8   CHLORIDE 105   CO2 25   GLUCOSE 87   BUN 25*   CREATININE 1.47*   CALCIUM 9.3                 Assessment/Plan  * CVA (cerebral vascular accident) (HCC)- (present on admission)  Assessment & Plan  Has left sided weakness  On ASA, Plavix, and Lipitor    Coronary artery disease involving native coronary artery- (present on admission)  Assessment & Plan  Remote h/o MI s/p LAD stent  More recent NSTEMI s/p Mid Circumflex PCI  Echo-EF 15%, grade III diastolic dysfunction, and aortic stenosis  On ASA, Plavix, and Lipitor  Blood pressure too low for ACE-I or B-Bl  Closely monitor volume status  No further aggressive intervention per Cardiology    CKD (chronic kidney disease) stage 3, GFR 30-59 ml/min (Formerly [facility])  Assessment & Plan  Avoid nephrotoxins  Renal dose all meds  Outpt Nephrology F/U     DNR

## 2020-04-06 NOTE — REHAB-SLP IDT TEAM NOTE
Speech Therapy   Cognitive Linquistic Functions  Comprehension Initial:  5 - Stand-by Prompting/Supervision or Set-up  Comprehension Current:  5 - Stand-by Prompting/Supervision or Set-up   Comprehension Description:  Hearing aids/amplifiers, Verbal cues, Glasses. Pt requires assistance handling hearing aids to control volume and prevent background interference  Expression Initial:  7 - Independent  Expression Current:  7 - Independent   Expression Description:     Social Interaction Initial:  7 - Independent  Social Interaction Current:  7 - Independent   Social Interaction Description:     Problem Solving Initial:  5 - Standby Prompting/Supervision or Set-up  Problem Solving Current:  5 - Standby Prompting/Supervision or Set-up   Problem Solving Description:  Verbal cueing, Increased time, Seat belt, Therapy schedule  Memory Initial:  4 - Minimal Assistance  Memory Current:  5 - Standby Prompting/Supervision or Set-up   Memory Description:  Verbal cueing, Therapy schedule  Executive Functioning / Organization Initial:     Executive Functioning / Organization Current: 4 - Minimal Assistance    Executive Functioning Desciption: pt independent prior with meds/finances for he and spouse  Swallowing  Swallowing Status: SLP not following for dysphagia   Orders Placed This Encounter   Procedures   • Diet Order Cardiac     Standing Status:   Standing     Number of Occurrences:   1     Order Specific Question:   Diet:     Answer:   Cardiac [6]     Behavior Modification Plan  Allow for rest time, Give clear feedback, Set clear goals, Provide reasonable choices, Decrease the chance of failure by offering activities that are within the patient's abilities and Analyze tasks (break down into smaller steps)  Assistive Technology  Hearing amplification or closed captioning, Low/Impaired vision equipment and Low tech: Calendar, planner, schedule, alarms/timers, pill organizer, post-it notes, lists  Family Training/Education:  To  be completed with spouse  DC Recommendations: TBD  Strengths:  Able to follow instructions, Alert and oriented, Effective communication skills, Independent PLOF, Making steady progress towards goals, Motivated for self care and independence, Pleasant and cooperative, Supportive family and Willingly participates in therapeutic activities  Barriers:  Pain poorly managed and Other: [place], STM, complex attention  # of short term goals set=2  # of short term goals met=1 (1 new goal added)   Speech Therapy Problems     Problem: Problem Solving STGs     Dates: Start: 04/03/20       Description:     Goal: STG-Within one week, patient will     Dates: Start: 04/03/20       Description: 1) Individualized goal:  Complete a medication sorting task with spv to achieve 100% accuracy   2) Interventions:  SLP Speech Language Treatment, SLP Self Care / ADL Training , SLP Cognitive Skill Development and SLP Group Treatment                   Problem: Speech/Swallowing LTGs     Dates: Start: 04/03/20       Description:     Goal: LTG-By discharge, patient will solve complex problem     Dates: Start: 04/03/20       Description: 1) Individualized goal:  With modified independence for a safe discharge home   2) Interventions:  SLP Speech Language Treatment, SLP Self Care / ADL Training , SLP Cognitive Skill Development and SLP Group Treatment                          Section completed by:  Claribel Tay MS,CCC-SLP

## 2020-04-06 NOTE — REHAB-OT IDT TEAM NOTE
Occupational Therapy   Activities of Daily Living  Eating Initial:  6 - Modified Independent  Eating Current:  6 - Modified Independent   Eating Description:  Adaptive equipment(partials)  Grooming Initial:  5 - Standby Prompting/Supervision or Set-up  Grooming Current:  5 - Standby Prompting/Supervision or Set-up   Grooming Description:  Increased time, Supervision for safety, Verbal cueing  Bathing Initial:  0 - Not tested, patient refused  Bathing Current:   5 - Standby Prompting/Supervision or Set-up   Bathing Description:     Upper Body Dressing Initial:  5 - Standby Prompting/Supervision or Set-up  Upper Body Dressing Current:  5 - Standby Prompting/Supervision or Set-up   Upper Body Dressing Description:  Set-up of equipment, Supervision for safety(setup/sba to don pullover shirt seated EOB)  Lower Body Dressing Initial:  4 - Minimal Assistance  Lower Body Dressing Current:  5 - Standby Prompting/Supervision or Set-up   Lower Body Dressing Description:  4 - Minimal Assistance  Toileting Initial:     Toileting Current:  5 - Standby Prompting/Supervision or Set-up   Toileting Description:     Toilet Transfer Initial:  4 - Minimal Assistance  Toilet Transfer Current:  5 - Standby Prompting/Supervision or Set-up   Toilet Transfer Description:  4 - Minimal Assistance  Tub / Shower Transfer Initial:  0 - Not tested, patient refused  Tub / Shower Transfer Current:  5 - Standby Prompting/Supervision or Set-up   Tub / Shower Transfer Description:     IADL:  Not yet addressed   Family Training/Education:  None  DME/DC Recommendations:  Has a shower chair and grab bars in shower/by toilet; Home health OT    Strengths:  Alert and oriented, Effective communication skills, Independent PLOF, Manages pain appropriately, Motivated for self care and independence, Pleasant and cooperative, Supportive family and Willingly participates in therapeutic activities  Barriers:  Decreased endurance, Generalized weakness, Limited  mobility, Poor activity tolerance and Poor balance     # of short term goals set= 3    # of short term goals met= 3    Occupational Therapy Goals     Problem: Bathing     Dates: Start: 04/03/20       Description:     Goal: STG-Within one week, patient will bathe     Dates: Start: 04/03/20       Description: 1) Individualized Goal:  Body with setup and SBA using grab bar, bench and HHSH.  2) Interventions:  OT Self Care/ADL, OT Manual Ther Technique, OT Neuro Re-Ed/Balance, OT Therapeutic Activity, OT Evaluation and OT Therapeutic Exercise       Note:     Goal Note filed on 04/06/20 1116 by Rome Botello MS,OTR/L    Min A                        Problem: Dressing     Dates: Start: 04/03/20       Description:     Goal: STG-Within one week, patient will dress LB     Dates: Start: 04/03/20       Description: 1) Individualized Goal:  With setup and SBA using AE/AD as needed.  2) Interventions:  OT Self Care/ADL, OT Manual Ther Technique, OT Neuro Re-Ed/Balance, OT Therapeutic Activity, OT Evaluation and OT Therapeutic Exercise     Note:     Goal Note filed on 04/06/20 1116 by Rome Botello MS,OTR/L    Min A                        Problem: OT Long Term Goals     Dates: Start: 04/03/20       Description:     Goal: LTG-By discharge, patient will complete basic self care tasks     Dates: Start: 04/03/20       Description: 1) Individualized Goal:  With supervision using AE/AD as needed  2) Interventions:  OT Self Care/ADL, OT Manual Ther Technique, OT Neuro Re-Ed/Balance, OT Therapeutic Activity, OT Evaluation and OT Therapeutic Exercise           Goal: LTG-By discharge, patient will perform bathroom transfers     Dates: Start: 04/03/20       Description: 1) Individualized Goal:  With supervision using AE/AD as needed  2) Interventions:  OT Self Care/ADL, OT Manual Ther Technique, OT Neuro Re-Ed/Balance, OT Therapeutic Activity, OT Evaluation and OT Therapeutic Exercise           Goal: LTG-By discharge, patient will  complete basic home management     Dates: Start: 04/03/20       Description: 1) Individualized Goal:  With supervision using AE/AD as needed  2) Interventions:  OT Self Care/ADL, OT Manual Ther Technique, OT Neuro Re-Ed/Balance, OT Therapeutic Activity, OT Evaluation and OT Therapeutic Exercise                 Problem: Toileting     Dates: Start: 04/03/20       Description:     Goal: STG-Within one week, patient will complete toileting tasks     Dates: Start: 04/03/20       Description: 1) Individualized Goal:  With SBA using grab bar as needed.  2) Interventions:  OT Self Care/ADL, OT Manual Ther Technique, OT Neuro Re-Ed/Balance, OT Therapeutic Activity, OT Evaluation and OT Therapeutic Exercise     Note:     Goal Note filed on 04/06/20 1116 by Rome Botello MS,OTR/L    Min A                              Section completed by:  Rome Botello MS,OTR/L

## 2020-04-06 NOTE — CARE PLAN
Problem: Bathing  Goal: STG-Within one week, patient will bathe  Description: 1) Individualized Goal:  Body with setup and SBA using grab bar, bench and HHSH.  2) Interventions:  OT Self Care/ADL, OT Manual Ther Technique, OT Neuro Re-Ed/Balance, OT Therapeutic Activity, OT Evaluation and OT Therapeutic Exercise     Outcome: NOT MET  Note: Min A     Problem: Dressing  Goal: STG-Within one week, patient will dress LB  Description: 1) Individualized Goal:  With setup and SBA using AE/AD as needed.  2) Interventions:  OT Self Care/ADL, OT Manual Ther Technique, OT Neuro Re-Ed/Balance, OT Therapeutic Activity, OT Evaluation and OT Therapeutic Exercise   Outcome: NOT MET  Note: Min A     Problem: Toileting  Goal: STG-Within one week, patient will complete toileting tasks  Description: 1) Individualized Goal:  With SBA using grab bar as needed.  2) Interventions:  OT Self Care/ADL, OT Manual Ther Technique, OT Neuro Re-Ed/Balance, OT Therapeutic Activity, OT Evaluation and OT Therapeutic Exercise   Outcome: NOT MET  Note: Min A

## 2020-04-06 NOTE — CARE PLAN
Problem: Safety  Goal: Will remain free from falls  Outcome: PROGRESSING AS EXPECTED  Patient uses call light for assist ,transfers with good safety awareness.     Problem: Pain Management  Goal: Pain level will decrease to patient's comfort goal  Outcome: PROGRESSING AS EXPECTED  Patient denies chest pain, will continue to monitor.

## 2020-04-06 NOTE — THERAPY
Occupational Therapy  Daily Treatment     Patient Name: Kyler Donato Jr.  Age:  87 y.o., Sex:  male  Medical Record #: [id number]  Today's Date: 2020     Precautions  Precautions: (P) Fall Risk, Cardiac Precautions (See Comments)  Comments: (P) maintain heart rate below 100 during therapies, SBP above 80.    Safety   ADL Safety : Requires Physical Assist for Safety, Requires Cueing for Safety  Bathroom Safety: Requires Physical Assist for Safety, Requires Cuing for Safety    Subjective    Patient lying in bed awake.  Agreeable to shower.  States he has been constipated, but is now having frequent loose stools.     Objective       20 1231   Precautions   Precautions Fall Risk;Cardiac Precautions (See Comments)   Comments maintain heart rate below 100 during therapies, SBP above 80.   Bed Mobility    Supine to Sit Supervised   Scooting Supervised   Interdisciplinary Plan of Care Collaboration   Patient Position at End of Therapy Seated;Call Light within Reach;Self Releasing Lap Belt Applied;Tray Table within Reach   OT Total Time Spent   OT Individual Total Time Spent (Mins) 60   OT Charge Group   OT Self Care / ADL 4       FIM Grooming Score:  7 - Independent  Grooming Description:       FIM Bathing Score:  5 - Standby Prompting/Supervision or Set-up  Bathing Description:       FIM Upper Body Dressin - Standby Prompting/Supervision or Set-up  Upper Body Dressing Description:  Set-up of equipment(setup to don/dof pullover shirt)    FIM Lower Body Dressing Score:  5 - Standby Prompting/Supervsion or Set-up  Lower Body Dressing Description:  Supervision for safety, Set-up of equipment(setup/sba to don/doff underwear, pants, socks and shoes with laces)    FIM Toiletin - Standby Prompting/Supervision or Set-up  Toileting Description:  Grab bar, Supervision for safety, Set-up of equipment(setup/sba with grab bar for balance)    FIM Toilet Transfer Score:  5 - Standby Prompting/Supervision or  Set-up  Toilet Transfer Description:  Grab bar, Supervision for safety, Set-up of equipment(setup/sba with grab bar w/c <> toilet)    FIM Tub/Shower Transfers Score:  5 - Standby Prompting/Supervision or Set-up  Tub/Shower Transfers Description:  Grab bar, Adaptive equipment, Supervision for safety, Set-up of equipment(setup/sba with grab bar and bench)      Assessment    Patient completing adl routine with less assistance today compared to evaluation on Friday.  Standing balance during adls and transfers much better.Would benefit from and is agreeable to try elastic shoelaces.    Plan    ADLs, IADLs, mobility, balance

## 2020-04-06 NOTE — REHAB-NURSING IDT TEAM NOTE
Nursing   Nursing  Diet/Nutrition:  Cardiac and Thin Liquids  Medication Administration:  Whole with Liquid Wash  % consumed at meals in last 24 hours:  Consumed % of meals per documentation.  Meal/Snack Consumption for the past 24 hrs:   Oral Nutrition   04/05/20 1800 Dinner;Between % Consumed   04/05/20 1200 Lunch;Between 50-75% Consumed   04/05/20 0900 Breakfast;Between % Consumed     Snack schedule:  HS  Supplement:  Other: none  Appetite:  Good  Fluid Intake/Output in past 24 hours: In: 960 [P.O.:960]  Out: 1700   Admit Weight:  Weight: 64.5 kg (142 lb 3.2 oz)  Weight Last 7 Days   [64 kg (141 lb 1.5 oz)-64.5 kg (142 lb 3.2 oz)] 64 kg (141 lb 1.5 oz) (04/05 0400)    Pain Issues:    Location:  Generalized (04/05 2051)  --         Severity:  neither Moderate nor Severe   Scheduled pain medications:  gabapentin (NEURONTIN)      PRN pain medications used in last 24 hours:  morphine (MS IR)    Non Pharmacologic Interventions:  food, repositioned and rest  Effectiveness of pain management plan:  good=patient states acceptable comfort after interventions    Bowel:    Bowel Assist Initial Score:  5 - Standby Prompting/Supervision or Set-up  Bowel Assist Current Score:  5 - Standby Prompting/Supervision or Set-up  Bowl Accidents in last 7 days:     Last bowel movement: 04/05/20  Stool Description: Small     Usual bowel pattern:  every 2-3 days  Scheduled bowel medications:  senna-docusate (PERICOLACE or SENOKOT S)   PRN bowel medications used in last 24 hours:  lactulose   Nursing Interventions:  Increased time, PRN medication, Scheduled medication, Supervision, Set-up  Effectiveness of bowel program:   fair=sometimes needs prn bowel meds for constipation  Bladder:    Bladder Assist Initial Score:  6 - Modified Independent  Bladder Assist Current Score:  6 - Modified Independent  Bladder Accidents in last 7 days:     PVR range for past 24-48 hours: --  Intermittent Catheterization:  Self straight  catheterization  Medications affecting bladder:  None    Time void schedule/voiding pattern:  Voiding every 2-4 hours  Interventions:  Increased time(self straight catheterization)    Sun:    Type:     Patient Lines/Drains/Airways Status    Active Catheter     None              Date placed:          Justification:    Diabetes:  Blood Sugar Frequency:  None    Range of BS for last 48 hours:       Scheduled diabetic medications:  None  Sliding scale usage in past 24 hours:  No  Total Short acting insulin in the past 24 hours:  None  Total Long acting insulin in the past 24 hours:  None    Wound:         Patient Lines/Drains/Airways Status    Active Current Wounds     None                   Interventions:  n/a      Wound Vac Location:  Not applicable  Dressing last changed:  Not applicable  Pump Mode Pressure Setting       Description of drainage:  Not applicable    Sleep/wake cycle:   Average hours slept:  Sleeps 4-6 hours without waking  Sleep medication usage:  Other Trazodone    Patient/Family Training/Education:  Fall Prevention, General Self Care, Pain Management, Safe Transfers and Safety  Discharge Supply Recommendations:  Catheter Supplies and Blood Pressure Monitor  Strengths: Alert and oriented, Willingly participates in therapeutic activities and Motivated for self care and independence   Barriers:   Constipation and Generalized weakness       Nursing Problems     Problem: Bowel/Gastric:     Description:     Goal: Normal bowel function is maintained or improved     Description:           Goal: Will not experience complications related to bowel motility     Description:                 Problem: Communication     Description:     Goal: The ability to communicate needs accurately and effectively will improve     Description:                 Problem: Discharge Barriers/Planning     Description:     Goal: Patient's continuum of care needs will be met     Description:                 Problem: Infection      Description:     Goal: Will remain free from infection     Description:                 Problem: Knowledge Deficit     Description:     Goal: Knowledge of disease process/condition, treatment plan, diagnostic tests, and medications will improve     Description:           Goal: Knowledge of the prescribed therapeutic regimen will improve     Description:                 Problem: Pain Management     Description:     Goal: Pain level will decrease to patient's comfort goal     Description:                 Problem: Safety     Description:     Goal: Will remain free from injury     Description:           Goal: Will remain free from falls     Description:                 Problem: Urinary Elimination:     Description:     Goal: Ability to reestablish a normal urinary elimination pattern will improve     Description:                 Problem: Venous Thromboembolism (VTW)/Deep Vein Thrombosis (DVT) Prevention:     Description:     Goal: Patient will participate in Venous Thrombosis (VTE)/Deep Vein Thrombosis (DVT)Prevention Measures     Description:                        Long Term Goals:   At discharge patient will be able to function safely at home and in the community with support.    Section completed by:  Tammy Doll R.N.

## 2020-04-06 NOTE — CARE PLAN
Problem: Safety  Goal: Will remain free from injury  Outcome: PROGRESSING AS EXPECTED  Note: Call light with in reach. Redirection to use call light for assistance. Non skid socks. Upper siderails up x2 while in bed.      Problem: Pain Management  Goal: Pain level will decrease to patient's comfort goal  Outcome: PROGRESSING AS EXPECTED  Note: Educate patient of non-pharmacological comfort measures: repositioning, relaxation/breathing technique, cold compress and activities.

## 2020-04-06 NOTE — PROGRESS NOTES
Hospital Medicine Daily Progress Note    Chief Complaint  S/P NSTEMI, CHF    Interval Problem Update  Pt c/o abd pain this morning; he thinks it's from constipation.  KUB shows moderate to large amount of stool--defer to primary team.    Review of Systems  Review of Systems   Constitutional: Negative for chills and fever.   HENT: Negative.    Eyes: Negative.    Respiratory: Negative for cough and shortness of breath.    Cardiovascular: Negative for chest pain and palpitations.   Gastrointestinal: Positive for constipation. Negative for abdominal pain, nausea and vomiting.   Musculoskeletal: Negative.    Skin: Negative for itching and rash.   Neurological: Positive for focal weakness.   Endo/Heme/Allergies: Negative for polydipsia. Does not bruise/bleed easily.        Physical Exam  Temp:  [36.6 °C (97.9 °F)-37.1 °C (98.7 °F)] 37.1 °C (98.7 °F)  Pulse:  [64-97] 97  Resp:  [16-18] 16  BP: (100-121)/(42-60) 111/42  SpO2:  [91 %-97 %] 97 %    Physical Exam  Vitals signs reviewed.   Constitutional:       Appearance: Normal appearance.   HENT:      Head: Normocephalic and atraumatic.      Right Ear: External ear normal.      Left Ear: External ear normal.      Nose: Nose normal.      Mouth/Throat:      Pharynx: Oropharynx is clear.   Eyes:      General:         Right eye: No discharge.         Left eye: No discharge.      Extraocular Movements: Extraocular movements intact.      Conjunctiva/sclera: Conjunctivae normal.   Neck:      Musculoskeletal: Normal range of motion and neck supple.   Cardiovascular:      Rate and Rhythm: Normal rate and regular rhythm.   Pulmonary:      Effort: No respiratory distress.      Breath sounds: No wheezing.      Comments: Decreased BS  Abdominal:      General: Bowel sounds are normal. There is no distension.      Palpations: Abdomen is soft.      Tenderness: There is no abdominal tenderness.   Musculoskeletal:      Right lower leg: No edema.      Left lower leg: No edema.   Skin:      General: Skin is warm and dry.   Neurological:      Mental Status: He is alert and oriented to person, place, and time.         Fluids    Intake/Output Summary (Last 24 hours) at 4/6/2020 1234  Last data filed at 4/6/2020 1200  Gross per 24 hour   Intake 1080 ml   Output 2150 ml   Net -1070 ml       Laboratory                      Assessment/Plan  * CVA (cerebral vascular accident) ([facility])- (present on admission)  Assessment & Plan  Has left sided weakness  On ASA, Plavix, and Lipitor    Coronary artery disease involving native coronary artery- (present on admission)  Assessment & Plan  Remote h/o MI s/p LAD stent  More recent NSTEMI s/p Mid Circumflex PCI  Echo-EF 15%, grade III diastolic dysfunction, and aortic stenosis  On ASA, Plavix, and Lipitor  Blood pressure too low for ACE-I or B-Bl  Closely monitor volume status  No further aggressive intervention per Cardiology    CKD (chronic kidney disease) stage 3, GFR 30-59 ml/min ([facility])  Assessment & Plan  Avoid nephrotoxins  Renal dose all meds  Outpt Nephrology F/U     DNR    Reviewed w/ Dr. Hernandez

## 2020-04-06 NOTE — REHAB-COLLABORATIVE ONGOING IDT TEAM NOTE
Weekly Interdisciplinary Team Conference Note    Weekly Interdisciplinary Team Conference # 1  Date:  4/6/2020    Clinicians present and reporting at team conference include the following:   MD: Eliz Hernandez MD   RN:  Alejandra Schreiber RN   PT:   Justice Rendon PT, DPT  OT:  Rome Botello MS, OT/L   ST:  Claribel Tay MS, CCC-SLP  CM:  Latia Murcia MS, LSW  REC:  None  RT:  None  RPh:  Diego Simon [facility]  Other:   None  All reporting clinicians have a working knowledge of this patient's plan of care.    Targeted DC Date:  4/13/2020     Medical    Patient Active Problem List    Diagnosis Date Noted   • NSTEMI (non-ST elevated myocardial infarction) (Formerly [facility]) 03/22/2020     Priority: High   • CHF (congestive heart failure) (Formerly [facility]) 11/19/2018     Priority: High   • Chronic combined systolic and diastolic congestive heart failure (Formerly [facility]) 07/31/2017     Priority: High   • SVT (supraventricular tachycardia) (CMS-HCC) 09/24/2015     Priority: High   • Coronary artery disease involving native coronary artery 09/24/2015     Priority: High   • Hypercholesterolemia 09/24/2015     Priority: High   • COPD (chronic obstructive pulmonary disease) (Former[facility]) 04/05/2017     Priority: Medium   • Right shoulder pain 09/24/2015     Priority: Medium   • GERD (gastroesophageal reflux disease) 09/24/2015     Priority: Medium   • Essential hypertension 07/06/2016     Priority: Low   • CKD (chronic kidney disease) stage 3, GFR 30-59 ml/min ([facility]) 04/03/2020   • Acute encephalopathy 03/31/2020   • Chest pain 03/30/2020   • Neurogenic bladder 03/30/2020   • Acute on chronic renal insufficiency 03/30/2020   • Urinary retention 03/27/2020   • Azotemia 03/27/2020   • CVA (cerebral vascular accident) ([facility]) 03/24/2020   • Anemia 03/22/2020   • Acute respiratory failure with hypoxia ([facility]) 03/22/2020     Results     ** No results found for the last 24 hours. **        Nursing  Diet/Nutrition:  Cardiac and Thin Liquids  Medication Administration:  Whole with Liquid  Wash  % consumed at meals in last 24 hours:  Consumed % of meals per documentation.  Meal/Snack Consumption for the past 24 hrs:   Oral Nutrition   04/05/20 1800 Dinner;Between % Consumed   04/05/20 1200 Lunch;Between 50-75% Consumed   04/05/20 0900 Breakfast;Between % Consumed     Snack schedule:  HS  Supplement:  Other: none  Appetite:  Good  Fluid Intake/Output in past 24 hours: In: 960 [P.O.:960]  Out: 1700   Admit Weight:  Weight: 64.5 kg (142 lb 3.2 oz)  Weight Last 7 Days   [64 kg (141 lb 1.5 oz)-64.5 kg (142 lb 3.2 oz)] 64 kg (141 lb 1.5 oz) (04/05 0400)    Pain Issues:    Location:  Generalized (04/05 2051)  --         Severity:  neither Moderate nor Severe   Scheduled pain medications:  gabapentin (NEURONTIN)      PRN pain medications used in last 24 hours:  morphine (MS IR)    Non Pharmacologic Interventions:  food, repositioned and rest  Effectiveness of pain management plan:  good=patient states acceptable comfort after interventions    Bowel:    Bowel Assist Initial Score:  5 - Standby Prompting/Supervision or Set-up  Bowel Assist Current Score:  5 - Standby Prompting/Supervision or Set-up  Bowl Accidents in last 7 days:     Last bowel movement: 04/05/20  Stool Description: Small     Usual bowel pattern:  every 2-3 days  Scheduled bowel medications:  senna-docusate (PERICOLACE or SENOKOT S)   PRN bowel medications used in last 24 hours:  lactulose   Nursing Interventions:  Increased time, PRN medication, Scheduled medication, Supervision, Set-up  Effectiveness of bowel program:   fair=sometimes needs prn bowel meds for constipation  Bladder:    Bladder Assist Initial Score:  6 - Modified Independent  Bladder Assist Current Score:  6 - Modified Independent  Bladder Accidents in last 7 days:     PVR range for past 24-48 hours: --  Intermittent Catheterization:  Self straight catheterization  Medications affecting bladder:  None    Time void schedule/voiding pattern:  Voiding every 2-4  hours  Interventions:  Increased time(self straight catheterization)    Sun:    Type:     Patient Lines/Drains/Airways Status    Active Catheter     None              Date placed:          Justification:    Diabetes:  Blood Sugar Frequency:  None    Range of BS for last 48 hours:       Scheduled diabetic medications:  None  Sliding scale usage in past 24 hours:  No  Total Short acting insulin in the past 24 hours:  None  Total Long acting insulin in the past 24 hours:  None    Wound:         Patient Lines/Drains/Airways Status    Active Current Wounds     None                   Interventions:  n/a      Wound Vac Location:  Not applicable  Dressing last changed:  Not applicable  Pump Mode Pressure Setting       Description of drainage:  Not applicable    Sleep/wake cycle:   Average hours slept:  Sleeps 4-6 hours without waking  Sleep medication usage:  Other Trazodone    Patient/Family Training/Education:  Fall Prevention, General Self Care, Pain Management, Safe Transfers and Safety  Discharge Supply Recommendations:  Catheter Supplies and Blood Pressure Monitor  Strengths: Alert and oriented, Willingly participates in therapeutic activities and Motivated for self care and independence   Barriers:   Constipation and Generalized weakness       Nursing Problems     Problem: Bowel/Gastric:     Description:     Goal: Normal bowel function is maintained or improved     Description:           Goal: Will not experience complications related to bowel motility     Description:                 Problem: Communication     Description:     Goal: The ability to communicate needs accurately and effectively will improve     Description:                 Problem: Discharge Barriers/Planning     Description:     Goal: Patient's continuum of care needs will be met     Description:                 Problem: Infection     Description:     Goal: Will remain free from infection     Description:                 Problem: Knowledge Deficit      Description:     Goal: Knowledge of disease process/condition, treatment plan, diagnostic tests, and medications will improve     Description:           Goal: Knowledge of the prescribed therapeutic regimen will improve     Description:                 Problem: Pain Management     Description:     Goal: Pain level will decrease to patient's comfort goal     Description:                 Problem: Safety     Description:     Goal: Will remain free from injury     Description:           Goal: Will remain free from falls     Description:                 Problem: Urinary Elimination:     Description:     Goal: Ability to reestablish a normal urinary elimination pattern will improve     Description:                 Problem: Venous Thromboembolism (VTW)/Deep Vein Thrombosis (DVT) Prevention:     Description:     Goal: Patient will participate in Venous Thrombosis (VTE)/Deep Vein Thrombosis (DVT)Prevention Measures     Description:                        Long Term Goals:   At discharge patient will be able to function safely at home and in the community with support.    Section completed by:  Tammy Doll R.N.           Mobility  Bed mobility:   SBA - SPV  Bed /Chair/Wheelchair Transfer Initial:  4 - Minimal Assistance  Bed /Chair/Wheelchair Transfer Current:  4 - Minimal Assistance   Bed/Chair/Wheelchair Transfer Description:  Supervision for safety, Increased time, Adaptive equipment, Verbal cueing, Set-up of equipment(Bed mobility with SBA; SPT with CGA )  Walk Initial:  2 - Max Assistance  Walk Current:  2 - Max Assistance   Walk Description:  Safety concerns, Requires incidental assist, Walker, Extra time, Requires wheelchair follow, Verbal cueing(20 ft // bars CGA,125 ft FWW, min A to prevent lateral lean L, limited by L UE weakness)  Wheelchair Initial:  2 - Max Assistance  Wheelchair Current:  2 - Max Assistance   Wheelchair Description:  Extra time(100 ft using UEs, spv )  Stairs Initial:  2 - Max  "Assistance  Stairs Current: 2 - Max Assistance   Stairs Description: Extra time, Hand rails, Verbal cueing, Requires incidental assist(6 4\" steps with 2 HRs and CGA, verbal cuing for step to pattern )  Patient/Family Training/Education:  Ongoing, safety with ambulation and FWW management  DME/DC Recommendations:  TBD, FWW, 24 hr SPV? Home vs OP PT?  Strengths:  Able to follow instructions, Motivated for self care and independence, Pleasant and cooperative and Willingly participates in therapeutic activities  Barriers:   Limited mobility, Poor balance and Other: poor motor control LLE  # of short term goals set= 3  # of short term goals met=0 (3 partially met)  Physical Therapy Problems     Problem: Balance     Dates: Start: 04/03/20       Description:     Goal: STG-Within one week, patient will maintain static standing     Dates: Start: 04/03/20       Description: 1) Individualized goal:  3 min without UE support and SBA- CGA   2) Interventions:  PT Group Therapy, PT E Stim Attended, PT Gait Training, PT Therapeutic Exercises, PT TENS Application, PT Neuro Re-Ed/Balance, PT Aquatic Therapy, PT Therapeutic Activity and PT Manual Therapy       Note:     Goal Note filed on 04/06/20 1125 by John Rendon, PT    < 3 min                        Problem: Mobility     Dates: Start: 04/03/20       Description:     Goal: STG-Within one week, patient will propel wheelchair community     Dates: Start: 04/03/20       Description: 1) Individualized goal:  150 ft spv   2) Interventions: PT Group Therapy, PT E Stim Attended, PT Gait Training, PT Therapeutic Exercises, PT TENS Application, PT Neuro Re-Ed/Balance, PT Aquatic Therapy, PT Therapeutic Activity and PT Manual Therapy           Note:     Goal Note filed on 04/06/20 1125 by John Rendon, PT    100 ft                  Goal: STG-Within one week, patient will ambulate community distances     Dates: Start: 04/03/20       Description: 1) Individualized goal:  150 ft " with FWW and min A  2) Interventions: PT Group Therapy, PT E Stim Attended, PT Gait Training, PT Therapeutic Exercises, PT TENS Application, PT Neuro Re-Ed/Balance, PT Aquatic Therapy, PT Therapeutic Activity and PT Manual Therapy           Note:     Goal Note filed on 04/06/20 1125 by John Rendon, PT    125 ft                        Problem: PT-Long Term Goals     Dates: Start: 04/03/20       Description:     Goal: LTG-By discharge, patient will maintain balance     Dates: Start: 04/03/20       Description: 1) Individualized goal:  Complete standardized balance assessment with score indicating low fall risk  2) Interventions:  PT Group Therapy, PT E Stim Attended, PT Gait Training, PT Therapeutic Exercises, PT TENS Application, PT Neuro Re-Ed/Balance, PT Aquatic Therapy, PT Therapeutic Activity and PT Manual Therapy                 Goal: LTG-By discharge, patient will ambulate     Dates: Start: 04/03/20       Description: 1) Individualized goal:  >150 ft with LRAD, spv   2) Interventions: PT Group Therapy, PT E Stim Attended, PT Gait Training, PT Therapeutic Exercises, PT TENS Application, PT Neuro Re-Ed/Balance, PT Aquatic Therapy, PT Therapeutic Activity and PT Manual Therapy                 Goal: LTG-By discharge, patient will transfer one surface to another     Dates: Start: 04/03/20       Description: 1) Individualized goal:  Mod I without AD  2) Interventions: PT Group Therapy, PT E Stim Attended, PT Gait Training, PT Therapeutic Exercises, PT TENS Application, PT Neuro Re-Ed/Balance, PT Aquatic Therapy, PT Therapeutic Activity and PT Manual Therapy                 Goal: LTG-By discharge, patient will transfer in/out of a car     Dates: Start: 04/03/20       Description: 1) Individualized goal:  Spv with LRAD  2) Interventions: PT Group Therapy, PT E Stim Attended, PT Gait Training, PT Therapeutic Exercises, PT TENS Application, PT Neuro Re-Ed/Balance, PT Aquatic Therapy, PT Therapeutic Activity and  PT Manual Therapy                               Section completed by:  John Rendon, PT    Activities of Daily Living  Eating Initial:  6 - Modified Independent  Eating Current:  6 - Modified Independent   Eating Description:  Adaptive equipment(partials)  Grooming Initial:  5 - Standby Prompting/Supervision or Set-up  Grooming Current:  5 - Standby Prompting/Supervision or Set-up   Grooming Description:  Increased time, Supervision for safety, Verbal cueing  Bathing Initial:  0 - Not tested, patient refused  Bathing Current:   5 - Standby Prompting/Supervision or Set-up   Bathing Description:     Upper Body Dressing Initial:  5 - Standby Prompting/Supervision or Set-up  Upper Body Dressing Current:  5 - Standby Prompting/Supervision or Set-up   Upper Body Dressing Description:  Set-up of equipment, Supervision for safety(setup/sba to don pullover shirt seated EOB)  Lower Body Dressing Initial:  4 - Minimal Assistance  Lower Body Dressing Current:  5 - Standby Prompting/Supervision or Set-up   Lower Body Dressing Description:  4 - Minimal Assistance  Toileting Initial:     Toileting Current:  5 - Standby Prompting/Supervision or Set-up   Toileting Description:     Toilet Transfer Initial:  4 - Minimal Assistance  Toilet Transfer Current:  5 - Standby Prompting/Supervision or Set-up   Toilet Transfer Description:  4 - Minimal Assistance  Tub / Shower Transfer Initial:  0 - Not tested, patient refused  Tub / Shower Transfer Current:  5 - Standby Prompting/Supervision or Set-up   Tub / Shower Transfer Description:     IADL:  Not yet addressed   Family Training/Education:  None  DME/DC Recommendations:  Has a shower chair and grab bars in shower/by toilet; Home health OT    Strengths:  Alert and oriented, Effective communication skills, Independent PLOF, Manages pain appropriately, Motivated for self care and independence, Pleasant and cooperative, Supportive family and Willingly participates in therapeutic  activities  Barriers:  Decreased endurance, Generalized weakness, Limited mobility, Poor activity tolerance and Poor balance     # of short term goals set= 3    # of short term goals met= 3    Occupational Therapy Goals     Problem: Bathing     Dates: Start: 04/03/20       Description:     Goal: STG-Within one week, patient will bathe     Dates: Start: 04/03/20       Description: 1) Individualized Goal:  Body with setup and SBA using grab bar, bench and HHSH.  2) Interventions:  OT Self Care/ADL, OT Manual Ther Technique, OT Neuro Re-Ed/Balance, OT Therapeutic Activity, OT Evaluation and OT Therapeutic Exercise       Note:     Goal Note filed on 04/06/20 1116 by Rome Botello MS,OTR/L    Min A                        Problem: Dressing     Dates: Start: 04/03/20       Description:     Goal: STG-Within one week, patient will dress LB     Dates: Start: 04/03/20       Description: 1) Individualized Goal:  With setup and SBA using AE/AD as needed.  2) Interventions:  OT Self Care/ADL, OT Manual Ther Technique, OT Neuro Re-Ed/Balance, OT Therapeutic Activity, OT Evaluation and OT Therapeutic Exercise     Note:     Goal Note filed on 04/06/20 1116 by Rome Botello MS,OTR/L    Min A                        Problem: OT Long Term Goals     Dates: Start: 04/03/20       Description:     Goal: LTG-By discharge, patient will complete basic self care tasks     Dates: Start: 04/03/20       Description: 1) Individualized Goal:  With supervision using AE/AD as needed  2) Interventions:  OT Self Care/ADL, OT Manual Ther Technique, OT Neuro Re-Ed/Balance, OT Therapeutic Activity, OT Evaluation and OT Therapeutic Exercise           Goal: LTG-By discharge, patient will perform bathroom transfers     Dates: Start: 04/03/20       Description: 1) Individualized Goal:  With supervision using AE/AD as needed  2) Interventions:  OT Self Care/ADL, OT Manual Ther Technique, OT Neuro Re-Ed/Balance, OT Therapeutic Activity, OT Evaluation  and OT Therapeutic Exercise           Goal: LTG-By discharge, patient will complete basic home management     Dates: Start: 04/03/20       Description: 1) Individualized Goal:  With supervision using AE/AD as needed  2) Interventions:  OT Self Care/ADL, OT Manual Ther Technique, OT Neuro Re-Ed/Balance, OT Therapeutic Activity, OT Evaluation and OT Therapeutic Exercise                 Problem: Toileting     Dates: Start: 04/03/20       Description:     Goal: STG-Within one week, patient will complete toileting tasks     Dates: Start: 04/03/20       Description: 1) Individualized Goal:  With SBA using grab bar as needed.  2) Interventions:  OT Self Care/ADL, OT Manual Ther Technique, OT Neuro Re-Ed/Balance, OT Therapeutic Activity, OT Evaluation and OT Therapeutic Exercise     Note:     Goal Note filed on 04/06/20 1116 by Rome Botello MS,OTR/L    Min A                              Section completed by:  Rome Botello MS,OTR/L    Cognitive Linquistic Functions  Comprehension Initial:  5 - Stand-by Prompting/Supervision or Set-up  Comprehension Current:  5 - Stand-by Prompting/Supervision or Set-up   Comprehension Description:  Hearing aids/amplifiers, Verbal cues, Glasses. Pt requires assistance handling hearing aids to control volume and prevent background interference  Expression Initial:  7 - Independent  Expression Current:  7 - Independent   Expression Description:     Social Interaction Initial:  7 - Independent  Social Interaction Current:  7 - Independent   Social Interaction Description:     Problem Solving Initial:  5 - Standby Prompting/Supervision or Set-up  Problem Solving Current:  5 - Standby Prompting/Supervision or Set-up   Problem Solving Description:  Verbal cueing, Increased time, Seat belt, Therapy schedule  Memory Initial:  4 - Minimal Assistance  Memory Current:  5 - Standby Prompting/Supervision or Set-up   Memory Description:  Verbal cueing, Therapy schedule  Executive Functioning /  Organization Initial:     Executive Functioning / Organization Current: 4 - Minimal Assistance    Executive Functioning Desciption: pt independent prior with meds/finances for he and spouse  Swallowing  Swallowing Status: SLP not following for dysphagia   Orders Placed This Encounter   Procedures   • Diet Order Cardiac     Standing Status:   Standing     Number of Occurrences:   1     Order Specific Question:   Diet:     Answer:   Cardiac [6]     Behavior Modification Plan  Allow for rest time, Give clear feedback, Set clear goals, Provide reasonable choices, Decrease the chance of failure by offering activities that are within the patient's abilities and Analyze tasks (break down into smaller steps)  Assistive Technology  Hearing amplification or closed captioning, Low/Impaired vision equipment and Low tech: Calendar, planner, schedule, alarms/timers, pill organizer, post-it notes, lists  Family Training/Education:  To be completed with spouse  DC Recommendations: TBD  Strengths:  Able to follow instructions, Alert and oriented, Effective communication skills, Independent PLOF, Making steady progress towards goals, Motivated for self care and independence, Pleasant and cooperative, Supportive family and Willingly participates in therapeutic activities  Barriers:  Pain poorly managed and Other: [place], STM, complex attention  # of short term goals set=2  # of short term goals met=1 (1 new goal added)   Speech Therapy Problems     Problem: Problem Solving STGs     Dates: Start: 04/03/20       Description:     Goal: STG-Within one week, patient will     Dates: Start: 04/03/20       Description: 1) Individualized goal:  Complete a medication sorting task with spv to achieve 100% accuracy   2) Interventions:  SLP Speech Language Treatment, SLP Self Care / ADL Training , SLP Cognitive Skill Development and SLP Group Treatment                   Problem: Speech/Swallowing LTGs     Dates: Start: 04/03/20       Description:      Goal: LTG-By discharge, patient will solve complex problem     Dates: Start: 04/03/20       Description: 1) Individualized goal:  With modified independence for a safe discharge home   2) Interventions:  SLP Speech Language Treatment, SLP Self Care / ADL Training , SLP Cognitive Skill Development and SLP Group Treatment                          Section completed by:  Claribel Tay MS,[facility]-SLP          REHAB-Pharmacy IDT Team Note by Diego Simon RP at 4/3/2020  4:49 PM  Version 1 of 1    Author:  Diego Simon RPH Service:  -- Author Type:  Pharmacist    Filed:  4/3/2020  4:51 PM Date of Service:  4/3/2020  4:49 PM Status:  Signed    :  Diego Simon RPH (Pharmacist)         Pharmacy   Pharmacy  Antibiotics/Antifungals/Antivirals:  Medication:      Active Orders (From admission, onward)    None        Route:        NA  Stop Date:  NA  Reason:      NA  Antihypertensives/Cardiac:  Medication:    Orders (72h ago, onward)     Start     Ordered    04/02/20 2100  atorvastatin (LIPITOR) tablet 80 mg  EVERY EVENING      04/02/20 1724    04/02/20 1718  nitroglycerin (NITROSTAT) tablet 0.4 mg  EVERY 5 MINUTES PRN      04/02/20 1724              Patient Vitals for the past 24 hrs:   BP Pulse   04/03/20 1409 118/66 70   04/03/20 0831 107/45 80   04/03/20 0710 113/63 84   04/02/20 1823 101/51 77     Anticoagulation:  Medication: Aspirin, Plavix, Heparin                                  Other key medications: A review of the medication list reveals no issues at this time.    Section completed by: Diego Simon [facility][AW.1]     Attribution Key     AW.1 - Diego Simon [facility] on 4/3/2020  4:49 PM                  DC Planning  DC destination/dispostion:  Home with family assistance    Referrals: TBD     DC Needs: TBD     Barriers to discharge:  Decreased mobility     Strengths: Supportive family     Section completed by:  Latia Marcus      Physician Summary  Eliz Hernandez MD participated and led team conference  discussion.

## 2020-04-06 NOTE — REHAB-PT IDT TEAM NOTE
"Physical Therapy   Mobility  Bed mobility:   SBA - SPV  Bed /Chair/Wheelchair Transfer Initial:  4 - Minimal Assistance  Bed /Chair/Wheelchair Transfer Current:  4 - Minimal Assistance   Bed/Chair/Wheelchair Transfer Description:  Supervision for safety, Increased time, Adaptive equipment, Verbal cueing, Set-up of equipment(Bed mobility with SBA; SPT with CGA )  Walk Initial:  2 - Max Assistance  Walk Current:  2 - Max Assistance   Walk Description:  Safety concerns, Requires incidental assist, Walker, Extra time, Requires wheelchair follow, Verbal cueing(20 ft // bars CGA,125 ft FWW, min A to prevent lateral lean L, limited by L UE weakness)  Wheelchair Initial:  2 - Max Assistance  Wheelchair Current:  2 - Max Assistance   Wheelchair Description:  Extra time(100 ft using UEs, spv )  Stairs Initial:  2 - Max Assistance  Stairs Current: 2 - Max Assistance   Stairs Description: Extra time, Hand rails, Verbal cueing, Requires incidental assist(6 4\" steps with 2 HRs and CGA, verbal cuing for step to pattern )  Patient/Family Training/Education:  Ongoing, safety with ambulation and FWW management  DME/DC Recommendations:  TBD, FWW, 24 hr SPV? Home vs OP PT?  Strengths:  Able to follow instructions, Motivated for self care and independence, Pleasant and cooperative and Willingly participates in therapeutic activities  Barriers:   Limited mobility, Poor balance and Other: poor motor control LLE  # of short term goals set= 3  # of short term goals met=0 (3 partially met)  Physical Therapy Problems     Problem: Balance     Dates: Start: 04/03/20       Description:     Goal: STG-Within one week, patient will maintain static standing     Dates: Start: 04/03/20       Description: 1) Individualized goal:  3 min without UE support and SBA- CGA   2) Interventions:  PT Group Therapy, PT E Stim Attended, PT Gait Training, PT Therapeutic Exercises, PT TENS Application, PT Neuro Re-Ed/Balance, PT Aquatic Therapy, PT Therapeutic " Activity and PT Manual Therapy       Note:     Goal Note filed on 04/06/20 1125 by John Rendon, PT    < 3 min                        Problem: Mobility     Dates: Start: 04/03/20       Description:     Goal: STG-Within one week, patient will propel wheelchair community     Dates: Start: 04/03/20       Description: 1) Individualized goal:  150 ft spv   2) Interventions: PT Group Therapy, PT E Stim Attended, PT Gait Training, PT Therapeutic Exercises, PT TENS Application, PT Neuro Re-Ed/Balance, PT Aquatic Therapy, PT Therapeutic Activity and PT Manual Therapy           Note:     Goal Note filed on 04/06/20 1125 by John Rendon, PT    100 ft                  Goal: STG-Within one week, patient will ambulate community distances     Dates: Start: 04/03/20       Description: 1) Individualized goal:  150 ft with FWW and min A  2) Interventions: PT Group Therapy, PT E Stim Attended, PT Gait Training, PT Therapeutic Exercises, PT TENS Application, PT Neuro Re-Ed/Balance, PT Aquatic Therapy, PT Therapeutic Activity and PT Manual Therapy           Note:     Goal Note filed on 04/06/20 1125 by John Rendon, PT    125 ft                        Problem: PT-Long Term Goals     Dates: Start: 04/03/20       Description:     Goal: LTG-By discharge, patient will maintain balance     Dates: Start: 04/03/20       Description: 1) Individualized goal:  Complete standardized balance assessment with score indicating low fall risk  2) Interventions:  PT Group Therapy, PT E Stim Attended, PT Gait Training, PT Therapeutic Exercises, PT TENS Application, PT Neuro Re-Ed/Balance, PT Aquatic Therapy, PT Therapeutic Activity and PT Manual Therapy                 Goal: LTG-By discharge, patient will ambulate     Dates: Start: 04/03/20       Description: 1) Individualized goal:  >150 ft with LRAD, spv   2) Interventions: PT Group Therapy, PT E Stim Attended, PT Gait Training, PT Therapeutic Exercises, PT TENS Application, PT  Neuro Re-Ed/Balance, PT Aquatic Therapy, PT Therapeutic Activity and PT Manual Therapy                 Goal: LTG-By discharge, patient will transfer one surface to another     Dates: Start: 04/03/20       Description: 1) Individualized goal:  Mod I without AD  2) Interventions: PT Group Therapy, PT E Stim Attended, PT Gait Training, PT Therapeutic Exercises, PT TENS Application, PT Neuro Re-Ed/Balance, PT Aquatic Therapy, PT Therapeutic Activity and PT Manual Therapy                 Goal: LTG-By discharge, patient will transfer in/out of a car     Dates: Start: 04/03/20       Description: 1) Individualized goal:  Spv with LRAD  2) Interventions: PT Group Therapy, PT E Stim Attended, PT Gait Training, PT Therapeutic Exercises, PT TENS Application, PT Neuro Re-Ed/Balance, PT Aquatic Therapy, PT Therapeutic Activity and PT Manual Therapy                               Section completed by:  John Rendon, PT

## 2020-04-06 NOTE — PROGRESS NOTES
"Rehab Progress Note     Date of Service: 4/6/2020  Chief Complaint: follow up stroke, heart attack    Interval Events (Subjective)    Patient seen and examined today in his room. He is complaining of abdominal discomfort, constipation, only had small BM yesterday, given MOM this morning. He agrees that he may need a suppository as well. He's nervous about going to therapy, but was encouraged to go to the gym. Advised him we will check a abdominal xray. He last emptied his bladder this morning. He denies any chest pain currently.     Patient did have 2 large BMs after this morning.     Objective:  VITAL SIGNS: /55   Pulse 64   Temp 37.1 °C (98.7 °F) (Oral)   Resp 16   Ht 1.803 m (5' 11\")   Wt 64 kg (141 lb 1.5 oz)   SpO2 94%   BMI 19.68 kg/m²   Gen: alert, no apparent distress  GI: soft, non-tender abdomen, bowel sounds present, slightly protuberant    No results found for this or any previous visit (from the past 72 hour(s)).    Current Facility-Administered Medications   Medication Frequency   • Respiratory Therapy Consult Continuous RT   • Pharmacy Consult Request ...Pain Management Review 1 Each PHARMACY TO DOSE   • acetaminophen (TYLENOL) tablet 650 mg Q4HRS PRN   • artificial tears ophthalmic solution 1 Drop PRN   • benzocaine-menthol (CEPACOL) lozenge 1 Lozenge Q2HRS PRN   • mag hydrox-al hydrox-simeth (MAALOX PLUS ES or MYLANTA DS) suspension 20 mL Q2HRS PRN   • ondansetron (ZOFRAN ODT) dispertab 4 mg 4X/DAY PRN    Or   • ondansetron (ZOFRAN) syringe/vial injection 4 mg 4X/DAY PRN   • traZODone (DESYREL) tablet 50 mg QHS PRN   • sodium chloride (OCEAN) 0.65 % nasal spray 2 Spray PRN   • hydrOXYzine HCl (ATARAX) tablet 50 mg Q6HRS PRN   • melatonin tablet 3 mg HS PRN   • lactulose 20 GM/30ML solution 30 mL QDAY PRN   • docusate sodium (ENEMEEZ) enema 283 mg QDAY PRN   • meclizine (ANTIVERT) tablet 25 mg TID PRN   • nitroglycerin (NITROSTAT) tablet 0.4 mg Q5 MIN PRN   • morphine (MS IR) tablet 7.5 " mg Q6HRS PRN   • aspirin EC (ECOTRIN) tablet 81 mg DAILY   • atorvastatin (LIPITOR) tablet 80 mg Q EVENING   • clopidogrel (PLAVIX) tablet 75 mg DAILY   • heparin injection 5,000 Units Q8HRS   • omeprazole (PRILOSEC) capsule 20 mg BID   • gabapentin (NEURONTIN) capsule 300 mg TID   • senna-docusate (PERICOLACE or SENOKOT S) 8.6-50 MG per tablet 2 Tab BID   • magnesium hydroxide (MILK OF MAGNESIA) suspension 30 mL QDAY PRN   • lidocaine 2 % jelly PRN   • LORazepam (ATIVAN) tablet 1 mg Q4HRS PRN       Orders Placed This Encounter   Procedures   • Diet Order Cardiac     Standing Status:   Standing     Number of Occurrences:   1     Order Specific Question:   Diet:     Answer:   Cardiac [6]       Assessment:  Active Hospital Problems    Diagnosis   • *CVA (cerebral vascular accident) ([facility])   • NSTEMI (non-ST elevated myocardial infarction) ([facility])   • Chronic combined systolic and diastolic congestive heart failure ([facility])   • Coronary artery disease involving native coronary artery   • GERD (gastroesophageal reflux disease)   • CKD (chronic kidney disease) stage 3, GFR 30-59 ml/min ([facility])   • Neurogenic bladder   • Urinary retention     This patient is a 87 y.o. male admitted for acute inpatient rehabilitation with CVA (cerebral vascular accident) ([facility]).    I led and attended the weekly conference today, and agree with the IDT conference documentation and plan of care as noted below.    Date of conference: 4/6/2020    Goals and barriers: See IDT note.    Biggest barriers: intermittent chest pain, constipation, left lateral lean with ambulation    Goals in next week: discharge    Therapy update 4/6/2020  Modified Independent eating  Supervision grooming  Mod assist bathing  Supervision upper body dressing  Min assist lower body dressing  Mod assist toileting  Modified Independentbladder  Supervision bowel  Min assist bed/chair transfer  Min assist toilet transfer  Min assist tub/shower transfer  Max assist ambulation  Max  assist wheelchair propulsion  Max assist stairs  Supervision comprehension  Independent expression  Independent social interaction  Supervision problem solving  Min assist memory    CM/social support: daughter supportive    Anticipated DC date: 4/13/2020    Home health: PT/OT/SLP/RN    Equip: FWW    Follow up: PCP, cardiology    Medical Decision Making and Plan:    Left cerebellar stroke  Left vertebral artery occlusion  2/2 cath/PCI  Left sided hemiparesis  Left sided paresthesias  Non-dominant  Continue full rehab program  PT/OT/SLP, 1 hr each discipline, 5 days per week  Continue aspirin, plavix, and statin for secondary stroke prophylaxis    Dizziness  PRN meclizine     Chronic urinary retention  Does ICs at home for last 11 years     Peripheral neuropathy  Continue gabapentin    Bowel  Constipation  Continue bowel meds  Last BM 4/5  Check KUB    DVT prophylaxis  Heparin    Appreciate assistance of hospitalist with his medical co-morbidities:     Coronary artery disease, aspirin/plavix, statin  S/p NSTEMI x 2 - if patient has symptoms of another heart attack (or stroke), no plan to send to ED, plan to transfer to palliative/hospice - daughter and patient in agreement  Acute systolic CHF, EF 15%  Hypotension, improved  Chronic kidney disease, renally dose meds/avoid nephrotoxins   Macrocytic Anemia  GERD, omeprazole     Total time:  40 minutes.  I spent greater than 50% of the time for patient care, counseling, and coordination on this date, including patient face-to face time, unit/floor time with review of records/pertinent lab data and studies, as well as discussing diagnostic evaluation/work up, planned therapeutic interventions, and future disposition of care, as per the interval events/subjective and the assessment and plan as noted above.        Eliz Hernandez M.D.   Physical Medicine and Rehabilitation

## 2020-04-07 PROCEDURE — 97530 THERAPEUTIC ACTIVITIES: CPT

## 2020-04-07 PROCEDURE — A9270 NON-COVERED ITEM OR SERVICE: HCPCS | Performed by: PHYSICAL MEDICINE & REHABILITATION

## 2020-04-07 PROCEDURE — 770010 HCHG ROOM/CARE - REHAB SEMI PRIVAT*

## 2020-04-07 PROCEDURE — 97130 THER IVNTJ EA ADDL 15 MIN: CPT

## 2020-04-07 PROCEDURE — 97110 THERAPEUTIC EXERCISES: CPT

## 2020-04-07 PROCEDURE — 700111 HCHG RX REV CODE 636 W/ 250 OVERRIDE (IP): Performed by: PHYSICAL MEDICINE & REHABILITATION

## 2020-04-07 PROCEDURE — 99232 SBSQ HOSP IP/OBS MODERATE 35: CPT | Performed by: PHYSICAL MEDICINE & REHABILITATION

## 2020-04-07 PROCEDURE — 700102 HCHG RX REV CODE 250 W/ 637 OVERRIDE(OP): Performed by: PHYSICAL MEDICINE & REHABILITATION

## 2020-04-07 PROCEDURE — 97112 NEUROMUSCULAR REEDUCATION: CPT

## 2020-04-07 PROCEDURE — 99232 SBSQ HOSP IP/OBS MODERATE 35: CPT | Performed by: HOSPITALIST

## 2020-04-07 PROCEDURE — 97116 GAIT TRAINING THERAPY: CPT

## 2020-04-07 PROCEDURE — 97129 THER IVNTJ 1ST 15 MIN: CPT

## 2020-04-07 RX ORDER — GABAPENTIN 100 MG/1
200 CAPSULE ORAL 3 TIMES DAILY
Status: DISCONTINUED | OUTPATIENT
Start: 2020-04-07 | End: 2020-04-08

## 2020-04-07 RX ADMIN — ATORVASTATIN CALCIUM 80 MG: 40 TABLET, FILM COATED ORAL at 20:34

## 2020-04-07 RX ADMIN — SENNOSIDES AND DOCUSATE SODIUM 2 TABLET: 8.6; 5 TABLET ORAL at 08:04

## 2020-04-07 RX ADMIN — GABAPENTIN 200 MG: 100 CAPSULE ORAL at 14:22

## 2020-04-07 RX ADMIN — HEPARIN SODIUM 5000 UNITS: 5000 INJECTION, SOLUTION INTRAVENOUS; SUBCUTANEOUS at 14:22

## 2020-04-07 RX ADMIN — MORPHINE SULFATE 7.5 MG: 15 TABLET ORAL at 20:33

## 2020-04-07 RX ADMIN — CLOPIDOGREL BISULFATE 75 MG: 75 TABLET ORAL at 08:04

## 2020-04-07 RX ADMIN — HEPARIN SODIUM 5000 UNITS: 5000 INJECTION, SOLUTION INTRAVENOUS; SUBCUTANEOUS at 20:36

## 2020-04-07 RX ADMIN — HEPARIN SODIUM 5000 UNITS: 5000 INJECTION, SOLUTION INTRAVENOUS; SUBCUTANEOUS at 05:21

## 2020-04-07 RX ADMIN — GABAPENTIN 300 MG: 300 CAPSULE ORAL at 08:04

## 2020-04-07 RX ADMIN — HYDROXYZINE HYDROCHLORIDE 50 MG: 25 TABLET, FILM COATED ORAL at 20:34

## 2020-04-07 RX ADMIN — SENNOSIDES AND DOCUSATE SODIUM 2 TABLET: 8.6; 5 TABLET ORAL at 20:34

## 2020-04-07 RX ADMIN — OMEPRAZOLE 20 MG: 20 CAPSULE, DELAYED RELEASE ORAL at 08:04

## 2020-04-07 RX ADMIN — ASPIRIN 81 MG: 81 TABLET, COATED ORAL at 08:04

## 2020-04-07 RX ADMIN — GABAPENTIN 200 MG: 100 CAPSULE ORAL at 20:34

## 2020-04-07 RX ADMIN — OMEPRAZOLE 20 MG: 20 CAPSULE, DELAYED RELEASE ORAL at 20:34

## 2020-04-07 ASSESSMENT — ENCOUNTER SYMPTOMS
ABDOMINAL PAIN: 0
SHORTNESS OF BREATH: 0
NERVOUS/ANXIOUS: 0
DIARRHEA: 0
VOMITING: 0
FEVER: 0
NAUSEA: 0
CHILLS: 0

## 2020-04-07 NOTE — CARE PLAN
Problem: Discharge Barriers/Planning  Goal: Patient's continuum of care needs will be met  Note: Blood pressure has been running low during this shift. Dr. Olsen notified. New order for orthostatic blood pressure received.     Problem: Bowel/Gastric:  Goal: Normal bowel function is maintained or improved  Note: Pt. is continent of bowels. LBM 4/7/20. Bowel sounds present in all four quadrants.

## 2020-04-07 NOTE — CARE PLAN
Problem: Safety  Goal: Will remain free from falls  Intervention: Implement fall precautions  Note: Use of call light encouraged to make needs known.Fall precautions and frequent rounding in place for safety.Call light within reach.Will continue to monitor and assess needs and safety.     Problem: Bowel/Gastric:  Goal: Normal bowel function is maintained or improved  Intervention: Educate patient and significant other/support system about signs and symptoms of constipation and interventions to implement  Note: Pt refused scheduled senna at hs.Continent of bowel per report.LBM 04/06.Will continue to monitor.     Problem: Pain Management  Goal: Pain level will decrease to patient's comfort goal  Intervention: Follow pain managment plan developed in collaboration with patient and Interdisciplinary Team  Note: Medicated per request for pain with good effect.Repositioned with pillows for comfort.Will continue to monitor and assess pain level and medicate as needed.

## 2020-04-07 NOTE — THERAPY
"Occupational Therapy  Daily Treatment     Patient Name: Kyler Donato Jr.  Age:  87 y.o., Sex:  male  Medical Record #: [id number]  Today's Date: 4/7/2020     Precautions  Precautions: (P) Fall Risk, Cardiac Precautions (See Comments)  Comments: maintain heart rate below 100 during therapies, SBP above 80.    Safety   ADL Safety : Requires Physical Assist for Safety, Requires Cueing for Safety  Bathroom Safety: Requires Physical Assist for Safety, Requires Cuing for Safety    Subjective    Patient agreeable to participate in OT.  At end of session, patient stated, \"I think we did pretty good today!\"     Objective       04/07/20 1031   Precautions   Precautions Fall Risk;Cardiac Precautions (See Comments)   Vitals   Pulse   (varied between 60 and 88 during session)   Room Air Oximetry 91   Sitting Lower Body Exercises   Sitting Lower Body Exercises Yes   Nustep Time (See Comments)  (Nustep level 3 x 12 with LEs only)   Balance   Standing Balance (Static) Fair +   Comments Mild dynamic and static standing balance challenges with no UE support with min perturbations provided by therapist.     Interdisciplinary Plan of Care Collaboration   Patient Position at End of Therapy Seated;Self Releasing Lap Belt Applied;Call Light within Reach;Tray Table within Reach   OT Total Time Spent   OT Individual Total Time Spent (Mins) 60   OT Charge Group   OT Therapy Activity 2   OT Therapeutic Exercise  2       2 x 10 posterior leans seated EOM for abdominal strengthening for improved standing balance.    CGA/SBA SPT w/c < >mat and w/c <> nustep.    Assessment    Patient with improved static standing balance with and without min perturbations with no support.    Plan    Continue low level endurance/strength building (don't push patient too hard), standing balance, ADLs/IADLs    "

## 2020-04-07 NOTE — ASSESSMENT & PLAN NOTE
No edema  No lung crackles  O2 sats good on RA  Echo (3/23): severe mitral regurgitation; EF 25%; Global hypokinesis; Grade III diastolic dysfunction; possible severe aortic stenosis   Echo (3/30): EF 15%  BNP: 6942 (3/22) --> 14,253 (3/28) --> 13,571 (3/30) --> 8483 (4/9)  Cont to monitor

## 2020-04-07 NOTE — PROGRESS NOTES
"Rehab Progress Note     Date of Service: 4/7/2020  Chief Complaint: follow up stroke, heart attack    Interval Events (Subjective)    Patient seen and examined today in his room. He has questions about his medications. He wants to know why he is on the statin (heart attack and stroke prevention), if he can stop the gabapentin as it doesn't help (will titrate off), and when he can stop the heparin shots (when he's walking over 200 ft). He wants to know how long it will take for him to recover from his stroke (months).     We discussed the planned discharge date for next Monday which he is happy to hear about. He is denying any chest pain. His left eye pain/headache is intermittent and stable. He has no other questions or complaints.    Objective:  VITAL SIGNS: BP (!) 99/55   Pulse 60   Temp 37.3 °C (99.1 °F) (Temporal)   Resp 18   Ht 1.803 m (5' 11\")   Wt 64 kg (141 lb 1.5 oz)   SpO2 98%   BMI 19.68 kg/m²   Gen: alert, no apparent distress    No results found for this or any previous visit (from the past 72 hour(s)).    Current Facility-Administered Medications   Medication Frequency   • gabapentin (NEURONTIN) capsule 200 mg TID   • bisacodyl (DULCOLAX) suppository 10 mg QDAY PRN   • Respiratory Therapy Consult Continuous RT   • Pharmacy Consult Request ...Pain Management Review 1 Each PHARMACY TO DOSE   • acetaminophen (TYLENOL) tablet 650 mg Q4HRS PRN   • artificial tears ophthalmic solution 1 Drop PRN   • benzocaine-menthol (CEPACOL) lozenge 1 Lozenge Q2HRS PRN   • mag hydrox-al hydrox-simeth (MAALOX PLUS ES or MYLANTA DS) suspension 20 mL Q2HRS PRN   • ondansetron (ZOFRAN ODT) dispertab 4 mg 4X/DAY PRN    Or   • ondansetron (ZOFRAN) syringe/vial injection 4 mg 4X/DAY PRN   • traZODone (DESYREL) tablet 50 mg QHS PRN   • sodium chloride (OCEAN) 0.65 % nasal spray 2 Spray PRN   • hydrOXYzine HCl (ATARAX) tablet 50 mg Q6HRS PRN   • melatonin tablet 3 mg HS PRN   • lactulose 20 GM/30ML solution 30 mL QDAY PRN "   • docusate sodium (ENEMEEZ) enema 283 mg QDAY PRN   • meclizine (ANTIVERT) tablet 25 mg TID PRN   • nitroglycerin (NITROSTAT) tablet 0.4 mg Q5 MIN PRN   • morphine (MS IR) tablet 7.5 mg Q6HRS PRN   • aspirin EC (ECOTRIN) tablet 81 mg DAILY   • atorvastatin (LIPITOR) tablet 80 mg Q EVENING   • clopidogrel (PLAVIX) tablet 75 mg DAILY   • heparin injection 5,000 Units Q8HRS   • omeprazole (PRILOSEC) capsule 20 mg BID   • senna-docusate (PERICOLACE or SENOKOT S) 8.6-50 MG per tablet 2 Tab BID   • magnesium hydroxide (MILK OF MAGNESIA) suspension 30 mL QDAY PRN   • lidocaine 2 % jelly PRN   • LORazepam (ATIVAN) tablet 1 mg Q4HRS PRN       Orders Placed This Encounter   Procedures   • Diet Order Cardiac     Standing Status:   Standing     Number of Occurrences:   1     Order Specific Question:   Diet:     Answer:   Cardiac [6]       Assessment:  Active Hospital Problems    Diagnosis   • *CVA (cerebral vascular accident) ([facility])   • NSTEMI (non-ST elevated myocardial infarction) ([facility])   • Chronic combined systolic and diastolic congestive heart failure ([facility])   • Coronary artery disease involving native coronary artery   • GERD (gastroesophageal reflux disease)   • CKD (chronic kidney disease) stage 3, GFR 30-59 ml/min ([facility])   • Neurogenic bladder   • Urinary retention     This patient is a 87 y.o. male admitted for acute inpatient rehabilitation with CVA (cerebral vascular accident) ([facility]).    I led and attended the weekly conference, and agree with the IDT conference documentation and plan of care as noted below.    Date of conference: 4/6/2020    Goals and barriers: See IDT note.    Biggest barriers: intermittent chest pain, constipation, left lateral lean with ambulation    Goals in next week: discharge    CM/social support: daughter supportive    Anticipated DC date: 4/13/2020    Home health: PT/OT/SLP/RN    Equip: FWW    Follow up: PCP, cardiology    Medical Decision Making and Plan:    Left cerebellar stroke  Left  vertebral artery occlusion  2/2 cath/PCI  Left sided hemiparesis, greatly improved  Left sided paresthesias, improved  Non-dominant  Continue full rehab program  PT/OT/SLP, 1 hr each discipline, 5 days per week  Continue aspirin, plavix, and statin for secondary stroke prophylaxis    Dizziness  PRN meclizine, not needing     Chronic urinary retention  Does ICs at home for last 11 years     Peripheral neuropathy  Continue gabapentin, per patient not needed, so will titrate off in the next several days to make sure symptoms don't return    Bowel  Constipation, resolved  Continue bowel meds  Last BM 4/7  KUB 4/7 with mod/large stool burden    DVT prophylaxis  Heparin until he's ambulating over 200 ft    Appreciate assistance of hospitalist with his medical co-morbidities:     Coronary artery disease, aspirin/plavix, statin  S/p NSTEMI x 2 - if patient has symptoms of another heart attack (or stroke), no plan to send to ED, plan to transfer to palliative/hospice - daughter and patient in agreement  Acute systolic CHF, EF 15%  Hypotension, improved  Chronic kidney disease, stable, renally dose meds/avoid nephrotoxins   Macrocytic Anemia, stable  GERD, omeprazole     Total time:  26 minutes.  I spent greater than 50% of the time for patient care, counseling, and coordination on this date, including patient face-to face time, unit/floor time with review of records/pertinent lab data and studies, as well as discussing diagnostic evaluation/work up, planned therapeutic interventions, and future disposition of care, as per the interval events/subjective and the assessment and plan as noted above.    I have performed a physical exam, reviewed and updated ROS, as well as the assessment and plan today 4/7/2020. In review of note from 4/6/2020 there are no new changes except as documented above.    Eliz Hernandez M.D.   Physical Medicine and Rehabilitation

## 2020-04-07 NOTE — THERAPY
Physical Therapy   Daily Treatment     Patient Name: Kyler Donato Jr.  Age:  87 y.o., Sex:  male  Medical Record #: [id number]  Today's Date: 4/7/2020     Precautions  Precautions: (P) Cardiac Precautions (See Comments), Fall Risk  Comments: (P) maintain heart rate below 100 during therapies, SBP above 80.    Subjective    Patient reported compliance with in room HEP, and motivation towards improvement.      Objective       04/07/20 1231   Precautions   Precautions Cardiac Precautions (See Comments);Fall Risk   Comments maintain heart rate below 100 during therapies, SBP above 80.   Vitals   Pulse 70   Patient BP Position Sitting   Blood Pressure  (!) 88/47  (Upon PT arrival, RN notified )   Standing Lower Body Exercises   Standing Lower Body Exercises   (facilitation for midline, inc LILIA, and glute activation)   Hamstring Curl 2 sets of 10;Bilateral   (109/51 82)   Hip Abduction 2 sets of 10;Bilateral   Marching 2 sets of 10  (116/49 83 bpm)   Heel Rise 2 sets of 10;Bilateral   Mini Squat Partial;2 sets of 10  (110/54 94 bpm )   Bed Mobility    Sit to Stand Stand by Assist  (// bars)   Neuro-Muscular Treatments   Neuro-Muscular Treatments Weight Shift Left;Weight Shift Right;Tactile Cuing;Sequencing   Comments Standing x 3 min, no UE support CGA to facilitate glute activation and midline, emphasis on LILIA, symmetry, and postural muscle activation, /49 79 bpm.   PT Total Time Spent   PT Individual Total Time Spent (Mins) 60   PT Charge Group   PT Gait Training 1   PT Therapeutic Exercise 1   PT Neuromuscular Re-Education / Balance 1   PT Therapeutic Activities 1       FIM Walking Score:  2 - Max Assistance  Walking Description:  Two hand rails(Emphasis on wider LILIA, and midline x 80ft CGA BP = 103/82 84bpm)    FIM Wheelchair Score:  5 - Standby Prompting/Supervision or Set-up  Wheelchair Description:  Supervision for safety( ft BP = 107/53, 78bpm SPV)      Assessment    Patient's BP was  initially low, yet asymptomatic 88/47, but was quickly improved to 107/53 after wc mobility. Patient's pulse earline to 94bpm after 10x2 mini squats, so seated rest was encouraged to remain within guidelines. Patient continues to demonstrated impaired glute recruitment/ trendelenberg, and fwd/left lateral lean.  Patient was encouraged to inc WB through right heal, which was easier to accomplish in a staggered stance.     Plan    Monitor vitals and do not push pt too hard - very high risk for cardiac event, Continue to progress gait training with FWW with focus on compensation for L lateral pulsion, Vector training no AD, balance activities to assist with ataxia, HEP, outcome measures, complete remaining vestibular exam within pt tolerance

## 2020-04-07 NOTE — PROGRESS NOTES
[facility] Medicine Daily Progress Note    Date of Service  4/7/2020    Chief Complaint:  Hypertension  CHF with elevated BNP    Interval History:  No significant events or changes since last visit    Review of Systems  Review of Systems   Constitutional: Negative for chills and fever.   Respiratory: Negative for shortness of breath.    Cardiovascular: Negative for chest pain.   Gastrointestinal: Negative for abdominal pain, diarrhea, nausea and vomiting.   Psychiatric/Behavioral: The patient is not nervous/anxious.         Physical Exam  Temp:  [36.8 °C (98.3 °F)-37.3 °C (99.1 °F)] 37.3 °C (99.1 °F)  Pulse:  [60-97] 60  Resp:  [16-18] 18  BP: ()/(42-63) 99/55  SpO2:  [96 %-98 %] 98 %    Physical Exam  Vitals signs and nursing note reviewed.   Constitutional:       Appearance: Normal appearance.   HENT:      Head: Atraumatic.   Eyes:      Conjunctiva/sclera: Conjunctivae normal.      Pupils: Pupils are equal, round, and reactive to light.   Neck:      Musculoskeletal: Normal range of motion and neck supple.   Cardiovascular:      Rate and Rhythm: Normal rate and regular rhythm.      Heart sounds: No murmur.   Pulmonary:      Effort: Pulmonary effort is normal.      Breath sounds: No stridor. No wheezing or rales.   Abdominal:      General: There is no distension.      Palpations: Abdomen is soft.      Tenderness: There is no abdominal tenderness.   Musculoskeletal:      Right lower leg: No edema.      Left lower leg: No edema.   Skin:     General: Skin is warm and dry.      Findings: No rash.   Neurological:      Mental Status: He is alert and oriented to person, place, and time.   Psychiatric:         Mood and Affect: Mood normal.         Behavior: Behavior normal.         Fluids    Intake/Output Summary (Last 24 hours) at 4/7/2020 0858  Last data filed at 4/7/2020 0800  Gross per 24 hour   Intake 1200 ml   Output 1200 ml   Net 0 ml       Laboratory                        Imaging    Assessment/Plan  * CVA  (cerebral vascular accident) ([facility])- (present on admission)  Assessment & Plan  Occurred post-op  MRI --> large area of acute infarction in the left PICA territory including the left dorsal lateral medulla  On ASA, Plavix  On Lipitor    CHF (congestive heart failure) ([facility])- (present on admission)  Assessment & Plan  No edema  No lung crackles  O2 sats good on RA  Echo (3/23): severe mitral regurgitation; EF 25%; Global hypokinesis; Grade III diastolic dysfunction; possible severe aortic stenosis   Echo (3/30): EF 15%  BNP: 6942 (3/22) --> 14,253 (3/28) --> 13,571 (3/30)  Cont to monitor    Coronary artery disease involving native coronary artery- (present on admission)  Assessment & Plan  Has hx of MI with LAD stent  Had recent NSTEMI s/p mid circumflex PCI  Had recent NSTEMI (at the Rehab) --> Cardio suggest medical management  Echo (3/23): EF 25%  Echo (3/30): EF 15%, grade III diastolic dysfunction, and aortic stenosis  On ASA, Plavix, and Lipitor  No further aggressive intervention per Cardiology  Monitor    CKD (chronic kidney disease) stage 3, GFR 30-59 ml/min ([facility])  Assessment & Plan  Bun/Cr: stable  Avoid nephrotoxins  Renal dose all meds  Monitor    Essential hypertension- (present on admission)  Assessment & Plan  Has hx  BP ok  Currently not on any hypertensive meds  Note: home meds include Toprol XL 25 mg daily and Cozaar 50 mg daily  Monitor

## 2020-04-07 NOTE — THERAPY
"Speech Language Pathology  Daily Treatment     Patient Name: Kyler Donato Jr.  Age:  87 y.o., Sex:  male  Medical Record #: [id number]  Today's Date: 4/7/2020     Subjective    \"Let's quit this mind stuff and get on with the physical.\" Educated on role of ST in pt's recovery and need to ensure independence with IADLs upon d/c. Pt agreeable.      Objective       04/07/20 0834   Cognition   Medication Management  Moderate (3)   Interdisciplinary Plan of Care Collaboration   Patient Position at End of Therapy In Bed;Call Light within Reach;Tray Table within Reach   SLP Total Time Spent   SLP Individual Total Time Spent (Mins) 60   Charge Group   SLP Cognitive Skill Development First 15 Minutes 1   SLP Cognitive Skill Development Additional 15 Minutes 3         Assessment    Pt highly motivated to return to PLOF and return home with family. Fxl med sort completed this date with pt sorting 6 meds with 50% accuracy. Errors made with 3 medications. Pt able to correct with cues from SLP. List of questions created for physician, pt also required clarification on current meds vs meds at home.     Plan    Continue fxl med sort again to ensure 100% accuracy    "

## 2020-04-07 NOTE — ASSESSMENT & PLAN NOTE
BP recently ok  Currently not on any hypertensive meds  Encouraging fluid intake  Note: home meds include Toprol XL 25 mg daily and Cozaar 50 mg daily  Monitor

## 2020-04-08 PROCEDURE — 97530 THERAPEUTIC ACTIVITIES: CPT

## 2020-04-08 PROCEDURE — 99232 SBSQ HOSP IP/OBS MODERATE 35: CPT | Performed by: HOSPITALIST

## 2020-04-08 PROCEDURE — 99233 SBSQ HOSP IP/OBS HIGH 50: CPT | Performed by: PHYSICAL MEDICINE & REHABILITATION

## 2020-04-08 PROCEDURE — 97130 THER IVNTJ EA ADDL 15 MIN: CPT

## 2020-04-08 PROCEDURE — 97129 THER IVNTJ 1ST 15 MIN: CPT

## 2020-04-08 PROCEDURE — 770010 HCHG ROOM/CARE - REHAB SEMI PRIVAT*

## 2020-04-08 PROCEDURE — 700111 HCHG RX REV CODE 636 W/ 250 OVERRIDE (IP): Performed by: PHYSICAL MEDICINE & REHABILITATION

## 2020-04-08 PROCEDURE — A9270 NON-COVERED ITEM OR SERVICE: HCPCS | Performed by: PHYSICAL MEDICINE & REHABILITATION

## 2020-04-08 PROCEDURE — 97116 GAIT TRAINING THERAPY: CPT

## 2020-04-08 PROCEDURE — 97112 NEUROMUSCULAR REEDUCATION: CPT

## 2020-04-08 PROCEDURE — 97110 THERAPEUTIC EXERCISES: CPT

## 2020-04-08 PROCEDURE — 700102 HCHG RX REV CODE 250 W/ 637 OVERRIDE(OP): Performed by: PHYSICAL MEDICINE & REHABILITATION

## 2020-04-08 RX ADMIN — CLOPIDOGREL BISULFATE 75 MG: 75 TABLET ORAL at 07:45

## 2020-04-08 RX ADMIN — SENNOSIDES AND DOCUSATE SODIUM 2 TABLET: 8.6; 5 TABLET ORAL at 19:26

## 2020-04-08 RX ADMIN — TRAZODONE HYDROCHLORIDE 50 MG: 50 TABLET ORAL at 19:24

## 2020-04-08 RX ADMIN — HEPARIN SODIUM 5000 UNITS: 5000 INJECTION, SOLUTION INTRAVENOUS; SUBCUTANEOUS at 14:37

## 2020-04-08 RX ADMIN — HEPARIN SODIUM 5000 UNITS: 5000 INJECTION, SOLUTION INTRAVENOUS; SUBCUTANEOUS at 05:48

## 2020-04-08 RX ADMIN — ASPIRIN 81 MG: 81 TABLET, COATED ORAL at 07:45

## 2020-04-08 RX ADMIN — HEPARIN SODIUM 5000 UNITS: 5000 INJECTION, SOLUTION INTRAVENOUS; SUBCUTANEOUS at 21:46

## 2020-04-08 RX ADMIN — OMEPRAZOLE 20 MG: 20 CAPSULE, DELAYED RELEASE ORAL at 07:44

## 2020-04-08 RX ADMIN — ATORVASTATIN CALCIUM 80 MG: 40 TABLET, FILM COATED ORAL at 19:24

## 2020-04-08 RX ADMIN — MORPHINE SULFATE 7.5 MG: 15 TABLET ORAL at 19:24

## 2020-04-08 RX ADMIN — SENNOSIDES AND DOCUSATE SODIUM 2 TABLET: 8.6; 5 TABLET ORAL at 07:45

## 2020-04-08 RX ADMIN — GABAPENTIN 200 MG: 100 CAPSULE ORAL at 07:44

## 2020-04-08 RX ADMIN — OMEPRAZOLE 20 MG: 20 CAPSULE, DELAYED RELEASE ORAL at 19:26

## 2020-04-08 RX ADMIN — MORPHINE SULFATE 7.5 MG: 15 TABLET ORAL at 07:43

## 2020-04-08 ASSESSMENT — ENCOUNTER SYMPTOMS
PALPITATIONS: 0
VOMITING: 0
BLURRED VISION: 0
NAUSEA: 0
DIZZINESS: 0
HALLUCINATIONS: 0
HEADACHES: 0
FEVER: 0
SHORTNESS OF BREATH: 0

## 2020-04-08 NOTE — PROGRESS NOTES
"Rehab Progress Note     Date of Service: 4/8/2020  Chief Complaint: follow up stroke, heart attack    Interval Events (Subjective)    Patient seen and examined today in the therapy gym.  He has questions about his medications.  He wants to know if he can take nitroglycerin for chest pain after discharge.  Advised as long as his blood pressure can tolerate.  He also wants to know when he can get off the gabapentin as he states he it this was [facility] for [facility] but it does not really help.  Advised him on titrating off but that his headache may return and be more severe as it is also a headache medications.  Patient would also like me to call his daughter who is here today for family training.  He has no other questions or complaints today.    I called the daughter Mariia and updated her on the medical and physical recovery that her father is making.  She was in for family training today and does feel like she will be ready to take him home on Monday.  Advised her I will call her if anything changes medically between now and Monday.        Objective:  VITAL SIGNS: /57   Pulse 70   Temp 36.5 °C (97.7 °F) (Oral)   Resp 18   Ht 1.803 m (5' 11\")   Wt 64 kg (141 lb 1.5 oz)   SpO2 100%   BMI 19.68 kg/m²   Gen: alert, no apparent distress  Neuro: notable for left lateral lean with standing      No results found for this or any previous visit (from the past 72 hour(s)).    Current Facility-Administered Medications   Medication Frequency   • gabapentin (NEURONTIN) capsule 200 mg TID   • bisacodyl (DULCOLAX) suppository 10 mg QDAY PRN   • Respiratory Therapy Consult Continuous RT   • Pharmacy Consult Request ...Pain Management Review 1 Each PHARMACY TO DOSE   • acetaminophen (TYLENOL) tablet 650 mg Q4HRS PRN   • artificial tears ophthalmic solution 1 Drop PRN   • benzocaine-menthol (CEPACOL) lozenge 1 Lozenge Q2HRS PRN   • mag hydrox-al hydrox-simeth (MAALOX PLUS ES or MYLANTA DS) suspension 20 mL " Q2HRS PRN   • ondansetron (ZOFRAN ODT) dispertab 4 mg 4X/DAY PRN    Or   • ondansetron (ZOFRAN) syringe/vial injection 4 mg 4X/DAY PRN   • traZODone (DESYREL) tablet 50 mg QHS PRN   • sodium chloride (OCEAN) 0.65 % nasal spray 2 Spray PRN   • hydrOXYzine HCl (ATARAX) tablet 50 mg Q6HRS PRN   • melatonin tablet 3 mg HS PRN   • lactulose 20 GM/30ML solution 30 mL QDAY PRN   • docusate sodium (ENEMEEZ) enema 283 mg QDAY PRN   • meclizine (ANTIVERT) tablet 25 mg TID PRN   • nitroglycerin (NITROSTAT) tablet 0.4 mg Q5 MIN PRN   • morphine (MS IR) tablet 7.5 mg Q6HRS PRN   • aspirin EC (ECOTRIN) tablet 81 mg DAILY   • atorvastatin (LIPITOR) tablet 80 mg Q EVENING   • clopidogrel (PLAVIX) tablet 75 mg DAILY   • heparin injection 5,000 Units Q8HRS   • omeprazole (PRILOSEC) capsule 20 mg BID   • senna-docusate (PERICOLACE or SENOKOT S) 8.6-50 MG per tablet 2 Tab BID   • magnesium hydroxide (MILK OF MAGNESIA) suspension 30 mL QDAY PRN   • lidocaine 2 % jelly PRN   • LORazepam (ATIVAN) tablet 1 mg Q4HRS PRN       Orders Placed This Encounter   Procedures   • Diet Order Cardiac     Standing Status:   Standing     Number of Occurrences:   1     Order Specific Question:   Diet:     Answer:   Cardiac [6]       Assessment:  Active Hospital Problems    Diagnosis   • *CVA (cerebral vascular accident) ([facility])   • NSTEMI (non-ST elevated myocardial infarction) ([facility])   • Chronic combined systolic and diastolic congestive heart failure ([facility])   • Coronary artery disease involving native coronary artery   • GERD (gastroesophageal reflux disease)   • CKD (chronic kidney disease) stage 3, GFR 30-59 ml/min ([facility])   • Neurogenic bladder   • Urinary retention     This patient is a 87 y.o. male admitted for acute inpatient rehabilitation with CVA (cerebral vascular accident) ([facility]).    I led and attended the weekly conference, and agree with the IDT conference documentation and plan of care as noted below.    Date of conference: 4/6/2020    Goals and  barriers: See IDT note.    Biggest barriers: intermittent chest pain, constipation, left lateral lean with ambulation    Goals in next week: discharge    CM/social support: daughter supportive    Anticipated DC date: 4/13/2020    Home health: PT/OT/SLP/RN    Equip: FWW    Follow up: PCP, cardiology    Medical Decision Making and Plan:    Left cerebellar stroke  Left vertebral artery occlusion  2/2 cath/PCI  Left sided hemiparesis, greatly improved  Left sided paresthesias, improved  Non-dominant  Continue full rehab program  PT/OT/SLP, 1 hr each discipline, 5 days per week  Continue aspirin, plavix, and statin for secondary stroke prophylaxis    Dizziness  PRN meclizine, not needing     Chronic urinary retention  Does ICs at home for last 11 years     Peripheral neuropathy  Discontinuing gabapentin as patient doesn't want to take    Headache, intermittent, over left eye, particularly when he looks left  Likely due to his stroke/vertebral artery occlusion  Advised patient may increase in severity as he doesn't want to take the gabapentin  He is using PRN morphine for his headache, discussed with daughter concern for rebound headaches, but also want to make patient comfortable    Bowel  Constipation, resolved  Continue bowel meds  Last BM 4/7  KUB 4/7 with mod/large stool burden    DVT prophylaxis  Heparin until he's ambulating over 200 ft    Appreciate assistance of hospitalist with his medical co-morbidities:     Coronary artery disease, aspirin/plavix, statin, PRN morphine for chest pain  S/p NSTEMI x 2 - if patient has symptoms of another heart attack (or stroke), no plan to send to ED, plan to transfer to palliative/hospice - daughter and patient in agreement  Acute systolic CHF, EF 15%  Hypotension, improved  Chronic kidney disease, stable, renally dose meds/avoid nephrotoxins   Macrocytic Anemia, stable  GERD, omeprazole     Total time:  36 minutes.  I spent greater than 50% of the time for patient care,  counseling, and coordination on this date, including patient face-to face time, unit/floor time with review of records/pertinent lab data and studies, as well as discussing diagnostic evaluation/work up, planned therapeutic interventions, and future disposition of care, as per the interval events/subjective and the assessment and plan as noted above.    Updated daughter Mariia today via phone call regarding his progress, discharge plans, medications.     I have performed a physical exam, reviewed and updated ROS, as well as the assessment and plan today 4/8/2020. In review of note from 4/7/2020 there are no new changes except as documented above.          Eliz Hernandez M.D.   Physical Medicine and Rehabilitation

## 2020-04-08 NOTE — THERAPY
Physical Therapy   Daily Treatment     Patient Name: Kyler Donato Jr.  Age:  87 y.o., Sex:  male  Medical Record #: [id number]  Today's Date: 4/8/2020     Precautions  Precautions: Fall Risk, Cardiac Precautions (See Comments)  Comments: maintain heart rate below 100 during therapies, SBP above 80.    Subjective    Pt and dtr ready for FT     Objective       04/08/20 0931   Interdisciplinary Plan of Care Collaboration   Patient Position at End of Therapy Seated;Family / Friend in Room  (handoff SLP)   PT Total Time Spent   PT Individual Total Time Spent (Mins) 60   PT Charge Group   PT Gait Training 2   PT Therapeutic Activities 2       Discussed pt home setup with dtr and pt. FT completed with dtr recommending bathroom mods - tub bench for improved safety with tub transfers. Also cues and demo for how to properly guard pt and provide min A for L lateral lean as well as providing cues for LILIA. Also FT on how to provide min A during ramp navigation up and down.     Assessment    Dtr provides safe min A to pt with gait belt and FWW for walking and ramp training and reports family will pursue tub bench for safer bathroom transfers. No concerns over easier transfers such as bed and car after discussing pt home setup - will perform with FWW in home.    Plan    Monitor vitals and do not push pt too hard - very high risk for cardiac event.  Continue to progress gait training with FWW with focus on compensation for L lateral pulsion, Vector training no AD, balance activities to assist with ataxia, consider Pepe Chi instruction, HEP, outcome measures, complete remaining vestibular exam within pt tolerance      D/C currently scheduled for 4/13/2020.

## 2020-04-08 NOTE — THERAPY
Speech Language Pathology  Daily Treatment     Patient Name: Kyler Donato Jr.  Age:  87 y.o., Sex:  male  Medical Record #: [id number]  Today's Date: 4/8/2020     Subjective    Family training completed with pt's daughterGeorgie, who will be acting caregiver when pt discharges home.      Objective     04/08/20 1034   Cognition   Simple Reasoning / Problem Solving Supervision (5)   Medication Management  Minimal (4)   Interdisciplinary Plan of Care Collaboration   IDT Collaboration with  Family / Caregiver   Patient Position at End of Therapy Seated;Call Light within Reach   Collaboration Comments family training completed with pt's daughterGeorgie   SLP Total Time Spent   SLP Individual Total Time Spent (Mins) 60   Charge Group   SLP Cognitive Skill Development First 15 Minutes 1   SLP Cognitive Skill Development Additional 15 Minutes 3         Assessment    Education provided to patient's daughter and performance on standardized assessment with moderate deficits in attention, memory and problem solving. Recommendations for pt to receive supervision at home as well as assistance with med mngt. Review of current med list both scheduled as well as PRN. Pt recalled questions prepared from session prior, however, pt reported physician discontinued gabapentin. Confirmation with physician at completion of family training that dosage is being titrated down, however, not discontinued yet. Pt's dtr in agreement with recommendations for supervision and support with IADLs as well as ongoing ST services short term.     Plan    Continue to address fxl med mngt, recall of novel training. Complete stroke ed

## 2020-04-08 NOTE — CARE PLAN
Problem: Safety  Goal: Will remain free from injury  Outcome: PROGRESSING AS EXPECTED  Note: Patient educated on importance of utilizing call light to minimize the potential of injury from falls. Patient verbalizes understanding.       Problem: Infection  Goal: Will remain free from infection  Outcome: PROGRESSING AS EXPECTED  Note: Education reinforced to wash hands thoroughly after using the restroom, prior to eating and throughout the day to minimize the potential of acquiring germs while in a facility setting. Patient engaged in conversation and verbalizes understanding.

## 2020-04-08 NOTE — THERAPY
"Physical Therapy   Daily Treatment     Patient Name: Kyler Donato Jr.  Age:  87 y.o., Sex:  male  Medical Record #: [id number]  Today's Date: 4/8/2020     Precautions  Precautions: Cardiac Precautions (See Comments), Fall Risk  Comments: maintain heart rate below 100 during therapies, SBP above 80.    Subjective    Patient agreeable to PT.  \"Let's get to it.\"     Objective       04/08/20 0831   Precautions   Precautions Cardiac Precautions (See Comments);Fall Risk   Comments maintain heart rate below 100 during therapies, SBP above 80.   Vitals   O2 (LPM) 0   O2 Delivery Device None - Room Air   Pain 0 - 10 Group   Comfort Goal 0   Non Verbal Descriptors   Non Verbal Scale  Calm   Bed Mobility    Sit to Stand Stand by Assist  (// bars, setup)   Neuro-Muscular Treatments   Comments Static standing balance activities for 30 minutes with only 2 seated breaks of 2-3 min each; performed in // bars with SBA and occasional CGA for safety; focus on limiting all LUE compensations and using RUE occasionally/limited to improve balance and L=R weight shift; goal to have 60% WB through RLE, use of mirror for visual feedback.  Worked on narrow vs standard LILIA on both firm and Airex/soft surfaces for prolonged reps with only occasional cueing. x30 min.   Interdisciplinary Plan of Care Collaboration   IDT Collaboration with  Occupational Therapist   Patient Position at End of Therapy Seated;Self Releasing Lap Belt Applied;Call Light within Reach;Tray Table within Reach   Collaboration Comments CLOF, balance goals, strict cardiac precautions   PT Total Time Spent   PT Individual Total Time Spent (Mins) 30   PT Charge Group   PT Neuromuscular Re-Education / Balance 2     Provided education and cueing for treatment goals and activity pacing within strict cardiac guidelines. Pt verbalizes agreement and benefited from structure activity to begin reinforcing demonstration of learning.    FIM Wheelchair Score:  1 - Total " Assistance  Wheelchair Description:         Assessment    Patient is highly motivated; benefits from structured activity to not excess pt's strict cardiac guidelines; use of mirror for visual feedback significantly improves quality of performance and patient was able to maintain or resume positioning with only minimal verbal cues after mirror training; impaired but slowly improving balance.    Plan    Monitor vitals and do not push pt too hard - very high risk for cardiac event.  Continue to progress gait training with FWW with focus on compensation for L lateral pulsion, Vector training no AD, balance activities to assist with ataxia, consider Pepe Chi instruction, HEP, outcome measures, complete remaining vestibular exam within pt tolerance     D/C currently scheduled for 4/13/2020.

## 2020-04-08 NOTE — PROGRESS NOTES
[facility] Medicine Daily Progress Note    Date of Service  4/8/2020    Chief Complaint:  Hypertension  CHF with elevated BNP    Interval History:  No significant events or changes since last visit    Review of Systems  Review of Systems   Constitutional: Negative for fever.   Eyes: Negative for blurred vision.   Respiratory: Negative for shortness of breath.    Cardiovascular: Negative for palpitations.   Gastrointestinal: Negative for nausea and vomiting.   Neurological: Negative for dizziness and headaches.   Psychiatric/Behavioral: Negative for hallucinations.        Physical Exam  Temp:  [36.5 °C (97.7 °F)-37.1 °C (98.8 °F)] 36.5 °C (97.7 °F)  Pulse:  [70-94] 70  Resp:  [18] 18  BP: ()/(47-82) 101/57  SpO2:  [100 %] 100 %    Physical Exam  Vitals signs and nursing note reviewed.   Constitutional:       General: He is not in acute distress.  HENT:      Mouth/Throat:      Mouth: Mucous membranes are moist.      Pharynx: Oropharynx is clear.   Eyes:      General: No scleral icterus.  Neck:      Musculoskeletal: No neck rigidity.   Cardiovascular:      Rate and Rhythm: Normal rate and regular rhythm.      Heart sounds: No murmur.   Pulmonary:      Effort: Pulmonary effort is normal.      Breath sounds: Normal breath sounds. No stridor.   Abdominal:      General: There is no distension.      Palpations: Abdomen is soft.      Tenderness: There is no abdominal tenderness.   Musculoskeletal:      Right lower leg: No edema.      Left lower leg: No edema.   Skin:     General: Skin is warm and dry.      Findings: No rash.   Neurological:      Mental Status: He is alert and oriented to person, place, and time.   Psychiatric:         Mood and Affect: Mood normal.         Behavior: Behavior normal.         Fluids    Intake/Output Summary (Last 24 hours) at 4/8/2020 0835  Last data filed at 4/8/2020 0330  Gross per 24 hour   Intake 360 ml   Output 1400 ml   Net -1040 ml       Laboratory                         Imaging    Assessment/Plan  * CVA (cerebral vascular accident) ([facility])- (present on admission)  Assessment & Plan  Occurred post-op  MRI --> large area of acute infarction in the left PICA territory including the left dorsal lateral medulla  On ASA, Plavix  On Lipitor    CHF (congestive heart failure) ([facility])- (present on admission)  Assessment & Plan  No edema  No lung crackles  O2 sats good on RA  Echo (3/23): severe mitral regurgitation; EF 25%; Global hypokinesis; Grade III diastolic dysfunction; possible severe aortic stenosis   Echo (3/30): EF 15%  BNP: 6942 (3/22) --> 14,253 (3/28) --> 13,571 (3/30)  Cont to monitor    Coronary artery disease involving native coronary artery- (present on admission)  Assessment & Plan  Has hx of MI with LAD stent  Had recent NSTEMI s/p mid circumflex PCI  Had recent NSTEMI (at the Rehab) --> Cardio suggest medical management  Echo (3/23): EF 25%  Echo (3/30): EF 15%, grade III diastolic dysfunction, and aortic stenosis  On ASA, Plavix, and Lipitor  No further aggressive intervention per Cardiology  Monitor    CKD (chronic kidney disease) stage 3, GFR 30-59 ml/min ([facility])  Assessment & Plan  Bun/Cr: stable  Avoid nephrotoxins  Renal dose all meds  Monitor    Essential hypertension- (present on admission)  Assessment & Plan  BP low normal but ok  BP occ dips a little lower  Currently not on any hypertensive meds  Encouraging fluid intake  Note: home meds include Toprol XL 25 mg daily and Cozaar 50 mg daily  Monitor

## 2020-04-08 NOTE — CARE PLAN
Problem: Safety  Goal: Will remain free from injury  Note: Patient uses call light appropriately. Bed locked and in the lowest position. Non skid socks in place. Hourly rounding in place.       Problem: Pain Management  Goal: Pain level will decrease to patient's comfort goal  Note: Pt. c/o headache once during this shift. PRN Morphine was given per patient request.

## 2020-04-08 NOTE — THERAPY
Occupational Therapy  Daily Treatment     Patient Name: Kyler Donato Jr.  Age:  87 y.o., Sex:  male  Medical Record #: [id number]  Today's Date: 4/8/2020     Precautions  Precautions: (P) Fall Risk, Cardiac Precautions (See Comments)  Comments: (P) maintain heart rate below 100 during therapies, SBP above 80.    Safety   ADL Safety : Requires Physical Assist for Safety, Requires Cueing for Safety  Bathroom Safety: Requires Physical Assist for Safety, Requires Cuing for Safety    Subjective    Patient sitting in w/c at bedside and agreeable to work on standing balance.  Patient frustrated that his standing balance is so poor while walking.     Objective       04/08/20 1231   Precautions   Precautions Fall Risk;Cardiac Precautions (See Comments)   Comments maintain heart rate below 100 during therapies, SBP above 80.   Vitals   Pulse   (between 80 and 95 during session)   Sitting Lower Body Exercises   Nustep Time (See Comments)  (Nustep level 3 x 12 minutes with LEs only)   Balance   Standing Balance (Static) Poor +   Standing Balance (Dynamic) Poor -   Comments static and dynamic balance in parallel bars with and without UE assist   Bed Mobility    Sit to Supine Supervised   Scooting Supervised   Interdisciplinary Plan of Care Collaboration   IDT Collaboration with  Physician   Patient Position at End of Therapy In Bed;Call Light within Reach;Tray Table within Reach;Phone within Reach   Collaboration Comments Dr Hernandez assessed patient   OT Total Time Spent   OT Individual Total Time Spent (Mins) 60   OT Charge Group   OT Therapy Activity 3   OT Therapeutic Exercise  1     SPT w/c <> nustep and w/c to bed with CGA and single UE support.    Min perturbations by therapist given to patient while static standing with no UE support.    FIM Lower Body Dressing Score:  6 - Modified Independent  Lower Body Dressing Description:  Assist device/equipment(mod I to don/doff shoes with elastic  simone)      Assessment    Static standing balance poor + today with dynamic standing balance poor - with no UE support or light UE support.  Continues to consistently lose balance to the left.    Plan    Continue low level endurance/strength building (don't push patient too hard), standing balance, ADLs/IADLs

## 2020-04-09 LAB
ANION GAP SERPL CALC-SCNC: 13 MMOL/L (ref 7–16)
BUN SERPL-MCNC: 27 MG/DL (ref 8–22)
CALCIUM SERPL-MCNC: 9.5 MG/DL (ref 8.5–10.5)
CHLORIDE SERPL-SCNC: 103 MMOL/L (ref 96–112)
CO2 SERPL-SCNC: 24 MMOL/L (ref 20–33)
CREAT SERPL-MCNC: 1.36 MG/DL (ref 0.5–1.4)
GLUCOSE SERPL-MCNC: 92 MG/DL (ref 65–99)
MAGNESIUM SERPL-MCNC: 1.9 MG/DL (ref 1.5–2.5)
NT-PROBNP SERPL IA-MCNC: 8483 PG/ML (ref 0–125)
PHOSPHATE SERPL-MCNC: 3.6 MG/DL (ref 2.5–4.5)
POTASSIUM SERPL-SCNC: 4 MMOL/L (ref 3.6–5.5)
SODIUM SERPL-SCNC: 140 MMOL/L (ref 135–145)

## 2020-04-09 PROCEDURE — 83735 ASSAY OF MAGNESIUM: CPT

## 2020-04-09 PROCEDURE — A9270 NON-COVERED ITEM OR SERVICE: HCPCS | Performed by: PHYSICAL MEDICINE & REHABILITATION

## 2020-04-09 PROCEDURE — 97110 THERAPEUTIC EXERCISES: CPT | Mod: CQ

## 2020-04-09 PROCEDURE — 700102 HCHG RX REV CODE 250 W/ 637 OVERRIDE(OP): Performed by: PHYSICAL MEDICINE & REHABILITATION

## 2020-04-09 PROCEDURE — 770010 HCHG ROOM/CARE - REHAB SEMI PRIVAT*

## 2020-04-09 PROCEDURE — 700111 HCHG RX REV CODE 636 W/ 250 OVERRIDE (IP): Performed by: PHYSICAL MEDICINE & REHABILITATION

## 2020-04-09 PROCEDURE — 80048 BASIC METABOLIC PNL TOTAL CA: CPT

## 2020-04-09 PROCEDURE — 97129 THER IVNTJ 1ST 15 MIN: CPT

## 2020-04-09 PROCEDURE — 97110 THERAPEUTIC EXERCISES: CPT

## 2020-04-09 PROCEDURE — 97130 THER IVNTJ EA ADDL 15 MIN: CPT

## 2020-04-09 PROCEDURE — 84100 ASSAY OF PHOSPHORUS: CPT

## 2020-04-09 PROCEDURE — 83880 ASSAY OF NATRIURETIC PEPTIDE: CPT

## 2020-04-09 PROCEDURE — 99232 SBSQ HOSP IP/OBS MODERATE 35: CPT | Performed by: HOSPITALIST

## 2020-04-09 PROCEDURE — 97535 SELF CARE MNGMENT TRAINING: CPT

## 2020-04-09 PROCEDURE — 36415 COLL VENOUS BLD VENIPUNCTURE: CPT

## 2020-04-09 PROCEDURE — 99231 SBSQ HOSP IP/OBS SF/LOW 25: CPT | Performed by: PHYSICAL MEDICINE & REHABILITATION

## 2020-04-09 PROCEDURE — 97530 THERAPEUTIC ACTIVITIES: CPT

## 2020-04-09 PROCEDURE — 97530 THERAPEUTIC ACTIVITIES: CPT | Mod: CQ

## 2020-04-09 RX ADMIN — HEPARIN SODIUM 5000 UNITS: 5000 INJECTION, SOLUTION INTRAVENOUS; SUBCUTANEOUS at 14:10

## 2020-04-09 RX ADMIN — SALINE NASAL SPRAY 2 SPRAY: 1.5 SOLUTION NASAL at 19:56

## 2020-04-09 RX ADMIN — TRAZODONE HYDROCHLORIDE 50 MG: 50 TABLET ORAL at 19:52

## 2020-04-09 RX ADMIN — HEPARIN SODIUM 5000 UNITS: 5000 INJECTION, SOLUTION INTRAVENOUS; SUBCUTANEOUS at 05:08

## 2020-04-09 RX ADMIN — HEPARIN SODIUM 5000 UNITS: 5000 INJECTION, SOLUTION INTRAVENOUS; SUBCUTANEOUS at 21:38

## 2020-04-09 RX ADMIN — OMEPRAZOLE 20 MG: 20 CAPSULE, DELAYED RELEASE ORAL at 19:50

## 2020-04-09 RX ADMIN — OMEPRAZOLE 20 MG: 20 CAPSULE, DELAYED RELEASE ORAL at 07:57

## 2020-04-09 RX ADMIN — CLOPIDOGREL BISULFATE 75 MG: 75 TABLET ORAL at 07:57

## 2020-04-09 RX ADMIN — ASPIRIN 81 MG: 81 TABLET, COATED ORAL at 07:57

## 2020-04-09 RX ADMIN — SENNOSIDES AND DOCUSATE SODIUM 2 TABLET: 8.6; 5 TABLET ORAL at 07:57

## 2020-04-09 RX ADMIN — MORPHINE SULFATE 7.5 MG: 15 TABLET ORAL at 19:51

## 2020-04-09 RX ADMIN — ATORVASTATIN CALCIUM 80 MG: 40 TABLET, FILM COATED ORAL at 19:50

## 2020-04-09 ASSESSMENT — ENCOUNTER SYMPTOMS
COUGH: 0
BLURRED VISION: 0
DIZZINESS: 0
DIARRHEA: 0
NERVOUS/ANXIOUS: 0
FEVER: 0

## 2020-04-09 NOTE — CARE PLAN
Problem: Safety  Goal: Will remain free from injury  Outcome: PROGRESSING AS EXPECTED  Patient uses call light appropriately for assist with needs/transfers.     Problem: Urinary Elimination:  Goal: Ability to reestablish a normal urinary elimination pattern will improve  Outcome: PROGRESSING AS EXPECTED  Patient does self cath with clean technique, results documented on flow sheet.

## 2020-04-09 NOTE — PROGRESS NOTES
"Rehab Progress Note     Date of Service: 4/9/2020  Chief Complaint: follow up stroke, heart attack    Interval Events (Subjective)    Patient seen and examined today in the hallway as he's headed to the gym with OT. He complains of 3/10 left eye pain, stable, and improves with the morphine. He's happy about his progress and the plan for discharge home on Monday. He denies any chest pain currently or recently.         Objective:  VITAL SIGNS: /54   Pulse 72   Temp 36.7 °C (98 °F) (Oral)   Resp 18   Ht 1.803 m (5' 11\")   Wt 64 kg (141 lb 1.5 oz)   SpO2 100%   BMI 19.68 kg/m²   Gen: alert, no apparent distress  Neuro: notable for left sided mild incoordination, hard of hearing    Recent Results (from the past 72 hour(s))   Basic Metabolic Panel    Collection Time: 04/09/20  5:38 AM   Result Value Ref Range    Sodium 140 135 - 145 mmol/L    Potassium 4.0 3.6 - 5.5 mmol/L    Chloride 103 96 - 112 mmol/L    Co2 24 20 - 33 mmol/L    Glucose 92 65 - 99 mg/dL    Bun 27 (H) 8 - 22 mg/dL    Creatinine 1.36 0.50 - 1.40 mg/dL    Calcium 9.5 8.5 - 10.5 mg/dL    Anion Gap 13.0 7.0 - 16.0   MAGNESIUM    Collection Time: 04/09/20  5:38 AM   Result Value Ref Range    Magnesium 1.9 1.5 - 2.5 mg/dL   PHOSPHORUS    Collection Time: 04/09/20  5:38 AM   Result Value Ref Range    Phosphorus 3.6 2.5 - 4.5 mg/dL   proBrain Natriuretic Peptide, NT    Collection Time: 04/09/20  5:38 AM   Result Value Ref Range    NT-proBNP 8483 (H) 0 - 125 pg/mL   ESTIMATED GFR    Collection Time: 04/09/20  5:38 AM   Result Value Ref Range    GFR If African American 60 >60 mL/min/1.73 m 2    GFR If Non African American 50 (A) >60 mL/min/1.73 m 2       Current Facility-Administered Medications   Medication Frequency   • bisacodyl (DULCOLAX) suppository 10 mg QDAY PRN   • Respiratory Therapy Consult Continuous RT   • Pharmacy Consult Request ...Pain Management Review 1 Each PHARMACY TO DOSE   • acetaminophen (TYLENOL) tablet 650 mg Q4HRS PRN   • " artificial tears ophthalmic solution 1 Drop PRN   • benzocaine-menthol (CEPACOL) lozenge 1 Lozenge Q2HRS PRN   • mag hydrox-al hydrox-simeth (MAALOX PLUS ES or MYLANTA DS) suspension 20 mL Q2HRS PRN   • ondansetron (ZOFRAN ODT) dispertab 4 mg 4X/DAY PRN    Or   • ondansetron (ZOFRAN) syringe/vial injection 4 mg 4X/DAY PRN   • traZODone (DESYREL) tablet 50 mg QHS PRN   • sodium chloride (OCEAN) 0.65 % nasal spray 2 Spray PRN   • hydrOXYzine HCl (ATARAX) tablet 50 mg Q6HRS PRN   • melatonin tablet 3 mg HS PRN   • lactulose 20 GM/30ML solution 30 mL QDAY PRN   • docusate sodium (ENEMEEZ) enema 283 mg QDAY PRN   • meclizine (ANTIVERT) tablet 25 mg TID PRN   • nitroglycerin (NITROSTAT) tablet 0.4 mg Q5 MIN PRN   • morphine (MS IR) tablet 7.5 mg Q6HRS PRN   • aspirin EC (ECOTRIN) tablet 81 mg DAILY   • atorvastatin (LIPITOR) tablet 80 mg Q EVENING   • clopidogrel (PLAVIX) tablet 75 mg DAILY   • heparin injection 5,000 Units Q8HRS   • omeprazole (PRILOSEC) capsule 20 mg BID   • senna-docusate (PERICOLACE or SENOKOT S) 8.6-50 MG per tablet 2 Tab BID   • magnesium hydroxide (MILK OF MAGNESIA) suspension 30 mL QDAY PRN   • lidocaine 2 % jelly PRN   • LORazepam (ATIVAN) tablet 1 mg Q4HRS PRN       Orders Placed This Encounter   Procedures   • Diet Order Cardiac     Standing Status:   Standing     Number of Occurrences:   1     Order Specific Question:   Diet:     Answer:   Cardiac [6]       Assessment:  Active Hospital Problems    Diagnosis   • *CVA (cerebral vascular accident) ([facility])   • NSTEMI (non-ST elevated myocardial infarction) ([facility])   • Chronic combined systolic and diastolic congestive heart failure ([facility])   • Coronary artery disease involving native coronary artery   • GERD (gastroesophageal reflux disease)   • CKD (chronic kidney disease) stage 3, GFR 30-59 ml/min ([facility])   • Neurogenic bladder   • Urinary retention     This patient is a 87 y.o. male admitted for acute inpatient rehabilitation with CVA (cerebral  vascular accident) ([facility]).    I led and attended the weekly conference, and agree with the IDT conference documentation and plan of care as noted below.    Date of conference: 4/6/2020    Goals and barriers: See IDT note.    Biggest barriers: intermittent chest pain, constipation, left lateral lean with ambulation    Goals in next week: discharge    CM/social support: daughter supportive    Anticipated DC date: 4/13/2020    Home health: PT/OT/SLP/RN    Equip: FWW    Follow up: PCP, cardiology    Medical Decision Making and Plan:    Left cerebellar stroke  Left vertebral artery occlusion  2/2 cath/PCI  Left sided hemiparesis, greatly improved - now with left lateral lean/incoordination  Left sided paresthesias, improved  Non-dominant  Continue full rehab program  PT/OT/SLP, 1 hr each discipline, 5 days per week  Continue aspirin, plavix, and statin for secondary stroke prophylaxis    Dizziness  PRN meclizine, not needing     Chronic urinary retention  Does ICs at home for last 11 years     Peripheral neuropathy  Discontinuing gabapentin as patient doesn't want to take    Headache, intermittent, over left eye, particularly when he looks left, improved frequency and intensity  Likely due to his stroke/vertebral artery occlusion  Advised patient may increase in severity as he doesn't want to take the gabapentin  He is using PRN morphine for his headache, discussed with daughter concern for rebound headaches, but also want to make patient comfortable    Bowel  Constipation, intermittent/improved  Continue bowel meds  Last BM 4/7  KUB 4/7 with mod/large stool burden    DVT prophylaxis  Heparin until he's ambulating over 200 ft    Appreciate assistance of hospitalist with his medical co-morbidities:     Coronary artery disease, aspirin/plavix, statin, PRN morphine for chest pain  S/p NSTEMI x 2 - if patient has symptoms of another heart attack (or stroke), no plan to send to ED, plan to transfer to palliative/hospice -  daughter and patient in agreement  Acute systolic CHF, EF 15%  Hypotension, improved  Chronic kidney disease, stable, renally dose meds/avoid nephrotoxins   Macrocytic Anemia, stable  GERD, omeprazole     Total time:  16 minutes.  I spent greater than 50% of the time for patient care, counseling, and coordination on this date, including patient face-to face time, unit/floor time with review of records/pertinent lab data and studies, as well as discussing diagnostic evaluation/work up, planned therapeutic interventions, and future disposition of care, as per the interval events/subjective and the assessment and plan as noted above.    I have performed a physical exam, reviewed and updated ROS, as well as the assessment and plan today 4/9/2020. In review of note from 4/8/2020 there are no new changes except as documented above.    Eliz Hernandez M.D.   Physical Medicine and Rehabilitation

## 2020-04-09 NOTE — THERAPY
Speech Language Pathology  Daily Treatment     Patient Name: Kyler Donato Jr.  Age:  87 y.o., Sex:  male  Medical Record #: [id number]  Today's Date: 4/9/2020     Subjective    Patient was in room at time of ST. Was willing to participate.      Objective       04/09/20 1302   Cognition   Simple Attention Within Functional Limits (6-7)   Moderate Attention Minimal (4)   SLP Total Time Spent   SLP Individual Total Time Spent (Mins) 60   Charge Group   SLP Cognitive Skill Development First 15 Minutes 1   SLP Cognitive Skill Development Additional 15 Minutes 3     Assessment    Initiated stroke education and reviewed stroke handout.     Plan    Reinforce stoke education

## 2020-04-09 NOTE — PROGRESS NOTES
Received patient on bed. Conscious coherent not in cp distress. Patient complains of headache and requested morphine PO for pain. Patient needs attended. Safety and fall precaution reinforced.

## 2020-04-09 NOTE — THERAPY
"Occupational Therapy  Daily Treatment     Patient Name: Kyler Donato Jr.  Age:  87 y.o., Sex:  male  Medical Record #: [id number]  Today's Date: 4/9/2020     Precautions  Precautions: (P) Fall Risk, Cardiac Precautions (See Comments)  Comments: (P) maintain heart rate below 100 during therapies, SBP above 80.    Safety   ADL Safety : Requires Physical Assist for Safety, Requires Cueing for Safety  Bathroom Safety: Requires Physical Assist for Safety, Requires Cuing for Safety    Subjective    \"I got up at 1:00 this morning to take a shower. I couldn't sleep.\"     Objective       04/09/20 1031   Precautions   Precautions Fall Risk;Cardiac Precautions (See Comments)   Comments maintain heart rate below 100 during therapies, SBP above 80.   IADL Treatments   Kitchen Mobility Education Patient educated on safe kitchen mobility techniques with reaching while holding onto counter or leaning into counter for support.  Sidestepped along counter and retrieved 10 items x 2 with CGA/SBA and cues for safety.   Sitting Lower Body Exercises   Nustep Time (See Comments)  (Nustep machine level 4 x 12 minutes with LEs only)   Balance   Standing Balance (Static) Fair +   Standing Balance (Dynamic) Trace +   Comments with no UE support   Interdisciplinary Plan of Care Collaboration   IDT Collaboration with  Physician   Patient Position at End of Therapy Seated;Call Light within Reach;Tray Table within Reach   Collaboration Comments assessed patient   OT Total Time Spent   OT Individual Total Time Spent (Mins) 60   OT Charge Group   OT Self Care / ADL 1   OT Therapy Activity 2   OT Therapeutic Exercise  1     Dry tub/shower transfer with tub bench and CGA with verbal cues for safety.    Assessment    Patient requires cues for safety with use of tub bench and with kitchen mobility.  Continues with poor dynamic balance in standing, especially with no UE support.    Plan    Continue low level endurance/strength building (don't " push patient too hard), standing balance, ADLs/IADLs

## 2020-04-09 NOTE — THERAPY
Physical Therapy   Daily Treatment     Patient Name: Kyler Donato Jr.  Age:  87 y.o., Sex:  male  Medical Record #: [id number]  Today's Date: 4/9/2020     Precautions  Precautions: (P) Fall Risk, Cardiac Precautions (See Comments)  Comments: (P) maintain heart rate below 100 during therapies, SBP above 80.    Subjective    Pt agreeable to therapy     Objective       04/09/20 0831   Precautions   Precautions Fall Risk;Cardiac Precautions (See Comments)   Comments maintain heart rate below 100 during therapies, SBP above 80.   Vitals   Pulse 72   Blood Pressure  101/54   Room Air Oximetry 97   O2 (LPM) 0   O2 Delivery Device None - Room Air   Vitals Comments hr monitored throughout staring 72bpm increased to 86bpm with exercises, appropriate rest breaks given   Sitting Lower Body Exercises   Sitting Lower Body Exercises Yes  (#2lb and pink resistance band)   Ankle Pumps 3 sets of 10   Hip Abduction 3 sets of 10   Hip Adduction 3 sets of 10   Long Arc Quad 3 sets of 10   Marching 3 sets of 10   Hamstring Curl 3 sets of 10   Neuro-Muscular Treatments   Neuro-Muscular Treatments Verbal Cuing   Comments vc for proper from and control with seated ther ex   Interdisciplinary Plan of Care Collaboration   Patient Position at End of Therapy Seated;Self Releasing Lap Belt Applied   PT Total Time Spent   PT Individual Total Time Spent (Mins) 60   PT Charge Group   PT Therapeutic Exercise 3   PT Therapeutic Activities 1   Supervising Physical Therapist Justice Rendon       [facility] Wheelchair Score:  5 - Standby Prompting/Supervision or Set-up  Wheelchair Description:  Extra time, Supervision for safety(240' x 1 B LE SPV )      Assessment    Pt completed seated LE exercises as indicated above, he was given extended rest breaks between each exercises to monitor his hr and bp, see above    Plan    Monitor vitals and do not push pt too hard - very high risk for cardiac event.  Continue to progress gait training with FWW with  focus on compensation for L lateral pulsion, Vector training no AD, balance activities to assist with ataxia, consider Pepe Chi instruction, HEP, outcome measures, complete remaining vestibular exam within pt tolerance      D/C currently scheduled for 4/13/2020.

## 2020-04-09 NOTE — PROGRESS NOTES
[facility] Medicine Daily Progress Note    Date of Service  4/9/2020    Chief Complaint:  Hypertension  CHF with elevated BNP    Interval History:  No significant events or changes since last visit    Review of Systems  Review of Systems   Constitutional: Negative for fever.   Eyes: Negative for blurred vision.   Respiratory: Negative for cough.    Cardiovascular: Negative for chest pain.   Gastrointestinal: Negative for diarrhea.   Musculoskeletal: Negative for joint pain.   Neurological: Negative for dizziness.   Psychiatric/Behavioral: The patient is not nervous/anxious.         Physical Exam  Temp:  [36.7 °C (98 °F)-37 °C (98.6 °F)] 36.7 °C (98 °F)  Pulse:  [68-79] 68  Resp:  [18-20] 18  BP: ()/(51-62) 98/62    Physical Exam  Vitals signs and nursing note reviewed.   Constitutional:       Appearance: He is not diaphoretic.   HENT:      Mouth/Throat:      Pharynx: No oropharyngeal exudate or posterior oropharyngeal erythema.   Eyes:      Extraocular Movements: Extraocular movements intact.   Neck:      Vascular: No carotid bruit.   Cardiovascular:      Rate and Rhythm: Normal rate and regular rhythm.      Heart sounds: No murmur.   Pulmonary:      Effort: Pulmonary effort is normal.      Breath sounds: Normal breath sounds. No stridor.   Abdominal:      General: Bowel sounds are normal.      Palpations: Abdomen is soft.   Musculoskeletal:      Right lower leg: No edema.      Left lower leg: No edema.   Skin:     General: Skin is warm and dry.      Findings: No rash.   Neurological:      Mental Status: He is alert and oriented to person, place, and time.   Psychiatric:         Mood and Affect: Mood normal.         Behavior: Behavior normal.         Fluids    Intake/Output Summary (Last 24 hours) at 4/9/2020 0822  Last data filed at 4/9/2020 0630  Gross per 24 hour   Intake 1620 ml   Output 1330 ml   Net 290 ml       Laboratory      Recent Labs     04/09/20  0538   SODIUM 140   POTASSIUM 4.0   CHLORIDE 103    CO2 24   GLUCOSE 92   BUN 27*   CREATININE 1.36   CALCIUM 9.5                   Imaging    Assessment/Plan  * CVA (cerebral vascular accident) ([facility])- (present on admission)  Assessment & Plan  Occurred post-op  MRI --> large area of acute infarction in the left PICA territory including the left dorsal lateral medulla  On ASA, Plavix  On Lipitor    CHF (congestive heart failure) ([facility])- (present on admission)  Assessment & Plan  No edema  No lung crackles  O2 sats good on RA  Echo (3/23): severe mitral regurgitation; EF 25%; Global hypokinesis; Grade III diastolic dysfunction; possible severe aortic stenosis   Echo (3/30): EF 15%  BNP: 6942 (3/22) --> 14,253 (3/28) --> 13,571 (3/30) --> 8483 (4/9)  Cont to monitor    Coronary artery disease involving native coronary artery- (present on admission)  Assessment & Plan  Has hx of MI with LAD stent  Had recent NSTEMI s/p mid circumflex PCI  Had recent NSTEMI (at the Rehab) --> Cardio suggest medical management  Echo (3/23): EF 25%  Echo (3/30): EF 15%, grade III diastolic dysfunction, and aortic stenosis  On ASA, Plavix, and Lipitor  No further aggressive intervention per Cardiology  Monitor    CKD (chronic kidney disease) stage 3, GFR 30-59 ml/min ([facility])  Assessment & Plan  Bun/Cr has improved: 27/1.36 (4/9)  Avoid nephrotoxins  Renal dose all meds  Monitor    Essential hypertension- (present on admission)  Assessment & Plan  BP low normal but ok  BP occ dips a little lower  Currently not on any hypertensive meds  Encouraging fluid intake  Note: home meds include Toprol XL 25 mg daily and Cozaar 50 mg daily  Monitor

## 2020-04-10 PROCEDURE — 92611 MOTION FLUOROSCOPY/SWALLOW: CPT

## 2020-04-10 PROCEDURE — 99232 SBSQ HOSP IP/OBS MODERATE 35: CPT | Performed by: HOSPITALIST

## 2020-04-10 PROCEDURE — 97530 THERAPEUTIC ACTIVITIES: CPT | Mod: CQ

## 2020-04-10 PROCEDURE — 99231 SBSQ HOSP IP/OBS SF/LOW 25: CPT | Performed by: PHYSICAL MEDICINE & REHABILITATION

## 2020-04-10 PROCEDURE — A9270 NON-COVERED ITEM OR SERVICE: HCPCS | Performed by: PHYSICAL MEDICINE & REHABILITATION

## 2020-04-10 PROCEDURE — 700111 HCHG RX REV CODE 636 W/ 250 OVERRIDE (IP): Performed by: PHYSICAL MEDICINE & REHABILITATION

## 2020-04-10 PROCEDURE — 770010 HCHG ROOM/CARE - REHAB SEMI PRIVAT*

## 2020-04-10 PROCEDURE — 97112 NEUROMUSCULAR REEDUCATION: CPT

## 2020-04-10 PROCEDURE — 97110 THERAPEUTIC EXERCISES: CPT | Mod: CQ

## 2020-04-10 PROCEDURE — 700102 HCHG RX REV CODE 250 W/ 637 OVERRIDE(OP): Performed by: PHYSICAL MEDICINE & REHABILITATION

## 2020-04-10 PROCEDURE — 97129 THER IVNTJ 1ST 15 MIN: CPT

## 2020-04-10 PROCEDURE — 97130 THER IVNTJ EA ADDL 15 MIN: CPT

## 2020-04-10 PROCEDURE — 97530 THERAPEUTIC ACTIVITIES: CPT

## 2020-04-10 PROCEDURE — 97110 THERAPEUTIC EXERCISES: CPT

## 2020-04-10 RX ADMIN — HEPARIN SODIUM 5000 UNITS: 5000 INJECTION, SOLUTION INTRAVENOUS; SUBCUTANEOUS at 14:25

## 2020-04-10 RX ADMIN — CLOPIDOGREL BISULFATE 75 MG: 75 TABLET ORAL at 08:28

## 2020-04-10 RX ADMIN — HEPARIN SODIUM 5000 UNITS: 5000 INJECTION, SOLUTION INTRAVENOUS; SUBCUTANEOUS at 20:51

## 2020-04-10 RX ADMIN — HEPARIN SODIUM 5000 UNITS: 5000 INJECTION, SOLUTION INTRAVENOUS; SUBCUTANEOUS at 05:15

## 2020-04-10 RX ADMIN — SENNOSIDES AND DOCUSATE SODIUM 2 TABLET: 8.6; 5 TABLET ORAL at 20:51

## 2020-04-10 RX ADMIN — MORPHINE SULFATE 7.5 MG: 15 TABLET ORAL at 20:51

## 2020-04-10 RX ADMIN — ATORVASTATIN CALCIUM 80 MG: 40 TABLET, FILM COATED ORAL at 20:51

## 2020-04-10 RX ADMIN — OMEPRAZOLE 20 MG: 20 CAPSULE, DELAYED RELEASE ORAL at 08:28

## 2020-04-10 RX ADMIN — OMEPRAZOLE 20 MG: 20 CAPSULE, DELAYED RELEASE ORAL at 20:51

## 2020-04-10 RX ADMIN — TRAZODONE HYDROCHLORIDE 50 MG: 50 TABLET ORAL at 20:51

## 2020-04-10 RX ADMIN — MORPHINE SULFATE 7.5 MG: 15 TABLET ORAL at 08:27

## 2020-04-10 RX ADMIN — ASPIRIN 81 MG: 81 TABLET, COATED ORAL at 08:28

## 2020-04-10 ASSESSMENT — ENCOUNTER SYMPTOMS
SHORTNESS OF BREATH: 0
CHILLS: 0
ABDOMINAL PAIN: 0
NERVOUS/ANXIOUS: 0
FEVER: 0
DIARRHEA: 0
VOMITING: 0
NAUSEA: 0

## 2020-04-10 ASSESSMENT — PATIENT HEALTH QUESTIONNAIRE - PHQ9
2. FEELING DOWN, DEPRESSED, IRRITABLE, OR HOPELESS: NOT AT ALL
SUM OF ALL RESPONSES TO PHQ9 QUESTIONS 1 AND 2: 0
1. LITTLE INTEREST OR PLEASURE IN DOING THINGS: NOT AT ALL

## 2020-04-10 NOTE — THERAPY
Speech Language Pathology  Daily Treatment     Patient Name: Kyler Donato Jr.  Age:  87 y.o., Sex:  male  Medical Record #: [id number]  Today's Date: 4/10/2020     Subjective    Pt pleasant and cooperative, assumed care in gym.      Objective     04/10/20 0904   Cognition   Simple Reasoning / Problem Solving Supervision (5)   Medication Management  Supervision (5)   Interdisciplinary Plan of Care Collaboration   Patient Position at End of Therapy Seated;Chair Alarm On;Self Releasing Lap Belt Applied   SLP Total Time Spent   SLP Individual Total Time Spent (Mins) 60   Charge Group   SLP Cognitive Skill Development First 15 Minutes 1   SLP Cognitive Skill Development Additional 15 Minutes 3         Assessment    Fxl med sort 100% given SPV this date. Fxl problem solvin% with SPV. Pt required cues to identify resources and use of daughter for transportation as pt states he is ok to drive at this time.     Plan    Complete outcome assessment testing prior to d/c on Monday.

## 2020-04-10 NOTE — PROGRESS NOTES
[facility] Medicine Daily Progress Note    Date of Service  4/10/2020    Chief Complaint:  Hypertension  CHF with elevated BNP    Interval History:  No significant events or changes since last visit    Review of Systems  Review of Systems   Constitutional: Negative for chills and fever.   Respiratory: Negative for shortness of breath.    Cardiovascular: Negative for chest pain.   Gastrointestinal: Negative for abdominal pain, diarrhea, nausea and vomiting.   Psychiatric/Behavioral: The patient is not nervous/anxious.         Physical Exam  Temp:  [36.5 °C (97.7 °F)-36.7 °C (98.1 °F)] 36.5 °C (97.7 °F)  Pulse:  [61-76] 76  Resp:  [16-18] 16  BP: ()/(47-58) 105/54    Physical Exam  Vitals signs and nursing note reviewed.   Constitutional:       Appearance: Normal appearance.   HENT:      Head: Atraumatic.   Eyes:      Conjunctiva/sclera: Conjunctivae normal.      Pupils: Pupils are equal, round, and reactive to light.   Neck:      Musculoskeletal: Normal range of motion and neck supple.   Cardiovascular:      Rate and Rhythm: Normal rate and regular rhythm.   Pulmonary:      Effort: Pulmonary effort is normal.      Breath sounds: Normal breath sounds.   Abdominal:      General: Bowel sounds are normal.      Palpations: Abdomen is soft.   Musculoskeletal:      Right lower leg: No edema.      Left lower leg: No edema.   Skin:     General: Skin is warm and dry.   Neurological:      Mental Status: He is alert and oriented to person, place, and time.   Psychiatric:         Mood and Affect: Mood normal.         Behavior: Behavior normal.         Fluids    Intake/Output Summary (Last 24 hours) at 4/10/2020 0839  Last data filed at 4/9/2020 213  Gross per 24 hour   Intake 900 ml   Output 800 ml   Net 100 ml       Laboratory      Recent Labs     04/09/20  0538   SODIUM 140   POTASSIUM 4.0   CHLORIDE 103   CO2 24   GLUCOSE 92   BUN 27*   CREATININE 1.36   CALCIUM 9.5                   Imaging    Assessment/Plan  * CVA  (cerebral vascular accident) ([facility])- (present on admission)  Assessment & Plan  Occurred post-op  MRI --> large area of acute infarction in the left PICA territory including the left dorsal lateral medulla  On ASA, Plavix  On Lipitor    CHF (congestive heart failure) ([facility])- (present on admission)  Assessment & Plan  No edema  No lung crackles  O2 sats good on RA  Echo (3/23): severe mitral regurgitation; EF 25%; Global hypokinesis; Grade III diastolic dysfunction; possible severe aortic stenosis   Echo (3/30): EF 15%  BNP: 6942 (3/22) --> 14,253 (3/28) --> 13,571 (3/30) --> 8483 (4/9)  Cont to monitor    Coronary artery disease involving native coronary artery- (present on admission)  Assessment & Plan  Has hx of MI with LAD stent  Had recent NSTEMI s/p mid circumflex PCI  Had recent NSTEMI (at the Rehab) --> Cardio suggest medical management  Echo (3/23): EF 25%  Echo (3/30): EF 15%, grade III diastolic dysfunction, and aortic stenosis  On ASA, Plavix, and Lipitor  No further aggressive intervention per Cardiology  Monitor    CKD (chronic kidney disease) stage 3, GFR 30-59 ml/min ([facility])  Assessment & Plan  Bun/Cr has improved: 27/1.36 (4/9)  Avoid nephrotoxins  Renal dose all meds  Monitor    Essential hypertension- (present on admission)  Assessment & Plan  BP low normal but ok  BP occ dips a little lower  Currently not on any hypertensive meds  Encouraging fluid intake  Note: home meds include Toprol XL 25 mg daily and Cozaar 50 mg daily  Monitor

## 2020-04-10 NOTE — DISCHARGE PLANNING
DME referral sent to [facility].  HH referral sent to [facility] per choice form.  Awaiting responses.

## 2020-04-10 NOTE — PROGRESS NOTES
Received patient on bed. Conscious coherent not in cp distress. Patient complains of headache and chest pain. requested morphine PO for pain. Patient needs attended. Safety and fall precaution reinforced. patient still verbalized he does not want to go to ER for chest pain and only wants comfort measure as ordered.

## 2020-04-10 NOTE — THERAPY
Occupational Therapy  Daily Treatment     Patient Name: Kyler Donato Jr.  Age:  87 y.o., Sex:  male  Medical Record #: [id number]  Today's Date: 4/10/2020     Precautions  Precautions: (P) Fall Risk, Cardiac Precautions (See Comments)  Comments: (P) maintain heart rate below 100 during therapies, SBP above 80.    Safety   ADL Safety : Requires Physical Assist for Safety, Requires Cueing for Safety  Bathroom Safety: Requires Physical Assist for Safety, Requires Cuing for Safety    Subjective    Patient upset about continued poor standing balance during walking.     Objective       04/10/20 1231   Precautions   Precautions Fall Risk;Cardiac Precautions (See Comments)   Comments maintain heart rate below 100 during therapies, SBP above 80.   Sitting Lower Body Exercises   Nustep Time (See Comments)  (level 3 x 13 minutes with LEs only)   Balance   Comments Static standing in parallel bars on airex foam pad with CGA/SBA for balance with min perturbations provided by therapist.   Bed Mobility    Sit to Supine Supervised   OT Total Time Spent   OT Individual Total Time Spent (Mins) 60   OT Charge Group   OT Therapy Activity 3   OT Therapeutic Exercise  1       FIM Lower Body Dressing Score:  6 - Modified Independent  Lower Body Dressing Description:  Assist device/equipment(mod I to doff shoes with elastic laces)    FIM Walking Score:  2 - Max Assistance  Walking Description:  Walker, Safety concerns, Verbal cueing, Supervision for safety(130' x 3 with min A for balance with 3-4 major losses of balance to the left requiring mod A to recover)      Assessment    Patient continues to have major losses of balance to the left during mobility with FWW requiring significant assistance to prevent from falling.      Plan    ADL routine this weekend in prep for d/c home on Monday

## 2020-04-10 NOTE — THERAPY
Physical Therapy   Daily Treatment     Patient Name: Kyler Donato Jr.  Age:  87 y.o., Sex:  male  Medical Record #: [id number]  Today's Date: 4/10/2020     Precautions  Precautions: Fall Risk, Cardiac Precautions (See Comments)  Comments: maintain heart rate below 100 during therapies, SBP above 80.    Subjective    Pt up in wc, willing to participate.     Objective       04/10/20 1031   Sitting Lower Body Exercises   Ankle Pumps 2 sets of 10   Long Arc Quad 2 sets of 10   Neuro-Muscular Treatments   Neuro-Muscular Treatments Biofeedback;Facilitation;Tactile Cuing;Verbal Cuing   Comments standing balance/ posture re-ed training with mirror for visual feedback, UE support at FWW and light tactile cues to maintain midline, 4 x 10 for heel raises / alternating arm raises.  Pre-gait stepping forward/ back with target dot for coordination 2 x 10.    PT Total Time Spent   PT Individual Total Time Spent (Mins) 30   PT Charge Group   PT Neuromuscular Re-Education / Balance 2         Assessment    Pt able to self correct L lateral lean during standing balance/ pre-gait exercises. Requests seated rests appropriately as needed for energy conserevation.    Plan    Monitor vitals and do not push pt too hard - very high risk for cardiac event.  Continue to progress gait training with FWW with focus on compensation for L lateral pulsion, Vector training no AD, balance activities to assist with ataxia, consider Pepe Chi instruction, HEP, outcome measures, complete remaining vestibular exam within pt tolerance      D/C currently scheduled for 4/13/2020.

## 2020-04-10 NOTE — DISCHARGE PLANNING
Call placed to Dr. Mcdaniels, [facility], to request follow up appointment after discharge for patient. I provided patient's daughter's name and number for a contact as well as mine. Fax number for Dr. Mcdaniels is [phone].

## 2020-04-10 NOTE — PROGRESS NOTES
"Rehab Progress Note     Date of Service: 4/10/2020  Chief Complaint: follow up stroke, heart attack    Interval Events (Subjective)    Patient seen and examined today in the therapy gym. He is working with PT on UE exercises. He denies any chest pain, and currently has no headache. He understands plan for discharge home on Monday.        Objective:  VITAL SIGNS: /54   Pulse 76   Temp 36.5 °C (97.7 °F) (Oral)   Resp 16   Ht 1.803 m (5' 11\")   Wt 64 kg (141 lb 1.5 oz)   SpO2 100%   BMI 19.68 kg/m²   Gen: alert, no apparent distress  Neuro: notable for mild left sided incoordination, hard of hearing    Recent Results (from the past 72 hour(s))   Basic Metabolic Panel    Collection Time: 04/09/20  5:38 AM   Result Value Ref Range    Sodium 140 135 - 145 mmol/L    Potassium 4.0 3.6 - 5.5 mmol/L    Chloride 103 96 - 112 mmol/L    Co2 24 20 - 33 mmol/L    Glucose 92 65 - 99 mg/dL    Bun 27 (H) 8 - 22 mg/dL    Creatinine 1.36 0.50 - 1.40 mg/dL    Calcium 9.5 8.5 - 10.5 mg/dL    Anion Gap 13.0 7.0 - 16.0   MAGNESIUM    Collection Time: 04/09/20  5:38 AM   Result Value Ref Range    Magnesium 1.9 1.5 - 2.5 mg/dL   PHOSPHORUS    Collection Time: 04/09/20  5:38 AM   Result Value Ref Range    Phosphorus 3.6 2.5 - 4.5 mg/dL   proBrain Natriuretic Peptide, NT    Collection Time: 04/09/20  5:38 AM   Result Value Ref Range    NT-proBNP 8483 (H) 0 - 125 pg/mL   ESTIMATED GFR    Collection Time: 04/09/20  5:38 AM   Result Value Ref Range    GFR If African American 60 >60 mL/min/1.73 m 2    GFR If Non African American 50 (A) >60 mL/min/1.73 m 2       Current Facility-Administered Medications   Medication Frequency   • bisacodyl (DULCOLAX) suppository 10 mg QDAY PRN   • Respiratory Therapy Consult Continuous RT   • Pharmacy Consult Request ...Pain Management Review 1 Each PHARMACY TO DOSE   • acetaminophen (TYLENOL) tablet 650 mg Q4HRS PRN   • artificial tears ophthalmic solution 1 Drop PRN   • benzocaine-menthol (CEPACOL) " lozenge 1 Lozenge Q2HRS PRN   • mag hydrox-al hydrox-simeth (MAALOX PLUS ES or MYLANTA DS) suspension 20 mL Q2HRS PRN   • ondansetron (ZOFRAN ODT) dispertab 4 mg 4X/DAY PRN    Or   • ondansetron (ZOFRAN) syringe/vial injection 4 mg 4X/DAY PRN   • traZODone (DESYREL) tablet 50 mg QHS PRN   • sodium chloride (OCEAN) 0.65 % nasal spray 2 Spray PRN   • hydrOXYzine HCl (ATARAX) tablet 50 mg Q6HRS PRN   • melatonin tablet 3 mg HS PRN   • lactulose 20 GM/30ML solution 30 mL QDAY PRN   • docusate sodium (ENEMEEZ) enema 283 mg QDAY PRN   • meclizine (ANTIVERT) tablet 25 mg TID PRN   • nitroglycerin (NITROSTAT) tablet 0.4 mg Q5 MIN PRN   • morphine (MS IR) tablet 7.5 mg Q6HRS PRN   • aspirin EC (ECOTRIN) tablet 81 mg DAILY   • atorvastatin (LIPITOR) tablet 80 mg Q EVENING   • clopidogrel (PLAVIX) tablet 75 mg DAILY   • heparin injection 5,000 Units Q8HRS   • omeprazole (PRILOSEC) capsule 20 mg BID   • senna-docusate (PERICOLACE or SENOKOT S) 8.6-50 MG per tablet 2 Tab BID   • magnesium hydroxide (MILK OF MAGNESIA) suspension 30 mL QDAY PRN   • lidocaine 2 % jelly PRN   • LORazepam (ATIVAN) tablet 1 mg Q4HRS PRN       Orders Placed This Encounter   Procedures   • Diet Order Cardiac     Standing Status:   Standing     Number of Occurrences:   1     Order Specific Question:   Diet:     Answer:   Cardiac [6]       Assessment:  Active Hospital Problems    Diagnosis   • *CVA (cerebral vascular accident) ([facility])   • NSTEMI (non-ST elevated myocardial infarction) ([facility])   • Chronic combined systolic and diastolic congestive heart failure ([facility])   • Coronary artery disease involving native coronary artery   • GERD (gastroesophageal reflux disease)   • CKD (chronic kidney disease) stage 3, GFR 30-59 ml/min ([facility])   • Neurogenic bladder   • Urinary retention     This patient is a 87 y.o. male admitted for acute inpatient rehabilitation with CVA (cerebral vascular accident) ([facility]).    I led and attended the weekly conference, and agree with  the IDT conference documentation and plan of care as noted below.    Date of conference: 4/6/2020    Goals and barriers: See IDT note.    Biggest barriers: intermittent chest pain, constipation, left lateral lean with ambulation    Goals in next week: discharge    CM/social support: daughter supportive    Anticipated DC date: 4/13/2020    Home health: PT/OT/SLP/RN    Equip: FWW    Follow up: PCP, cardiology    Medical Decision Making and Plan:    Left cerebellar stroke  Left vertebral artery occlusion  2/2 cath/PCI  Left sided hemiparesis, greatly improved - now with left lateral lean/incoordination  Left sided paresthesias, improved  Non-dominant  Continue full rehab program  PT/OT/SLP, 1 hr each discipline, 5 days per week  Continue aspirin, plavix, and statin for secondary stroke prophylaxis    Dizziness  PRN meclizine, not needing     Chronic urinary retention  Does ICs at home for last 11 years     Peripheral neuropathy  Discontinuing gabapentin as patient doesn't want to take    Headache, intermittent, over left eye, particularly when he looks left, improved frequency and intensity  Likely due to his stroke/vertebral artery occlusion  Advised patient may increase in severity as he doesn't want to take the gabapentin  He is using PRN morphine for his headache, discussed with daughter concern for rebound headaches, but also want to make patient comfortable  Currently using morphine about once daily    Bowel  Constipation, resolved  Continue bowel meds  KUB 4/7 with mod/large stool burden  Last BM 4/9    DVT prophylaxis  Heparin until he's ambulating over 200 ft    Appreciate assistance of hospitalist with his medical co-morbidities:     Coronary artery disease, aspirin/plavix, statin, PRN morphine for chest pain  S/p NSTEMI x 2 - if patient has symptoms of another heart attack (or stroke), no plan to send to ED, plan to transfer to palliative/hospice - daughter and patient in agreement  Acute systolic CHF, EF  15%  Hypotension, improved  Chronic kidney disease, stable, renally dose meds/avoid nephrotoxins   Macrocytic Anemia, stable  GERD, omeprazole     Total time:  16 minutes.  I spent greater than 50% of the time for patient care, counseling, and coordination on this date, including patient face-to face time, unit/floor time with review of records/pertinent lab data and studies, as well as discussing diagnostic evaluation/work up, planned therapeutic interventions, and future disposition of care, as per the interval events/subjective and the assessment and plan as noted above.    I have performed a physical exam, reviewed and updated ROS, as well as the assessment and plan today 4/10/2020. In review of note from 4/9/2020 there are no new changes except as documented above.          Eliz Hernandez M.D.   Physical Medicine and Rehabilitation

## 2020-04-10 NOTE — DISCHARGE PLANNING
Case Management Discharge Instructions        Discharge Location: Home with Home Health     Agency Name /Phone: [facility]-[phone]     Home Health: Registered Nurse, Occupational Therapist, Physical Therapist, Speech Therapist,      DME Provider / Phone: [phone]  Medical Equipment Ordered: Wheelchair, Cushion        Follow-up Information:     [facility]  [address]  [address]  [facility] [address]  [phone]  On 4/27/2020  Cardiology - Monday, April 27, 2020 @ 1:20PM     Zeke Mcdaniels M.D.  [address]  [facility] [id number] [phone]  [facility] to contact you or daughter to make follow up appointment. Discharge summary will be faxed to [phone].

## 2020-04-10 NOTE — THERAPY
Physical Therapy   Daily Treatment     Patient Name: Kyler Donato Jr.  Age:  87 y.o., Sex:  male  Medical Record #: [id number]  Today's Date: 4/10/2020     Precautions  Precautions: (P) Fall Risk, Cardiac Precautions (See Comments)  Comments: (P) maintain heart rate below 100 during therapies, SBP above 80.    Subjective    Pt agreeable to PT session     Objective       04/10/20 1331   Precautions   Precautions Fall Risk;Cardiac Precautions (See Comments)   Comments maintain heart rate below 100 during therapies, SBP above 80.   Vitals   Pulse 88   Vitals Comments hr monitored throughout seated exercise hr remains <90bpm   Sitting Lower Body Exercises   Sitting Lower Body Exercises Yes   Ankle Pumps 3 sets of 10   Hip Abduction 3 sets of 10   Hip Adduction 3 sets of 10   Long Arc Quad 3 sets of 10   Marching 3 sets of 10   Hamstring Curl 3 sets of 10   Interdisciplinary Plan of Care Collaboration   Patient Position at End of Therapy Other (Comments);In Bed;Call Light within Reach   PT Total Time Spent   PT Individual Total Time Spent (Mins) 60   PT Charge Group   PT Therapeutic Exercise 3   PT Therapeutic Activities 1   Supervising Physical Therapist Justice Rendon       FIM Wheelchair Score:  5 - Standby Prompting/Supervision or Set-up  Wheelchair Description:  Supervision for safety, Extra time(400' x 1 B UE SPV for safety)      Assessment    Pt completed seated LE exercise program with SPV, he required vc for proper from and control. The patient has overall improving endurance and continues to benefit from therapy intervention. Pt is looking forward to Monday dc, FT has been completed    Plan    Collect dc IRF/-PEDRO and FIMS

## 2020-04-11 PROCEDURE — 700102 HCHG RX REV CODE 250 W/ 637 OVERRIDE(OP): Performed by: PHYSICAL MEDICINE & REHABILITATION

## 2020-04-11 PROCEDURE — 770010 HCHG ROOM/CARE - REHAB SEMI PRIVAT*

## 2020-04-11 PROCEDURE — 700111 HCHG RX REV CODE 636 W/ 250 OVERRIDE (IP): Performed by: PHYSICAL MEDICINE & REHABILITATION

## 2020-04-11 PROCEDURE — A9270 NON-COVERED ITEM OR SERVICE: HCPCS | Performed by: PHYSICAL MEDICINE & REHABILITATION

## 2020-04-11 PROCEDURE — 99231 SBSQ HOSP IP/OBS SF/LOW 25: CPT | Performed by: HOSPITALIST

## 2020-04-11 PROCEDURE — 99231 SBSQ HOSP IP/OBS SF/LOW 25: CPT | Performed by: PHYSICAL MEDICINE & REHABILITATION

## 2020-04-11 RX ORDER — CALCIUM CARBONATE 500 MG/1
500 TABLET, CHEWABLE ORAL DAILY
Status: DISCONTINUED | OUTPATIENT
Start: 2020-04-11 | End: 2020-04-13 | Stop reason: HOSPADM

## 2020-04-11 RX ADMIN — MORPHINE SULFATE 7.5 MG: 15 TABLET ORAL at 20:00

## 2020-04-11 RX ADMIN — ATORVASTATIN CALCIUM 80 MG: 40 TABLET, FILM COATED ORAL at 20:00

## 2020-04-11 RX ADMIN — HEPARIN SODIUM 5000 UNITS: 5000 INJECTION, SOLUTION INTRAVENOUS; SUBCUTANEOUS at 05:20

## 2020-04-11 RX ADMIN — OMEPRAZOLE 20 MG: 20 CAPSULE, DELAYED RELEASE ORAL at 06:05

## 2020-04-11 RX ADMIN — SENNOSIDES AND DOCUSATE SODIUM 2 TABLET: 8.6; 5 TABLET ORAL at 20:00

## 2020-04-11 RX ADMIN — CLOPIDOGREL BISULFATE 75 MG: 75 TABLET ORAL at 08:01

## 2020-04-11 RX ADMIN — HEPARIN SODIUM 5000 UNITS: 5000 INJECTION, SOLUTION INTRAVENOUS; SUBCUTANEOUS at 14:52

## 2020-04-11 RX ADMIN — TRAZODONE HYDROCHLORIDE 50 MG: 50 TABLET ORAL at 20:00

## 2020-04-11 RX ADMIN — ASPIRIN 81 MG: 81 TABLET, COATED ORAL at 08:02

## 2020-04-11 RX ADMIN — HEPARIN SODIUM 5000 UNITS: 5000 INJECTION, SOLUTION INTRAVENOUS; SUBCUTANEOUS at 21:06

## 2020-04-11 RX ADMIN — OMEPRAZOLE 20 MG: 20 CAPSULE, DELAYED RELEASE ORAL at 20:00

## 2020-04-11 RX ADMIN — MORPHINE SULFATE 7.5 MG: 15 TABLET ORAL at 05:54

## 2020-04-11 RX ADMIN — CALCIUM CARBONATE (ANTACID) CHEW TAB 500 MG 500 MG: 500 CHEW TAB at 11:35

## 2020-04-11 ASSESSMENT — ENCOUNTER SYMPTOMS
HEADACHES: 0
VOMITING: 0
DIZZINESS: 0
PALPITATIONS: 0
HALLUCINATIONS: 0
SHORTNESS OF BREATH: 0
NAUSEA: 0
BLURRED VISION: 0
FEVER: 0

## 2020-04-11 ASSESSMENT — PATIENT HEALTH QUESTIONNAIRE - PHQ9
1. LITTLE INTEREST OR PLEASURE IN DOING THINGS: NOT AT ALL
SUM OF ALL RESPONSES TO PHQ9 QUESTIONS 1 AND 2: 0
2. FEELING DOWN, DEPRESSED, IRRITABLE, OR HOPELESS: NOT AT ALL

## 2020-04-11 NOTE — PROGRESS NOTES
[facility] Medicine Daily Progress Note    Date of Service  4/11/2020    Chief Complaint:  Hypertension  CHF with elevated BNP    Interval History:  No significant events or changes since last visit    Review of Systems  Review of Systems   Constitutional: Negative for fever.   Eyes: Negative for blurred vision.   Respiratory: Negative for shortness of breath.    Cardiovascular: Negative for palpitations.   Gastrointestinal: Negative for nausea and vomiting.   Neurological: Negative for dizziness and headaches.   Psychiatric/Behavioral: Negative for hallucinations.        Physical Exam  Temp:  [36.6 °C (97.8 °F)-37.2 °C (98.9 °F)] 36.6 °C (97.8 °F)  Pulse:  [77-88] 77  Resp:  [16-18] 16  BP: (100-113)/(53-66) 102/58  SpO2:  [99 %] 99 %    Physical Exam  Vitals signs and nursing note reviewed.   Constitutional:       General: He is not in acute distress.  HENT:      Mouth/Throat:      Mouth: Mucous membranes are moist.      Pharynx: Oropharynx is clear.   Eyes:      General: No scleral icterus.  Neck:      Musculoskeletal: No neck rigidity.   Cardiovascular:      Rate and Rhythm: Normal rate and regular rhythm.   Pulmonary:      Effort: Pulmonary effort is normal.      Breath sounds: No wheezing or rales.   Abdominal:      General: Bowel sounds are normal.      Palpations: Abdomen is soft.   Musculoskeletal:      Right lower leg: No edema.      Left lower leg: No edema.   Skin:     General: Skin is warm and dry.   Neurological:      Mental Status: He is alert and oriented to person, place, and time.   Psychiatric:         Mood and Affect: Mood normal.         Behavior: Behavior normal.         Fluids    Intake/Output Summary (Last 24 hours) at 4/11/2020 0843  Last data filed at 4/11/2020 0452  Gross per 24 hour   Intake 1720 ml   Output 1525 ml   Net 195 ml       Laboratory      Recent Labs     04/09/20  0538   SODIUM 140   POTASSIUM 4.0   CHLORIDE 103   CO2 24   GLUCOSE 92   BUN 27*   CREATININE 1.36   CALCIUM 9.5                    Imaging    Assessment/Plan  * CVA (cerebral vascular accident) ([facility])- (present on admission)  Assessment & Plan  Occurred post-op  MRI --> large area of acute infarction in the left PICA territory including the left dorsal lateral medulla  On ASA, Plavix  On Lipitor    CHF (congestive heart failure) ([facility])- (present on admission)  Assessment & Plan  No edema  No lung crackles  O2 sats good on RA  Echo (3/23): severe mitral regurgitation; EF 25%; Global hypokinesis; Grade III diastolic dysfunction; possible severe aortic stenosis   Echo (3/30): EF 15%  BNP: 6942 (3/22) --> 14,253 (3/28) --> 13,571 (3/30) --> 8483 (4/9)  Cont to monitor    Coronary artery disease involving native coronary artery- (present on admission)  Assessment & Plan  Has hx of MI with LAD stent  Had recent NSTEMI s/p mid circumflex PCI  Had recent NSTEMI (at the Rehab) --> Cardio suggest medical management  Echo (3/23): EF 25%  Echo (3/30): EF 15%, grade III diastolic dysfunction, and aortic stenosis  On ASA, Plavix, and Lipitor  No further aggressive intervention per Cardiology  Monitor    CKD (chronic kidney disease) stage 3, GFR 30-59 ml/min ([facility])  Assessment & Plan  Bun/Cr has improved: 27/1.36 (4/9)  Avoid nephrotoxins  Renal dose all meds  Monitor    Essential hypertension- (present on admission)  Assessment & Plan  BP recently ok  Currently not on any hypertensive meds  Encouraging fluid intake  Note: home meds include Toprol XL 25 mg daily and Cozaar 50 mg daily  Monitor

## 2020-04-11 NOTE — CARE PLAN
Problem: Safety  Goal: Will remain free from injury  Outcome: PROGRESSING AS EXPECTED     Problem: Bowel/Gastric:  Goal: Will not experience complications related to bowel motility  Outcome: PROGRESSING AS EXPECTED

## 2020-04-11 NOTE — CARE PLAN
Problem: Communication  Goal: The ability to communicate needs accurately and effectively will improve  Outcome: PROGRESSING AS EXPECTED  Note: Patient uses call light consistently and appropriately this shift.  Waits for assistance when needed and does not attempt self transfer.  Able to verbalize needs.  Will continue to monitor.      Problem: Venous Thromboembolism (VTW)/Deep Vein Thrombosis (DVT) Prevention:  Goal: Patient will participate in Venous Thrombosis (VTE)/Deep Vein Thrombosis (DVT)Prevention Measures  Outcome: PROGRESSING AS EXPECTED  Note: Pt is on heparin, ASA, and Plavix for DVT prophylaxis. No s/s of DVT or edema noted; will continue to monitor.

## 2020-04-11 NOTE — PROGRESS NOTES
"Rehab Progress Note     CC: Stroke, MI    Interval Events (Subjective)  Patient is doing well. Patient states he had some heartburn last night. Patient reports pain is controlled, sleeping well, and overall feeling ok. We discussed starting TUMS for him.       Objective:  VITAL SIGNS: /58   Pulse 77   Temp 36.6 °C (97.8 °F) (Oral)   Resp 16   Ht 1.803 m (5' 11\")   Wt 64 kg (141 lb 1.5 oz)   SpO2 99%   BMI 19.68 kg/m²   Gen: NAD  Psych: Mood & Affect appropriate   CV: RRR, No cyanosis  Resp: CTAB  Abd: NTND  Skin: No visible rashes or lesions  Neuro: Answers questions appropriately, Following commands     Recent Results (from the past 72 hour(s))   Basic Metabolic Panel    Collection Time: 04/09/20  5:38 AM   Result Value Ref Range    Sodium 140 135 - 145 mmol/L    Potassium 4.0 3.6 - 5.5 mmol/L    Chloride 103 96 - 112 mmol/L    Co2 24 20 - 33 mmol/L    Glucose 92 65 - 99 mg/dL    Bun 27 (H) 8 - 22 mg/dL    Creatinine 1.36 0.50 - 1.40 mg/dL    Calcium 9.5 8.5 - 10.5 mg/dL    Anion Gap 13.0 7.0 - 16.0   MAGNESIUM    Collection Time: 04/09/20  5:38 AM   Result Value Ref Range    Magnesium 1.9 1.5 - 2.5 mg/dL   PHOSPHORUS    Collection Time: 04/09/20  5:38 AM   Result Value Ref Range    Phosphorus 3.6 2.5 - 4.5 mg/dL   proBrain Natriuretic Peptide, NT    Collection Time: 04/09/20  5:38 AM   Result Value Ref Range    NT-proBNP 8483 (H) 0 - 125 pg/mL   ESTIMATED GFR    Collection Time: 04/09/20  5:38 AM   Result Value Ref Range    GFR If African American 60 >60 mL/min/1.73 m 2    GFR If Non African American 50 (A) >60 mL/min/1.73 m 2       Current Facility-Administered Medications   Medication Frequency   • calcium carbonate (TUMS) chewable tab 500 mg DAILY   • bisacodyl (DULCOLAX) suppository 10 mg QDAY PRN   • Respiratory Therapy Consult Continuous RT   • Pharmacy Consult Request ...Pain Management Review 1 Each PHARMACY TO DOSE   • acetaminophen (TYLENOL) tablet 650 mg Q4HRS PRN   • artificial tears " ophthalmic solution 1 Drop PRN   • benzocaine-menthol (CEPACOL) lozenge 1 Lozenge Q2HRS PRN   • mag hydrox-al hydrox-simeth (MAALOX PLUS ES or MYLANTA DS) suspension 20 mL Q2HRS PRN   • ondansetron (ZOFRAN ODT) dispertab 4 mg 4X/DAY PRN    Or   • ondansetron (ZOFRAN) syringe/vial injection 4 mg 4X/DAY PRN   • traZODone (DESYREL) tablet 50 mg QHS PRN   • sodium chloride (OCEAN) 0.65 % nasal spray 2 Spray PRN   • hydrOXYzine HCl (ATARAX) tablet 50 mg Q6HRS PRN   • melatonin tablet 3 mg HS PRN   • lactulose 20 GM/30ML solution 30 mL QDAY PRN   • docusate sodium (ENEMEEZ) enema 283 mg QDAY PRN   • meclizine (ANTIVERT) tablet 25 mg TID PRN   • nitroglycerin (NITROSTAT) tablet 0.4 mg Q5 MIN PRN   • morphine (MS IR) tablet 7.5 mg Q6HRS PRN   • aspirin EC (ECOTRIN) tablet 81 mg DAILY   • atorvastatin (LIPITOR) tablet 80 mg Q EVENING   • clopidogrel (PLAVIX) tablet 75 mg DAILY   • heparin injection 5,000 Units Q8HRS   • omeprazole (PRILOSEC) capsule 20 mg BID   • senna-docusate (PERICOLACE or SENOKOT S) 8.6-50 MG per tablet 2 Tab BID   • magnesium hydroxide (MILK OF MAGNESIA) suspension 30 mL QDAY PRN   • lidocaine 2 % jelly PRN   • LORazepam (ATIVAN) tablet 1 mg Q4HRS PRN       Orders Placed This Encounter   Procedures   • Diet Order Cardiac     Standing Status:   Standing     Number of Occurrences:   1     Order Specific Question:   Diet:     Answer:   Cardiac [6]       Assessment:  Active Hospital Problems    Diagnosis   • *CVA (cerebral vascular accident) ([facility])   • NSTEMI (non-ST elevated myocardial infarction) ([facility])   • CHF (congestive heart failure) ([facility])   • Chronic combined systolic and diastolic congestive heart failure ([facility])   • Coronary artery disease involving native coronary artery   • GERD (gastroesophageal reflux disease)   • Essential hypertension   • CKD (chronic kidney disease) stage 3, GFR 30-59 ml/min ([facility])   • Neurogenic bladder   • Urinary retention       Medical Decision Making and Plan:  Patient is  admitted to [facility] for ongoing PT, OT and/or SLP.  Continue medical management per primary team.      In addition:   Starting TUMS 500mg Daily    Total time:  15 minutes.  I spent greater than 50% of the time for patient care and coordination on this date, including unit/floor time, and face-to-face time with the patient as per assessment and plan above.      Jace Mcgraw, DO   Physical Medicine and Rehabilitation

## 2020-04-11 NOTE — PROGRESS NOTES
"Change of shift report obtained, care resumed, vitals stable. Medicated x2 for neck and head pain, pt started c/o \"chest pain\" further explained as reflux, offered pt maalox, refused at this time. Provided AM 0900 dose of  omeprazole at 0600 per patient request. Vitals wnl, pt refused nitro tabs, stating he did not believe it was cardiac related. Will continue to monitor closely and report any worsening or change in condition to provider. No other issues or concerns, pt ICP x2 throughout the night, rested comfortably.   "

## 2020-04-12 PROCEDURE — 700111 HCHG RX REV CODE 636 W/ 250 OVERRIDE (IP): Performed by: PHYSICAL MEDICINE & REHABILITATION

## 2020-04-12 PROCEDURE — 97116 GAIT TRAINING THERAPY: CPT

## 2020-04-12 PROCEDURE — 99231 SBSQ HOSP IP/OBS SF/LOW 25: CPT | Performed by: HOSPITALIST

## 2020-04-12 PROCEDURE — 97535 SELF CARE MNGMENT TRAINING: CPT

## 2020-04-12 PROCEDURE — 700102 HCHG RX REV CODE 250 W/ 637 OVERRIDE(OP): Performed by: PHYSICAL MEDICINE & REHABILITATION

## 2020-04-12 PROCEDURE — 97112 NEUROMUSCULAR REEDUCATION: CPT

## 2020-04-12 PROCEDURE — A9270 NON-COVERED ITEM OR SERVICE: HCPCS | Performed by: PHYSICAL MEDICINE & REHABILITATION

## 2020-04-12 PROCEDURE — 770010 HCHG ROOM/CARE - REHAB SEMI PRIVAT*

## 2020-04-12 PROCEDURE — 97129 THER IVNTJ 1ST 15 MIN: CPT | Performed by: SPEECH-LANGUAGE PATHOLOGIST

## 2020-04-12 PROCEDURE — 97130 THER IVNTJ EA ADDL 15 MIN: CPT | Performed by: SPEECH-LANGUAGE PATHOLOGIST

## 2020-04-12 PROCEDURE — 97530 THERAPEUTIC ACTIVITIES: CPT

## 2020-04-12 RX ADMIN — MORPHINE SULFATE 7.5 MG: 15 TABLET ORAL at 19:51

## 2020-04-12 RX ADMIN — ATORVASTATIN CALCIUM 80 MG: 40 TABLET, FILM COATED ORAL at 20:53

## 2020-04-12 RX ADMIN — ASPIRIN 81 MG: 81 TABLET, COATED ORAL at 08:13

## 2020-04-12 RX ADMIN — CALCIUM CARBONATE (ANTACID) CHEW TAB 500 MG 500 MG: 500 CHEW TAB at 08:14

## 2020-04-12 RX ADMIN — SENNOSIDES AND DOCUSATE SODIUM 2 TABLET: 8.6; 5 TABLET ORAL at 08:13

## 2020-04-12 RX ADMIN — HEPARIN SODIUM 5000 UNITS: 5000 INJECTION, SOLUTION INTRAVENOUS; SUBCUTANEOUS at 05:51

## 2020-04-12 RX ADMIN — OMEPRAZOLE 20 MG: 20 CAPSULE, DELAYED RELEASE ORAL at 08:13

## 2020-04-12 RX ADMIN — CLOPIDOGREL BISULFATE 75 MG: 75 TABLET ORAL at 08:13

## 2020-04-12 RX ADMIN — HEPARIN SODIUM 5000 UNITS: 5000 INJECTION, SOLUTION INTRAVENOUS; SUBCUTANEOUS at 14:53

## 2020-04-12 RX ADMIN — TRAZODONE HYDROCHLORIDE 50 MG: 50 TABLET ORAL at 20:54

## 2020-04-12 RX ADMIN — SENNOSIDES AND DOCUSATE SODIUM 2 TABLET: 8.6; 5 TABLET ORAL at 19:51

## 2020-04-12 RX ADMIN — HEPARIN SODIUM 5000 UNITS: 5000 INJECTION, SOLUTION INTRAVENOUS; SUBCUTANEOUS at 20:53

## 2020-04-12 RX ADMIN — OMEPRAZOLE 20 MG: 20 CAPSULE, DELAYED RELEASE ORAL at 19:51

## 2020-04-12 ASSESSMENT — ENCOUNTER SYMPTOMS
BLURRED VISION: 0
NERVOUS/ANXIOUS: 0
DIARRHEA: 0
DIZZINESS: 0
COUGH: 0
FEVER: 0

## 2020-04-12 ASSESSMENT — MONTREAL COGNITIVE ASSESSMENT (MOCA)
11. FOR EACH PAIR OF WORDS, WHAT CATEGORY DO THEY BELONG TO (OUT OF 2): 0
7. [VIGILENCE] TAP WHEN HEARING DESIGNATED LETTER: 1
6. READ LIST OF DIGITS [FORWARD/BACKWARD]: 2
10. [FLUENCY] NAME WORDS STARTING WITH DESIGNATED LETTER: 1
VISUOSPATIAL/EXECUTIVE SUBSCORE: 3
4. NAME EACH OF THE THREE ANIMALS SHOWN: 3
8. SERIAL SUBTRACTION OF 7S: 1
12. MEMORY INDEX SCORE: 1
9. REPEAT EACH SENTENCE: 2
LEVEL OF SEVERITY: MILD COGNITIVE IMPAIRMENT
WHAT IS THE TOTAL SCORE (OUT OF 30): 19
ORIENTATION SUBSCORE: 5
WHAT LEVEL OF EDUCATION WAS ATTAINED: HIGH SCHOOL EDUCATION

## 2020-04-12 ASSESSMENT — PATIENT HEALTH QUESTIONNAIRE - PHQ9
2. FEELING DOWN, DEPRESSED, IRRITABLE, OR HOPELESS: NOT AT ALL
1. LITTLE INTEREST OR PLEASURE IN DOING THINGS: NOT AT ALL
2. FEELING DOWN, DEPRESSED, IRRITABLE, OR HOPELESS: NOT AT ALL
SUM OF ALL RESPONSES TO PHQ9 QUESTIONS 1 AND 2: 0
1. LITTLE INTEREST OR PLEASURE IN DOING THINGS: NOT AT ALL
SUM OF ALL RESPONSES TO PHQ9 QUESTIONS 1 AND 2: 0

## 2020-04-12 ASSESSMENT — FIBROSIS 4 INDEX: FIB4 SCORE: 1.88

## 2020-04-12 NOTE — THERAPY
Occupational Therapy  Daily Treatment     Patient Name: Kyler Donato Jr.  Age:  87 y.o., Sex:  male  Medical Record #: [id number]  Today's Date: 2020     Precautions  Precautions: Fall Risk, Cardiac Precautions (See Comments)  Comments: maintain heart rate below 100 during therapies, SBP above 80.    Safety   ADL Safety : Requires Physical Assist for Safety, Requires Cueing for Safety  Bathroom Safety: Requires Physical Assist for Safety, Requires Cuing for Safety    Subjective       Objective       20 0701   Precautions   Precautions Fall Risk;Cardiac Precautions (See Comments)   Comments maintain heart rate below 100 during therapies, SBP above 80.   Vitals   O2 Delivery Device None - Room Air   Pain   Intervention Declines   Pain 0 - 10 Group   Therapist Pain Assessment 0   Non Verbal Descriptors   Non Verbal Scale  Calm   Cognition    Level of Consciousness Alert   Bed Mobility    Supine to Sit Supervised   Interdisciplinary Plan of Care Collaboration   IDT Collaboration with  Nursing   Patient Position at End of Therapy Seated;Self Releasing Lap Belt Applied;Chair Alarm On;Call Light within Reach;Tray Table within Reach;Phone within Reach   OT Total Time Spent   OT Individual Total Time Spent (Mins) 60   OT Charge Group   OT Self Care / ADL 4       FIM Grooming Score:  7 - Independent  Grooming Description:  (wc level at sink side for groom/hygiene/denture care)    FIM Bathing Score:  5 - Standby Prompting/Supervision or Set-up  Bathing Description:       FIM Upper Body Dressin - Modified Independent  Upper Body Dressing Description:  Increased time(Mod I to manage pull over shirt)    FIM Lower Body Dressing Score:  6 - Modified Independent  Lower Body Dressing Description:  Increased time(Mod I to manage LB clothing seated/standing via grab bar)    FIM Toiletin - Standby Prompting/Supervision or Set-up  Toileting Description:  Grab bar, Supervision for safety    FIM Toilet  Transfer Score:  5 - Standby Prompting/Supervision or Set-up  Toilet Transfer Description:  Grab bar, Supervision for safety(Sup/SBA to transfer to/from manual wc and commode via grab bar.)    FIM Tub/Shower Transfers Score:  5 - Standby Prompting/Supervision or Set-up  Tub/Shower Transfers Description:  Grab bar, Supervision for safety(SBA to transfer to/from manual wc and shower bench via grab bar.)      Assessment    Pt was alert and cooperative w/ tx.  Sup/SBA for bathroom transfers to/from manual wc via grab bar. Limiters include decreased functional strength, endurance and balance.  See FIM Levels above.    Plan    ADL routine this weekend in prep for d/c home on Monday

## 2020-04-12 NOTE — CARE PLAN
Problem: PT-Long Term Goals  Goal: LTG-By discharge, patient will maintain balance  Description: 1) Individualized goal:  Complete standardized balance assessment with score indicating low fall risk  2) Interventions:  PT Group Therapy, PT E Stim Attended, PT Gait Training, PT Therapeutic Exercises, PT TENS Application, PT Neuro Re-Ed/Balance, PT Aquatic Therapy, PT Therapeutic Activity and PT Manual Therapy         Outcome: NOT MET  Goal: LTG-By discharge, patient will ambulate  Description: 1) Individualized goal:  >150 ft with LRAD, spv   2) Interventions: PT Group Therapy, PT E Stim Attended, PT Gait Training, PT Therapeutic Exercises, PT TENS Application, PT Neuro Re-Ed/Balance, PT Aquatic Therapy, PT Therapeutic Activity and PT Manual Therapy         Outcome: NOT MET  Goal: LTG-By discharge, patient will transfer one surface to another  Description: 1) Individualized goal:  Mod I without AD  2) Interventions: PT Group Therapy, PT E Stim Attended, PT Gait Training, PT Therapeutic Exercises, PT TENS Application, PT Neuro Re-Ed/Balance, PT Aquatic Therapy, PT Therapeutic Activity and PT Manual Therapy         Outcome: NOT MET  Goal: LTG-By discharge, patient will transfer in/out of a car  Description: 1) Individualized goal:  Spv with LRAD  2) Interventions: PT Group Therapy, PT E Stim Attended, PT Gait Training, PT Therapeutic Exercises, PT TENS Application, PT Neuro Re-Ed/Balance, PT Aquatic Therapy, PT Therapeutic Activity and PT Manual Therapy         Outcome: NOT MET

## 2020-04-12 NOTE — THERAPY
"Physical Therapy   Daily Treatment     Patient Name: Kyler Donato Jr.  Age:  87 y.o., Sex:  male  Medical Record #: [id number]  Today's Date: 4/12/2020     Precautions  Precautions: (P) Cardiac Precautions (See Comments), Fall Risk, Other (See Comments)  Comments: (P) maintain heart rate below 100 during therapies, SBP above 80.    Subjective    Patient recalls family training with daughter, and expected D/C tomorrow. Refused curb; ramp at home.     Objective       04/12/20 0831   Precautions   Precautions Cardiac Precautions (See Comments);Fall Risk;Other (See Comments)   Comments maintain heart rate below 100 during therapies, SBP above 80.   Vitals   Pulse 70   Patient BP Position Sitting   Blood Pressure  117/62   Bed Mobility    Supine to Sit Supervised   Sit to Supine Supervised   Sit to Stand Contact Guard Assist  (FWW)   Scooting Supervised   Rolling Supervised   Neuro-Muscular Treatments   Neuro-Muscular Treatments Sequencing;Verbal Cuing;Weight Shift Right;Weight Shift Left   Comments Car CGA + set up SPT with FWW vs car to wc reach pivot.  Refused curb- ramp at home.  Home safety/ fall prevention handouts reviewed and provided.  Supine <> prone <> quadriped CGA/min A for initiation of floor recovery training, atop mat. Unable to complete.    PT Total Time Spent   PT Individual Total Time Spent (Mins) 60   PT Charge Group   PT Gait Training 1   PT Neuromuscular Re-Education / Balance 1   PT Therapeutic Activities 2       FIM Bed/Chair/Wheelchair Transfers Score: 4 - Minimal Assistance  Bed/Chair/Wheelchair Transfers Description:  (CGA SPT FWW, SPV bed mob)    FIM Walking Score:  2 - Max Assistance  Walking Description:  Walker(>50 ft, limited by fatigue CGA FWW indoors)    FIM Wheelchair Score:  5 - Standby Prompting/Supervision or Set-up  Wheelchair Description:       FIM Stairs Score:  2 - Max Assistance  Stairs Description:  Ascends/descends 4 to 11 steps(Up/down 4 6\" stairs with BHR CGA step " to)      Assessment    Patient demonstrated recall in training, and readiness for D/C. Patient continues to be limited by instability and fatigue, but did not demonstrate any LOB.  Recommend short household ambulation at this time. Patient was unable to complete full floor recovery training due to weakness.     Plan    D/C patient home with daughter and HH PT.

## 2020-04-12 NOTE — PROGRESS NOTES
[facility] Medicine Daily Progress Note    Date of Service  4/12/2020    Chief Complaint:  Hypertension  CHF with elevated BNP    Interval History:  No significant events or changes since last visit    Review of Systems  Review of Systems   Constitutional: Negative for fever.   Eyes: Negative for blurred vision.   Respiratory: Negative for cough.    Cardiovascular: Negative for chest pain.   Gastrointestinal: Negative for diarrhea.   Musculoskeletal: Negative for joint pain.   Neurological: Negative for dizziness.   Psychiatric/Behavioral: The patient is not nervous/anxious.         Physical Exam  Temp:  [36.7 °C (98 °F)-37.1 °C (98.7 °F)] 37.1 °C (98.7 °F)  Pulse:  [77-80] 80  Resp:  [17-18] 18  BP: ()/(56-58) 111/58  SpO2:  [93 %] 93 %    Physical Exam  Vitals signs and nursing note reviewed.   Constitutional:       Appearance: He is not diaphoretic.   HENT:      Mouth/Throat:      Pharynx: No oropharyngeal exudate or posterior oropharyngeal erythema.   Eyes:      Extraocular Movements: Extraocular movements intact.   Neck:      Vascular: No carotid bruit.   Cardiovascular:      Rate and Rhythm: Normal rate and regular rhythm.   Pulmonary:      Effort: Pulmonary effort is normal.      Breath sounds: No wheezing or rales.   Abdominal:      Palpations: Abdomen is soft.      Tenderness: There is no abdominal tenderness.   Musculoskeletal:      Right lower leg: No edema.      Left lower leg: No edema.   Skin:     General: Skin is warm and dry.   Neurological:      Mental Status: He is alert and oriented to person, place, and time.   Psychiatric:         Mood and Affect: Mood normal.         Behavior: Behavior normal.         Fluids    Intake/Output Summary (Last 24 hours) at 4/12/2020 0938  Last data filed at 4/12/2020 0541  Gross per 24 hour   Intake 840 ml   Output 900 ml   Net -60 ml       Laboratory                        Imaging    Assessment/Plan  * CVA (cerebral vascular accident) (H[facility])- (present on  admission)  Assessment & Plan  Occurred post-op  MRI --> large area of acute infarction in the left PICA territory including the left dorsal lateral medulla  On ASA, Plavix  On Lipitor  Note: if pt has another stroke --> no further treatment and no transfer to [facility]     CHF (congestive heart failure) ([facility])- (present on admission)  Assessment & Plan  No edema  No lung crackles  O2 sats good on RA  Echo (3/23): severe mitral regurgitation; EF 25%; Global hypokinesis; Grade III diastolic dysfunction; possible severe aortic stenosis   Echo (3/30): EF 15%  BNP: 6942 (3/22) --> 14,253 (3/28) --> 13,571 (3/30) --> 8483 (4/9)  Cont to monitor    Coronary artery disease involving native coronary artery- (present on admission)  Assessment & Plan  Has hx of MI with LAD stent  Had recent NSTEMI s/p mid circumflex PCI  Had recent NSTEMI (at the Rehab) --> Cardio suggest medical management  Echo (3/23): EF 25%  Echo (3/30): EF 15%, grade III diastolic dysfunction, and aortic stenosis  On ASA, Plavix, and Lipitor  No further aggressive intervention per Cardiology  Monitor    CKD (chronic kidney disease) stage 3, GFR 30-59 ml/min ([facility])  Assessment & Plan  Bun/Cr has improved: 27/1.36 (4/9)  Avoid nephrotoxins  Renal dose all meds  Monitor    Essential hypertension- (present on admission)  Assessment & Plan  BP recently ok  Currently not on any hypertensive meds  Encouraging fluid intake  Note: home meds include Toprol XL 25 mg daily and Cozaar 50 mg daily  Monitor

## 2020-04-12 NOTE — CARE PLAN
Problem: Infection  Goal: Will remain free from infection  Outcome: PROGRESSING AS EXPECTED  Note: Patient remains free from s/s infection; afebrile.  Will continue to monitor.      Problem: Skin Integrity  Goal: Risk for impaired skin integrity will decrease  Outcome: PROGRESSING AS EXPECTED  Note: Patient's skin remains intact and free from new or accidental injury this shift.  Will continue to monitor.

## 2020-04-12 NOTE — PROGRESS NOTES
Change of shift report obtained, vitals stable, medicated x1 for generalized/ headache pain. Patient was restless on and off all shift, waking up to sit in his wheelchair then returned to bed a few times throughout the night, no other issues or concerns.

## 2020-04-13 VITALS
HEART RATE: 93 BPM | TEMPERATURE: 98.6 F | HEIGHT: 71 IN | OXYGEN SATURATION: 94 % | DIASTOLIC BLOOD PRESSURE: 67 MMHG | SYSTOLIC BLOOD PRESSURE: 110 MMHG | WEIGHT: 141.4 LBS | RESPIRATION RATE: 18 BRPM | BODY MASS INDEX: 19.8 KG/M2

## 2020-04-13 PROBLEM — Z78.9 IMPAIRED MOBILITY AND ADLS: Status: ACTIVE | Noted: 2020-04-13

## 2020-04-13 PROBLEM — Z74.09 IMPAIRED MOBILITY AND ADLS: Status: ACTIVE | Noted: 2020-04-13

## 2020-04-13 PROCEDURE — 99231 SBSQ HOSP IP/OBS SF/LOW 25: CPT | Performed by: HOSPITALIST

## 2020-04-13 PROCEDURE — 700102 HCHG RX REV CODE 250 W/ 637 OVERRIDE(OP): Performed by: PHYSICAL MEDICINE & REHABILITATION

## 2020-04-13 PROCEDURE — 99239 HOSP IP/OBS DSCHRG MGMT >30: CPT | Performed by: PHYSICAL MEDICINE & REHABILITATION

## 2020-04-13 PROCEDURE — 700111 HCHG RX REV CODE 636 W/ 250 OVERRIDE (IP): Performed by: PHYSICAL MEDICINE & REHABILITATION

## 2020-04-13 PROCEDURE — A9270 NON-COVERED ITEM OR SERVICE: HCPCS | Performed by: PHYSICAL MEDICINE & REHABILITATION

## 2020-04-13 RX ORDER — MORPHINE SULFATE 15 MG/1
7.5 TABLET ORAL EVERY 6 HOURS PRN
Qty: 14 TAB | Refills: 0 | Status: SHIPPED | OUTPATIENT
Start: 2020-04-13 | End: 2020-04-20

## 2020-04-13 RX ORDER — CALCIUM CARBONATE 500 MG/1
500 TABLET, CHEWABLE ORAL DAILY
Qty: 30 TAB | COMMUNITY
Start: 2020-04-14

## 2020-04-13 RX ORDER — TRAZODONE HYDROCHLORIDE 50 MG/1
50 TABLET ORAL
Qty: 30 TAB | Refills: 0 | Status: SHIPPED | OUTPATIENT
Start: 2020-04-13

## 2020-04-13 RX ORDER — ATORVASTATIN CALCIUM 80 MG/1
80 TABLET, FILM COATED ORAL EVERY EVENING
Qty: 30 TAB | Refills: 0 | Status: SHIPPED | OUTPATIENT
Start: 2020-04-13

## 2020-04-13 RX ORDER — CLOPIDOGREL BISULFATE 75 MG/1
75 TABLET ORAL DAILY
Qty: 30 TAB | Refills: 0 | Status: SHIPPED | OUTPATIENT
Start: 2020-04-13

## 2020-04-13 RX ORDER — NITROGLYCERIN 0.4 MG/1
0.4 TABLET SUBLINGUAL PRN
Qty: 25 TAB | Refills: 0 | Status: SHIPPED | OUTPATIENT
Start: 2020-04-13

## 2020-04-13 RX ORDER — OMEPRAZOLE 20 MG/1
20 CAPSULE, DELAYED RELEASE ORAL 2 TIMES DAILY
Qty: 60 CAP | Refills: 0 | Status: SHIPPED | OUTPATIENT
Start: 2020-04-13

## 2020-04-13 RX ADMIN — OMEPRAZOLE 20 MG: 20 CAPSULE, DELAYED RELEASE ORAL at 08:09

## 2020-04-13 RX ADMIN — CALCIUM CARBONATE (ANTACID) CHEW TAB 500 MG 500 MG: 500 CHEW TAB at 08:09

## 2020-04-13 RX ADMIN — CLOPIDOGREL BISULFATE 75 MG: 75 TABLET ORAL at 08:09

## 2020-04-13 RX ADMIN — HEPARIN SODIUM 5000 UNITS: 5000 INJECTION, SOLUTION INTRAVENOUS; SUBCUTANEOUS at 05:42

## 2020-04-13 RX ADMIN — ASPIRIN 81 MG: 81 TABLET, COATED ORAL at 08:09

## 2020-04-13 ASSESSMENT — ENCOUNTER SYMPTOMS
NERVOUS/ANXIOUS: 0
SHORTNESS OF BREATH: 0
NAUSEA: 0
VOMITING: 0
DIARRHEA: 0
CHILLS: 0
FEVER: 0
ABDOMINAL PAIN: 0

## 2020-04-13 ASSESSMENT — ACTIVITIES OF DAILY LIVING (ADL)
TOILETING_LEVEL_OF_ASSIST: REQUIRES SUPERVISION WITH TOILETING
TOILET_TRANSFER_LEVEL_OF_ASSIST: REQUIRES SUPERVISION WITH TOILET TRANSFER
SHOWER_TRANSFER_LEVEL_OF_ASSIST: REQUIRES SUPERVISION WITH SHOWER TRANSFER

## 2020-04-13 NOTE — DISCHARGE SUMMARY
"Rehab Discharge Note    Admission: 4/2/2020    Discharge: 4/13/2020    Admission Diagnosis:   Active Hospital Problems    Diagnosis   • *CVA (cerebral vascular accident) ([facility])   • NSTEMI (non-ST elevated myocardial infarction) ([facility])   • CHF (congestive heart failure) ([facility])   • Chronic combined systolic and diastolic congestive heart failure ([facility])   • Coronary artery disease involving native coronary artery   • GERD (gastroesophageal reflux disease)   • Essential hypertension   • Impaired mobility and ADLs   • CKD (chronic kidney disease) stage 3, GFR 30-59 ml/min ([facility])   • Neurogenic bladder   • Urinary retention       Discharge Diagnosis:  Active Hospital Problems    Diagnosis   • *CVA (cerebral vascular accident) ([facility])   • NSTEMI (non-ST elevated myocardial infarction) ([facility])   • CHF (congestive heart failure) ([facility])   • Chronic combined systolic and diastolic congestive heart failure ([facility])   • Coronary artery disease involving native coronary artery   • GERD (gastroesophageal reflux disease)   • Essential hypertension   • Impaired mobility and ADLs   • CKD (chronic kidney disease) stage 3, GFR 30-59 ml/min ([facility])   • Neurogenic bladder   • Urinary retention       HPI prior to rehab  Per Dr. Mcgraw's consult note \"The patient is a 87 y.o. right hand dominant male with a past medical history of coronary artery disease, ischemic cardiomyopathy with ejection fraction 25%, hypertension, SVT, chronic urinary retention requiring straight cath at home;  who presented on 3/22/2020  3:13 PM with burning chest pain radiating to neck and back with dizziness, nausea and vomiting.  Patient found to have an NSTEMI and was admitted for close cardiac monitoring, serial EKGs and troponins. Patient was initially treated with aspirin and started on IV heparin.  Patient underwent cardiac catheterization and PCI with stent placement in mid circumflex artery on 3/23/2020 by Luis Mccann MD. then on 3/24/2020 patient's nurse " "called rapid response for neurologic changes with headache, left-sided weakness, left-sided ataxia and nystagmus.  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 Patient did not receive TPA. 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 EKG with prolonged qTC of 503. Patient was initially admitted to the rehab hospital on 3/27 and had begun his therapies, when he developed acute chest pain. He had elevated troponin to 2907, so was sent back to acute. He was put on a heparin drip, had repeat Head CT that showed his evolving infarction, no hemorrhage. He was seen by cardiology, had ECHO that showed decreased EF to 15%, and was not a candidate for any intervention.      Initially the plan was for hospice and referral was made, but patient wanted to try rehab to get back home to his wife. The patient tells me he does NOT want to go back to the hospital for any new symptoms of a stroke or heart attack, and would just want to be made comfortable. This was discussed with his daughter Mariia, who is his POA, and she is in agreement. If this were to occur, we would stop therapy, start comfort care only, and try to get him home on hospice.     Patient is currently denying any chest pain.  He reports continued and intermittent numbness in the left side of his face and his left foot.  He is right-hand dominant.  He currently is reporting a very mild headache.  He does do intermittent catheterizations for his chronic neurogenic bladder.     Patient was evaluated by Rehab Medicine physician and Physical Therapy and Occupational Therapy and determined to be appropriate for acute inpatient rehab and was transferred to " [facility] on 4/2/2020  2:32 PM.    Rehab Hospital Course    Left cerebellar stroke  Left vertebral artery occlusion  s/p cath/PCI  Left sided hemiparesis, greatly improved - now with very mild left lateral lean/incoordination  Left sided paresthesias, improved  Non-dominant  Continue aspirin, plavix, and statin for secondary stroke prophylaxis     Dizziness, resolved  PRN meclizine, not needing     Chronic urinary retention  Does ICs at home for last 11 years     Peripheral neuropathy  Discontinued gabapentin as patient doesn't want to take     Headache, intermittent, over left eye, particularly when he looks left, improved frequency and intensity  Likely due to his stroke/vertebral artery occlusion  He is using PRN morphine for his headache, discussed with daughter concern for rebound headaches, but also want to make patient comfortable  Currently using morphine about once daily     Bowel  Constipation, resolved  Continue bowel meds  KUB 4/7 with mod/large stool burden  Last BM 4/13     DVT prophylaxis  Heparin until he's ambulating over 200 ft, continued though out his stay     Appreciated the assistance of hospitalist with his medical co-morbidities:     Coronary artery disease, aspirin/plavix, statin, PRN morphine for chest pain - was not having any more chest pain at discharge, did not need to use any nitro, but was given some at discharge  S/p NSTEMI x 2 - if patient has symptoms of another heart attack (or stroke), no plan to send to ED, plan to transfer to palliative/hospice - daughter and patient in agreement  Acute systolic CHF, EF 15%  Hypotension, improved/resolved  Chronic kidney disease, stable, renally dose meds/avoid nephrotoxins   Macrocytic Anemia, stable  GERD, omeprazole , TUMS    Labs    Lab Results   Component Value Date/Time    SODIUM 140 04/09/2020 05:38 AM    POTASSIUM 4.0 04/09/2020 05:38 AM    CHLORIDE 103 04/09/2020 05:38 AM    CO2 24 04/09/2020 05:38 AM    GLUCOSE 92  2020 05:38 AM    BUN 27 (H) 2020 05:38 AM    CREATININE 1.36 2020 05:38 AM      Lab Results   Component Value Date/Time    WBC 8.4 2020 05:17 AM    RBC 3.16 (L) 2020 05:17 AM    HEMOGLOBIN 10.3 (L) 2020 05:17 AM    HEMATOCRIT 32.2 (L) 2020 05:17 AM    .9 (H) 2020 05:17 AM    MCH 32.6 2020 05:17 AM    MCHC 32.0 (L) 2020 05:17 AM    MPV 10.7 2020 05:17 AM    NEUTSPOLYS 66.80 2020 05:17 AM    LYMPHOCYTES 18.10 (L) 2020 05:17 AM    MONOCYTES 11.60 2020 05:17 AM    EOSINOPHILS 2.60 2020 05:17 AM    BASOPHILS 0.50 2020 05:17 AM          Functional Status at Discharge  Eatin - Modified Independent  Eating Description:  Insert dentures  Groomin - Independent  Grooming Description:  (wc level at sink side for groom/hygiene/denture care)  Bathin - Standby Prompting/Supervision or Set-up  Bathing Description:  Grab bar, Tub bench, Hand held shower, Increased time, Supervision for safety(Sup/SBA in stand via grab bar for brandt, Mod I for remainder)  Upper Body Dressin - Modified Independent  Upper Body Dressing Description:  Increased time(Mod I to manage pull over shirt)  Lower Body Dressin - Modified Independent  Lower Body Dressing Description:  6 - Modified Independent  Discharge Location : Home  Level of Supervision Required: Intermittent Supervision  Recommended Equipment for Discharge: Shower Chair;Grab Bars in Tub / Shower  Recommended Services Upon Discharge: Home Health Occupational Therapy  Long Term Goals Met: 2  Long Term Goals Not Met: 1  Reason(s) for Goals Not Met: Assist for IADL's  Criteria for Termination of Services: Maximum Function Achieved for Inpatient Rehabilitation  Walk:  2 - Max Assistance  Distance Walked:  Walks  feet  Walk Description:  Walker(>50 ft, limited by fatigue CGA FWW indoors)  Wheelchair:  5 - Standby Prompting/Supervision or Set-up  Distance Propelled:   "Propels a minimum of 150 feet   Wheelchair Description:  Supervision for safety, Extra time(400' x 1 B UE SPV for safety)  Stairs 2 - Max Assistance  Stairs DescriptionAscends/descends 4 to 11 steps(Up/down 4 6\" stairs with BHR CGA step to)  Discharge Location: Relative / Friend's Home  Patient Discharging with Assist of: Family  Level of Supervision Required Upon Discharge: Intermittent Supervision  Recommended Equipment for Discharge: Front-Wheeled Walker  Recommeded Services Upon Discharge: Home Health Physical Therapy  Long Term Goals Met: N/A- See POC  Long Term Goals Not Met: All- Patient continues to require CGA with mobility, and continues to be a fall risk  Reason(s) for Goals Not Met: L imbalance, fatigues easily  Comprehension Mode:  Both  Comprehension:  5 - Stand-by Prompting/Supervision or Set-up  Comprehension Description:  Hearing aids/amplifiers, Verbal cues, Glasses  Expression Mode:  Vocal  Expression:  7 - Independent  Expression Description:     Social Interaction:  7 - Independent  Social Interaction Description:     Problem Solvin - Standby Prompting/Supervision or Set-up  Problem Solving Description:  Verbal cueing, Increased time, Seat belt, Therapy schedule  Memory:  5 - Standby Prompting/Supervision or Set-up  Memory Description:  Verbal cueing, Therapy schedule       Discharge Medication:     Medication List      START taking these medications      Instructions   calcium carbonate 500 MG Chew  Start taking on:  2020  Commonly known as:  TUMS   Take 1 Tab by mouth every day.  Dose:  500 mg     morphine 15 MG tablet  Commonly known as:  MS IR   Take 0.5 Tabs by mouth every 6 hours as needed for up to 7 days.  Dose:  7.5 mg     traZODone 50 MG Tabs  Commonly known as:  DESYREL   Take 1 Tab by mouth at bedtime as needed.  Dose:  50 mg        CONTINUE taking these medications      Instructions   aspirin EC 81 MG Tbec  Commonly known as:  ECOTRIN   Take 1 Tab by mouth every " day.  Dose:  81 mg     atorvastatin 80 MG tablet  Commonly known as:  LIPITOR   Take 1 Tab by mouth every evening.  Dose:  80 mg     clopidogrel 75 MG Tabs  Commonly known as:  PLAVIX   Take 1 Tab by mouth every day.  Dose:  75 mg     nitroglycerin 0.4 MG Subl  Commonly known as:  NITROSTAT   Place 1 Tab under tongue as needed for Chest Pain (up to 3 doses (if SBP greater than 90 mmHg)).  Dose:  0.4 mg     omeprazole 20 MG delayed-release capsule  Commonly known as:  PRILOSEC   Take 1 Cap by mouth 2 Times a Day.  Dose:  20 mg        STOP taking these medications    Cholecalciferol 1000 UNIT Tabs  Commonly known as:  VITAMIND D3     gabapentin 400 MG Caps  Commonly known as:  NEURONTIN     heparin 5000 UNIT/ML Soln     Magnesium Oxide 420 (252 Mg) MG Tabs     meclizine 25 MG Tabs  Commonly known as:  ANTIVERT     senna-docusate 8.6-50 MG Tabs  Commonly known as:  PERICOLACE or SENOKOT S              Discharge Location: Home with Home Health     Agency Name /Phone: [facility]-[phone]     Home Health: Registered Nurse, Occupational Therapist, Physical Therapist, Speech Therapist,      DME Provider / Phone: [facility]-[phone]  Medical Equipment Ordered: Wheelchair, Cushion        Follow-up Information:     C[place]AND Ruby  [address]  [address]  [facility] [address]  [phone]  On 4/27/2020  Cardiology - Monday, April 27, 2020 @ 1:20PM     Zeke Mcdaniels M.D.  [address] [facility] [id number] [phone]  [facility] to contact you or daughter to make follow up appointment. Discharge summary will be faxed to [phone].     Condition on Discharge:  Good.    More than 33 minutes was spent on discharging this patient, including face-to-face time, prescription management, and the dictation of this note.

## 2020-04-13 NOTE — PROGRESS NOTES
[facility] Medicine Daily Progress Note    Date of Service  4/13/2020    Chief Complaint:  Hypertension  CHF with elevated BNP    Interval History:  No significant events or changes since last visit    Review of Systems  Review of Systems   Constitutional: Negative for chills and fever.   Respiratory: Negative for shortness of breath.    Cardiovascular: Negative for chest pain.   Gastrointestinal: Negative for abdominal pain, diarrhea, nausea and vomiting.   Psychiatric/Behavioral: The patient is not nervous/anxious.         Physical Exam  Temp:  [36.6 °C (97.9 °F)-37.1 °C (98.7 °F)] 37 °C (98.6 °F)  Pulse:  [70-93] 93  Resp:  [17-18] 18  BP: ()/(52-67) 110/67  SpO2:  [94 %-95 %] 94 %    Physical Exam  Vitals signs and nursing note reviewed.   Constitutional:       Appearance: Normal appearance.   HENT:      Head: Atraumatic.   Eyes:      Conjunctiva/sclera: Conjunctivae normal.      Pupils: Pupils are equal, round, and reactive to light.   Neck:      Musculoskeletal: Normal range of motion and neck supple.   Cardiovascular:      Rate and Rhythm: Normal rate and regular rhythm.      Heart sounds: No murmur.   Pulmonary:      Effort: Pulmonary effort is normal.      Breath sounds: No stridor. No wheezing or rales.   Abdominal:      General: There is no distension.      Palpations: Abdomen is soft.      Tenderness: There is no abdominal tenderness.   Musculoskeletal:      Right lower leg: No edema.      Left lower leg: No edema.   Skin:     General: Skin is warm and dry.      Findings: No rash.   Neurological:      Mental Status: He is alert and oriented to person, place, and time.   Psychiatric:         Mood and Affect: Mood normal.         Behavior: Behavior normal.         Fluids    Intake/Output Summary (Last 24 hours) at 4/13/2020 0741  Last data filed at 4/13/2020 0300  Gross per 24 hour   Intake 700 ml   Output 1200 ml   Net -500 ml       Laboratory                        Imaging    Assessment/Plan  * CVA  (cerebral vascular accident) (HCC)- (present on admission)  Assessment & Plan  Occurred post-op  MRI --> large area of acute infarction in the left PICA territory including the left dorsal lateral medulla  On ASA, Plavix  On Lipitor  Note: if pt has another stroke --> no further treatment and [place] [facility] [place]     [place] (congestive heart failure) ([facility])- (present on admission)  Assessment & Plan  No edema  No lung crackles  O2 sats good on RA  Echo (3/23): severe mitral regurgitation; EF 25%; Global hypokinesis; Grade III diastolic dysfunction; possible severe aortic stenosis   Echo (3/30): EF 15%  BNP: 6942 (3/22) --> 14,253 (3/28) --> 13,571 (3/30) --> 8483 (4/9)  Cont to monitor    Coronary artery disease involving native coronary artery- (present on admission)  Assessment & Plan  Has hx of MI with LAD stent  Had recent NSTEMI s/p mid circumflex PCI  Had recent NSTEMI (at the Rehab) --> Cardio suggest medical management  Echo (3/23): EF 25%  Echo (3/30): EF 15%, grade III diastolic dysfunction, and aortic stenosis  On ASA, Plavix, and Lipitor  No further aggressive intervention per Cardiology  Monitor    CKD (chronic kidney disease) stage 3, GFR 30-59 ml/min (Formerly [facility])  Assessment & Plan  Bun/Cr has improved: 27/1.36 (4/9)  Avoid nephrotoxins  Renal dose all meds  Monitor    Essential hypertension- (present on admission)  Assessment & Plan  BP recently ok  Currently not on any hypertensive meds  Encouraging fluid intake  Note: home meds include Toprol XL 25 mg daily and Cozaar 50 mg daily  Monitor

## 2020-04-13 NOTE — THERAPY
Speech Language Pathology  Daily Treatment     Patient Name: Kyler Donato Jr.  Age:  87 y.o., Sex:  male  Medical Record #: [id number]  Today's Date: 4/12/2020     Subjective    Pt was seen for d/c testing, seated in room. Pt was initially in the restroom with RN, attempting to place catheter, when SLP arrived. SLP waited for pt for initial 25 minutes minutes of the session, limiting tx to 35 minutes. Pt was awake and alert. Pt's HA was producing feedback, impacting pt's hearing and likely impacting results of assessment. SLP attempted to place HA and reduce feedback but it wasn't effective.      Objective       04/12/20 1331   Receptive Language / Auditory Comprehension   Receptive Language / Auditory Comprehension WDL   Answers Yes / No Personal Questions Within Functional Limits (6-7)   Answers Yes / No Simple / Contextual Questions Within Functional Limits (6-7)   Follows One Unit Commands Within Functional Limits (6-7)   Understands Complex Conversation Minimal (4)   Expressive Language   Expressive Language (WDL) WDL   Cognition   Orientation  Within Functional Limits (6-7)   Functional Memory Activities Minimal (4)   Simple Reasoning / Problem Solving Supervision (5)   Outcome Measures   Outcome Measures Utilized MoCA   MoCA (Bernabe Cognitive Assessment)   Visuospatial/Executive 3   Naming 3   Attention - Digits 2   Attention - Letters 1   Attention - Serial 7 Subtraction 1   Language - Repeat 2   Language - Fluency 1   Language - Abstraction 0   Delayed Recall 1   Orientation 5   Level of Education 0   Total 19   Level of Severity Mild cognitive impairment   Interdisciplinary Plan of Care Collaboration   IDT Collaboration with  Nursing   Patient Position at End of Therapy Seated;Call Light within Reach;Tray Table within Reach   Therapy Missed   Missed Therapy (Minutes) 25   Reason For Missed Therapy Non-Medical - Other (Please Comment)  (Pt in restroom for initial 25 minutes of tx, RN aware)   SLP  Total Time Spent   SLP Individual Total Time Spent (Mins) 35   Charge Group   Charges Yes   SLP Cognitive Skill Development First 15 Minutes 1   SLP Cognitive Skill Development Additional 15 Minutes 1       Assessment    SLP initially attempted the SCCAN for d/c testing. However, given the time constraint, SLP administered the MoCA instead. Pt scored 19/30, indicating a mild cognitive impairment. This is consistent with initial assessment, also indicating mild cognitive deficits. Pt with current deficits in the areas of executive functions and memory.     Plan  Pt will require assistance with IADLs at d/c 2* mild cognitive impairment. Pt reported that he will be living with his daughter. [facility] ungers training daughter for medication management at d/c.

## 2020-04-13 NOTE — PROGRESS NOTES
Patient discharged to home per order.  Discharge instructions reviewed with patient and daughter; they verbalize understanding and signed copies placed in chart.  Patient has all belongings; signed copy of form in chart.  Patient left facility at 1015 via w/c accompanied by rehab staff.  Have enjoyed working with this pleasant patient.

## 2020-04-13 NOTE — PROGRESS NOTES
Change of shift report obtained, vitals stable, medicated x1 for headache, prn sleep aid given per request. Patient was unable to sleep, he was restless waking up and sitting in the wheelchair throughout the night multiple times. Pt icp x1 this shift at 0330, no other issues or concerns noted.

## 2020-04-13 NOTE — DISCHARGE PLANNING
Case management Summary:   Met with patient and his daughter prior to discharge.     Reviewed all follow up appointments.     Referral made to [facility] and they have accepted referral and are ready to follow.      [employer] has delivered a standard WC and cushion to patient.      During hospitalization, I have provided support and education and have been available for questions and information during hours of operation, communicated with therapy team and MD along with providing links/resources  to outside services.    Patient verbalizes agreement with all plans and has an understanding of the next steps within the post acute services.     Individualized Goals:   1. Improve functional status to return home with intermittent support of daughter  2. Improve functional status to return home, care for wife with assistance of his daughter, his other dtr and step dtr who live out of town    Outcome:   1. Met  2. In-progress-will continue therapies with home health

## 2020-04-14 ENCOUNTER — TELEPHONE (OUTPATIENT)
Dept: CARDIOLOGY | Facility: MEDICAL CENTER | Age: 85
End: 2020-04-14

## 2020-04-14 NOTE — TELEPHONE ENCOUNTER
SERENA    Patient daughter, Kisha, called to advise that this appointment they have with SERENA on 4/27/2020 may not work. They do not have a way to do a virtual appointment and this patient was recently in the hospital with a heart attack. They are not sure that the appointment should wait until we can see patients in the office again. Would this patient be okay to come in and if so when would be a good time to have that set up?       Thank you,  Georgina PULIDO/

## 2020-04-14 NOTE — DISCHARGE INSTRUCTIONS
Speech Therapy Discharge Instructions for Todd Donato Jr.    4/14/2020    Diet: Regular (7), Thin (0)  Swallow Strategies: NA  Other Diet Instructions: NA  Supervision: 24 hour supervision is recommended for safety at this time  Cognition / Communication: It has been a pleasure working with you, Todd! Please be safe and keep up the great work at home. ~ Claribel Tay MS, CCC-SLP    Stroke Prevention  Some health problems and behaviors may make it more likely for you to have a stroke. Below are ways to lessen your risk of having a stroke.  · Be active for at least 30 minutes on most or all days.  · Do not smoke. Try not to be around others who smoke.  · Do not drink too much alcohol.  ¨ Do not have more than 2 drinks a day if you are a man.  ¨ Do not have more than 1 drink a day if you are a woman and are not pregnant.  · Eat healthy foods, such as fruits and vegetables. If you were put on a specific diet, follow the diet as told.  · Keep your cholesterol levels under control through diet and medicines. Look for foods that are low in saturated fat, trans fat, cholesterol, and are high in fiber.  · If you have diabetes, follow all diet plans and take your medicine as told.  · Ask your doctor if you need treatment to lower your blood pressure. If you have high blood pressure (hypertension), follow all diet plans and take your medicine as told by your doctor.  · If you are 18-39 years old, have your blood pressure checked every 3-5 years. If you are age 40 or older, have your blood pressure checked every year.  · Keep a healthy weight. Eat foods that are low in calories, salt, saturated fat, trans fat, and cholesterol.  · Do not take drugs.  · Avoid birth control pills, if this applies. Talk to your doctor about the risks of taking birth control pills.  · Talk to your doctor if you have sleep problems (sleep apnea).  · Take all medicine as told by your doctor.  ¨ You may be told to take aspirin or blood thinner  medicine. Take this medicine as told by your doctor.  ¨ Understand your medicine instructions.  · Make sure any other conditions you have are being taken care of.  Get help right away if:  · You suddenly lose feeling (you feel numb) or have weakness in your face, arm, or leg.  · Your face or eyelid hangs down to one side.  · You suddenly feel confused.  · You have trouble talking (aphasia) or understanding what people are saying.  · You suddenly have trouble seeing in one or both eyes.  · You suddenly have trouble walking.  · You are dizzy.  · You lose your balance or your movements are clumsy (uncoordinated).  · You suddenly have a very bad headache and you do not know the cause.  · You have new chest pain.  · Your heart feels like it is fluttering or skipping a beat (irregular heartbeat).  Do not wait to see if the symptoms above go away. Get help right away. Call your local emergency services (911 in [place] Do not drive yourself to the hospital.   This information is not intended to replace advice given to you by your health care provider. Make sure you discuss any questions you have with your health care provider.  Document Released: 06/18/2013 Document Revised: 05/25/2017 Document Reviewed: 06/20/2014  [employer] Interactive Patient Education © 2017 [employer]    Fall Prevention in the Home  Falls can cause injuries and can affect people from all age groups. There are many simple things that you can do to make your home safe and to help prevent falls.  What can I do on the outside of my home?  · Regularly repair the edges of walkways and driveways and fix any cracks.  · Remove high doorway thresholds.  · Trim any shrubbery on the main path into your home.  · Use bright outdoor lighting.  · Clear walkways of debris and clutter, including tools and rocks.  · Regularly check that handrails are securely fastened and in good repair. Both sides of any steps should have handrails.  · Install guardrails along the  edges of any raised decks or porches.  · Have leaves, snow, and ice cleared regularly.  · Use sand or salt on walkways during winter months.  · In the garage, clean up any spills right away, including grease or oil spills.  What can I do in the bathroom?  · Use night lights.  · Install grab bars by the toilet and in the tub and shower. Do not use towel bars as grab bars.  · Use non-skid mats or decals on the floor of the tub or shower.  · If you need to sit down while you are in the shower, use a plastic, non-slip stool.  · Keep the floor dry. Immediately clean up any water that spills on the floor.  · Remove soap buildup in the tub or shower on a regular basis.  · Attach bath mats securely with double-sided non-slip rug tape.  · Remove throw rugs and other tripping hazards from the floor.  What can I do in the bedroom?  · Use night lights.  · Make sure that a bedside light is easy to reach.  · Do not use oversized bedding that drapes onto the floor.  · Have a firm chair that has side arms to use for getting dressed.  · Remove throw rugs and other tripping hazards from the floor.  What can I do in the kitchen?  · Clean up any spills right away.  · Avoid walking on wet floors.  · Place frequently used items in easy-to-reach places.  · If you need to reach for something above you, use a sturdy step stool that has a grab bar.  · Keep electrical cables out of the way.  · Do not use floor polish or wax that makes floors slippery. If you have to use wax, make sure that it is non-skid floor wax.  · Remove throw rugs and other tripping hazards from the floor.  What can I do in the stairways?  · Do not leave any items on the stairs.  · Make sure that there are handrails on both sides of the stairs. Fix handrails that are broken or loose. Make sure that handrails are as long as the stairways.  · Check any carpeting to make sure that it is firmly attached to the stairs. Fix any carpet that is loose or worn.  · Avoid having  throw rugs at the top or bottom of stairways, or secure the rugs with carpet tape to prevent them from moving.  · Make sure that you have a light switch at the top of the stairs and the bottom of the stairs. If you do not have them, have them installed.  What are some other fall prevention tips?  · Wear closed-toe shoes that fit well and support your feet. Wear shoes that have rubber soles or low heels.  · When you use a stepladder, make sure that it is completely opened and that the sides are firmly locked. Have someone hold the ladder while you are using it. Do not climb a closed stepladder.  · Add color or contrast paint or tape to grab bars and handrails in your home. Place contrasting color strips on the first and last steps.  · Use mobility aids as needed, such as canes, walkers, scooters, and crutches.  · Turn on lights if it is dark. Replace any light bulbs that burn out.  · Set up furniture so that there are clear paths. Keep the furniture in the same spot.  · Fix any uneven floor surfaces.  · Choose a carpet design that does not hide the edge of steps of a stairway.  · Be aware of any and all pets.  · Review your medicines with your healthcare provider. Some medicines can cause dizziness or changes in blood pressure, which increase your risk of falling.  Talk with your health care provider about other ways that you can decrease your risk of falls. This may include working with a physical therapist or  to improve your strength, balance, and endurance.  This information is not intended to replace advice given to you by your health care provider. Make sure you discuss any questions you have with your health care provider.  Document Released: 12/08/2003 Document Revised: 05/16/2017 Document Reviewed: 01/22/2016  [employer] Interactive Patient Education © 2017 [employer]    Helping Someone Who is Suicidal  Suicide is when someone takes his or her own life. Someone who is thinking about suicide needs  immediate help. Although you might not know what to say or do to help, start by letting that person know you care. Listen to him or her. Then talk about how to get help. Help is available through therapy, medicine, and other treatments.  What are signs that someone is suicidal?  Common signs include:  · Signs of depression, such as:  ¨ Rage.  ¨ Irritability.  ¨ Shame.  ¨ Excessive worry.  ¨ Loss of interest in things the person once enjoyed.  · Changes in social behaviors and relationships, including:  ¨ Isolating oneself.  ¨ Withdrawing from friends and family.  ¨ Giving away possessions.  ¨ Saying good-bye.  ¨ Acting aggressively.  ¨ Sleeping more or less than usual.  ¨ Having trouble managing school or work.  ¨ Talking about feeling hopeless or being a burden.  ¨ Engaging in risky behaviors, such as drinking more alcohol or using more drugs.  What are the risk factors for suicide?  Risk factors for suicide include:  · Other suicides in the family.  · A history of suicide attempts.  · Depression or other mental health issues.  · Being in assisted or facing assisted time.  · Having had close friends who have committed suicide.  · Alcohol or drug abuse, especially combined with a mental illness.  What should I do if someone is suicidal?  If you believe a person is in immediate danger of committing suicide, call your local emergency services (911 in the [place] for help.  If a person says he or she wants to commit suicide, take the threat seriously. Help the person get help right away by:  · Calling your local emergency services.  · Calling a suicide prevention hotline.  · Contacting a crisis center or a local suicide prevention center. These are often located at hospitals, clinics, community service organizations, social service providers, or health departments.  If a person confides in you that he or she is considering suicide:  · Listen to the person's thoughts and concerns with compassion.  · Let the person know you will  stay with him or her.  · Ask if the person is having thoughts of hurting himself or herself.  · Offer to help the person get to a doctor or mental health professional.  · Remove all weapons and medicines from the person's living space.  · Do not promise to keep his or her thoughts of suicide a secret.  This information is not intended to replace advice given to you by your health care provider. Make sure you discuss any questions you have with your health care provider.  Document Released: 06/23/2004 Document Revised: 05/25/2017 Document Reviewed: 05/28/2015  [employer] Interactive Patient Education © 2017 [employer]      Physical Therapy Discharge Instructions for Todd Reynoldsus Hernandez    4/12/2020    Level of Assist Required for Ambulation: Assist for Balance on Flat Surfaces, Assist for Balance on Stairs(Use of 2 railings on stairs )  Distance Patient May Ambulate: >50 ft, or until fatigued, encourage short distance ambulation for increased safety at this time.  Device Recommended for Ambulation: Front-Wheeled Walker  Level of Assist Required to Propel Wheelchair: Supervision  Level of Assist Required for Transfers: Physical Assist  Device Recommended for Transfers: Front-Wheeled Walker    Occupational Therapy Discharge Instructions for Kyler Reynoldsus Hernandez    4/13/2020    Level of Assist Required for Eating: Able to Complete Eating without Assist  Level of Assist Required for Grooming: Able to Complete Grooming without Assist  Level of Assist Required for Dressing: Able to Complete Dressing without Assist  Level of Assist Required for Toileting: Requires Supervision with Toileting  Level of Assist Required for Toilet Transfer: Requires Supervision with Toilet Transfer  Equipment for Toilet Transfer: Grab Bars by Toilet  Level of Assist Required for Bathing: Requires Supervision with Bathing  Equipment for Bathing: Shower Chair, Long Handled Sponge, Grab Bars in Tub / Shower, Hand Held Shower Head  Level  of Assist Required for Shower Transfer: Requires Supervision with Shower Transfer  Equipment for Shower Transfer: Grab Bars in Tub / Shower, Shower Chair  Level of Assist Required for Home Mgmt: Requires Physical Assist with Home Management  Level of Assist Required for Meal Prep: Requires Physical Assist with Meal Preparation  Driving: Please Contact Physician Prior to Driving  Home Exercise Program: Refer to Home Exercise Program Handout for Details

## 2021-01-15 DIAGNOSIS — Z23 NEED FOR VACCINATION: ICD-10-CM

## 2021-03-03 NOTE — PROGRESS NOTES
Report received from day shift RN, assumed care of patient at 1900. Patient currently AAOx4, speech clear, RHODES. Resting in bed, c/o feeling sleepy but denies any pain, needs, or distress at this time. Patient due to void, straight cathed by RN for 600 cc clear, yellow urine. Pt tolerated well. All vital signs stable, patient on 2L NC with SpO2 stable. Telemetry monitor on, safety precautions in place. Will continue to monitor.    Home

## 2021-03-20 NOTE — PROGRESS NOTES
Received report and assumed care at 0700. Patient A/O x 4, no complaints of pain, tele box on, on 1.5L oxygen. Bed locked and in lowest position. Will continue to monitor.    Vital Signs Last 24 Hrs  T(C): 36.6 (20 Mar 2021 07:49), Max: 36.8 (19 Mar 2021 19:59)  T(F): 97.9 (20 Mar 2021 07:49), Max: 98.2 (19 Mar 2021 19:59)  HR: 76 (20 Mar 2021 07:49) (76 - 76)  BP: 112/71 (20 Mar 2021 07:49) (112/71 - 112/71)  BP(mean): --  RR: --  SpO2: --

## 2023-09-16 NOTE — PROGRESS NOTES
"Rehab Progress Note     Date of Service: 4/3/2020  Chief Complaint: follow up stroke, heart attack    Interval Events (Subjective)    Patient seen and examined in his room today.  He reports that therapy went well this morning and he is not having any chest pain.  He is about ready to call his wife.  He did report a mild headache at the end of his speech therapy session but this has resolved.  He is prepared after his phone call to empty his bladder.  He has no new complaints.    Objective:  VITAL SIGNS: /45   Pulse 80   Temp 36.5 °C (97.7 °F) (Oral)   Resp 17   Ht 1.803 m (5' 11\")   Wt 64.5 kg (142 lb 3.2 oz)   SpO2 96%   BMI 19.83 kg/m²   Gen: alert, no apparent distress    Recent Results (from the past 72 hour(s))   APTT    Collection Time: 03/31/20  3:16 PM   Result Value Ref Range    APTT 97.1 (H) 24.7 - 36.0 sec   ACCU-CHEK GLUCOSE    Collection Time: 03/31/20  4:39 PM   Result Value Ref Range    Glucose - Accu-Ck 99 65 - 99 mg/dL   APTT    Collection Time: 03/31/20  9:39 PM   Result Value Ref Range    APTT 93.5 (H) 24.7 - 36.0 sec   APTT    Collection Time: 04/01/20  3:59 AM   Result Value Ref Range    APTT 136.2 (HH) 24.7 - 36.0 sec   APTT    Collection Time: 04/01/20  9:53 AM   Result Value Ref Range    APTT 50.7 (H) 24.7 - 36.0 sec   CBC WITH DIFFERENTIAL    Collection Time: 04/01/20 12:52 PM   Result Value Ref Range    WBC 7.6 4.8 - 10.8 K/uL    RBC 3.17 (L) 4.70 - 6.10 M/uL    Hemoglobin 10.3 (L) 14.0 - 18.0 g/dL    Hematocrit 31.9 (L) 42.0 - 52.0 %    .6 (H) 81.4 - 97.8 fL    MCH 32.5 27.0 - 33.0 pg    MCHC 32.3 (L) 33.7 - 35.3 g/dL    RDW 49.5 35.9 - 50.0 fL    Platelet Count 202 164 - 446 K/uL    MPV 10.3 9.0 - 12.9 fL    Neutrophils-Polys 64.20 44.00 - 72.00 %    Lymphocytes 19.20 (L) 22.00 - 41.00 %    Monocytes 13.20 0.00 - 13.40 %    Eosinophils 2.20 0.00 - 6.90 %    Basophils 0.70 0.00 - 1.80 %    Immature Granulocytes 0.50 0.00 - 0.90 %    Nucleated RBC 0.00 /100 WBC    " PO fluids provided.    Neutrophils (Absolute) 4.88 1.82 - 7.42 K/uL    Lymphs (Absolute) 1.46 1.00 - 4.80 K/uL    Monos (Absolute) 1.00 (H) 0.00 - 0.85 K/uL    Eos (Absolute) 0.17 0.00 - 0.51 K/uL    Baso (Absolute) 0.05 0.00 - 0.12 K/uL    Immature Granulocytes (abs) 0.04 0.00 - 0.11 K/uL    NRBC (Absolute) 0.00 K/uL   Comp Metabolic Panel    Collection Time: 04/01/20 12:52 PM   Result Value Ref Range    Sodium 143 135 - 145 mmol/L    Potassium 4.4 3.6 - 5.5 mmol/L    Chloride 105 96 - 112 mmol/L    Co2 26 20 - 33 mmol/L    Anion Gap 12.0 7.0 - 16.0    Glucose 86 65 - 99 mg/dL    Bun 21 8 - 22 mg/dL    Creatinine 1.44 (H) 0.50 - 1.40 mg/dL    Calcium 9.3 8.5 - 10.5 mg/dL    AST(SGOT) 37 12 - 45 U/L    ALT(SGPT) 34 2 - 50 U/L    Alkaline Phosphatase 82 30 - 99 U/L    Total Bilirubin 0.4 0.1 - 1.5 mg/dL    Albumin 3.8 3.2 - 4.9 g/dL    Total Protein 6.5 6.0 - 8.2 g/dL    Globulin 2.7 1.9 - 3.5 g/dL    A-G Ratio 1.4 g/dL   MAGNESIUM    Collection Time: 04/01/20 12:52 PM   Result Value Ref Range    Magnesium 1.9 1.5 - 2.5 mg/dL   PHOSPHORUS    Collection Time: 04/01/20 12:52 PM   Result Value Ref Range    Phosphorus 3.4 2.5 - 4.5 mg/dL   PROCALCITONIN    Collection Time: 04/01/20 12:52 PM   Result Value Ref Range    Procalcitonin 0.07 <0.25 ng/mL   ESTIMATED GFR    Collection Time: 04/01/20 12:52 PM   Result Value Ref Range    GFR If  56 (A) >60 mL/min/1.73 m 2    GFR If Non  46 (A) >60 mL/min/1.73 m 2   URINALYSIS    Collection Time: 04/01/20  5:44 PM   Result Value Ref Range    Color Yellow     Character Clear     Specific Gravity 1.009 <1.035    Ph 8.0 5.0 - 8.0    Glucose Negative Negative mg/dL    Ketones Negative Negative mg/dL    Protein Negative Negative mg/dL    Bilirubin Negative Negative    Urobilinogen, Urine 0.2 Negative    Nitrite Negative Negative    Leukocyte Esterase Large (A) Negative    Occult Blood Negative Negative    Micro Urine Req Microscopic    URINE MICROSCOPIC (W/UA)    Collection  Time: 04/01/20  5:44 PM   Result Value Ref Range    WBC  (A) /hpf    RBC 0-2 (A) /hpf    Bacteria Few (A) None /hpf    Epithelial Cells Negative /hpf    Hyaline Cast 0-2 /lpf   CBC WITH DIFFERENTIAL    Collection Time: 04/02/20  3:29 AM   Result Value Ref Range    WBC 8.5 4.8 - 10.8 K/uL    RBC 3.03 (L) 4.70 - 6.10 M/uL    Hemoglobin 9.9 (L) 14.0 - 18.0 g/dL    Hematocrit 30.5 (L) 42.0 - 52.0 %    .7 (H) 81.4 - 97.8 fL    MCH 32.7 27.0 - 33.0 pg    MCHC 32.5 (L) 33.7 - 35.3 g/dL    RDW 49.0 35.9 - 50.0 fL    Platelet Count 227 164 - 446 K/uL    MPV 10.4 9.0 - 12.9 fL    Neutrophils-Polys 65.40 44.00 - 72.00 %    Lymphocytes 19.10 (L) 22.00 - 41.00 %    Monocytes 12.40 0.00 - 13.40 %    Eosinophils 1.90 0.00 - 6.90 %    Basophils 0.70 0.00 - 1.80 %    Immature Granulocytes 0.50 0.00 - 0.90 %    Nucleated RBC 0.00 /100 WBC    Neutrophils (Absolute) 5.54 1.82 - 7.42 K/uL    Lymphs (Absolute) 1.62 1.00 - 4.80 K/uL    Monos (Absolute) 1.05 (H) 0.00 - 0.85 K/uL    Eos (Absolute) 0.16 0.00 - 0.51 K/uL    Baso (Absolute) 0.06 0.00 - 0.12 K/uL    Immature Granulocytes (abs) 0.04 0.00 - 0.11 K/uL    NRBC (Absolute) 0.00 K/uL   Comp Metabolic Panel    Collection Time: 04/02/20  3:29 AM   Result Value Ref Range    Sodium 141 135 - 145 mmol/L    Potassium 4.4 3.6 - 5.5 mmol/L    Chloride 104 96 - 112 mmol/L    Co2 24 20 - 33 mmol/L    Anion Gap 13.0 7.0 - 16.0    Glucose 107 (H) 65 - 99 mg/dL    Bun 25 (H) 8 - 22 mg/dL    Creatinine 1.46 (H) 0.50 - 1.40 mg/dL    Calcium 9.2 8.5 - 10.5 mg/dL    AST(SGOT) 30 12 - 45 U/L    ALT(SGPT) 33 2 - 50 U/L    Alkaline Phosphatase 77 30 - 99 U/L    Total Bilirubin 0.5 0.1 - 1.5 mg/dL    Albumin 3.9 3.2 - 4.9 g/dL    Total Protein 6.5 6.0 - 8.2 g/dL    Globulin 2.6 1.9 - 3.5 g/dL    A-G Ratio 1.5 g/dL   ESTIMATED GFR    Collection Time: 04/02/20  3:29 AM   Result Value Ref Range    GFR If  55 (A) >60 mL/min/1.73 m 2    GFR If Non  46 (A) >60  mL/min/1.73 m 2   CBC with Differential    Collection Time: 04/03/20  5:17 AM   Result Value Ref Range    WBC 8.4 4.8 - 10.8 K/uL    RBC 3.16 (L) 4.70 - 6.10 M/uL    Hemoglobin 10.3 (L) 14.0 - 18.0 g/dL    Hematocrit 32.2 (L) 42.0 - 52.0 %    .9 (H) 81.4 - 97.8 fL    MCH 32.6 27.0 - 33.0 pg    MCHC 32.0 (L) 33.7 - 35.3 g/dL    RDW 49.9 35.9 - 50.0 fL    Platelet Count 236 164 - 446 K/uL    MPV 10.7 9.0 - 12.9 fL    Neutrophils-Polys 66.80 44.00 - 72.00 %    Lymphocytes 18.10 (L) 22.00 - 41.00 %    Monocytes 11.60 0.00 - 13.40 %    Eosinophils 2.60 0.00 - 6.90 %    Basophils 0.50 0.00 - 1.80 %    Immature Granulocytes 0.40 0.00 - 0.90 %    Nucleated RBC 0.00 /100 WBC    Neutrophils (Absolute) 5.59 1.82 - 7.42 K/uL    Lymphs (Absolute) 1.51 1.00 - 4.80 K/uL    Monos (Absolute) 0.97 (H) 0.00 - 0.85 K/uL    Eos (Absolute) 0.22 0.00 - 0.51 K/uL    Baso (Absolute) 0.04 0.00 - 0.12 K/uL    Immature Granulocytes (abs) 0.03 0.00 - 0.11 K/uL    NRBC (Absolute) 0.00 K/uL   Comp Metabolic Panel (CMP)    Collection Time: 04/03/20  5:17 AM   Result Value Ref Range    Sodium 143 135 - 145 mmol/L    Potassium 3.8 3.6 - 5.5 mmol/L    Chloride 105 96 - 112 mmol/L    Co2 25 20 - 33 mmol/L    Anion Gap 13.0 7.0 - 16.0    Glucose 87 65 - 99 mg/dL    Bun 25 (H) 8 - 22 mg/dL    Creatinine 1.47 (H) 0.50 - 1.40 mg/dL    Calcium 9.3 8.5 - 10.5 mg/dL    AST(SGOT) 28 12 - 45 U/L    ALT(SGPT) 30 2 - 50 U/L    Alkaline Phosphatase 81 30 - 99 U/L    Total Bilirubin 0.5 0.1 - 1.5 mg/dL    Albumin 3.8 3.2 - 4.9 g/dL    Total Protein 6.4 6.0 - 8.2 g/dL    Globulin 2.6 1.9 - 3.5 g/dL    A-G Ratio 1.5 g/dL   Magnesium    Collection Time: 04/03/20  5:17 AM   Result Value Ref Range    Magnesium 1.7 1.5 - 2.5 mg/dL   Vitamin D, 25-hydroxy (blood)    Collection Time: 04/03/20  5:17 AM   Result Value Ref Range    25-Hydroxy   Vitamin D 25 47 30 - 100 ng/mL   ESTIMATED GFR    Collection Time: 04/03/20  5:17 AM   Result Value Ref Range    GFR If   55 (A) >60 mL/min/1.73 m 2    GFR If Non African American 45 (A) >60 mL/min/1.73 m 2       Current Facility-Administered Medications   Medication Frequency   • Respiratory Therapy Consult Continuous RT   • Pharmacy Consult Request ...Pain Management Review 1 Each PHARMACY TO DOSE   • acetaminophen (TYLENOL) tablet 650 mg Q4HRS PRN   • artificial tears ophthalmic solution 1 Drop PRN   • benzocaine-menthol (CEPACOL) lozenge 1 Lozenge Q2HRS PRN   • mag hydrox-al hydrox-simeth (MAALOX PLUS ES or MYLANTA DS) suspension 20 mL Q2HRS PRN   • ondansetron (ZOFRAN ODT) dispertab 4 mg 4X/DAY PRN    Or   • ondansetron (ZOFRAN) syringe/vial injection 4 mg 4X/DAY PRN   • traZODone (DESYREL) tablet 50 mg QHS PRN   • sodium chloride (OCEAN) 0.65 % nasal spray 2 Spray PRN   • hydrOXYzine HCl (ATARAX) tablet 50 mg Q6HRS PRN   • melatonin tablet 3 mg HS PRN   • lactulose 20 GM/30ML solution 30 mL QDAY PRN   • docusate sodium (ENEMEEZ) enema 283 mg QDAY PRN   • meclizine (ANTIVERT) tablet 25 mg TID PRN   • nitroglycerin (NITROSTAT) tablet 0.4 mg Q5 MIN PRN   • morphine (MS IR) tablet 7.5 mg Q6HRS PRN   • aspirin EC (ECOTRIN) tablet 81 mg DAILY   • atorvastatin (LIPITOR) tablet 80 mg Q EVENING   • clopidogrel (PLAVIX) tablet 75 mg DAILY   • heparin injection 5,000 Units Q8HRS   • omeprazole (PRILOSEC) capsule 20 mg BID   • gabapentin (NEURONTIN) capsule 300 mg TID   • senna-docusate (PERICOLACE or SENOKOT S) 8.6-50 MG per tablet 2 Tab BID   • magnesium hydroxide (MILK OF MAGNESIA) suspension 30 mL QDAY PRN   • lidocaine 2 % jelly PRN   • LORazepam (ATIVAN) tablet 1 mg Q4HRS PRN       Orders Placed This Encounter   Procedures   • Diet Order Cardiac     Standing Status:   Standing     Number of Occurrences:   1     Order Specific Question:   Diet:     Answer:   Cardiac [6]       Assessment:  Active Hospital Problems    Diagnosis   • *CVA (cerebral vascular accident) (HCC)   • NSTEMI (non-ST elevated myocardial infarction)  ([facility])   • Chronic combined systolic and diastolic congestive heart failure ([facility])   • Coronary artery disease involving native coronary artery   • GERD (gastroesophageal reflux disease)   • CKD (chronic kidney disease) stage 3, GFR 30-59 ml/min ([facility])   • Neurogenic bladder   • Urinary retention     This patient is a 87 y.o. male admitted for acute inpatient rehabilitation with CVA (cerebral vascular accident) ([facility]).    Medical Decision Making and Plan:    Left cerebellar stroke  Left vertebral artery occlusion  2/2 cath/PCI  Left sided hemiparesis  Left sided paresthesias  Non-dominant  Continue full rehab program  PT/OT/SLP, 1 hr each discipline, 5 days per week  Continue aspirin, plavix, and statin for secondary stroke prophylaxis    Dizziness  PRN meclizine     Chronic urinary retention  Does ICs at home for last 11 years     Peripheral neuropathy  Continue gabapentin    Bowel  Meds as needed  Last BM 4/2    DVT prophylaxis  Heparin    Appreciate assistance of hospitalist with his medical co-morbidities:     Coronary artery disease  S/p NSTEMI x 2 - if patient has symptoms of another heart attack, no plan to send to ED, plan to transfer to palliative/hospice - daughter and patient in agreement  Acute systolic CHF, EF 15%  Hypotension  Chronic kidney disease  Macrocytic Anemia  GERD    Total time:  35 minutes.  I spent greater than 50% of the time for patient care, counseling, and coordination on this date, including patient face-to face time, unit/floor time with review of records/pertinent lab data and studies, as well as discussing diagnostic evaluation/work up, planned therapeutic interventions, and future disposition of care, as per the interval events/subjective and the assessment and plan as noted above.      Eliz Hernandez M.D.   Physical Medicine and Rehabilitation